# Patient Record
Sex: MALE | Race: WHITE | NOT HISPANIC OR LATINO | Employment: OTHER | URBAN - METROPOLITAN AREA
[De-identification: names, ages, dates, MRNs, and addresses within clinical notes are randomized per-mention and may not be internally consistent; named-entity substitution may affect disease eponyms.]

---

## 2017-04-13 ENCOUNTER — ALLSCRIPTS OFFICE VISIT (OUTPATIENT)
Dept: OTHER | Facility: OTHER | Age: 77
End: 2017-04-13

## 2017-04-13 ENCOUNTER — HOSPITAL ENCOUNTER (OUTPATIENT)
Dept: RADIOLOGY | Facility: CLINIC | Age: 77
Discharge: HOME | End: 2017-04-13
Payer: COMMERCIAL

## 2017-04-13 DIAGNOSIS — M16.12 PRIMARY OSTEOARTHRITIS OF LEFT HIP: ICD-10-CM

## 2017-04-13 PROCEDURE — 73501 X-RAY EXAM HIP UNI 1 VIEW: CPT

## 2017-04-14 LAB
A/G RATIO (HISTORICAL): 1.9 (ref 1.2–2.2)
ALBUMIN SERPL BCP-MCNC: 4.5 G/DL (ref 3.5–4.8)
ALP SERPL-CCNC: 21 IU/L (ref 39–117)
ALT SERPL W P-5'-P-CCNC: 22 IU/L (ref 0–44)
AST SERPL W P-5'-P-CCNC: 28 IU/L (ref 0–40)
BILIRUB SERPL-MCNC: 1.3 MG/DL (ref 0–1.2)
BUN SERPL-MCNC: 12 MG/DL (ref 8–27)
BUN/CREA RATIO (HISTORICAL): 12 (ref 10–24)
CALCIUM SERPL-MCNC: 9.5 MG/DL (ref 8.6–10.2)
CHLORIDE SERPL-SCNC: 100 MMOL/L (ref 96–106)
CHOLEST SERPL-MCNC: 168 MG/DL (ref 100–199)
CHOLEST/HDLC SERPL: 2.5 RATIO UNITS (ref 0–5)
CO2 SERPL-SCNC: 24 MMOL/L (ref 18–29)
CREAT SERPL-MCNC: 1.02 MG/DL (ref 0.76–1.27)
EGFR AFRICAN AMERICAN (HISTORICAL): 83 ML/MIN/1.73
EGFR-AMERICAN CALC (HISTORICAL): 72 ML/MIN/1.73
GLUCOSE SERPL-MCNC: 88 MG/DL (ref 65–99)
HDLC SERPL-MCNC: 67 MG/DL
LDLC SERPL CALC-MCNC: 90 MG/DL (ref 0–99)
POTASSIUM SERPL-SCNC: 5.8 MMOL/L (ref 3.5–5.2)
PROSTATE SPECIFIC ANTIGEN (HISTORICAL): 0.8 NG/ML (ref 0–4)
SODIUM SERPL-SCNC: 141 MMOL/L (ref 134–144)
TOT. GLOBULIN, SERUM (HISTORICAL): 2.4 G/DL (ref 1.5–4.5)
TOTAL PROTEIN (HISTORICAL): 6.9 G/DL (ref 6–8.5)
TRIGL SERPL-MCNC: 57 MG/DL (ref 0–149)
VLDLC SERPL CALC-MCNC: 11 MG/DL (ref 5–40)

## 2017-04-17 ENCOUNTER — GENERIC CONVERSION - ENCOUNTER (OUTPATIENT)
Dept: OTHER | Facility: OTHER | Age: 77
End: 2017-04-17

## 2017-05-11 ENCOUNTER — GENERIC CONVERSION - ENCOUNTER (OUTPATIENT)
Dept: OTHER | Facility: OTHER | Age: 77
End: 2017-05-11

## 2017-05-11 ENCOUNTER — TRANSCRIBE ORDERS (OUTPATIENT)
Dept: LAB | Facility: HOSPITAL | Age: 77
End: 2017-05-11

## 2017-05-11 ENCOUNTER — OFFICE VISIT (OUTPATIENT)
Dept: LAB | Facility: HOSPITAL | Age: 77
End: 2017-05-11
Attending: ORTHOPAEDIC SURGERY
Payer: COMMERCIAL

## 2017-05-11 ENCOUNTER — HOSPITAL ENCOUNTER (OUTPATIENT)
Dept: RADIOLOGY | Facility: HOSPITAL | Age: 77
Discharge: HOME | End: 2017-05-11
Attending: ORTHOPAEDIC SURGERY
Payer: COMMERCIAL

## 2017-05-11 ENCOUNTER — TRANSCRIBE ORDERS (OUTPATIENT)
Dept: RADIOLOGY | Facility: HOSPITAL | Age: 77
End: 2017-05-11

## 2017-05-11 ENCOUNTER — ALLSCRIPTS OFFICE VISIT (OUTPATIENT)
Dept: OTHER | Facility: OTHER | Age: 77
End: 2017-05-11

## 2017-05-11 DIAGNOSIS — M16.12 PRIMARY OSTEOARTHRITIS OF LEFT HIP: ICD-10-CM

## 2017-05-11 DIAGNOSIS — M16.12 PRIMARY OSTEOARTHRITIS OF LEFT HIP: Primary | ICD-10-CM

## 2017-05-11 LAB
ABO GROUP BLD: NORMAL
ATRIAL RATE: 50 BPM
BLD GP AB SCN SERPL QL: NEGATIVE
P AXIS: 44 DEGREES
PR INTERVAL: 264 MS
QRS AXIS: 18 DEGREES
QRSD INTERVAL: 82 MS
QT INTERVAL: 426 MS
QTC INTERVAL: 388 MS
RH BLD: POSITIVE
SPECIMEN EXPIRATION DATE: NORMAL
T WAVE AXIS: 24 DEGREES
VENTRICULAR RATE: 50 BPM

## 2017-05-11 PROCEDURE — 86900 BLOOD TYPING SEROLOGIC ABO: CPT

## 2017-05-11 PROCEDURE — 86901 BLOOD TYPING SEROLOGIC RH(D): CPT

## 2017-05-11 PROCEDURE — 93005 ELECTROCARDIOGRAM TRACING: CPT

## 2017-05-11 PROCEDURE — 71020 HB CHEST X-RAY 2VW FRONTAL&LATL: CPT

## 2017-05-11 PROCEDURE — 86850 RBC ANTIBODY SCREEN: CPT

## 2017-05-12 ENCOUNTER — GENERIC CONVERSION - ENCOUNTER (OUTPATIENT)
Dept: OTHER | Facility: OTHER | Age: 77
End: 2017-05-12

## 2017-05-12 LAB — AMBIG ABBREV CMP14 DEFAULT (HISTORICAL): NORMAL

## 2017-05-13 LAB
A/G RATIO (HISTORICAL): 1.8 (ref 1.2–2.2)
ALBUMIN SERPL BCP-MCNC: 4.2 G/DL (ref 3.5–4.8)
ALP SERPL-CCNC: 22 IU/L (ref 39–117)
ALT SERPL W P-5'-P-CCNC: 20 IU/L (ref 0–44)
APTT PPP: 28 SEC (ref 24–33)
AST SERPL W P-5'-P-CCNC: 28 IU/L (ref 0–40)
BACTERIA UR QL AUTO: NORMAL
BASOPHILS # BLD AUTO: 0.1 X10E3/UL (ref 0–0.2)
BASOPHILS # BLD AUTO: 1 %
BILIRUB SERPL-MCNC: 1 MG/DL (ref 0–1.2)
BILIRUB UR QL STRIP: NEGATIVE
BUN SERPL-MCNC: 13 MG/DL (ref 8–27)
BUN/CREA RATIO (HISTORICAL): 13 (ref 10–24)
CALCIUM SERPL-MCNC: 9.3 MG/DL (ref 8.6–10.2)
CHLORIDE SERPL-SCNC: 94 MMOL/L (ref 96–106)
CO2 SERPL-SCNC: 25 MMOL/L (ref 18–29)
COLOR UR: YELLOW
COMMENT (HISTORICAL): CLEAR
CREAT SERPL-MCNC: 1.02 MG/DL (ref 0.76–1.27)
DEPRECATED RDW RBC AUTO: 13.4 % (ref 12.3–15.4)
EGFR AFRICAN AMERICAN (HISTORICAL): 83 ML/MIN/1.73
EGFR-AMERICAN CALC (HISTORICAL): 72 ML/MIN/1.73
EOSINOPHIL # BLD AUTO: 0.1 X10E3/UL (ref 0–0.4)
EOSINOPHIL # BLD AUTO: 2 %
FECAL OCCULT BLOOD DIAGNOSTIC (HISTORICAL): NEGATIVE
GLUCOSE (HISTORICAL): NEGATIVE
GLUCOSE SERPL-MCNC: 90 MG/DL (ref 65–99)
HBA1C MFR BLD HPLC: 5.3 % (ref 4.8–5.6)
HCT VFR BLD AUTO: 41 % (ref 37.5–51)
HGB BLD-MCNC: 13.6 G/DL (ref 12.6–17.7)
IMM.GRANULOCYTES (CD4/8) (HISTORICAL): 0 %
IMM.GRANULOCYTES (CD4/8) (HISTORICAL): 0 X10E3/UL (ref 0–0.1)
INR PPP: 1.1 (ref 0.8–1.2)
KETONES UR STRIP-MCNC: NEGATIVE MG/DL
LEUKOCYTE ESTERASE UR QL STRIP: NEGATIVE
LYMPHOCYTES # BLD AUTO: 1.5 X10E3/UL (ref 0.7–3.1)
LYMPHOCYTES # BLD AUTO: 26 %
MCH RBC QN AUTO: 31.6 PG (ref 26.6–33)
MCHC RBC AUTO-ENTMCNC: 33.2 G/DL (ref 31.5–35.7)
MCV RBC AUTO: 95 FL (ref 79–97)
MICROSCOPIC EXAMINATION (HISTORICAL): NORMAL
MICROSCOPIC EXAMINATION (HISTORICAL): NORMAL
MONOCYTES # BLD AUTO: 0.4 X10E3/UL (ref 0.1–0.9)
MONOCYTES (HISTORICAL): 7 %
NEUTROPHILS # BLD AUTO: 3.9 X10E3/UL (ref 1.4–7)
NEUTROPHILS # BLD AUTO: 64 %
NITRITE UR QL STRIP: NEGATIVE
NON-SQ EPI CELLS URNS QL MICRO: NORMAL /HPF
PH UR STRIP.AUTO: 7 [PH] (ref 5–7.5)
PLATELET # BLD AUTO: 319 X10E3/UL (ref 150–379)
POTASSIUM SERPL-SCNC: 4.5 MMOL/L (ref 3.5–5.2)
PROT UR STRIP-MCNC: NEGATIVE MG/DL
PROTHROMBIN TIME: 11.9 SEC (ref 9.1–12)
RBC (HISTORICAL): 4.31 X10E6/UL (ref 4.14–5.8)
RBC (HISTORICAL): NORMAL /HPF
SODIUM SERPL-SCNC: 134 MMOL/L (ref 134–144)
SP GR UR STRIP.AUTO: 1.01 (ref 1–1.03)
TOT. GLOBULIN, SERUM (HISTORICAL): 2.3 G/DL (ref 1.5–4.5)
TOTAL PROTEIN (HISTORICAL): 6.5 G/DL (ref 6–8.5)
UROBILINOGEN UR QL STRIP.AUTO: 0.2 EU/DL (ref 0.2–1)
WBC # BLD AUTO: 6 X10E3/UL (ref 3.4–10.8)
WBC # BLD AUTO: NORMAL /HPF

## 2017-05-23 ENCOUNTER — ALLSCRIPTS OFFICE VISIT (OUTPATIENT)
Dept: OTHER | Facility: OTHER | Age: 77
End: 2017-05-23

## 2017-06-05 ENCOUNTER — ANESTHESIA EVENT (OUTPATIENT)
Dept: PERIOP | Facility: HOSPITAL | Age: 77
DRG: 470 | End: 2017-06-05
Payer: COMMERCIAL

## 2017-06-06 DIAGNOSIS — Z47.1 AFTERCARE FOLLOWING JOINT REPLACEMENT SURGERY: ICD-10-CM

## 2017-06-06 DIAGNOSIS — M16.12 PRIMARY OSTEOARTHRITIS OF LEFT HIP: ICD-10-CM

## 2017-06-09 ENCOUNTER — GENERIC CONVERSION - ENCOUNTER (OUTPATIENT)
Dept: OTHER | Facility: OTHER | Age: 77
End: 2017-06-09

## 2017-06-12 ENCOUNTER — ANESTHESIA (OUTPATIENT)
Dept: PERIOP | Facility: HOSPITAL | Age: 77
DRG: 470 | End: 2017-06-12
Payer: COMMERCIAL

## 2017-06-12 ENCOUNTER — APPOINTMENT (OUTPATIENT)
Dept: RADIOLOGY | Facility: HOSPITAL | Age: 77
DRG: 470 | End: 2017-06-12
Payer: COMMERCIAL

## 2017-06-12 ENCOUNTER — HOSPITAL ENCOUNTER (INPATIENT)
Facility: HOSPITAL | Age: 77
LOS: 1 days | Discharge: HOME HEALTH | DRG: 470 | End: 2017-06-13
Attending: ORTHOPAEDIC SURGERY | Admitting: ORTHOPAEDIC SURGERY
Payer: COMMERCIAL

## 2017-06-12 LAB
ABO GROUP BLD: NORMAL
BLD GP AB SCN SERPL QL: NEGATIVE
RH BLD: POSITIVE
SPECIMEN EXPIRATION DATE: NORMAL

## 2017-06-12 PROCEDURE — 73501 X-RAY EXAM HIP UNI 1 VIEW: CPT

## 2017-06-12 PROCEDURE — C1776 JOINT DEVICE (IMPLANTABLE): HCPCS | Performed by: ORTHOPAEDIC SURGERY

## 2017-06-12 PROCEDURE — 86923 COMPATIBILITY TEST ELECTRIC: CPT

## 2017-06-12 PROCEDURE — A9270 NON-COVERED ITEM OR SERVICE: HCPCS | Performed by: ORTHOPAEDIC SURGERY

## 2017-06-12 PROCEDURE — G8988 SELF CARE GOAL STATUS: HCPCS

## 2017-06-12 PROCEDURE — C1713 ANCHOR/SCREW BN/BN,TIS/BN: HCPCS | Performed by: ORTHOPAEDIC SURGERY

## 2017-06-12 PROCEDURE — A9270 NON-COVERED ITEM OR SERVICE: HCPCS | Performed by: PHYSICIAN ASSISTANT

## 2017-06-12 PROCEDURE — G8978 MOBILITY CURRENT STATUS: HCPCS

## 2017-06-12 PROCEDURE — 0SRB0JA REPLACEMENT OF LEFT HIP JOINT WITH SYNTHETIC SUBSTITUTE, UNCEMENTED, OPEN APPROACH: ICD-10-PCS | Performed by: ORTHOPAEDIC SURGERY

## 2017-06-12 PROCEDURE — 30233N1 TRANSFUSION OF NONAUTOLOGOUS RED BLOOD CELLS INTO PERIPHERAL VEIN, PERCUTANEOUS APPROACH: ICD-10-PCS | Performed by: ORTHOPAEDIC SURGERY

## 2017-06-12 PROCEDURE — 86901 BLOOD TYPING SEROLOGIC RH(D): CPT | Performed by: ORTHOPAEDIC SURGERY

## 2017-06-12 PROCEDURE — A9270 NON-COVERED ITEM OR SERVICE: HCPCS | Performed by: ANESTHESIOLOGY

## 2017-06-12 PROCEDURE — G8979 MOBILITY GOAL STATUS: HCPCS

## 2017-06-12 PROCEDURE — 97165 OT EVAL LOW COMPLEX 30 MIN: CPT

## 2017-06-12 PROCEDURE — 97163 PT EVAL HIGH COMPLEX 45 MIN: CPT

## 2017-06-12 PROCEDURE — 86850 RBC ANTIBODY SCREEN: CPT | Performed by: ORTHOPAEDIC SURGERY

## 2017-06-12 PROCEDURE — 86900 BLOOD TYPING SEROLOGIC ABO: CPT | Performed by: ORTHOPAEDIC SURGERY

## 2017-06-12 PROCEDURE — G8987 SELF CARE CURRENT STATUS: HCPCS

## 2017-06-12 PROCEDURE — G8989 SELF CARE D/C STATUS: HCPCS

## 2017-06-12 DEVICE — M-SPEC METAL FEMORAL HEAD 12/14 TAPER DIAMETER 36MM +1.5: Type: IMPLANTABLE DEVICE | Site: HIP | Status: FUNCTIONAL

## 2017-06-12 DEVICE — PINNACLE POROCOAT ACETABULAR SHELL SECTOR II 54MM OD
Type: IMPLANTABLE DEVICE | Site: HIP | Status: FUNCTIONAL
Brand: PINNACLE POROCOAT

## 2017-06-12 DEVICE — CORAIL HIP SYSTEM CEMENTLESS FEMORAL STEM 12/14 AMT 135 DEGREES KA SIZE 12 HA COATED STANDARD COLLAR
Type: IMPLANTABLE DEVICE | Site: HIP | Status: FUNCTIONAL
Brand: CORAIL

## 2017-06-12 DEVICE — PINNACLE HIP SOLUTIONS ALTRX POLYETHYLENE ACETABULAR LINER NEUTRAL 36MM ID 54MM OD
Type: IMPLANTABLE DEVICE | Site: HIP | Status: FUNCTIONAL
Brand: PINNACLE ALTRX

## 2017-06-12 DEVICE — PINNACLE CANCELLOUS BONE SCREW 6.5MM X 25MM
Type: IMPLANTABLE DEVICE | Site: HIP | Status: FUNCTIONAL
Brand: PINNACLE

## 2017-06-12 RX ORDER — LIDOCAINE HYDROCHLORIDE 10 MG/ML
INJECTION, SOLUTION INFILTRATION; PERINEURAL AS NEEDED
Status: DISCONTINUED | OUTPATIENT
Start: 2017-06-12 | End: 2017-06-12 | Stop reason: SURG

## 2017-06-12 RX ORDER — HYDROMORPHONE HYDROCHLORIDE 2 MG/ML
INJECTION, SOLUTION INTRAMUSCULAR; INTRAVENOUS; SUBCUTANEOUS AS NEEDED
Status: DISCONTINUED | OUTPATIENT
Start: 2017-06-12 | End: 2017-06-12 | Stop reason: SURG

## 2017-06-12 RX ORDER — MIDAZOLAM HYDROCHLORIDE 1 MG/ML
INJECTION INTRAMUSCULAR; INTRAVENOUS AS NEEDED
Status: DISCONTINUED | OUTPATIENT
Start: 2017-06-12 | End: 2017-06-12 | Stop reason: SURG

## 2017-06-12 RX ORDER — ONDANSETRON 2 MG/ML
4 INJECTION INTRAMUSCULAR; INTRAVENOUS ONCE AS NEEDED
Status: DISCONTINUED | OUTPATIENT
Start: 2017-06-12 | End: 2017-06-12 | Stop reason: HOSPADM

## 2017-06-12 RX ORDER — METOCLOPRAMIDE HYDROCHLORIDE 5 MG/ML
10 INJECTION INTRAMUSCULAR; INTRAVENOUS ONCE AS NEEDED
Status: DISCONTINUED | OUTPATIENT
Start: 2017-06-12 | End: 2017-06-12 | Stop reason: HOSPADM

## 2017-06-12 RX ORDER — ONDANSETRON 2 MG/ML
4 INJECTION INTRAMUSCULAR; INTRAVENOUS EVERY 6 HOURS PRN
Status: DISCONTINUED | OUTPATIENT
Start: 2017-06-12 | End: 2017-06-13 | Stop reason: HOSPADM

## 2017-06-12 RX ORDER — FENTANYL CITRATE/PF 50 MCG/ML
50 SYRINGE (ML) INJECTION
Status: DISCONTINUED | OUTPATIENT
Start: 2017-06-12 | End: 2017-06-12 | Stop reason: HOSPADM

## 2017-06-12 RX ORDER — GABAPENTIN 100 MG/1
100 CAPSULE ORAL ONCE
Status: COMPLETED | OUTPATIENT
Start: 2017-06-12 | End: 2017-06-12

## 2017-06-12 RX ORDER — PANTOPRAZOLE SODIUM 40 MG/1
40 TABLET, DELAYED RELEASE ORAL DAILY
Status: DISCONTINUED | OUTPATIENT
Start: 2017-06-12 | End: 2017-06-13 | Stop reason: HOSPADM

## 2017-06-12 RX ORDER — EPHEDRINE SULFATE 50 MG/ML
INJECTION, SOLUTION INTRAVENOUS AS NEEDED
Status: DISCONTINUED | OUTPATIENT
Start: 2017-06-12 | End: 2017-06-12 | Stop reason: SURG

## 2017-06-12 RX ORDER — MULTIVITAMIN
1 TABLET ORAL DAILY
COMMUNITY
End: 2017-12-28 | Stop reason: ALTCHOICE

## 2017-06-12 RX ORDER — TRANEXAMIC ACID 100 MG/ML
INJECTION, SOLUTION INTRAVENOUS AS NEEDED
Status: DISCONTINUED | OUTPATIENT
Start: 2017-06-12 | End: 2017-06-12 | Stop reason: SURG

## 2017-06-12 RX ORDER — DOCUSATE SODIUM 100 MG/1
100 CAPSULE, LIQUID FILLED ORAL 2 TIMES DAILY
Status: DISCONTINUED | OUTPATIENT
Start: 2017-06-12 | End: 2017-06-13 | Stop reason: HOSPADM

## 2017-06-12 RX ORDER — MEPERIDINE HYDROCHLORIDE 25 MG/ML
12.5 INJECTION INTRAMUSCULAR; INTRAVENOUS; SUBCUTANEOUS ONCE AS NEEDED
Status: DISCONTINUED | OUTPATIENT
Start: 2017-06-12 | End: 2017-06-12 | Stop reason: HOSPADM

## 2017-06-12 RX ORDER — ONDANSETRON 2 MG/ML
INJECTION INTRAMUSCULAR; INTRAVENOUS AS NEEDED
Status: DISCONTINUED | OUTPATIENT
Start: 2017-06-12 | End: 2017-06-12 | Stop reason: SURG

## 2017-06-12 RX ORDER — ACETAMINOPHEN 325 MG/1
650 TABLET ORAL EVERY 6 HOURS PRN
Status: DISCONTINUED | OUTPATIENT
Start: 2017-06-12 | End: 2017-06-13 | Stop reason: HOSPADM

## 2017-06-12 RX ORDER — MAGNESIUM HYDROXIDE/ALUMINUM HYDROXICE/SIMETHICONE 120; 1200; 1200 MG/30ML; MG/30ML; MG/30ML
30 SUSPENSION ORAL EVERY 6 HOURS PRN
Status: DISCONTINUED | OUTPATIENT
Start: 2017-06-12 | End: 2017-06-13 | Stop reason: HOSPADM

## 2017-06-12 RX ORDER — TRAMADOL HYDROCHLORIDE 50 MG/1
50 TABLET ORAL EVERY 6 HOURS SCHEDULED
Status: DISCONTINUED | OUTPATIENT
Start: 2017-06-12 | End: 2017-06-13 | Stop reason: HOSPADM

## 2017-06-12 RX ORDER — MAGNESIUM HYDROXIDE 1200 MG/15ML
LIQUID ORAL AS NEEDED
Status: DISCONTINUED | OUTPATIENT
Start: 2017-06-12 | End: 2017-06-12 | Stop reason: HOSPADM

## 2017-06-12 RX ORDER — ACETAMINOPHEN 325 MG/1
650 TABLET ORAL ONCE
Status: COMPLETED | OUTPATIENT
Start: 2017-06-12 | End: 2017-06-12

## 2017-06-12 RX ORDER — SODIUM CHLORIDE, SODIUM LACTATE, POTASSIUM CHLORIDE, CALCIUM CHLORIDE 600; 310; 30; 20 MG/100ML; MG/100ML; MG/100ML; MG/100ML
125 INJECTION, SOLUTION INTRAVENOUS CONTINUOUS
Status: DISCONTINUED | OUTPATIENT
Start: 2017-06-12 | End: 2017-06-13 | Stop reason: HOSPADM

## 2017-06-12 RX ORDER — PROPOFOL 10 MG/ML
INJECTION, EMULSION INTRAVENOUS AS NEEDED
Status: DISCONTINUED | OUTPATIENT
Start: 2017-06-12 | End: 2017-06-12 | Stop reason: SURG

## 2017-06-12 RX ORDER — OXYCODONE HYDROCHLORIDE 5 MG/1
5 TABLET ORAL EVERY 4 HOURS PRN
Status: DISCONTINUED | OUTPATIENT
Start: 2017-06-12 | End: 2017-06-13 | Stop reason: HOSPADM

## 2017-06-12 RX ORDER — MORPHINE SULFATE 4 MG/ML
3 INJECTION, SOLUTION INTRAMUSCULAR; INTRAVENOUS
Status: DISCONTINUED | OUTPATIENT
Start: 2017-06-12 | End: 2017-06-13 | Stop reason: HOSPADM

## 2017-06-12 RX ORDER — ROCURONIUM BROMIDE 10 MG/ML
INJECTION, SOLUTION INTRAVENOUS AS NEEDED
Status: DISCONTINUED | OUTPATIENT
Start: 2017-06-12 | End: 2017-06-12 | Stop reason: SURG

## 2017-06-12 RX ORDER — SENNOSIDES 8.6 MG
1 TABLET ORAL DAILY
Status: DISCONTINUED | OUTPATIENT
Start: 2017-06-12 | End: 2017-06-13 | Stop reason: HOSPADM

## 2017-06-12 RX ORDER — SODIUM CHLORIDE 9 MG/ML
INJECTION, SOLUTION INTRAVENOUS CONTINUOUS PRN
Status: DISCONTINUED | OUTPATIENT
Start: 2017-06-12 | End: 2017-06-12 | Stop reason: SURG

## 2017-06-12 RX ORDER — OXYCODONE HYDROCHLORIDE 10 MG/1
10 TABLET ORAL EVERY 4 HOURS PRN
Status: DISCONTINUED | OUTPATIENT
Start: 2017-06-12 | End: 2017-06-13 | Stop reason: HOSPADM

## 2017-06-12 RX ORDER — OXYCODONE HYDROCHLORIDE 5 MG/1
TABLET ORAL
Qty: 60 TABLET | Refills: 0 | Status: SHIPPED | OUTPATIENT
Start: 2017-06-12 | End: 2017-12-28 | Stop reason: ALTCHOICE

## 2017-06-12 RX ORDER — FENTANYL CITRATE 50 UG/ML
INJECTION, SOLUTION INTRAMUSCULAR; INTRAVENOUS AS NEEDED
Status: DISCONTINUED | OUTPATIENT
Start: 2017-06-12 | End: 2017-06-12 | Stop reason: SURG

## 2017-06-12 RX ORDER — TRAMADOL HYDROCHLORIDE 50 MG/1
50 TABLET ORAL ONCE
Status: COMPLETED | OUTPATIENT
Start: 2017-06-12 | End: 2017-06-12

## 2017-06-12 RX ORDER — GLYCOPYRROLATE 0.2 MG/ML
INJECTION INTRAMUSCULAR; INTRAVENOUS AS NEEDED
Status: DISCONTINUED | OUTPATIENT
Start: 2017-06-12 | End: 2017-06-12 | Stop reason: SURG

## 2017-06-12 RX ORDER — CALCIUM CARBONATE 200(500)MG
1000 TABLET,CHEWABLE ORAL DAILY PRN
Status: DISCONTINUED | OUTPATIENT
Start: 2017-06-12 | End: 2017-06-13 | Stop reason: HOSPADM

## 2017-06-12 RX ADMIN — HYDROMORPHONE HYDROCHLORIDE 0.2 MG: 1 INJECTION, SOLUTION INTRAMUSCULAR; INTRAVENOUS; SUBCUTANEOUS at 11:41

## 2017-06-12 RX ADMIN — PROPOFOL 150 MG: 10 INJECTION, EMULSION INTRAVENOUS at 08:16

## 2017-06-12 RX ADMIN — PANTOPRAZOLE SODIUM 40 MG: 40 TABLET, DELAYED RELEASE ORAL at 13:28

## 2017-06-12 RX ADMIN — SODIUM CHLORIDE, SODIUM LACTATE, POTASSIUM CHLORIDE, AND CALCIUM CHLORIDE 125 ML/HR: .6; .31; .03; .02 INJECTION, SOLUTION INTRAVENOUS at 19:34

## 2017-06-12 RX ADMIN — FENTANYL CITRATE 50 MCG: 50 INJECTION, SOLUTION INTRAMUSCULAR; INTRAVENOUS at 10:24

## 2017-06-12 RX ADMIN — TRAMADOL HYDROCHLORIDE 50 MG: 50 TABLET, COATED ORAL at 07:19

## 2017-06-12 RX ADMIN — HYDROMORPHONE HYDROCHLORIDE 0.2 MG: 1 INJECTION, SOLUTION INTRAMUSCULAR; INTRAVENOUS; SUBCUTANEOUS at 12:02

## 2017-06-12 RX ADMIN — ROCURONIUM BROMIDE 10 MG: 10 INJECTION, SOLUTION INTRAVENOUS at 10:24

## 2017-06-12 RX ADMIN — HYDROMORPHONE HYDROCHLORIDE 0.2 MG: 1 INJECTION, SOLUTION INTRAMUSCULAR; INTRAVENOUS; SUBCUTANEOUS at 12:07

## 2017-06-12 RX ADMIN — LIDOCAINE HYDROCHLORIDE 50 MG: 10 INJECTION, SOLUTION INFILTRATION; PERINEURAL at 08:16

## 2017-06-12 RX ADMIN — SODIUM CHLORIDE, SODIUM LACTATE, POTASSIUM CHLORIDE, AND CALCIUM CHLORIDE: .6; .31; .03; .02 INJECTION, SOLUTION INTRAVENOUS at 06:30

## 2017-06-12 RX ADMIN — EPHEDRINE SULFATE 5 MG: 50 INJECTION, SOLUTION INTRAMUSCULAR; INTRAVENOUS; SUBCUTANEOUS at 10:39

## 2017-06-12 RX ADMIN — TRANEXAMIC ACID 1000 MG: 100 INJECTION, SOLUTION INTRAVENOUS at 08:36

## 2017-06-12 RX ADMIN — ONDANSETRON 4 MG: 2 INJECTION INTRAMUSCULAR; INTRAVENOUS at 08:10

## 2017-06-12 RX ADMIN — EPHEDRINE SULFATE 5 MG: 50 INJECTION, SOLUTION INTRAMUSCULAR; INTRAVENOUS; SUBCUTANEOUS at 09:19

## 2017-06-12 RX ADMIN — NEOSTIGMINE METHYLSULFATE 3 MG: 1 INJECTION, SOLUTION INTRAMUSCULAR; INTRAVENOUS; SUBCUTANEOUS at 11:00

## 2017-06-12 RX ADMIN — HYDROMORPHONE HYDROCHLORIDE 0.4 MG: 2 INJECTION, SOLUTION INTRAMUSCULAR; INTRAVENOUS; SUBCUTANEOUS at 11:23

## 2017-06-12 RX ADMIN — DOCUSATE SODIUM 100 MG: 100 CAPSULE, LIQUID FILLED ORAL at 18:04

## 2017-06-12 RX ADMIN — EPHEDRINE SULFATE 5 MG: 50 INJECTION, SOLUTION INTRAMUSCULAR; INTRAVENOUS; SUBCUTANEOUS at 08:40

## 2017-06-12 RX ADMIN — ROCURONIUM BROMIDE 10 MG: 10 INJECTION, SOLUTION INTRAVENOUS at 10:48

## 2017-06-12 RX ADMIN — GABAPENTIN 100 MG: 100 CAPSULE ORAL at 07:19

## 2017-06-12 RX ADMIN — EPHEDRINE SULFATE 5 MG: 50 INJECTION, SOLUTION INTRAMUSCULAR; INTRAVENOUS; SUBCUTANEOUS at 10:02

## 2017-06-12 RX ADMIN — SODIUM CHLORIDE: 0.9 INJECTION, SOLUTION INTRAVENOUS at 08:21

## 2017-06-12 RX ADMIN — EPHEDRINE SULFATE 5 MG: 50 INJECTION, SOLUTION INTRAMUSCULAR; INTRAVENOUS; SUBCUTANEOUS at 10:04

## 2017-06-12 RX ADMIN — FENTANYL CITRATE 100 MCG: 50 INJECTION, SOLUTION INTRAMUSCULAR; INTRAVENOUS at 08:16

## 2017-06-12 RX ADMIN — HYDROMORPHONE HYDROCHLORIDE 0.2 MG: 2 INJECTION, SOLUTION INTRAMUSCULAR; INTRAVENOUS; SUBCUTANEOUS at 11:20

## 2017-06-12 RX ADMIN — ROCURONIUM BROMIDE 50 MG: 10 INJECTION, SOLUTION INTRAVENOUS at 08:16

## 2017-06-12 RX ADMIN — SODIUM CHLORIDE, SODIUM LACTATE, POTASSIUM CHLORIDE, AND CALCIUM CHLORIDE 125 ML/HR: .6; .31; .03; .02 INJECTION, SOLUTION INTRAVENOUS at 11:48

## 2017-06-12 RX ADMIN — TRAMADOL HYDROCHLORIDE 50 MG: 50 TABLET, COATED ORAL at 18:04

## 2017-06-12 RX ADMIN — DOCUSATE SODIUM 100 MG: 100 CAPSULE, LIQUID FILLED ORAL at 13:28

## 2017-06-12 RX ADMIN — CEFAZOLIN SODIUM 1000 MG: 1 SOLUTION INTRAVENOUS at 19:34

## 2017-06-12 RX ADMIN — MIDAZOLAM HYDROCHLORIDE 2 MG: 1 INJECTION, SOLUTION INTRAMUSCULAR; INTRAVENOUS at 08:10

## 2017-06-12 RX ADMIN — HYDROMORPHONE HYDROCHLORIDE 0.4 MG: 2 INJECTION, SOLUTION INTRAMUSCULAR; INTRAVENOUS; SUBCUTANEOUS at 11:27

## 2017-06-12 RX ADMIN — EPHEDRINE SULFATE 15 MG: 50 INJECTION, SOLUTION INTRAMUSCULAR; INTRAVENOUS; SUBCUTANEOUS at 08:43

## 2017-06-12 RX ADMIN — ACETAMINOPHEN 650 MG: 325 TABLET, FILM COATED ORAL at 21:40

## 2017-06-12 RX ADMIN — HYDROMORPHONE HYDROCHLORIDE 0.2 MG: 1 INJECTION, SOLUTION INTRAMUSCULAR; INTRAVENOUS; SUBCUTANEOUS at 12:29

## 2017-06-12 RX ADMIN — FENTANYL CITRATE 100 MCG: 50 INJECTION, SOLUTION INTRAMUSCULAR; INTRAVENOUS at 08:57

## 2017-06-12 RX ADMIN — ACETAMINOPHEN 650 MG: 325 TABLET, FILM COATED ORAL at 07:19

## 2017-06-12 RX ADMIN — CEFAZOLIN SODIUM 2000 MG: 2 SOLUTION INTRAVENOUS at 08:34

## 2017-06-12 RX ADMIN — HYDROMORPHONE HYDROCHLORIDE 0.2 MG: 1 INJECTION, SOLUTION INTRAMUSCULAR; INTRAVENOUS; SUBCUTANEOUS at 11:54

## 2017-06-12 RX ADMIN — ROCURONIUM BROMIDE 20 MG: 10 INJECTION, SOLUTION INTRAVENOUS at 09:01

## 2017-06-12 RX ADMIN — TRAMADOL HYDROCHLORIDE 50 MG: 50 TABLET, COATED ORAL at 13:28

## 2017-06-12 RX ADMIN — SENNOSIDES 8.6 MG: 8.6 TABLET, FILM COATED ORAL at 13:28

## 2017-06-12 RX ADMIN — GLYCOPYRROLATE 0.6 MG: 0.2 INJECTION INTRAMUSCULAR; INTRAVENOUS at 11:00

## 2017-06-13 VITALS
SYSTOLIC BLOOD PRESSURE: 106 MMHG | WEIGHT: 150 LBS | BODY MASS INDEX: 22.73 KG/M2 | HEART RATE: 70 BPM | OXYGEN SATURATION: 99 % | TEMPERATURE: 98.9 F | HEIGHT: 68 IN | RESPIRATION RATE: 18 BRPM | DIASTOLIC BLOOD PRESSURE: 56 MMHG

## 2017-06-13 PROBLEM — Z96.649 S/P TOTAL HIP ARTHROPLASTY: Status: ACTIVE | Noted: 2017-06-13

## 2017-06-13 LAB
ANION GAP SERPL CALCULATED.3IONS-SCNC: 6 MMOL/L (ref 4–13)
BUN SERPL-MCNC: 9 MG/DL (ref 5–25)
CALCIUM SERPL-MCNC: 7.6 MG/DL (ref 8.3–10.1)
CHLORIDE SERPL-SCNC: 98 MMOL/L (ref 100–108)
CO2 SERPL-SCNC: 28 MMOL/L (ref 21–32)
CREAT SERPL-MCNC: 0.68 MG/DL (ref 0.6–1.3)
ERYTHROCYTE [DISTWIDTH] IN BLOOD BY AUTOMATED COUNT: 13.8 % (ref 11.6–15.1)
GFR SERPL CREATININE-BSD FRML MDRD: >60 ML/MIN/1.73SQ M
GLUCOSE SERPL-MCNC: 109 MG/DL (ref 65–140)
HCT VFR BLD AUTO: 32.9 % (ref 36.5–49.3)
HGB BLD-MCNC: 11.2 G/DL (ref 12–17)
MCH RBC QN AUTO: 33.1 PG (ref 26.8–34.3)
MCHC RBC AUTO-ENTMCNC: 34 G/DL (ref 31.4–37.4)
MCV RBC AUTO: 97 FL (ref 82–98)
PLATELET # BLD AUTO: 259 THOUSANDS/UL (ref 149–390)
PMV BLD AUTO: 9.3 FL (ref 8.9–12.7)
POTASSIUM SERPL-SCNC: 3.9 MMOL/L (ref 3.5–5.3)
RBC # BLD AUTO: 3.38 MILLION/UL (ref 3.88–5.62)
SODIUM SERPL-SCNC: 132 MMOL/L (ref 136–145)
WBC # BLD AUTO: 9.33 THOUSAND/UL (ref 4.31–10.16)

## 2017-06-13 PROCEDURE — A9270 NON-COVERED ITEM OR SERVICE: HCPCS | Performed by: PHYSICIAN ASSISTANT

## 2017-06-13 PROCEDURE — 97116 GAIT TRAINING THERAPY: CPT

## 2017-06-13 PROCEDURE — 80048 BASIC METABOLIC PNL TOTAL CA: CPT | Performed by: ORTHOPAEDIC SURGERY

## 2017-06-13 PROCEDURE — 85027 COMPLETE CBC AUTOMATED: CPT | Performed by: PHYSICIAN ASSISTANT

## 2017-06-13 PROCEDURE — 97530 THERAPEUTIC ACTIVITIES: CPT

## 2017-06-13 RX ORDER — TRAMADOL HYDROCHLORIDE 50 MG/1
50 TABLET ORAL EVERY 6 HOURS SCHEDULED
Qty: 40 TABLET | Refills: 0
Start: 2017-06-13 | End: 2017-06-23

## 2017-06-13 RX ORDER — PANTOPRAZOLE SODIUM 40 MG/1
40 TABLET, DELAYED RELEASE ORAL DAILY
Refills: 0
Start: 2017-06-13 | End: 2017-12-28 | Stop reason: ALTCHOICE

## 2017-06-13 RX ORDER — ACETAMINOPHEN 325 MG/1
650 TABLET ORAL EVERY 6 HOURS PRN
Qty: 30 TABLET | Refills: 0
Start: 2017-06-13 | End: 2018-01-06 | Stop reason: HOSPADM

## 2017-06-13 RX ADMIN — ACETAMINOPHEN 650 MG: 325 TABLET, FILM COATED ORAL at 10:17

## 2017-06-13 RX ADMIN — TRAMADOL HYDROCHLORIDE 50 MG: 50 TABLET, COATED ORAL at 05:01

## 2017-06-13 RX ADMIN — TRAMADOL HYDROCHLORIDE 50 MG: 50 TABLET, COATED ORAL at 12:11

## 2017-06-13 RX ADMIN — DOCUSATE SODIUM 100 MG: 100 CAPSULE, LIQUID FILLED ORAL at 10:17

## 2017-06-13 RX ADMIN — OXYCODONE HYDROCHLORIDE 10 MG: 10 TABLET ORAL at 10:17

## 2017-06-13 RX ADMIN — PANTOPRAZOLE SODIUM 40 MG: 40 TABLET, DELAYED RELEASE ORAL at 10:17

## 2017-06-13 RX ADMIN — CEFAZOLIN SODIUM 1000 MG: 1 SOLUTION INTRAVENOUS at 04:52

## 2017-06-13 RX ADMIN — TRAMADOL HYDROCHLORIDE 50 MG: 50 TABLET, COATED ORAL at 00:22

## 2017-06-13 RX ADMIN — SENNOSIDES 8.6 MG: 8.6 TABLET, FILM COATED ORAL at 10:17

## 2017-06-13 RX ADMIN — ENOXAPARIN SODIUM 40 MG: 40 INJECTION SUBCUTANEOUS at 10:17

## 2017-06-13 RX ADMIN — ONDANSETRON 4 MG: 2 INJECTION INTRAMUSCULAR; INTRAVENOUS at 15:43

## 2017-06-13 RX ADMIN — OXYCODONE HYDROCHLORIDE 10 MG: 10 TABLET ORAL at 02:40

## 2017-06-13 RX ADMIN — OXYCODONE HYDROCHLORIDE 10 MG: 10 TABLET ORAL at 17:03

## 2017-06-13 RX ADMIN — SODIUM CHLORIDE, SODIUM LACTATE, POTASSIUM CHLORIDE, AND CALCIUM CHLORIDE 125 ML/HR: .6; .31; .03; .02 INJECTION, SOLUTION INTRAVENOUS at 04:51

## 2017-06-15 ENCOUNTER — GENERIC CONVERSION - ENCOUNTER (OUTPATIENT)
Dept: OTHER | Facility: OTHER | Age: 77
End: 2017-06-15

## 2017-06-15 LAB
ABO GROUP BLD BPU: NORMAL
ABO GROUP BLD BPU: NORMAL
BPU ID: NORMAL
BPU ID: NORMAL
UNIT DISPENSE STATUS: NORMAL
UNIT DISPENSE STATUS: NORMAL
UNIT PRODUCT CODE: NORMAL
UNIT PRODUCT CODE: NORMAL
UNIT RH: NORMAL
UNIT RH: NORMAL

## 2017-06-20 ENCOUNTER — ALLSCRIPTS OFFICE VISIT (OUTPATIENT)
Dept: OTHER | Facility: OTHER | Age: 77
End: 2017-06-20

## 2017-06-20 ENCOUNTER — HOSPITAL ENCOUNTER (OUTPATIENT)
Dept: RADIOLOGY | Facility: HOSPITAL | Age: 77
Discharge: HOME | End: 2017-06-20
Attending: ORTHOPAEDIC SURGERY
Payer: COMMERCIAL

## 2017-06-20 DIAGNOSIS — Z47.1 AFTERCARE FOLLOWING JOINT REPLACEMENT SURGERY: ICD-10-CM

## 2017-06-20 PROCEDURE — 73502 X-RAY EXAM HIP UNI 2-3 VIEWS: CPT

## 2017-06-27 ENCOUNTER — ALLSCRIPTS OFFICE VISIT (OUTPATIENT)
Dept: OTHER | Facility: OTHER | Age: 77
End: 2017-06-27

## 2017-07-28 ENCOUNTER — HOSPITAL ENCOUNTER (OUTPATIENT)
Dept: RADIOLOGY | Facility: HOSPITAL | Age: 77
Discharge: HOME | End: 2017-07-28
Attending: ORTHOPAEDIC SURGERY
Payer: COMMERCIAL

## 2017-07-28 ENCOUNTER — ALLSCRIPTS OFFICE VISIT (OUTPATIENT)
Dept: OTHER | Facility: OTHER | Age: 77
End: 2017-07-28

## 2017-07-28 DIAGNOSIS — Z47.1 AFTERCARE FOLLOWING JOINT REPLACEMENT SURGERY: ICD-10-CM

## 2017-07-28 DIAGNOSIS — M17.0 PRIMARY OSTEOARTHRITIS OF BOTH KNEES: ICD-10-CM

## 2017-07-28 PROCEDURE — 73502 X-RAY EXAM HIP UNI 2-3 VIEWS: CPT

## 2017-08-15 ENCOUNTER — ALLSCRIPTS OFFICE VISIT (OUTPATIENT)
Dept: OTHER | Facility: OTHER | Age: 77
End: 2017-08-15

## 2017-08-15 ENCOUNTER — HOSPITAL ENCOUNTER (OUTPATIENT)
Dept: RADIOLOGY | Facility: HOSPITAL | Age: 77
Discharge: HOME | End: 2017-08-15
Attending: ORTHOPAEDIC SURGERY
Payer: COMMERCIAL

## 2017-08-15 DIAGNOSIS — M17.0 PRIMARY OSTEOARTHRITIS OF BOTH KNEES: ICD-10-CM

## 2017-08-15 PROCEDURE — 73562 X-RAY EXAM OF KNEE 3: CPT

## 2017-09-08 ENCOUNTER — ALLSCRIPTS OFFICE VISIT (OUTPATIENT)
Dept: OTHER | Facility: OTHER | Age: 77
End: 2017-09-08

## 2017-09-08 ENCOUNTER — HOSPITAL ENCOUNTER (OUTPATIENT)
Dept: RADIOLOGY | Facility: HOSPITAL | Age: 77
Discharge: HOME | End: 2017-09-08
Attending: ORTHOPAEDIC SURGERY
Payer: COMMERCIAL

## 2017-09-08 DIAGNOSIS — Z47.1 AFTERCARE FOLLOWING JOINT REPLACEMENT SURGERY: ICD-10-CM

## 2017-09-08 PROCEDURE — 73502 X-RAY EXAM HIP UNI 2-3 VIEWS: CPT

## 2017-11-14 ENCOUNTER — GENERIC CONVERSION - ENCOUNTER (OUTPATIENT)
Dept: OTHER | Facility: OTHER | Age: 77
End: 2017-11-14

## 2017-12-05 ENCOUNTER — TRANSCRIBE ORDERS (OUTPATIENT)
Dept: ADMINISTRATIVE | Facility: HOSPITAL | Age: 77
End: 2017-12-05

## 2017-12-05 ENCOUNTER — HOSPITAL ENCOUNTER (OUTPATIENT)
Dept: NON INVASIVE DIAGNOSTICS | Facility: CLINIC | Age: 77
Discharge: HOME | End: 2017-12-05
Payer: COMMERCIAL

## 2017-12-05 ENCOUNTER — APPOINTMENT (OUTPATIENT)
Dept: RADIOLOGY | Facility: CLINIC | Age: 77
End: 2017-12-05
Payer: COMMERCIAL

## 2017-12-05 ENCOUNTER — GENERIC CONVERSION - ENCOUNTER (OUTPATIENT)
Dept: OTHER | Facility: OTHER | Age: 77
End: 2017-12-05

## 2017-12-05 ENCOUNTER — GENERIC CONVERSION - ENCOUNTER (OUTPATIENT)
Dept: OBGYN CLINIC | Facility: HOSPITAL | Age: 77
End: 2017-12-05

## 2017-12-05 ENCOUNTER — ALLSCRIPTS OFFICE VISIT (OUTPATIENT)
Dept: OTHER | Facility: OTHER | Age: 77
End: 2017-12-05

## 2017-12-05 DIAGNOSIS — Z01.810 PRE-OPERATIVE CARDIOVASCULAR EXAMINATION: Primary | ICD-10-CM

## 2017-12-05 DIAGNOSIS — Z01.810 PRE-OPERATIVE CARDIOVASCULAR EXAMINATION: ICD-10-CM

## 2017-12-05 DIAGNOSIS — M16.11 PRIMARY OSTEOARTHRITIS OF RIGHT HIP: ICD-10-CM

## 2017-12-05 PROCEDURE — 71020 HB CHEST X-RAY 2VW FRONTAL&LATL: CPT

## 2017-12-05 PROCEDURE — 93005 ELECTROCARDIOGRAM TRACING: CPT

## 2017-12-06 LAB
ABO GROUP BLD: NORMAL
APTT PPP: 27 SEC (ref 24–33)
ATRIAL RATE: 59 BPM
BACTERIA UR QL AUTO: NORMAL
BASOPHILS # BLD AUTO: 0 X10E3/UL (ref 0–0.2)
BASOPHILS # BLD AUTO: 1 %
BILIRUB UR QL STRIP: NEGATIVE
BUN SERPL-MCNC: 12 MG/DL (ref 8–27)
BUN/CREA RATIO (HISTORICAL): 13 (ref 10–24)
CALCIUM SERPL-MCNC: 9.7 MG/DL (ref 8.6–10.2)
CHLORIDE SERPL-SCNC: 95 MMOL/L (ref 96–106)
CO2 SERPL-SCNC: 24 MMOL/L (ref 18–29)
COLOR UR: YELLOW
COMMENT (HISTORICAL): CLEAR
CREAT SERPL-MCNC: 0.96 MG/DL (ref 0.76–1.27)
DEPRECATED RDW RBC AUTO: 13.1 % (ref 12.3–15.4)
EGFR AFRICAN AMERICAN (HISTORICAL): 89 ML/MIN/1.73
EGFR-AMERICAN CALC (HISTORICAL): 77 ML/MIN/1.73
EOSINOPHIL # BLD AUTO: 0.1 X10E3/UL (ref 0–0.4)
EOSINOPHIL # BLD AUTO: 2 %
FECAL OCCULT BLOOD DIAGNOSTIC (HISTORICAL): NEGATIVE
GLUCOSE (HISTORICAL): NEGATIVE
GLUCOSE SERPL-MCNC: 88 MG/DL (ref 65–99)
HBA1C MFR BLD HPLC: 5 % (ref 4.8–5.6)
HCT VFR BLD AUTO: 40 % (ref 37.5–51)
HGB BLD-MCNC: 14.1 G/DL (ref 13–17.7)
IMM.GRANULOCYTES (CD4/8) (HISTORICAL): 0 %
IMM.GRANULOCYTES (CD4/8) (HISTORICAL): 0 X10E3/UL (ref 0–0.1)
INR PPP: 1.1 (ref 0.8–1.2)
KETONES UR STRIP-MCNC: NEGATIVE MG/DL
LEUKOCYTE ESTERASE UR QL STRIP: NEGATIVE
LYMPHOCYTES # BLD AUTO: 1.2 X10E3/UL (ref 0.7–3.1)
LYMPHOCYTES # BLD AUTO: 22 %
MCH RBC QN AUTO: 33.3 PG (ref 26.6–33)
MCHC RBC AUTO-ENTMCNC: 35.3 G/DL (ref 31.5–35.7)
MCV RBC AUTO: 95 FL (ref 79–97)
MICROSCOPIC EXAMINATION (HISTORICAL): NORMAL
MICROSCOPIC EXAMINATION (HISTORICAL): NORMAL
MONOCYTES # BLD AUTO: 0.4 X10E3/UL (ref 0.1–0.9)
MONOCYTES (HISTORICAL): 6 %
NEUTROPHILS # BLD AUTO: 3.9 X10E3/UL (ref 1.4–7)
NEUTROPHILS # BLD AUTO: 69 %
NITRITE UR QL STRIP: NEGATIVE
NON-SQ EPI CELLS URNS QL MICRO: NORMAL /HPF
P AXIS: 47 DEGREES
PH UR STRIP.AUTO: 7.5 [PH] (ref 5–7.5)
PLATELET # BLD AUTO: 330 X10E3/UL (ref 150–379)
POTASSIUM SERPL-SCNC: 4.5 MMOL/L (ref 3.5–5.2)
PR INTERVAL: 260 MS
PROT UR STRIP-MCNC: NEGATIVE MG/DL
PROTHROMBIN TIME: 11.6 SEC (ref 9.1–12)
QRS AXIS: 11 DEGREES
QRSD INTERVAL: 80 MS
QT INTERVAL: 432 MS
QTC INTERVAL: 427 MS
RBC (HISTORICAL): 4.23 X10E6/UL (ref 4.14–5.8)
RBC (HISTORICAL): NORMAL /HPF
RH BLD: POSITIVE
SODIUM SERPL-SCNC: 138 MMOL/L (ref 134–144)
SP GR UR STRIP.AUTO: 1.01 (ref 1–1.03)
T WAVE AXIS: 31 DEGREES
URINALYSIS (UA) (HISTORICAL): NORMAL
UROBILINOGEN UR QL STRIP.AUTO: 0.2 EU/DL (ref 0.2–1)
VENTRICULAR RATE: 59 BPM
WBC # BLD AUTO: 5.6 X10E3/UL (ref 3.4–10.8)
WBC # BLD AUTO: NORMAL /HPF

## 2017-12-07 ENCOUNTER — GENERIC CONVERSION - ENCOUNTER (OUTPATIENT)
Dept: OTHER | Facility: OTHER | Age: 77
End: 2017-12-07

## 2017-12-27 ENCOUNTER — ALLSCRIPTS OFFICE VISIT (OUTPATIENT)
Dept: OTHER | Facility: OTHER | Age: 77
End: 2017-12-27

## 2017-12-28 ENCOUNTER — ANESTHESIA EVENT (OUTPATIENT)
Dept: PERIOP | Facility: HOSPITAL | Age: 77
DRG: 470 | End: 2017-12-28
Payer: COMMERCIAL

## 2017-12-28 NOTE — PROGRESS NOTES
or depression, no change in personality, no emotional problems  Endocrine: No complaints of proptosis, no hot flashes, no muscle weakness, no erectile dysfunction, no deepening of the voice, no feelings of weakness  Hematologic/Lymphatic: No complaints of swollen glands, no swollen glands in the neck, does not bleed easily, no easy bruising  Active Problems   1  Back pain (724 5) (M54 9)   2  Bladder disorder (596 9) (N32 9)   3  BPH (benign prostatic hyperplasia) (600 00) (N40 0)   4  Diverticulitis of colon (562 11) (K57 32)   5  Fullness in clavicular area (784 2) (R22 2)   6  Heart murmur (785 2) (R01 1)   7  Kidney mass (593 9) (N28 89)   8  Knee pain (719 46) (M25 569)   9  Left-sided low back pain with left-sided sciatica (724 3) (M54 42)   10  Leg pain (729 5) (M79 606)   11  Neoplasm of skin (239 2) (D49 2)   12  Pain of both hip joints (719 45) (M25 551,M25 552)   13  Primary osteoarthritis of both hips (715 15) (M16 0)   14  Primary osteoarthritis of both knees (715 16) (M17 0)   15  Primary osteoarthritis of left hip (715 15) (M16 12)   16  Primary osteoarthritis of right hip (715 15) (M16 11)    Past Medical History    · History of acute bronchitis (V12 69) (Z87 09)   · History of arthritis (V13 4) (Z87 39)     The active problems and past medical history were reviewed and updated today  Surgical History    · History of Arthroscopy Knee Left   · History of Arthroscopy Knee Right   · History of Hernia Repair     The surgical history was reviewed and updated today  Family History   Mother    · Family history of    · Family history of Melanoma  Father    · Family history of    · Family history of Melanoma  Sister    · Family history of      The family history was reviewed and updated today         Social History    · Former smoker (I72 39) (G02 157)   · Denied: History of Marijuana   ·    · Never a smoker   · Denied: History of No drug use   · Retired   · Social alcohol use (Z78 9)  The social history was reviewed and updated today  The social history was reviewed and is unchanged  Current Meds    1  Daily Value Multivitamin TABS; Take 1 tablet daily Recorded     The medication list was reviewed and updated today  Allergies   1  No Known Drug Allergies    Vitals    Recorded: [id number] 01:30PM   Heart Rate 64   Systolic 102, LUE, Sitting   Diastolic 80, LUE, Sitting   Height 5 ft 8 in   Weight 153 lb    BMI Calculated 23 26   BSA Calculated 1 82     Physical Exam        Constitutional      General appearance: No acute distress, well appearing and well nourished  Eyes      Conjunctiva and lids: No erythema, swelling or discharge  Pupils and irises: Equal, round, reactive to light  Ears, Nose, Mouth, and Throat      External inspection of ears and nose: Normal        Otoscopic examination: Tympanic membranes translucent with normal light reflex  Canals patent without erythema  Hearing: Normal        Nasal mucosa, septum, and turbinates: Normal without edema or erythema  Lips, teeth, and gums: Normal, good dentition  Oropharynx: Normal with no erythema, edema, exudate or lesions  Neck      Neck: Supple, symmetric, trachea midline, no masses  Thyroid: Normal, no thyromegaly  Pulmonary      Respiratory effort: No increased work of breathing or signs of respiratory distress  Auscultation of lungs: Clear to auscultation  Cardiovascular      Auscultation of heart: Normal rate and rhythm, normal S1 and S2, no murmurs  Peripheral vascular exam: Normal        Examination of extremities for edema and/or varicosities: Normal        Lymphatic      Palpation of lymph nodes in neck: No lymphadenopathy  Musculoskeletal      Gait and station: Normal        Inspection/palpation of digits and nails: Normal without clubbing or cyanosis         Inspection/palpation of joints, bones, and muscles: Normal        Range of motion: Normal        Stability: Normal        Muscle strength/tone: Normal        Skin      Skin and subcutaneous tissue: Normal without rashes or lesions  Palpation of skin and subcutaneous tissue: Normal turgor  Neurologic      Cranial nerves: Cranial nerves 2-12 intact  Cortical function: Normal mental status  Coordination: Normal finger to nose and heel to shin  Psychiatric      Judgment and insight: Normal        Orientation to person, place and time: Normal        Recent and remote memory: Intact  Mood and affect: Normal        End of Encounter Meds   1  Daily Value Multivitamin TABS; Take 1 tablet daily Recorded    Future Appointments      Date/Time Provider Specialty Site   01/03/2018 08:30 AM PARTHA Tena  [address] [place] [address] OR   01/11/2018 10:00 AM PARTHA Tena   Orthopedic Surgery [facility] [place]   01/19/2018 10:00 AM Efrain Rae     Signatures    Electronically signed by : Anita Kemp DO; Dec 27 2017  4:26PM EST                       (Author)

## 2018-01-03 ENCOUNTER — APPOINTMENT (OUTPATIENT)
Dept: RADIOLOGY | Facility: HOSPITAL | Age: 78
DRG: 470 | End: 2018-01-03
Payer: COMMERCIAL

## 2018-01-03 ENCOUNTER — ANESTHESIA (OUTPATIENT)
Dept: PERIOP | Facility: HOSPITAL | Age: 78
DRG: 470 | End: 2018-01-03
Payer: COMMERCIAL

## 2018-01-03 ENCOUNTER — HOSPITAL ENCOUNTER (INPATIENT)
Facility: HOSPITAL | Age: 78
LOS: 3 days | Discharge: HOME HEALTH | DRG: 470 | End: 2018-01-06
Attending: ORTHOPAEDIC SURGERY | Admitting: ORTHOPAEDIC SURGERY
Payer: COMMERCIAL

## 2018-01-03 DIAGNOSIS — Z96.641 STATUS POST TOTAL REPLACEMENT OF RIGHT HIP: Primary | ICD-10-CM

## 2018-01-03 PROCEDURE — C1776 JOINT DEVICE (IMPLANTABLE): HCPCS | Performed by: ORTHOPAEDIC SURGERY

## 2018-01-03 PROCEDURE — 73502 X-RAY EXAM HIP UNI 2-3 VIEWS: CPT

## 2018-01-03 PROCEDURE — G8979 MOBILITY GOAL STATUS: HCPCS

## 2018-01-03 PROCEDURE — 86900 BLOOD TYPING SEROLOGIC ABO: CPT | Performed by: ORTHOPAEDIC SURGERY

## 2018-01-03 PROCEDURE — C1713 ANCHOR/SCREW BN/BN,TIS/BN: HCPCS | Performed by: ORTHOPAEDIC SURGERY

## 2018-01-03 PROCEDURE — 97163 PT EVAL HIGH COMPLEX 45 MIN: CPT

## 2018-01-03 PROCEDURE — 0SR90JA REPLACEMENT OF RIGHT HIP JOINT WITH SYNTHETIC SUBSTITUTE, UNCEMENTED, OPEN APPROACH: ICD-10-PCS | Performed by: ORTHOPAEDIC SURGERY

## 2018-01-03 PROCEDURE — 86901 BLOOD TYPING SEROLOGIC RH(D): CPT | Performed by: ORTHOPAEDIC SURGERY

## 2018-01-03 PROCEDURE — G8978 MOBILITY CURRENT STATUS: HCPCS

## 2018-01-03 PROCEDURE — 86850 RBC ANTIBODY SCREEN: CPT | Performed by: ORTHOPAEDIC SURGERY

## 2018-01-03 DEVICE — M-SPEC METAL FEMORAL HEAD 12/14 TAPER DIAMETER 36MM -2: Type: IMPLANTABLE DEVICE | Site: HIP | Status: FUNCTIONAL

## 2018-01-03 DEVICE — PINNACLE HIP SOLUTIONS ALTRX POLYETHYLENE ACETABULAR LINER NEUTRAL 36MM ID 52MM OD
Type: IMPLANTABLE DEVICE | Site: HIP | Status: FUNCTIONAL
Brand: PINNACLE ALTRX

## 2018-01-03 DEVICE — PINNACLE POROCOAT ACETABULAR SHELL SECTOR II 52MM OD
Type: IMPLANTABLE DEVICE | Site: HIP | Status: FUNCTIONAL
Brand: PINNACLE POROCOAT

## 2018-01-03 DEVICE — PINNACLE CANCELLOUS BONE SCREW 6.5MM X 25MM
Type: IMPLANTABLE DEVICE | Site: HIP | Status: FUNCTIONAL
Brand: PINNACLE

## 2018-01-03 DEVICE — CORAIL HIP SYSTEM CEMENTLESS FEMORAL STEM 12/14 AMT 135 DEGREES KA SIZE 13 HA COATED STANDARD COLLAR
Type: IMPLANTABLE DEVICE | Site: HIP | Status: FUNCTIONAL
Brand: CORAIL

## 2018-01-03 RX ORDER — ROCURONIUM BROMIDE 10 MG/ML
INJECTION, SOLUTION INTRAVENOUS AS NEEDED
Status: DISCONTINUED | OUTPATIENT
Start: 2018-01-03 | End: 2018-01-03 | Stop reason: SURG

## 2018-01-03 RX ORDER — GLYCOPYRROLATE 0.2 MG/ML
INJECTION INTRAMUSCULAR; INTRAVENOUS AS NEEDED
Status: DISCONTINUED | OUTPATIENT
Start: 2018-01-03 | End: 2018-01-03 | Stop reason: SURG

## 2018-01-03 RX ORDER — FENTANYL CITRATE 50 UG/ML
INJECTION, SOLUTION INTRAMUSCULAR; INTRAVENOUS AS NEEDED
Status: DISCONTINUED | OUTPATIENT
Start: 2018-01-03 | End: 2018-01-03 | Stop reason: SURG

## 2018-01-03 RX ORDER — GABAPENTIN 100 MG/1
100 CAPSULE ORAL 3 TIMES DAILY
Qty: 90 CAPSULE | Refills: 0 | Status: SHIPPED | OUTPATIENT
Start: 2018-01-03 | End: 2018-06-29

## 2018-01-03 RX ORDER — TRAMADOL HYDROCHLORIDE 50 MG/1
50 TABLET ORAL EVERY 6 HOURS SCHEDULED
Status: DISCONTINUED | OUTPATIENT
Start: 2018-01-03 | End: 2018-01-06 | Stop reason: HOSPADM

## 2018-01-03 RX ORDER — PROPOFOL 10 MG/ML
INJECTION, EMULSION INTRAVENOUS AS NEEDED
Status: DISCONTINUED | OUTPATIENT
Start: 2018-01-03 | End: 2018-01-03 | Stop reason: SURG

## 2018-01-03 RX ORDER — OXYCODONE HYDROCHLORIDE 5 MG/1
TABLET ORAL
Qty: 30 TABLET | Refills: 0 | Status: SHIPPED | OUTPATIENT
Start: 2018-01-03 | End: 2018-06-29

## 2018-01-03 RX ORDER — METOCLOPRAMIDE HYDROCHLORIDE 5 MG/ML
5 INJECTION INTRAMUSCULAR; INTRAVENOUS ONCE AS NEEDED
Status: DISCONTINUED | OUTPATIENT
Start: 2018-01-03 | End: 2018-01-03 | Stop reason: HOSPADM

## 2018-01-03 RX ORDER — ACETAMINOPHEN 325 MG/1
975 TABLET ORAL ONCE
Status: COMPLETED | OUTPATIENT
Start: 2018-01-03 | End: 2018-01-03

## 2018-01-03 RX ORDER — SODIUM CHLORIDE, SODIUM LACTATE, POTASSIUM CHLORIDE, CALCIUM CHLORIDE 600; 310; 30; 20 MG/100ML; MG/100ML; MG/100ML; MG/100ML
20 INJECTION, SOLUTION INTRAVENOUS CONTINUOUS
Status: DISCONTINUED | OUTPATIENT
Start: 2018-01-03 | End: 2018-01-03

## 2018-01-03 RX ORDER — LIDOCAINE HYDROCHLORIDE 10 MG/ML
INJECTION, SOLUTION INFILTRATION; PERINEURAL AS NEEDED
Status: DISCONTINUED | OUTPATIENT
Start: 2018-01-03 | End: 2018-01-03 | Stop reason: SURG

## 2018-01-03 RX ORDER — GABAPENTIN 300 MG/1
300 CAPSULE ORAL ONCE
Status: COMPLETED | OUTPATIENT
Start: 2018-01-03 | End: 2018-01-03

## 2018-01-03 RX ORDER — CALCIUM CARBONATE 200(500)MG
1000 TABLET,CHEWABLE ORAL DAILY PRN
Status: DISCONTINUED | OUTPATIENT
Start: 2018-01-03 | End: 2018-01-06 | Stop reason: HOSPADM

## 2018-01-03 RX ORDER — OXYCODONE HYDROCHLORIDE 5 MG/1
5 TABLET ORAL EVERY 4 HOURS PRN
Status: DISCONTINUED | OUTPATIENT
Start: 2018-01-03 | End: 2018-01-06 | Stop reason: HOSPADM

## 2018-01-03 RX ORDER — ALBUMIN, HUMAN INJ 5% 5 %
SOLUTION INTRAVENOUS CONTINUOUS PRN
Status: DISCONTINUED | OUTPATIENT
Start: 2018-01-03 | End: 2018-01-03 | Stop reason: SURG

## 2018-01-03 RX ORDER — ACETAMINOPHEN 325 MG/1
650 TABLET ORAL EVERY 6 HOURS PRN
Status: DISCONTINUED | OUTPATIENT
Start: 2018-01-03 | End: 2018-01-06 | Stop reason: HOSPADM

## 2018-01-03 RX ORDER — DOCUSATE SODIUM 100 MG/1
100 CAPSULE, LIQUID FILLED ORAL 2 TIMES DAILY
Status: DISCONTINUED | OUTPATIENT
Start: 2018-01-03 | End: 2018-01-06 | Stop reason: HOSPADM

## 2018-01-03 RX ORDER — SODIUM CHLORIDE, SODIUM LACTATE, POTASSIUM CHLORIDE, CALCIUM CHLORIDE 600; 310; 30; 20 MG/100ML; MG/100ML; MG/100ML; MG/100ML
125 INJECTION, SOLUTION INTRAVENOUS CONTINUOUS
Status: DISCONTINUED | OUTPATIENT
Start: 2018-01-03 | End: 2018-01-03

## 2018-01-03 RX ORDER — OXYCODONE HYDROCHLORIDE 10 MG/1
10 TABLET ORAL EVERY 4 HOURS PRN
Status: DISCONTINUED | OUTPATIENT
Start: 2018-01-03 | End: 2018-01-06 | Stop reason: HOSPADM

## 2018-01-03 RX ORDER — TRANEXAMIC ACID 100 MG/ML
INJECTION, SOLUTION INTRAVENOUS AS NEEDED
Status: DISCONTINUED | OUTPATIENT
Start: 2018-01-03 | End: 2018-01-03

## 2018-01-03 RX ORDER — PANTOPRAZOLE SODIUM 40 MG/1
40 TABLET, DELAYED RELEASE ORAL
Status: DISCONTINUED | OUTPATIENT
Start: 2018-01-04 | End: 2018-01-06 | Stop reason: HOSPADM

## 2018-01-03 RX ORDER — ONDANSETRON 2 MG/ML
4 INJECTION INTRAMUSCULAR; INTRAVENOUS EVERY 6 HOURS PRN
Status: DISCONTINUED | OUTPATIENT
Start: 2018-01-03 | End: 2018-01-06 | Stop reason: HOSPADM

## 2018-01-03 RX ORDER — ACETAMINOPHEN 325 MG/1
650 TABLET ORAL EVERY 6 HOURS
Qty: 90 TABLET | Refills: 0 | Status: SHIPPED | OUTPATIENT
Start: 2018-01-03 | End: 2018-06-29

## 2018-01-03 RX ORDER — ONDANSETRON 2 MG/ML
4 INJECTION INTRAMUSCULAR; INTRAVENOUS ONCE AS NEEDED
Status: DISCONTINUED | OUTPATIENT
Start: 2018-01-03 | End: 2018-01-03 | Stop reason: HOSPADM

## 2018-01-03 RX ORDER — SODIUM CHLORIDE, SODIUM LACTATE, POTASSIUM CHLORIDE, CALCIUM CHLORIDE 600; 310; 30; 20 MG/100ML; MG/100ML; MG/100ML; MG/100ML
1.5 INJECTION, SOLUTION INTRAVENOUS CONTINUOUS
Status: DISCONTINUED | OUTPATIENT
Start: 2018-01-03 | End: 2018-01-05

## 2018-01-03 RX ORDER — EPHEDRINE SULFATE 50 MG/ML
INJECTION, SOLUTION INTRAVENOUS AS NEEDED
Status: DISCONTINUED | OUTPATIENT
Start: 2018-01-03 | End: 2018-01-03 | Stop reason: SURG

## 2018-01-03 RX ORDER — ONDANSETRON 2 MG/ML
INJECTION INTRAMUSCULAR; INTRAVENOUS AS NEEDED
Status: DISCONTINUED | OUTPATIENT
Start: 2018-01-03 | End: 2018-01-03 | Stop reason: SURG

## 2018-01-03 RX ORDER — MORPHINE SULFATE 2 MG/ML
2 INJECTION, SOLUTION INTRAMUSCULAR; INTRAVENOUS EVERY 4 HOURS PRN
Status: DISCONTINUED | OUTPATIENT
Start: 2018-01-03 | End: 2018-01-06 | Stop reason: HOSPADM

## 2018-01-03 RX ORDER — KETAMINE HYDROCHLORIDE 50 MG/ML
INJECTION, SOLUTION, CONCENTRATE INTRAMUSCULAR; INTRAVENOUS AS NEEDED
Status: DISCONTINUED | OUTPATIENT
Start: 2018-01-03 | End: 2018-01-03 | Stop reason: SURG

## 2018-01-03 RX ORDER — TRAMADOL HYDROCHLORIDE 50 MG/1
50 TABLET ORAL EVERY 6 HOURS
Qty: 40 TABLET | Refills: 0 | Status: SHIPPED | OUTPATIENT
Start: 2018-01-03 | End: 2018-01-13

## 2018-01-03 RX ORDER — SODIUM CHLORIDE 9 MG/ML
INJECTION, SOLUTION INTRAVENOUS CONTINUOUS PRN
Status: DISCONTINUED | OUTPATIENT
Start: 2018-01-03 | End: 2018-01-03 | Stop reason: SURG

## 2018-01-03 RX ORDER — FENTANYL CITRATE/PF 50 MCG/ML
25 SYRINGE (ML) INJECTION
Status: DISCONTINUED | OUTPATIENT
Start: 2018-01-03 | End: 2018-01-03 | Stop reason: HOSPADM

## 2018-01-03 RX ADMIN — KETAMINE HYDROCHLORIDE 10 MG: 50 INJECTION, SOLUTION INTRAMUSCULAR; INTRAVENOUS at 10:06

## 2018-01-03 RX ADMIN — SODIUM CHLORIDE, SODIUM LACTATE, POTASSIUM CHLORIDE, AND CALCIUM CHLORIDE 125 ML/HR: .6; .31; .03; .02 INJECTION, SOLUTION INTRAVENOUS at 07:48

## 2018-01-03 RX ADMIN — EPHEDRINE SULFATE 10 MG: 50 INJECTION, SOLUTION INTRAMUSCULAR; INTRAVENOUS; SUBCUTANEOUS at 09:03

## 2018-01-03 RX ADMIN — FENTANYL CITRATE 25 MCG: 50 INJECTION INTRAMUSCULAR; INTRAVENOUS at 12:24

## 2018-01-03 RX ADMIN — SODIUM CHLORIDE: 0.9 INJECTION, SOLUTION INTRAVENOUS at 08:42

## 2018-01-03 RX ADMIN — CEFAZOLIN SODIUM 2000 MG: 2 SOLUTION INTRAVENOUS at 08:45

## 2018-01-03 RX ADMIN — LIDOCAINE HYDROCHLORIDE 50 MG: 10 INJECTION, SOLUTION INFILTRATION; PERINEURAL at 08:37

## 2018-01-03 RX ADMIN — ROCURONIUM BROMIDE 10 MG: 10 INJECTION INTRAVENOUS at 10:45

## 2018-01-03 RX ADMIN — FENTANYL CITRATE 50 MCG: 50 INJECTION, SOLUTION INTRAMUSCULAR; INTRAVENOUS at 10:06

## 2018-01-03 RX ADMIN — FENTANYL CITRATE 25 MCG: 50 INJECTION INTRAMUSCULAR; INTRAVENOUS at 12:12

## 2018-01-03 RX ADMIN — ROCURONIUM BROMIDE 10 MG: 10 INJECTION INTRAVENOUS at 10:05

## 2018-01-03 RX ADMIN — FENTANYL CITRATE 50 MCG: 50 INJECTION, SOLUTION INTRAMUSCULAR; INTRAVENOUS at 09:05

## 2018-01-03 RX ADMIN — DEXAMETHASONE SODIUM PHOSPHATE 10 MG: 10 INJECTION INTRAMUSCULAR; INTRAVENOUS at 09:13

## 2018-01-03 RX ADMIN — ALBUMIN HUMAN: 0.05 INJECTION, SOLUTION INTRAVENOUS at 10:35

## 2018-01-03 RX ADMIN — ROCURONIUM BROMIDE 40 MG: 10 INJECTION INTRAVENOUS at 08:37

## 2018-01-03 RX ADMIN — KETAMINE HYDROCHLORIDE 20 MG: 50 INJECTION, SOLUTION INTRAMUSCULAR; INTRAVENOUS at 09:15

## 2018-01-03 RX ADMIN — MUPIROCIN 1 APPLICATION: 20 OINTMENT TOPICAL at 07:44

## 2018-01-03 RX ADMIN — KETAMINE HYDROCHLORIDE 10 MG: 50 INJECTION, SOLUTION INTRAMUSCULAR; INTRAVENOUS at 11:53

## 2018-01-03 RX ADMIN — PROPOFOL 150 MG: 10 INJECTION, EMULSION INTRAVENOUS at 08:37

## 2018-01-03 RX ADMIN — FENTANYL CITRATE 25 MCG: 50 INJECTION INTRAMUSCULAR; INTRAVENOUS at 12:17

## 2018-01-03 RX ADMIN — Medication 1000 MG: at 08:55

## 2018-01-03 RX ADMIN — HYDROMORPHONE HYDROCHLORIDE 0.4 MG: 1 INJECTION, SOLUTION INTRAMUSCULAR; INTRAVENOUS; SUBCUTANEOUS at 12:53

## 2018-01-03 RX ADMIN — ACETAMINOPHEN 650 MG: 325 TABLET, FILM COATED ORAL at 17:20

## 2018-01-03 RX ADMIN — SODIUM CHLORIDE, SODIUM LACTATE, POTASSIUM CHLORIDE, AND CALCIUM CHLORIDE 1.5 ML/KG/HR: .6; .31; .03; .02 INJECTION, SOLUTION INTRAVENOUS at 13:00

## 2018-01-03 RX ADMIN — NEOSTIGMINE METHYLSULFATE 3 MG: 1 INJECTION, SOLUTION INTRAMUSCULAR; INTRAVENOUS; SUBCUTANEOUS at 11:34

## 2018-01-03 RX ADMIN — CEFAZOLIN SODIUM 1000 MG: 1 SOLUTION INTRAVENOUS at 17:20

## 2018-01-03 RX ADMIN — FENTANYL CITRATE 50 MCG: 50 INJECTION, SOLUTION INTRAMUSCULAR; INTRAVENOUS at 08:37

## 2018-01-03 RX ADMIN — EPHEDRINE SULFATE 5 MG: 50 INJECTION, SOLUTION INTRAMUSCULAR; INTRAVENOUS; SUBCUTANEOUS at 09:54

## 2018-01-03 RX ADMIN — SODIUM CHLORIDE, SODIUM LACTATE, POTASSIUM CHLORIDE, AND CALCIUM CHLORIDE: .6; .31; .03; .02 INJECTION, SOLUTION INTRAVENOUS at 08:05

## 2018-01-03 RX ADMIN — EPHEDRINE SULFATE 5 MG: 50 INJECTION, SOLUTION INTRAMUSCULAR; INTRAVENOUS; SUBCUTANEOUS at 10:16

## 2018-01-03 RX ADMIN — GABAPENTIN 300 MG: 300 CAPSULE ORAL at 07:44

## 2018-01-03 RX ADMIN — FENTANYL CITRATE 50 MCG: 50 INJECTION, SOLUTION INTRAMUSCULAR; INTRAVENOUS at 11:53

## 2018-01-03 RX ADMIN — ROCURONIUM BROMIDE 10 MG: 10 INJECTION INTRAVENOUS at 09:05

## 2018-01-03 RX ADMIN — GLYCOPYRROLATE 0.4 MG: 0.2 INJECTION, SOLUTION INTRAMUSCULAR; INTRAVENOUS at 11:34

## 2018-01-03 RX ADMIN — DOCUSATE SODIUM 100 MG: 100 CAPSULE, LIQUID FILLED ORAL at 17:20

## 2018-01-03 RX ADMIN — HYDROMORPHONE HYDROCHLORIDE 0.4 MG: 1 INJECTION, SOLUTION INTRAMUSCULAR; INTRAVENOUS; SUBCUTANEOUS at 12:43

## 2018-01-03 RX ADMIN — ALBUMIN HUMAN: 0.05 INJECTION, SOLUTION INTRAVENOUS at 10:24

## 2018-01-03 RX ADMIN — KETAMINE HYDROCHLORIDE 10 MG: 50 INJECTION, SOLUTION INTRAMUSCULAR; INTRAVENOUS at 10:54

## 2018-01-03 RX ADMIN — ONDANSETRON 4 MG: 2 INJECTION INTRAMUSCULAR; INTRAVENOUS at 11:15

## 2018-01-03 RX ADMIN — FENTANYL CITRATE 25 MCG: 50 INJECTION INTRAMUSCULAR; INTRAVENOUS at 12:29

## 2018-01-03 RX ADMIN — HYDROMORPHONE HYDROCHLORIDE 0.4 MG: 1 INJECTION, SOLUTION INTRAMUSCULAR; INTRAVENOUS; SUBCUTANEOUS at 12:33

## 2018-01-03 RX ADMIN — ACETAMINOPHEN 975 MG: 325 TABLET, FILM COATED ORAL at 07:44

## 2018-01-03 RX ADMIN — TRAMADOL HYDROCHLORIDE 50 MG: 50 TABLET, FILM COATED ORAL at 17:20

## 2018-01-03 NOTE — CONSULTS
Consultation - Adia Andrade 78 y o  male MRN: [id number]    Unit/Bed#: CW3 351-01 Encounter: [id number]        History of Present Illness     HPI: Adia Andrade is a 78y o  year old male with a history of BPH, renal cyst presented today under orthopedic service to undergo right JUDIE  He recently underwent left JUDIE with a good outcome  He did well intraoperatively with an estimated 325 mL blood loss  He has since been transitioned to the floor with stable hemodynamics and we were asked to follow along for medical management  ROS:  Constitutional: Negative  HENT: Negative  Respiratory: Negative  Cardiovascular: Negative  Gastrointestinal: Negative  Musculoskeletal: + right hip pain   Neurological: Negative  Psychiatric/Behavioral: Negative          Historical Information   Past Medical History:   Diagnosis Date    BPH (benign prostatic hyperplasia)     Diverticulitis of colon     Kidney mass     Murmur 08/1947    diagnosed as a child 8 YO    Osteoarthritis      Past Surgical History:   Procedure Laterality Date    HERNIA REPAIR      HIP ARTHROSCOPY W/ LABRAL DEBRIDEMENT      Left    KNEE ARTHROSCOPY      bilateral    [place] TOTAL HIP ARTHROPLASTY Left 6/12/2017    Procedure: ANTERIOR TOTAL HIP ARTHROPLASTY;  Surgeon: Boy Stoll MD;  [facility];  Service: Orthopedics     Social History   History   Alcohol Use    1 2 oz/week    2 Cans of beer per week     History   Drug Use No     History   Smoking Status    Former Smoker   Smokeless Tobacco    Never Used     Family History   Problem Relation Age of Onset    Cancer Mother     Cancer Father     Cancer Brother     Heart disease Brother        Meds/Allergies   current meds:  Current Facility-Administered Medications   Medication Dose Route Frequency    acetaminophen (TYLENOL) tablet 650 mg  650 mg Oral Q6H PRN    calcium carbonate (TUMS) chewable tablet 1,000 mg  1,000 mg Oral Daily PRN    ceFAZolin (ANCEF) IVPB (premix) 1,000 mg  1,000 mg Intravenous Q8H    docusate sodium (COLACE) capsule 100 mg  100 mg Oral BID    [START ON 1/4/2018] enoxaparin (LOVENOX) subcutaneous injection 40 mg  40 mg Subcutaneous Daily    lactated ringers infusion  1 5 mL/kg/hr Intravenous Continuous    morphine injection 2 mg  2 mg Intravenous Q4H PRN    ondansetron (ZOFRAN) injection 4 mg  4 mg Intravenous Q6H PRN    oxyCODONE (ROXICODONE) immediate release tablet 10 mg  10 mg Oral Q4H PRN    oxyCODONE (ROXICODONE) IR tablet 5 mg  5 mg Oral Q4H PRN    [START ON 1/4/2018] pantoprazole (PROTONIX) EC tablet 40 mg  40 mg Oral Early Morning    povidone-iodine (BETADINE) 10 % 20 application in sodium chloride 0 9 % 500 mL irrigation bottle   Irrigation Once    traMADol (ULTRAM) tablet 50 mg  50 mg Oral Q6H [address]       PTA meds:   Prescriptions Prior to Admission   Medication    acetaminophen (TYLENOL) 325 mg tablet    ascorbic acid (VITAMIN C) 500 mg tablet    ferrous gluconate (FERGON) 324 mg tablet    folic acid (FOLVITE) 1 mg tablet     No Known Allergies    Objective   Vitals: Blood pressure 128/77, pulse 62, temperature 99 3 °F (37 4 °C), resp  rate 20, height 5' 7" (1 702 m), weight 68 kg (150 lb), SpO2 100 %  Physical Exam   Constitutional: Pt is in NAD, nontoxic  HENT: nares patent, oropharynx negative for thrush  Head: Normocephalic  Eyes: EOM are normal  Pupils are equal, round, and reactive to light  Neck: Neck supple  Cardiovascular: Normal rate and regular rhythm  No murmur heard  Pulmonary/Chest: Breath sounds normal  No respiratory distress  Pt has no wheezes  Pt has no rales  Abdominal: Soft  Bowel sounds are normal  Pt exhibits no distension  There is no tenderness  There is no rebound and no guarding  Extremities: no LE edema  Neurological: Pt is alert and oriented to person, place, and time  Psychiatric: Pt has a normal mood and affect         Lab Results:                   Glucose (mg/dL)   Date Value 06/13/2017 109       Labs reviewed    Imaging: reviewed  EKG, Pathology, and Other Studies: I have personally reviewed pertinent reports  VTE Prophylaxis: Enoxaparin (Lovenox)    Code Status: Level 1 - Full Code     Assessment/Plan     1  Right JUDIE: pain control per primary team  Recommend IS hourly and bowel regimen to help prevent narcotic induced constipation  Follow up labs in AM to assess degree of postoperative anemia  2  BPH: on no medications as an outpatient; monitor PVRs after ramirez removed in AM  3  DVT prophylaxis: continue Lovenox 40mg daily    Counseling / Coordination of Care  Total floor / unit time spent today 45 minutes  Greater than 50% of total time was spent with the patient and / or family counseling and / or coordination of care        Tammi Kruse PA-C

## 2018-01-03 NOTE — PHYSICAL THERAPY NOTE
Physical Therapy Evaluation    Patient's Name:Raman Coffey    Today's Date:01/03/18    Patient Active Problem List   Diagnosis    S/P total hip arthroplasty       Past Medical History:   Diagnosis Date    BPH (benign prostatic hyperplasia)     Diverticulitis of colon     Kidney mass     Murmur 08[birth year]1947    diagnosed as a child 8 YO    Osteoarthritis        Past Surgical History:   Procedure Laterality Date    HERNIA REPAIR      HIP ARTHROSCOPY W/ LABRAL DEBRIDEMENT      Left    KNEE ARTHROSCOPY      bilateral    [place] TOTAL HIP ARTHROPLASTY Left 6/12/2017    Procedure: ANTERIOR TOTAL HIP ARTHROPLASTY;  Surgeon: Tommy Bass MD;  [facility];  Service: Orthopedics        01/03/18 6399   Note Type   Note type Eval only   Pain Assessment   Pain Assessment 0-10   Pain Score 4   Pain Type Surgical pain   Pain Location Hip   Pain Orientation Right   Hospital Pain Intervention(s) Ambulation/increased activity;Repositioned   Response to Interventions feels better up in chair   Home Living   Type of [address] [place] [address] Two level; Able to live on main level with bedroom/bathroom  (1 MORENA)   Prior Function   Level of [place] Independent with ADLs and functional mobility   Lives With Spouse   Receives Help From [facility] in the last 6 months 0   Vocational Retired   Restrictions/Precautions   Wells Jose Bearing Precautions Per Order Yes   RLE [place] Bearing Per Order WBAT   Other Precautions Chair Alarm;WBS;Multiple lines; Fall Risk;Pain   General   Family/Caregiver Present No   Cognition   Overall Cognitive Status WFL   Arousal/Participation Alert   Orientation Level Oriented X4   Following Commands Follows all commands and directions without difficulty   RUE Assessment   RUE Assessment (func reach and grasp)   LUE Assessment   LUE Assessment (func reach and grasp)   Strength RLE   RLE Overall Strength 2/5  (knee ext 2+/5, hip fl 2/5)   Strength LLE   LLE Overall Strength 4/5   Coordination Movements are Fluid and Coordinated 0   Coordination and Movement Description RLE instability   Bed Mobility   Supine to Sit 4  Minimal assistance   Additional items Assist x 1;HOB elevated; Increased time required;Verbal cues;LE management   Transfers   Sit to Stand 4  Minimal assistance   Additional items Assist x 1; Increased time required;Verbal cues   Stand to Sit 4  Minimal assistance   Additional items Assist x 1; Increased time required;Verbal cues   Stand pivot 4  Minimal assistance   Additional items Assist x 1; Increased time required;Verbal cues   Ambulation/Elevation   Gait pattern Improper Weight shift;Decreased foot clearance;Decreased R stance; Short stride; Step to;Excessively slow   Gait Assistance 4  Minimal assist   Additional items Assist x 1;Verbal cues   Assistive Device Rolling walker   Distance 3 ft   Balance   Static Sitting Fair +   Dynamic Sitting Fair   Static Standing Poor +   Dynamic Standing Poor   Ambulatory Poor +   Endurance Deficit   Endurance Deficit Yes   Endurance Deficit Description pain and fatigue   Activity Tolerance   Activity Tolerance Patient tolerated treatment well   Nurse Adenike Ware RN   Assessment   Prognosis Good   Problem List Decreased strength;Decreased range of motion;Decreased endurance; Impaired balance;Decreased mobility; Decreased skin integrity;Orthopedic restrictions;Pain   Assessment Maria Jaffe is a 78year old male who presents [facility] to receive elective R JUDIE (anterior approach)  Current factors affecting complexity include cont pulse ox, NV checks, regression from baseline mobility, falls risk  Upon PT eval, pt impairments include decreased strength, decreased mobility, decreased balance, decreased endurance, increased pain, and orthopedic restrictions  Pt was min A when performing supine-sit and sit-stand transfers  Pt was able to amb 3 ft from bed-chair w/ RW and min A; WBAT RLE, short stride, improper weight shift, decreased R stance  Pt states that he was having slight lightheadedness once upright in chair and PT educated him that it is not uncommon after surgery  Pt prior level of func is independent w/ ADLS and func mobility  Pt lives w/ wife in 2 story home w/ first floor set up available and 1 MORENA  Skilled PT needed in order to increase pt strength, endurance, balance, mobility and maximize func independence  PT rec is home with family support and home PT upon DC from hospital     Barriers to Discharge Inaccessible home environment   Goals   Patient Goals go home   STG Expiration Date 01/13/18   Short Term Goal #1 1  Independent with Bed Mobility Rolling Right and Left     2  Modified Independent with Bed Mobility Supine-Sit     3  Modified Independent with Transfer Bed-Chair     4  Increase Dynamic Sitting Balance at least 1 Grade for improved stability with functional reach activities     5  Increase Dynamic Standing Balance at least 1 Grade for improved ease with Activities of Daily Living     6  Increase Lower Extremity Strength at least 1 Grade for improved ease mobility tasks     7  Modified Independent with  Ambulation 150 feet using RW to facilitate home and community mobility 8  Modified Independent with Ascending/Descending 1 step to facilitate home and community accessibility  Plan   Treatment/Interventions Functional transfer training;LE strengthening/ROM; Elevations; Therapeutic exercise; Endurance training;Patient/family training;Equipment eval/education; Bed mobility;Gait training; Compensatory technique education;Continued evaluation;Spoke to nursing   PT Frequency Twice a day   Recommendation   Recommendation Home with family support;Home PT   Equipment Recommended Walker   PT - OK to Discharge No   Barthel Index   Feeding 10   Bathing 0   Grooming Score 5   Dressing Score 5   Bladder Score 0   Bowels Score 10   Toilet Use Score 5   Transfers (Bed/Chair) Score 10   Mobility (Level Surface) Score 0   Stairs Score 0   Barthel Index Score 45

## 2018-01-03 NOTE — OP NOTE
OPERATIVE REPORT  PATIENT NAME: Martínez Mccord    :  1940  MRN: [id number]  Pt Location: [facility] OR ROOM 18    SURGERY DATE: 1/3/2018    Surgeon(s) and Role:     * Kobi Saldana MD - Primary     * Marianne Ornelas PA-C - Juma Pacheco MD - Assisting    Primary osteoarthritis of right hip [M16 11]    Post-Op Diagnosis Codes:     * Primary osteoarthritis of right hip [M16 11]    Procedure(s):  ANTERIOR TOTAL HIP ARTHROPLASTY    Specimen(s):  * No specimens in log *    Estimated Blood Loss:   325 mL    Drains:       Anesthesia Type:   Choice    Operative Indications:  Primary osteoarthritis of right hip [M16 11]  Severe DJD right Hip  This patient failed conservative treatment including injections, NSAID by mouth and physical therapy     Operative Findings:  Severe wear femoral head and acetabulum right hip    Complications:   None    Note:   This surgery could not be completed without the assistance of [facility]   An assistant was required on both sides of the table   for retraction during the soft tissue dissection, for retraction   during the exposure of bone and cutting and reaming of the acetabulum   and femoral neck and preparing the femoral canal  Also, two people   were needed for retraction during the insertion of the provisional   implants during the reduction and during the insertion of the   permanent implants  Procedure and Technique:  The patient was administered a general anesthetic with endotracheal   intubation and prepped and draped in the usual sterile fashion for an   operation on her right hip on the hana table  The modified Lozada-Hickman approach was used  Sharp dissection was   carried out down through the subcutaneous tissue to the investing   fascia of the tensa fascia wilfred muscle  This was incised for the   length of the muscle  The muscle was retracted laterally   The   perforating femoral vessels were carefully obliterated using the Aquamantys  Dissection was carried out down to the capsule  A   capsulotomy was carried out across the edge of the acetabulum, down   the femoral neck and into the region of the greater trochanter  Both   sides were tagged with 0 Ethibond  The labrum was ossified and then it   was removed using an osteotome and rongeurs  Dissection was carried   out medially around the medial calcar and down to the lesser   trochanter  Dissection was also carried out laterally to expose the   greater trochanter  The femoral neck was cut about a fingerbreadth   above the lesser trochanter using an oscillating saw finished off   laterally with an osteotome  The femoral head was extracted from the acetabulum using a corkscrew  The acetabulum was reamed starting with the size   Forty-seven mm reamers in 1-mm increments to size 52  The 52 mm [place] cup sector 2   shell was inserted  Two 25 mm screws was placed for improved stability  The Altrex liner was inserted  Attention was then drawn to the proximal femur  The soft tissue was removed from the region between the femoral neck and greater trochanter  A box osteotome was used to cut into the lateral aspect of the proximal   femur  A combination of rongeur and hand gouge was used, along with a curette   to remove cancellous bone out to the lateral wall  The chili pepper   broach was inserted  Then the size 8, 9, 10, 11, 12, and 13 broaches were   inserted  The calcar planer was used to bring the femoral neck down to   the appropriate level for the provisional   The size 13 seemed to be   the appropriate size  Trial reduction was carried out with a   standard neck and the leg lengths appeared to be longer on the right side under the guide   of image intensification    The -2 neck length was trialed and the leg lengths where close to equal     The provisionals were removed from the femur, and the KA 13 collared   Corail stem was inserted, and the 36 mm M-SPEC head was   applied with a -2 neck length  Trial reduction was carried out and the leg lengths appeared   equal  The hip was stable in extension and external rotation and also   there was no tendency toward lateral subluxation  The wound was thoroughly irrigated with sterile saline solution  It was   also irrigated with iodine solution  The capsule was closed using 0   Ethibond  The investing fascia of the tensa fascia wilfred muscle was   closed with a running 0 Vicryl  The subcutaneous tissue was closed   using 2-0 Vicryl in an inverted fashion  The skin was closed using   Staples   The skin was reprepped with chlorhexidine prep  A silver   sterile dressing was applied  The patient tolerated the procedure well and left the operating room in   good condition          I was present for the entire procedure    Patient Disposition:  PACU     SIGNATURE: Anay Bhakta MD  DATE: January 3, 2018  TIME: 12:11 PM    OPERATIVE REPORT  PATIENT NAME: Carmela Gomez    :  1940  MRN: [id number]  Pt Location: [facility] ROOM 18    SURGERY DATE: 1/3/2018    Surgeon(s) and Role:     * Anay Bhakta MD - Primary     * Jocelyn Avelar PA-C - Donna Lynch MD - Assisting    Preop Diagnosis:  Primary osteoarthritis of right hip [M16 11]    Post-Op Diagnosis Codes:     * Primary osteoarthritis of right hip [M16 11]    Procedure(s) (LRB):  ANTERIOR TOTAL HIP ARTHROPLASTY (Right)    Specimen(s):  * No specimens in log *    Estimated Blood Loss:   325 mL    Drains:       Anesthesia Type:   Choice    Operative Indications:  Primary osteoarthritis of right hip [M16 11]    Operative Findings:  Severe DJD right hip    Complications:   None    Procedure and Technique:  Right total hip arthroplasty by way of anterior approach on the [place] table   I was present for the entire procedure    Patient Disposition:  PACU     SIGNATURE: Anay Bhakta MD  DATE: January 3, 2018  TIME: 12:11 PM

## 2018-01-03 NOTE — SOCIAL WORK
Pt new admit to the floor  CM met with patient and explained cm role  Pt alert and oriented  Pt reports he lives in a 2 story home w/wife w/1st floor setup  Pt reports being independent PTA, reports good support at home, he drives and has transport home w/wife for d/c  Pt reports DME: rw, raised toilet seat, cane, reports previous VNA, and rehab  Pts pharmacy is [facility] in [place]  No POA  Pt denies hx/admission for drug/etoh and psych/mental health  Pt reports he filled his Lovenox  CM reviewed d/c planning process including the following: identifying help at home, patient preference for d/c planning needs, Discharge Lounge, [facility] Meds to Bed program, availability of treatment team to discuss questions or concerns patient and/or family may have regarding understanding medications and recognizing signs and symptoms once discharged  CM also encouraged patient to follow up with all recommended appointments after discharge  Patient advised of importance for patient and family to participate in managing patients medical well being

## 2018-01-03 NOTE — ANESTHESIA PREPROCEDURE EVALUATION
Review of Systems/Medical History  Patient summary reviewed  Chart reviewed      Cardiovascular  EKG reviewed, Exercise tolerance: good,    Comment: Murmur EKG SHOW PVC AND 1ST AV BLOCK,  Pulmonary  Negative pulmonary ROS ,        GI/Hepatic    No GERD ,        Negative  ROS        Endo/Other  Arthritis     GYN       Hematology  Negative hematology ROS      Musculoskeletal  Negative musculoskeletal ROS        Neurology  Negative neurology ROS      Psychology   Negative psychology ROS            Physical Exam    Airway    Mallampati score: II  TM Distance: >3 FB  Neck ROM: full     Dental   No notable dental hx     Cardiovascular  Cardiovascular exam normal    Pulmonary  Pulmonary exam normal     Other Findings        Anesthesia Plan  ASA Score- 2     Anesthesia Type- general with ASA Monitors  Additional Monitors:   Airway Plan: ETT  Plan Factors-    Induction- intravenous  Postoperative Plan-   Planned trial extubation    Informed Consent- Anesthetic plan and risks discussed with patient  I personally reviewed this patient with the CRNA  Discussed and agreed on the Anesthesia Plan with the CRNA  Venkatesh Stanton

## 2018-01-03 NOTE — ANESTHESIA POSTPROCEDURE EVALUATION
Post-Op Assessment Note      CV Status:  Stable    Mental Status:  Alert and awake    Hydration Status:  Euvolemic    PONV Controlled:  Controlled    Airway Patency:  Patent    Post Op Vitals Reviewed: Yes          Staff: CRNA           BP   136/74   Temp   98 8   Pulse  74   Resp   16   SpO2   99

## 2018-01-03 NOTE — DISCHARGE INSTRUCTIONS
Discharge Instructions - Orthopedics  Doria Spurling 78 y o  male MRN: [id number]  Unit/Bed#: PACU 10    Weight Bearing Status:                                           Weight Bearing as tolerated to the right lower extremity  DVT prophylaxis:  Complete course of Lovenox as directed    Pain:  Continue analgesics as directed    Showering Instructions:   Do not shower until followup     Dressing Instructions:   Keep dressing clean, dry and intact until follow up appointment  Driving Instructions:  No driving until cleared by Orthopaedic Surgery  PT/OT:  Continue PT/OT on outpatient basis as directed    Appt Instructions: If you do not have your appointment, please call the clinic at [phone]  Otherwise followup as scheduled below:    Contact the office sooner if you experience any increased numbness/tingling in the extremities        Miscellaneous:  None

## 2018-01-04 ENCOUNTER — DOCUMENTATION (OUTPATIENT)
Dept: OBGYN CLINIC | Facility: HOSPITAL | Age: 78
End: 2018-01-04

## 2018-01-04 LAB
ANION GAP SERPL CALCULATED.3IONS-SCNC: 6 MMOL/L (ref 4–13)
BUN SERPL-MCNC: 14 MG/DL (ref 5–25)
CALCIUM SERPL-MCNC: 8.3 MG/DL (ref 8.3–10.1)
CHLORIDE SERPL-SCNC: 101 MMOL/L (ref 100–108)
CO2 SERPL-SCNC: 29 MMOL/L (ref 21–32)
CREAT SERPL-MCNC: 0.9 MG/DL (ref 0.6–1.3)
ERYTHROCYTE [DISTWIDTH] IN BLOOD BY AUTOMATED COUNT: 13.3 % (ref 11.6–15.1)
GFR SERPL CREATININE-BSD FRML MDRD: 83 ML/MIN/1.73SQ M
GLUCOSE SERPL-MCNC: 109 MG/DL (ref 65–140)
HCT VFR BLD AUTO: 31.3 % (ref 36.5–49.3)
HGB BLD-MCNC: 10.7 G/DL (ref 12–17)
MCH RBC QN AUTO: 33.5 PG (ref 26.8–34.3)
MCHC RBC AUTO-ENTMCNC: 34.2 G/DL (ref 31.4–37.4)
MCV RBC AUTO: 98 FL (ref 82–98)
PLATELET # BLD AUTO: 241 THOUSANDS/UL (ref 149–390)
PMV BLD AUTO: 9.2 FL (ref 8.9–12.7)
POTASSIUM SERPL-SCNC: 4 MMOL/L (ref 3.5–5.3)
RBC # BLD AUTO: 3.19 MILLION/UL (ref 3.88–5.62)
SODIUM SERPL-SCNC: 136 MMOL/L (ref 136–145)
WBC # BLD AUTO: 8.04 THOUSAND/UL (ref 4.31–10.16)

## 2018-01-04 PROCEDURE — 97530 THERAPEUTIC ACTIVITIES: CPT | Performed by: PHYSICAL THERAPIST

## 2018-01-04 PROCEDURE — 80048 BASIC METABOLIC PNL TOTAL CA: CPT | Performed by: PHYSICIAN ASSISTANT

## 2018-01-04 PROCEDURE — 97166 OT EVAL MOD COMPLEX 45 MIN: CPT

## 2018-01-04 PROCEDURE — G8987 SELF CARE CURRENT STATUS: HCPCS

## 2018-01-04 PROCEDURE — 85027 COMPLETE CBC AUTOMATED: CPT | Performed by: PHYSICIAN ASSISTANT

## 2018-01-04 PROCEDURE — 97116 GAIT TRAINING THERAPY: CPT | Performed by: PHYSICAL THERAPIST

## 2018-01-04 PROCEDURE — G8988 SELF CARE GOAL STATUS: HCPCS

## 2018-01-04 RX ORDER — ACETAMINOPHEN 325 MG/1
TABLET ORAL
Qty: 30 TABLET | Refills: 0 | Status: SHIPPED | OUTPATIENT
Start: 2018-01-04 | End: 2018-06-29

## 2018-01-04 RX ORDER — DOCUSATE SODIUM 100 MG/1
100 CAPSULE, LIQUID FILLED ORAL 2 TIMES DAILY
Qty: 10 CAPSULE | Refills: 0 | Status: SHIPPED | OUTPATIENT
Start: 2018-01-04 | End: 2018-06-29

## 2018-01-04 RX ADMIN — SODIUM CHLORIDE, SODIUM LACTATE, POTASSIUM CHLORIDE, AND CALCIUM CHLORIDE 1.5 ML/KG/HR: .6; .31; .03; .02 INJECTION, SOLUTION INTRAVENOUS at 11:59

## 2018-01-04 RX ADMIN — TRAMADOL HYDROCHLORIDE 50 MG: 50 TABLET, FILM COATED ORAL at 06:00

## 2018-01-04 RX ADMIN — PANTOPRAZOLE SODIUM 40 MG: 40 TABLET, DELAYED RELEASE ORAL at 06:00

## 2018-01-04 RX ADMIN — SODIUM CHLORIDE, SODIUM LACTATE, POTASSIUM CHLORIDE, AND CALCIUM CHLORIDE 1.5 ML/KG/HR: .6; .31; .03; .02 INJECTION, SOLUTION INTRAVENOUS at 01:25

## 2018-01-04 RX ADMIN — DOCUSATE SODIUM 100 MG: 100 CAPSULE, LIQUID FILLED ORAL at 17:57

## 2018-01-04 RX ADMIN — TRAMADOL HYDROCHLORIDE 50 MG: 50 TABLET, FILM COATED ORAL at 11:58

## 2018-01-04 RX ADMIN — TRAMADOL HYDROCHLORIDE 50 MG: 50 TABLET, FILM COATED ORAL at 23:06

## 2018-01-04 RX ADMIN — ENOXAPARIN SODIUM 40 MG: 40 INJECTION SUBCUTANEOUS at 09:00

## 2018-01-04 RX ADMIN — DOCUSATE SODIUM 100 MG: 100 CAPSULE, LIQUID FILLED ORAL at 09:00

## 2018-01-04 RX ADMIN — ACETAMINOPHEN 650 MG: 325 TABLET, FILM COATED ORAL at 10:37

## 2018-01-04 RX ADMIN — TRAMADOL HYDROCHLORIDE 50 MG: 50 TABLET, FILM COATED ORAL at 17:56

## 2018-01-04 RX ADMIN — TRAMADOL HYDROCHLORIDE 50 MG: 50 TABLET, FILM COATED ORAL at 01:25

## 2018-01-04 RX ADMIN — CEFAZOLIN SODIUM 1000 MG: 1 SOLUTION INTRAVENOUS at 01:25

## 2018-01-04 NOTE — PROGRESS NOTES
Orthopedics   Stella De Los Santos 78 y o  male MRN: [id number]  Unit/Bed#: CW3 351-01      Subjective:  78 y  o male post operative day 1 right total hip arthroplasty  Pt doing well  Pain controlled      Labs:    0  Lab Value Date/Time   HCT 31 3 (L) 01/04/2018 0540   HCT 32 9 (L) 06/13/2017 0436   HGB 10 7 (L) 01/04/2018 0540   HGB 11 2 (L) 06/13/2017 0436   WBC 8 04 01/04/2018 0540   WBC 9 33 06/13/2017 0436       Meds:    Current Facility-Administered Medications:     acetaminophen (TYLENOL) tablet 650 mg, 650 mg, Oral, Q6H PRN, Teresa Villagomez PA-C, 650 mg at 01/03/18 1720    calcium carbonate (TUMS) chewable tablet 1,000 mg, 1,000 mg, Oral, Daily PRN, Teresa Villagomez PA-C    docusate sodium (COLACE) capsule 100 mg, 100 mg, Oral, BID, Teresa Villagomez PA-C, 100 mg at 01/03/18 1720    enoxaparin (LOVENOX) subcutaneous injection 40 mg, 40 mg, Subcutaneous, Daily, Teresa Villagomez PA-C    lactated ringers infusion, 1 5 mL/kg/hr, Intravenous, Continuous, Teresa Villagomez PA-C, Last Rate: 102 mL/hr at 01/04/18 0125, 1 5 mL/kg/hr at 01/04/18 0125    morphine injection 2 mg, 2 mg, Intravenous, Q4H PRN, Teresa Villagomez PA-C    ondansetron [facility]) injection 4 mg, 4 mg, Intravenous, Q6H PRN, Teresa Villagomez PA-C    oxyCODONE (ROXICODONE) immediate release tablet 10 mg, 10 mg, Oral, Q4H PRN, Teresa Villagomez PA-C    oxyCODONE (ROXICODONE) IR tablet 5 mg, 5 mg, Oral, Q4H PRN, Teresa Villagomez PA-C    pantoprazole (PROTONIX) EC tablet 40 mg, 40 mg, Oral, Early Morning, Teresa Villagomez PA-C, 40 mg at 01/04/18 0600    povidone-iodine (BETADINE) 10 % 20 application in sodium chloride 0 9 % 500 mL irrigation bottle, , Irrigation, Once, Nereyda Graham MD    traMADol [facility]) tablet 50 mg, 50 mg, Oral, Q6H [address], Teresa Villagomez PA-C, 50 mg at 01/04/18 0600    Blood Culture:   No results found for: BLOODCX    Wound Culture:   No results found for: WOUNDCULT    Ins and Outs:  I/O last 24 hours: In: 3916 [P O :540; I V :1900; IV Piggyback:500]  Out: 4350 [Urine:4025; Blood:325]          Physical Exam:  Vitals:    01/04/18 0238   BP: 103/60   Pulse: 67   Resp: 16   Temp: 98 °F (36 7 °C)   SpO2: 100%     left lower extremity:  · Dressings C/D/I  · Sensation intact s/s/sp/dp/t  · Motor intact ankle df/pf, ehl/fhl  · 2+ dorsalis pedis     _*_*_*_*_*_*_*_*_*_*_*_*_*_*_*_*_*_*_*_*_*_*_*_*_*_*_*_*_*_*_*_*_*_*_*_*_*_*_*_*_*    Assessment: 78 y  o male post operative day 1 right total hip arthroplasty   Doing well    Plan:  · Weight Bearing as tolerated RLE  · Up and out of bed  · Total hip precautions- anterior   · DVT prophylaxis  · Analgesics  · PT/OT  · Will continue to assess for acute blood loss anemia    Desiree Cardenas MD

## 2018-01-04 NOTE — SOCIAL WORK
Sent referral for Nursing and Home PT to [facility]  MARTHA called [facility] spoke to Jared, they will accept patient, and should be out to see him tomorrow  Jared asked that we fax d/c instructions  MARTHA faxed d/c instructions to [phone]

## 2018-01-04 NOTE — PHYSICAL THERAPY NOTE
Physical Therapy Progress Note     01/04/18 [facility]   Pain Assessment   Pain Assessment 0-10   Pain Score 4   Pain Type Acute pain   Pain Location Hip   Pain Orientation Right   Hospital Pain Intervention(s) Ambulation/increased activity;Repositioned; Emotional support  (notified nursing, pain med request)   Restrictions/Precautions   Weight Bearing Precautions Per Order Yes   RLE Weight Bearing Per Order WBAT   Other Precautions WBS; Multiple lines   General   Chart Reviewed Yes   Family/Caregiver Present No   Cognition   Overall Cognitive Status WFL   Orientation Level Oriented X4   Subjective   Subjective ready to go home, will be staying first floor for about 1 week but was able to voice knowlege of stair sequencing   Bed Mobility   Supine to Sit 5  Supervision   Sit to Supine 4  Minimal assistance   Additional items LE management  (rle)   Transfers   Sit to Stand 6  Modified independent   Stand to Sit 6  Modified independent   Ambulation/Elevation   Gait pattern Antalgic   Gait Assistance 6  Modified independent   Assistive Device Rolling walker   Distance 200'   Balance   Static Sitting Normal   Dynamic Sitting Good   Static Standing Fair -   Dynamic Standing Fair -   Ambulatory Fair -   Endurance Deficit   Endurance Deficit Yes   Endurance Deficit Description pain   Activity Tolerance   Activity Tolerance Patient tolerated treatment well;Patient limited by pain   Nurse Made Aware yes- nanci   Exercises   THR Supine;10 reps;AAROM; Right  (instructed and performed HEP)   Assessment   Prognosis Good   Problem List Decreased strength;Decreased endurance; Impaired balance;Decreased mobility; Decreased safety awareness;Decreased skin integrity;Pain   Assessment pt demonstrated good safety practices with all mobility, pt tolerated amb 200' but with antalgic gait  pt did request pain meds following session  pt instructed and performed HEP, demonstrated knowledge  pt  will be able to return home, has already arranged PT  pt will stay first floor  spoke with ortho resident   Goals   Patient Goals go home   Treatment Day 1   Plan   Treatment/Interventions Elevations   PT Frequency 7x/wk; Twice a day   Recommendation   Recommendation Home with family support  (PT already arranged)   Equipment Recommended Rodriguez Feldman   PT - OK to Discharge Yes     Wong Cea, PT

## 2018-01-04 NOTE — PROGRESS NOTES
Internal Medicine Progress Note  Patient: Shweta Adkins  Age/sex: 78 y o  male  Medical Record #: [id number]      ASSESSMENT/PLAN:  Shweta Adkins is seen and examined and mangement for following issues:       1  Right JUDIE: pain control per primary team  Recommend IS hourly and bowel regimen to help prevent narcotic induced constipation  2  BPH: on no medications as an outpatient; void trial now that ramirez out  3  ABLA: hgb stable at 10 7; no need for transfusion at this time  4  DVT prophylaxis: continue Lovenox 40mg daily    Subjective: Patient seen and examined  Pain controlled  No events overnight  Hoping to possibly be discharged today   Ramirez out    ROS:   GI: denies abdominal pain, change bowel habits or reflux symptoms  Neuro: No new neurologic changes  Respiratory: No Cough, SOB  Cardiovascular: No CP, palpitations     Scheduled Meds:    docusate sodium 100 mg Oral BID   enoxaparin 40 mg Subcutaneous Daily   pantoprazole 40 mg Oral Early Morning   betadine 20 mL in sodium chloride 0 9% 500 ml  Irrigation Once   traMADol 50 mg Oral Q6H [address]       Labs:       Results from last 7 days  Lab Units 01/04/18  0540   WBC Thousand/uL 8 04   HEMOGLOBIN g/dL 10 7*   HEMATOCRIT % 31 3*   PLATELETS Thousands/uL 241       Results from last 7 days  Lab Units 01/04/18  0540   SODIUM mmol/L 136   POTASSIUM mmol/L 4 0   CHLORIDE mmol/L 101   CO2 mmol/L 29   BUN mg/dL 14   CREATININE mg/dL 0 90   GLUCOSE RANDOM mg/dL 109   CALCIUM mg/dL 8 3                Glucose (mg/dL)   Date Value   01/04/2018 109   06/13/2017 109       Labs reviewed    Physical Examination:  Vitals:   Vitals:    01/03/18 1900 01/03/18 2308 01/04/18 0238 01/04/18 0836   BP: 117/61 106/60 103/60 99/53   Pulse: 63 69 67 72   Resp: 17 16 16 18   Temp: 97 6 °F (36 4 °C) 98 5 °F (36 9 °C) 98 °F (36 7 °C) 97 9 °F (36 6 °C)   TempSrc: Oral Oral Oral Oral   SpO2: 100% 100% 100% 98%   Weight:       Height:           GEN: NAD  HEENT: NC/AT, EOMI  RESP: CTAB, no R/R/W  CV: +S1 S2, regular rate, no rubs  ABD: soft, NT, ND, normal BS   : no ramirez  EXT: no lower extremity edema  Skin: no rashes  Neuro: AAO x 3      [x ] Total time spent: 30 Mins and greater than 50% of this time was spent counseling/coordinating care        Cliff Darden PA-C  Internal Medicine

## 2018-01-04 NOTE — OCCUPATIONAL THERAPY NOTE
[address] Evaluation     Patient Name: Stella De Los Santos  WMRCI'V Date: 1/4/2018  Problem List  Patient Active Problem List   Diagnosis    S/P total hip arthroplasty     Past Medical History  Past Medical History:   Diagnosis Date    BPH (benign prostatic hyperplasia)     Diverticulitis of colon     Kidney mass     Murmur 08/1947    diagnosed as a child 8 YO    Osteoarthritis      Past Surgical History  Past Surgical History:   Procedure Laterality Date    HERNIA REPAIR      HIP ARTHROSCOPY W/ LABRAL DEBRIDEMENT      Left    KNEE ARTHROSCOPY      bilateral    [facility] TOTAL HIP ARTHROPLASTY Left 6/12/2017    Procedure: ANTERIOR TOTAL HIP ARTHROPLASTY;  Surgeon: Nereyda Graham MD;  [facility];  Service: Orthopedics    [facility] TOTAL HIP ARTHROPLASTY Right 1/3/2018    Procedure: ANTERIOR TOTAL HIP ARTHROPLASTY;  Surgeon: Nereyda Graham MD;  [facility];  Service: Orthopedics           01/04/18 0940   Note Type   Note type Eval/Treat   Restrictions/Precautions   Weight Bearing Precautions Per Order Yes   RLE Weight Bearing Per Order WBAT   Other Precautions WBS; Multiple lines;Telemetry; Fall Risk;Pain   Pain Assessment   Pain Assessment 0-10   Pain Score 4   Pain Type Acute pain   Pain Location Hip   Pain Orientation Right   Hospital Pain Intervention(s) Ambulation/increased activity;Repositioned   Response to Interventions tolerated   Home Living   Type of [address] [facility] Two level; Able to live on main level with bedroom/bathroom   Bathroom Shower/Tub Walk-in shower   Bathroom Toilet Standard   [address]   Additional Comments Pt lives with his wife in a 2 story home w/ a first floor set up and no MORENA     Prior Function   Level of [place] Independent with ADLs and functional mobility   Lives With Spouse   Receives Help From Family   ADL Assistance Independent   IADLs Independent   Falls in the last 6 months 0   Vocational Retired   Comments Pt was I w/ ADLS and IADLS, drove, & required no use of DME PTA  Lifestyle   Autonomy Pt was I w/ ADLS/IADLS PTA    Reciprocal Relationships Pt lives with his wife who he reports will be able to provide A PRN upon D/C    Service to Others Pt is retired   Intrinsic Gratification Pt active PTA    Psychosocial   Psychosocial (WDL) WDL   ADL   Eating Assistance 7  Independent   Grooming Assistance 7  [address] [facility] 5  Supervision/Setup   [address] 3  Moderate Assistance   [address] 5  Supervision/Setup    [facility] [facility] [address] 3  Moderate [address]  4  Minimal Assistance   Bed Mobility   Supine to Sit 5  Supervision   Additional items HOB elevated; Increased time required   Sit to Supine Unable to assess  (pt oob at end of therapy session )   Transfers   Sit to Stand 4  Minimal assistance  (CGA)   Additional items Increased time required   Stand to Sit 5  Supervision  (close s)   Additional items Increased time required   Additional Comments Pt performed functional trasfners w/ CGA For sit to stand for steadying/balance and close S for stand to sit for safety   Functional Mobility   Functional Mobility 5  Supervision   Additional Comments Pt performed functional mobility for short in room distances w/ close S using rw   Additional items Rolling walker   Balance   Static Sitting Good   Static Standing Fair   Ambulatory Fair -   Activity Tolerance   Activity Tolerance Patient tolerated treatment well   Nurse Made Aware [facility] confirm pt appropriate for OT eval    RUE Assessment   RUE Assessment WFL   LUE Assessment   LUE Assessment WFL   Hand Function   Gross Motor Coordination Functional   Fine Motor Coordination Functional   Cognition   Overall Cognitive Status WFL   Arousal/Participation Alert; Responsive; Cooperative   Attention Within functional limits   Memory Within functional limits   Following Commands Follows all commands and directions without difficulty   Assessment   Limitation Decreased ADL status; Decreased endurance;Decreased self-care trans;Decreased high-level ADLs   Prognosis Good   Assessment Pt is a 77 y/o male seen for OT eval s/p adm to [facility] w/ OA of R hip receiving ANTERIOR TOTAL HIP ARTHROPLASTY on 1/3/2018  Comorbidities include a h/o BPH, renal cysts, murmur, diverticulitis and prior L JUDIE on 2017  Pt with active OT orders, WBAT orders and activity as tolerated orders  Pt lives with his wife in a 2 story home w/ a first floor set up and no MORENA  Pt was I w/ ADLS and IADLS, drove, & required no use of DME PTA  Pt is currently demonstrating the following occupational deficits: S UB ADLS, mod A LB ADLS, CGA functional transfers and close S functional mobility using rw for short in room distances  Pt with deficits and limitations in all baseline areas of occupation 2* significant pain, decreased endurance/activity tolerance, decreased functional forward reach, decreased ADL status, impaired balance, decreased mobility stauts, and orthopedic restrictions  The following Occupational Performance Areas to address include: bathing/shower, dressing, functional mobility, community mobility, clothing management, meal prep and household maintenance  Pt scored overall 55/100 on the Barthel Index  Based on the aforementioned OT evaluation, functional performance deficits, and assessments, pt has been identified as a moderate complexity evaluation  Anticipate pt will be able to return home w/ increased family support once medically stable  Pt to continue to benefit from acute immediate OT services to address the following goals 3-5x/wk to  w/in 7-10 days:   Goals   Patient Goals to go home   Plan   Treatment Interventions ADL retraining;Functional transfer training; Endurance training;Patient/family training;Equipment evaluation/education; Compensatory technique education;Continued evaluation; Energy conservation; Activityengagement   Goal Expiration Date 01/14/18   OT Frequency 3-5x/wk   Recommendation   OT Discharge Recommendation Home with family support   Equipment Recommended (shower chair )   OT - OK to Discharge Yes   Barthel Index   Feeding 10   Bathing 0   Grooming Score 5   Dressing Score 5   Bladder Score 10   Bowels Score 10   Toilet Use Score 5   Transfers (Bed/Chair) Score 10   Mobility (Level Surface) Score 0  (less than 50)   Stairs Score 0   Barthel Index Score 55   Modified [place] Scale   Modified [place] Scale 3       GOALS:    1) Pt will improve activity tolerance to G for min 30 min txment sessions  2) Pt will complete ADLs/self care w/ mod I using adaptive equipment and DME as needed w/ G hyiene/thoroughness w/ min cues fro cog support  3) Pt will complete toileting w/ mod I w/ G hygiene/thoroughness using DME as needed  4) Pt will improve functional transfers on/off all surfaces using DME as needed w/ G balance/safety including toileting w/ mod I  5) Pt will improve functional mobility during ADL/IADL/leisure tasks using DME as needed w/ g balance/safety w/ mod I  6) Pt will demonstrate G carryover of pt/caregiver education and training as appropriate w/ mod I w/o cues w/ G tolerance  7) Pt will demonstrate 100% carryover of learned E C  techniques s/p skilled education w/o cues t/o functional ADL/ IADL/leisure interest tasks w/ mod I  8) Pt will demonstrate 100% carryover of precautions s/p review w/o cues w/ mod I w/ G tolerance/participation t/o functional ADL/IADL/leisure tasks  9) Pt will demonstrate G high level balance and tolerance t/o fx'l I/ADL/leisure tasks w/ mod I w/ DME PRN w/ G balance/safety w/o cues      Timothy Trujillo, MS, OTR/L

## 2018-01-04 NOTE — PROGRESS NOTES
Lengthy discussion had at bedside regarding patient's urinary retention  Patient is highly averse to straight cath  He currently does not have the urge to void though his bladder was scanned for 650cc  Patient will be given more time to attempt spontaneous micturition with the caveat that he must notify the nurse if he feels the urge to void and cannot

## 2018-01-04 NOTE — PROGRESS NOTES
Spoke with Dr Sonam Lovelace regarding home care vs  outpt PT, per Dr Kaia Burk, pt OK to to outpt PT  Dr Kaia Burk aware that first outpt PT visit on 1/8, OK per Dr Sonam Lovelace  Spoke with pt, pt agreeable to go to outpt PT on 1/8 at 1600  Crystal with CM notified  Per Dr Yasmeen Porras, he will place new outpt PT order

## 2018-01-04 NOTE — DISCHARGE SUMMARY
ORTHOPEDICS DISCHARGE SUMMARY   Gaston Flowers 78 y o  male MRN: [id number]  Unit/Bed#: CW3 351-01      Attending Physician: Ho Mix    Admitting diagnosis: Primary osteoarthritis of right hip [M16 11]    Discharge diagnosis: Primary osteoarthritis of right hip [M16 11]    Date of admission: 1/3/2018    Date of discharge: 01/04/18    Procedure: Right anterior total hip arthroplasty    HPI  This is a 78y o  year old male that presented to the office with signs and symptoms of right hip osteoarthritis  They tried and failed conservative treatment measures and wished to proceed with surgical intervention  The risks, benefits, and complications of the procedure were discussed with the patient and informed consent was obtained  Hospital Course: The patient was admitted to the hospital on 1/3/2018 and underwent an uncomplicated right total hip arthroplasty  They were transferred to the floor after a brief stay in the post-anesthesia care unit  Their pain was well managed with IV and oral pain medications  They began therapy on post operative day #1  Lovenox was also started for DVT prophylaxis post operative day #1  Post operative course was uneventful On discharge date pt was cleared by PT and the medicine team and determined to be safe for discharge  Daily discussion was had with the patient, nursing staff, orthopaedic team, and family members if present  All questions were answered to the patients satisifaction  0  Lab Value Date/Time   HGB 10 7 (L) 01/04/2018 0540   HGB 11 2 (L) 06/13/2017 0436       Discharge Instructions: The patient was discharged weight bearing as tolerated to the right lower extremity  Lovenox will be continued for 28 days  Continue PT/OT  Take pain medications as instructed  Discharge Medications: For the complete list of discharge medications, please refer to the patient's medication reconciliation

## 2018-01-04 NOTE — CASE MANAGEMENT
Initial Clinical Review    Age/Sex: 78 y o  male admitted admitted on 1/3 for elective surgery - OR     Surgery Date: 1/3    Procedure: S/P ANTERIOR TOTAL HIP ARTHROPLASTY (Right Hip)    Anesthesia: General    Admission Orders: Date/Time/Statement: Inpatient 1/3/18 @ 80 Med Surg     Orders Placed This Encounter   Procedures    Inpatient Admission     Standing Status:   Standing     Number of Occurrences:   1     Order Specific Question:   Admitting Physician     Answer:   Sacha Clements [[id number]]     Order Specific Question:   Level of Care     Answer:   Med Surg [[id number]]     Order Specific Question:   Estimated length of stay     Answer:   More than 2 Midnights     Order Specific Question:   Certification     Answer:   I certify that inpatient services are medically necessary for this patient for a duration of greater than two midnights  See H&P and MD Progress Notes for additional information about the patient's course of treatment  Vital Signs: BP 99/53   Pulse 72   Temp 97 9 °F (36 6 °C) (Oral)   Resp 18   Ht 5' 7" (1 702 m)   Wt 68 kg (150 lb)   SpO2 98%   BMI 23 49 kg/m²     Diet:        Diet Orders            Start     Ordered    01/03/18 1320  Diet Regular; Regular House  Diet effective now     Question Answer Comment   Diet Type Regular    Regular Regular House    RD to adjust diet per protocol?  Yes        01/03/18 1319          Mobility: Activity as tolerated  PT/OT eval and treat    DVT Prophylaxis: Sequential compression device    Scheduled Meds:  Cefazolin  Intravenous x3   docusate sodium 100 mg Oral BID   enoxaparin 40 mg Subcutaneous Daily   pantoprazole 40 mg Oral Early Morning   betadine 20 mL in sodium chloride 0 9% 500 ml  Irrigation Once   traMADol 50 mg Oral Q6H [address]     Continuous Infusions:  lactated ringers 1 5 mL/kg/hr Last Rate: 1 5 mL/kg/hr (01/04/18 0125)     PRN Meds:     Acetaminophen po x1    calcium carbonate    morphine injection    ondansetron    oxyCODONE

## 2018-01-04 NOTE — PLAN OF CARE
Problem: OCCUPATIONAL THERAPY ADULT  Goal: Performs self-care activities at highest level of function for planned discharge setting  See evaluation for individualized goals  Treatment Interventions: ADL retraining, Functional transfer training, Endurance training, Patient/family training, Equipment evaluation/education, Compensatory technique education, Continued evaluation, Energy conservation, Activityengagement  Equipment Recommended:  (shower chair )       See flowsheet documentation for full assessment, interventions and recommendations  Limitation: Decreased ADL status, Decreased endurance, Decreased self-care trans, Decreased high-level ADLs  Prognosis: Good  Assessment: Pt is a 77 y/o male seen for OT eval s/p adm to [facility] w/ OA of R hip receiving ANTERIOR TOTAL HIP ARTHROPLASTY on 1/3/2018  Comorbidities include a h/o BPH, renal cysts, murmur, diverticulitis and prior L JUDIE on June 2017  Pt with active OT orders, WBAT orders and activity as tolerated orders  Pt lives with his wife in a 2 story home w/ a first floor set up and no MORENA  Pt was I w/ ADLS and IADLS, drove, & required no use of DME PTA  Pt is currently demonstrating the following occupational deficits: S UB ADLS, mod A LB ADLS, CGA functional transfers and close S functional mobility using rw for short in room distances  Pt with deficits and limitations in all baseline areas of occupation 2* significant pain, decreased endurance/activity tolerance, decreased functional forward reach, decreased ADL status, impaired balance, decreased mobility stauts, and orthopedic restrictions  The following Occupational Performance Areas to address include: bathing/shower, dressing, functional mobility, community mobility, clothing management, meal prep and household maintenance  Pt scored overall 55/100 on the Barthel Index   Based on the aforementioned OT evaluation, functional performance deficits, and assessments, pt has been identified as a moderate complexity evaluation  Anticipate pt will be able to return home w/ increased family support once medically stable  Pt to continue to benefit from acute immediate OT services to address the following goals 3-5x/wk to  w/in 7-10 days:     OT Discharge Recommendation: Home with family support  OT - OK to Discharge:  Yes    Ngoc Mars MS, OTR/L

## 2018-01-04 NOTE — PLAN OF CARE
Problem: PHYSICAL THERAPY ADULT  Goal: Performs mobility at highest level of function for planned discharge setting  See evaluation for individualized goals  Treatment/Interventions: Functional transfer training, LE strengthening/ROM, Elevations, Therapeutic exercise, Endurance training, Patient/family training, Equipment eval/education, Bed mobility, Gait training, Compensatory technique education, Continued evaluation, Spoke to nursing  Equipment Recommended: Rosendo Jay       See flowsheet documentation for full assessment, interventions and recommendations  Outcome: Adequate for Discharge  Prognosis: Good  Problem List: Decreased strength, Decreased endurance, Impaired balance, Decreased mobility, Decreased safety awareness, Decreased skin integrity, Pain  Assessment: pt demonstrated good safety practices with all mobility, pt tolerated amb 200' but with antalgic gait  pt did request pain meds following session  pt instructed and performed HEP, demonstrated knowledge  pt  will be able to return home, has already arranged PT  pt will stay first floor  spoke with ortho resident  Barriers to Discharge: Inaccessible home environment     Recommendation: Home with family support (PT already arranged)     PT - OK to Discharge: Yes    See flowsheet documentation for full assessment

## 2018-01-04 NOTE — SOCIAL WORK
[facility] stating an appointment for outpt PT has been setup for patient starting on Monday 1/8 @ 4pm w/2nd appointment on 1/11 @ 9am  CM notified RN, and [facility]

## 2018-01-05 LAB
ANION GAP SERPL CALCULATED.3IONS-SCNC: 5 MMOL/L (ref 4–13)
ANION GAP SERPL CALCULATED.3IONS-SCNC: 6 MMOL/L (ref 4–13)
BASOPHILS # BLD AUTO: 0.01 THOUSANDS/ΜL (ref 0–0.1)
BASOPHILS NFR BLD AUTO: 0 % (ref 0–1)
BUN SERPL-MCNC: 11 MG/DL (ref 5–25)
BUN SERPL-MCNC: 11 MG/DL (ref 5–25)
CALCIUM SERPL-MCNC: 8 MG/DL (ref 8.3–10.1)
CALCIUM SERPL-MCNC: 8.4 MG/DL (ref 8.3–10.1)
CHLORIDE SERPL-SCNC: 92 MMOL/L (ref 100–108)
CHLORIDE SERPL-SCNC: 93 MMOL/L (ref 100–108)
CO2 SERPL-SCNC: 27 MMOL/L (ref 21–32)
CO2 SERPL-SCNC: 28 MMOL/L (ref 21–32)
CREAT SERPL-MCNC: 0.75 MG/DL (ref 0.6–1.3)
CREAT SERPL-MCNC: 0.83 MG/DL (ref 0.6–1.3)
EOSINOPHIL # BLD AUTO: 0.01 THOUSAND/ΜL (ref 0–0.61)
EOSINOPHIL NFR BLD AUTO: 0 % (ref 0–6)
ERYTHROCYTE [DISTWIDTH] IN BLOOD BY AUTOMATED COUNT: 13.4 % (ref 11.6–15.1)
GFR SERPL CREATININE-BSD FRML MDRD: 86 ML/MIN/1.73SQ M
GFR SERPL CREATININE-BSD FRML MDRD: 90 ML/MIN/1.73SQ M
GLUCOSE SERPL-MCNC: 117 MG/DL (ref 65–140)
GLUCOSE SERPL-MCNC: 97 MG/DL (ref 65–140)
HCT VFR BLD AUTO: 29.2 % (ref 36.5–49.3)
HGB BLD-MCNC: 10.1 G/DL (ref 12–17)
LYMPHOCYTES # BLD AUTO: 0.86 THOUSANDS/ΜL (ref 0.6–4.47)
LYMPHOCYTES NFR BLD AUTO: 8 % (ref 14–44)
MCH RBC QN AUTO: 33.8 PG (ref 26.8–34.3)
MCHC RBC AUTO-ENTMCNC: 34.6 G/DL (ref 31.4–37.4)
MCV RBC AUTO: 98 FL (ref 82–98)
MONOCYTES # BLD AUTO: 0.96 THOUSAND/ΜL (ref 0.17–1.22)
MONOCYTES NFR BLD AUTO: 9 % (ref 4–12)
NEUTROPHILS # BLD AUTO: 9.09 THOUSANDS/ΜL (ref 1.85–7.62)
NEUTS SEG NFR BLD AUTO: 83 % (ref 43–75)
NRBC BLD AUTO-RTO: 0 /100 WBCS
PLATELET # BLD AUTO: 211 THOUSANDS/UL (ref 149–390)
PMV BLD AUTO: 9.7 FL (ref 8.9–12.7)
POTASSIUM SERPL-SCNC: 4 MMOL/L (ref 3.5–5.3)
POTASSIUM SERPL-SCNC: 4.3 MMOL/L (ref 3.5–5.3)
RBC # BLD AUTO: 2.99 MILLION/UL (ref 3.88–5.62)
SODIUM SERPL-SCNC: 125 MMOL/L (ref 136–145)
SODIUM SERPL-SCNC: 126 MMOL/L (ref 136–145)
WBC # BLD AUTO: 10.96 THOUSAND/UL (ref 4.31–10.16)

## 2018-01-05 PROCEDURE — 97535 SELF CARE MNGMENT TRAINING: CPT

## 2018-01-05 PROCEDURE — 85025 COMPLETE CBC W/AUTO DIFF WBC: CPT | Performed by: PHYSICIAN ASSISTANT

## 2018-01-05 PROCEDURE — 80048 BASIC METABOLIC PNL TOTAL CA: CPT | Performed by: PHYSICIAN ASSISTANT

## 2018-01-05 PROCEDURE — 97116 GAIT TRAINING THERAPY: CPT

## 2018-01-05 PROCEDURE — 97110 THERAPEUTIC EXERCISES: CPT

## 2018-01-05 PROCEDURE — G8989 SELF CARE D/C STATUS: HCPCS

## 2018-01-05 RX ORDER — SODIUM CHLORIDE 1000 MG
1 TABLET, SOLUBLE MISCELLANEOUS
Status: DISCONTINUED | OUTPATIENT
Start: 2018-01-05 | End: 2018-01-06 | Stop reason: HOSPADM

## 2018-01-05 RX ORDER — SODIUM CHLORIDE 9 MG/ML
100 INJECTION, SOLUTION INTRAVENOUS CONTINUOUS
Status: DISCONTINUED | OUTPATIENT
Start: 2018-01-05 | End: 2018-01-06 | Stop reason: HOSPADM

## 2018-01-05 RX ORDER — METOCLOPRAMIDE 10 MG/1
10 TABLET ORAL AS NEEDED
Status: COMPLETED | OUTPATIENT
Start: 2018-01-05 | End: 2018-01-05

## 2018-01-05 RX ADMIN — SODIUM CHLORIDE TAB 1 GM 1 G: 1 TAB at 17:57

## 2018-01-05 RX ADMIN — METOCLOPRAMIDE HYDROCHLORIDE 10 MG: 10 TABLET ORAL at 10:10

## 2018-01-05 RX ADMIN — SODIUM CHLORIDE TAB 1 GM 1 G: 1 TAB at 13:12

## 2018-01-05 RX ADMIN — ENOXAPARIN SODIUM 40 MG: 40 INJECTION SUBCUTANEOUS at 08:56

## 2018-01-05 RX ADMIN — ACETAMINOPHEN 650 MG: 325 TABLET, FILM COATED ORAL at 23:22

## 2018-01-05 RX ADMIN — DOCUSATE SODIUM 100 MG: 100 CAPSULE, LIQUID FILLED ORAL at 17:57

## 2018-01-05 RX ADMIN — DOCUSATE SODIUM 100 MG: 100 CAPSULE, LIQUID FILLED ORAL at 08:57

## 2018-01-05 RX ADMIN — ONDANSETRON 4 MG: 2 INJECTION INTRAMUSCULAR; INTRAVENOUS at 05:21

## 2018-01-05 NOTE — PLAN OF CARE
Problem: PHYSICAL THERAPY ADULT  Goal: Performs mobility at highest level of function for planned discharge setting  See evaluation for individualized goals  Treatment/Interventions: Functional transfer training, LE strengthening/ROM, Elevations, Therapeutic exercise, Endurance training, Patient/family training, Equipment eval/education, Bed mobility, Gait training, Compensatory technique education, Continued evaluation, Spoke to nursing  Equipment Recommended: Thao Bella       See flowsheet documentation for full assessment, interventions and recommendations  Outcome: Adequate for Discharge  Prognosis: Excellent  Problem List: Decreased strength, Decreased endurance, Impaired balance, Decreased mobility, Decreased safety awareness, Decreased skin integrity, Pain  Assessment: The pt  continues to ambulate with independence with the rolling walker and he was able to perform standing therapeutic exercise  He had no loss of balance, but he was limited due to nausea  He noted that this was worse with movement, but he was able to perform stairs today with only verbal instruction for technique  He has demonstrated the necessary mobility in order to safely return home  Barriers to Discharge: None     Recommendation: Home with family support     PT - OK to Discharge: Yes    See flowsheet documentation for full assessment

## 2018-01-05 NOTE — PHYSICAL THERAPY NOTE
Physical Therapy Progress Note     01/05/18 1545   Pain Assessment   Pain Assessment 0-10   Pain Score 2   Pain Type Surgical pain   Pain Location Hip   Pain Orientation Right   Hospital Pain Intervention(s) Repositioned; Ambulation/increased activity; Emotional support   Response to Interventions Satisfied  Restrictions/Precautions   Other Precautions Pain   Subjective   Subjective The pt  states that he is eager to go home, and he would like to go for a walk  Transfers   Sit to Stand 6  Modified independent   Stand to Sit 6  Modified independent   Ambulation/Elevation   Gait pattern Inconsistent fallon;Decreased foot clearance   Gait Assistance 6  Modified independent   Assistive Device Rolling walker   Distance 300 feet  Balance   Static Sitting Normal   Dynamic Sitting Normal   Static Standing Good   Ambulatory Good   Activity Tolerance   Activity Tolerance Patient tolerated treatment well;Patient limited by pain   Nurse [address] , RN  Exercises   THR Standing;Right;AROM;15 reps   Assessment   Prognosis Excellent   Problem List Decreased strength;Decreased endurance; Impaired balance;Decreased mobility; Decreased safety awareness;Decreased skin integrity;Pain   Assessment The pt  has improved gait quality, fallon, and ambulatory distance this afternoon  He also has improved strength in his RLE  He remains appropriate to discharge home when medically appropriate  Barriers to Discharge None   Goals   Patient Goals To go home  STG Expiration Date 01/13/18   Treatment Day 3   Plan   Treatment/Interventions Functional transfer training;LE strengthening/ROM; Therapeutic exercise; Endurance training;Patient/family training;Bed mobility;Gait training   Progress Improving as expected   PT Frequency Twice a day   Recommendation   Recommendation Home with family support; Outpatient PT   Equipment Recommended Rubi Burton   PT - OK to Discharge Yes     Tere Aguilar PTA

## 2018-01-05 NOTE — PROGRESS NOTES
Patient has been seen by Yudy Blanco from Ortho earlier this evening  Patient was due to void but does not have urge to void and is refusing to be straight cathed  I have bladder scanned the patient at 922 mL this evening and still does not feel urge to void  Ortho called and made aware  Will continue to monitor patient

## 2018-01-05 NOTE — DISCHARGE SUMMARY
ORTHOPEDICS DISCHARGE SUMMARY   Nemo Jaimes 78 y o  male MRN: [id number]  Unit/Bed#: CW3 351-01      Attending Physician: Staci Cabrales    Admitting diagnosis: Primary osteoarthritis of right hip [M16 11]    Discharge diagnosis: Primary osteoarthritis of right hip [M16 11]    Date of admission: 1/3/2018    Date of discharge: 01/05/18    Procedure: right total hip arthroplasty    HPI  This is a 78y o  year old male that presented to the office with signs and symptoms of right hip osteoarthritis  They tried and failed conservative treatment measures and wished to proceed with surgical intervention  The risks, benefits, and complications of the procedure were discussed with the patient and informed consent was obtained  Hospital Course: The patient was admitted to the hospital on 1/3/2018 and underwent an uncomplicated right total hip arthroplasty  They were transferred to the floor after a brief stay in the post-anesthesia care unit  Their pain was well managed with IV and oral pain medications  They began therapy on post operative day #1  Lovenox was also started for DVT prophylaxis post operative day #1  Non eventful post operative course  On discharge date pt was cleared by PT and the medicine team and determined to be safe for discharge  Daily discussion was had with the patient, nursing staff, orthopaedic team, and family members if present  All questions were answered to the patients satisifaction  0  Lab Value Date/Time   HGB 10 1 (L) 01/05/2018 0439   HGB 10 7 (L) 01/04/2018 0540   HGB 11 2 (L) 06/13/2017 0436         Discharge Instructions: The patient was discharged weight bearing as tolerated to the right lower extremity  Lovenox will be continued for 28 days  Continue PT/OT  Take pain medications as instructed  Maintain right  hip precautions  Discharge Medications: For the complete list of discharge medications, please refer to the patient's medication reconciliation

## 2018-01-05 NOTE — PLAN OF CARE
Problem: PHYSICAL THERAPY ADULT  Goal: Performs mobility at highest level of function for planned discharge setting  See evaluation for individualized goals  Treatment/Interventions: Functional transfer training, LE strengthening/ROM, Elevations, Therapeutic exercise, Endurance training, Patient/family training, Equipment eval/education, Bed mobility, Gait training, Compensatory technique education, Continued evaluation, Spoke to nursing  Equipment Recommended: Katja Ricardo       See flowsheet documentation for full assessment, interventions and recommendations  Outcome: Adequate for Discharge  Prognosis: Excellent  Problem List: Decreased strength, Decreased endurance, Impaired balance, Decreased mobility, Decreased safety awareness, Decreased skin integrity, Pain  Assessment: The pt  has improved gait quality, fallon, and ambulatory distance this afternoon  He also has improved strength in his RLE  He remains appropriate to discharge home when medically appropriate  Barriers to Discharge: None     Recommendation: Home with family support, Outpatient PT     PT - OK to Discharge: Yes    See flowsheet documentation for full assessment

## 2018-01-05 NOTE — PHYSICAL THERAPY NOTE
Physical Therapy Progress Note     01/05/18 0920   Pain Assessment   Pain Assessment 0-10   Pain Score 3   Pain Type Acute pain;Surgical pain   Pain Location Hip   Pain Orientation Right   Hospital Pain Intervention(s) Repositioned; Ambulation/increased activity   Response to Interventions Tolerated  Restrictions/Precautions   RLE Weight Bearing Per Order WBAT   Other Precautions WBS  (Anterior JUDIE )   Subjective   Subjective The pt  notes that he feels very nauseous, and that he doesn't feel "right" in the head  Transfers   Sit to Stand 6  Modified independent   Stand to Sit 6  Modified independent   Ambulation/Elevation   Gait pattern Excessively slow; Inconsistent fallon   Gait Assistance 6  Modified independent   Assistive Device Rolling walker   Distance 140 feet  Stair Management Assistance 6  Modified independent   Additional items Verbal cues   Stair Management Technique Two rails; Step to pattern; Foreward;Nonreciprocal   Number of Stairs 5   Balance   Static Sitting Normal   Dynamic Sitting Normal   Static Standing Good   Ambulatory Fair +   Activity Tolerance   Activity Tolerance Patient tolerated treatment well;Treatment limited secondary to medical complications (Comment)   Nurse Adenike Stevens RN  Exercises   THR Standing;Right;AROM;5 reps   Assessment   Prognosis Excellent   Problem List Decreased strength;Decreased endurance; Impaired balance;Decreased mobility; Decreased safety awareness;Decreased skin integrity;Pain   Assessment The pt  continues to ambulate with independence with the rolling walker and he was able to perform standing therapeutic exercise  He had no loss of balance, but he was limited due to nausea  He noted that this was worse with movement, but he was able to perform stairs today with only verbal instruction for technique  He has demonstrated the necessary mobility in order to safely return home  Barriers to Discharge None   Goals   Patient Goals To feel better     STG Expiration Date 01/13/18   Treatment Day 2   Plan   Treatment/Interventions Functional transfer training;LE strengthening/ROM; Elevations; Therapeutic exercise; Endurance training;Patient/family training;Bed mobility;Gait training   Progress Improving as expected   PT Frequency Twice a day   Recommendation   Recommendation Home with family support   Equipment Recommended Merlin Sovereign   PT - OK to Discharge Yes     Fariha Feliz, PTA

## 2018-01-05 NOTE — PROGRESS NOTES
Orthopedics   Adia Andrade 78 y o  male MRN: [id number]  Unit/Bed#: CW3 351-01      Subjective:  78 y  o male post operative day 1 right anterior total hip arthroplasty  Pt doing well  Pain controlled  Was able to void overnight      Labs:    0  Lab Value Date/Time   HCT 29 2 (L) 01/05/2018 0439   HCT 31 3 (L) 01/04/2018 0540   HCT 32 9 (L) 06/13/2017 0436   HGB 10 1 (L) 01/05/2018 0439   HGB 10 7 (L) 01/04/2018 0540   HGB 11 2 (L) 06/13/2017 0436   WBC 10 96 (H) 01/05/2018 0439   WBC 8 04 01/04/2018 0540   WBC 9 33 06/13/2017 0436       Meds:    Current Facility-Administered Medications:     acetaminophen (TYLENOL) tablet 650 mg, 650 mg, Oral, Q6H PRN, Malissa Lesches, PA-C, 650 mg at 01/04/18 1037    calcium carbonate (TUMS) chewable tablet 1,000 mg, 1,000 mg, Oral, Daily PRN, Malissa Lesches, PA-C    docusate sodium (COLACE) capsule 100 mg, 100 mg, Oral, BID, Malissa Lesches, PA-C, 100 mg at 01/04/18 1757    enoxaparin (LOVENOX) subcutaneous injection 40 mg, 40 mg, Subcutaneous, Daily, Malissa Lesches, PA-C, 40 mg at 01/04/18 0900    lactated ringers infusion, 1 5 mL/kg/hr, Intravenous, Continuous, Malissa Lesches, PA-C, Last Rate: 102 mL/hr at 01/04/18 1159, 1 5 mL/kg/hr at 01/04/18 1159    morphine injection 2 mg, 2 mg, Intravenous, Q4H PRN, Malissa Lesches, PA-C    [facility]etron [facility] injection 4 mg, 4 mg, Intravenous, Q6H PRN, Malissa Lesches, PA-C, 4 mg at 01/05/18 0521    oxyCODONE (ROXICODONE) immediate release tablet 10 mg, 10 mg, Oral, Q4H PRN, Malissa Lesches, PA-C    oxyCODONE (ROXICODONE) IR tablet 5 mg, 5 mg, Oral, Q4H PRN, Malissa Lesches, PA-C    pantoprazole (PROTONIX) EC tablet 40 mg, 40 mg, Oral, Early Morning, Malissa Lesches, PA-C, Stopped at 01/05/18 0517    povidone-iodine (BETADINE) 10 % 20 application in sodium chloride 0 9 % 500 mL irrigation bottle, , Irrigation, Once, Boy Stoll MD    traMADol Ana Maria Drone) tablet 50 mg, 50 mg, Oral, Q6H [address], Sandi Frias PA-C, 50 mg at 01/04/18 2304    Blood Culture:   No results found for: BLOODCX    Wound Culture:   No results found for: WOUNDCULT    Ins and Outs:  I/O last 24 hours: In: 680 [P O :680]  Out: 3225 [Urine:3225]          Physical Exam:  Vitals:    01/05/18 0528   BP: 93/58   Pulse: 72   Resp:    Temp:    SpO2:      right lower extremity:  · Dressings C/D/I  · Sensation intact L1/S1  · Motor intact L3-S1  · 2+ dorsalis pedis     _*_*_*_*_*_*_*_*_*_*_*_*_*_*_*_*_*_*_*_*_*_*_*_*_*_*_*_*_*_*_*_*_*_*_*_*_*_*_*_*_*    Assessment: 78 y  o male post operative day 1 right anterior total hip arthroplasty   Doing well    Plan:  · Weight Bearing as tolerated  · Up and out of bed  · Anterior hip precautions  · DVT prophylaxis  · Analgesics  · PT/OT  · Will continue to assess for acute blood loss anemia    Jania Chavez

## 2018-01-05 NOTE — PROGRESS NOTES
Internal Medicine Progress Note  Patient: Boris Peterson  Age/sex: 78 y o  male  Medical Record #: [id number]      ASSESSMENT/PLAN:  Boris Peterson is seen and examined and mangement for following issues:       1  Right JUDIE: pain control per primary team  Recommend IS hourly and bowel regimen to help prevent narcotic induced constipation  2  BPH: on no medications as an outpatient; void trial now that ramirez out  3  ABLA: hgb stable at 10 1; no need for transfusion at this time  4  DVT prophylaxis: continue Lovenox 40mg daily  5  Hyponatremia:  Dropped from yesterday to 126  Pt is on no medications to cause the drop  Will repeat to make sure result is accurate  Hold DC for now  Subjective: Patient seen and examined  Pain controlled  No events overnight  Hoping to possibly be discharged today  Discussed with pt and nursing that pt's Na is low  Will repeat to insure accuracy  Pt does report poor appetite  He denies any other complaints      ROS:   GI: denies abdominal pain, change bowel habits or reflux symptoms  Neuro: No new neurologic changes  Respiratory: No Cough, SOB  Cardiovascular: No CP, palpitations     Scheduled Meds:    docusate sodium 100 mg Oral BID   enoxaparin 40 mg Subcutaneous Daily   pantoprazole 40 mg Oral Early Morning   betadine 20 mL in sodium chloride 0 9% 500 ml  Irrigation Once   traMADol 50 mg Oral Q6H [address]       Labs:       Results from last 7 days  Lab Units 01/05/18  0439 01/04/18  0540   WBC Thousand/uL 10 96* 8 04   HEMOGLOBIN g/dL 10 1* 10 7*   HEMATOCRIT % 29 2* 31 3*   PLATELETS Thousands/uL 211 241       Results from last 7 days  Lab Units 01/05/18  0434 01/04/18  0540   SODIUM mmol/L 126* 136   POTASSIUM mmol/L 4 0 4 0   CHLORIDE mmol/L 93* 101   CO2 mmol/L 27 29   BUN mg/dL 11 14   CREATININE mg/dL 0 75 0 90   GLUCOSE RANDOM mg/dL 97 109   CALCIUM mg/dL 8 0* 8 3                  Glucose (mg/dL)   Date Value   01/05/2018 97   01/04/2018 109   06/13/2017 109       Labs reviewed    Physical Examination:  Vitals:   Vitals:    01/04/18 2330 01/05/18 0300 01/05/18 0528 01/05/18 0803   BP: 99/52 116/56 93/58 123/61   Pulse: 81 74 72 70   Resp: 18 18 18   Temp: 99 8 °F (37 7 °C) 99 4 °F (37 4 °C)  98 2 °F (36 8 °C)   TempSrc: Oral Oral  Oral   SpO2: 96% 96%  98%   Weight:       Height:           GEN: NAD  HEENT: NC/AT, EOMI  RESP: CTAB, no R/R/W  CV: +S1 S2, regular rate, no rubs  ABD: soft, NT, ND, normal BS   : no ramirez  EXT: no lower extremity edema  Skin: no rashes  Neuro: AAO x 3      [ X ] Total time spent: 30 Mins and greater than 50% of this time was spent counseling/coordinating care        Antony Gunn PA-C  Internal Medicine

## 2018-01-05 NOTE — OCCUPATIONAL THERAPY NOTE
OccupationalTherapy Progress Note     Patient Name: Doria Spurling  CJIUU'A Date: 1/5/2018  Problem List  Patient Active Problem List   Diagnosis    S/P total hip arthroplasty             01/05/18 1145   Restrictions/Precautions   Weight Bearing Precautions Per Order Yes   RUE Weight Bearing Per Order WBAT   LUE Weight Bearing Per Order WBAT   RLE Weight Bearing Per Order WBAT   LLE Weight Bearing Per Order WBAT   Other Precautions WBS   Pain Assessment   Pain Assessment 0-10   Pain Score 3   Pain Type Acute pain   Pain Location Hip   Pain Orientation Right   Hospital Pain Intervention(s) Repositioned; Ambulation/increased activity; Emotional support   ADL   Eating Assistance 7  Independent   Grooming Assistance 7  Independent   UB Bathing Assistance 5  Supervision/Setup   LB Bathing Assistance 5  Supervision/Setup   UB Dressing Assistance 5  Supervision/Setup   LB Dressing Assistance 5  Supervision/Setup   Toileting Assistance  5  Supervision/Setup   Bed Mobility   Additional Comments oob in chair   Transfers   Sit to Stand 6  Modified independent   Stand to Sit 6  Modified independent   Stand pivot 6  Modified independent   Functional Mobility   Functional Mobility 6  Modified independent   Additional items Rolling walker   Cognition   Overall Cognitive Status WFL   Activity Tolerance   Activity Tolerance Patient tolerated treatment well   Assessment   Assessment Pt seen for OT session focusing on adls, transfers, functional mob and safe use of rw - pt able to complete LB dressing seated in beside chair at sba level  with min cues to start with operated le first - sba to stand and pull over hips, completes all transfers and functional mobility at Mod I level with RW - demonstrates good carryover with safe use of RW during functional tasks - has all necessary DME for home as well as supportive spouse who is able to provide assist prn - anticipate d/c home when medically cleared - no further acute OT needs indicated at this time- d/c from caseload   Plan   Treatment Interventions ADL retraining;Functional transfer training;Patient/family training;Equipment evaluation/education; Compensatory technique education; Activityengagement   Goal Expiration Date 01/14/18   Treatment Day 1   OT Frequency 3-5x/wk   Recommendation   OT Discharge Recommendation Home with family support   OT - OK to Discharge Yes   Barthel Index   Feeding 10   Bathing 5   Grooming Score 5   Dressing Score 10   Bladder Score 10   Bowels Score 10   Toilet Use Score 10   Transfers (Bed/Chair) Score 15   Mobility (Level Surface) Score 15   Stairs Score 0   Barthel Index Score 90     [place], [place]

## 2018-01-06 VITALS
RESPIRATION RATE: 18 BRPM | HEART RATE: 75 BPM | SYSTOLIC BLOOD PRESSURE: 127 MMHG | HEIGHT: 67 IN | WEIGHT: 150 LBS | DIASTOLIC BLOOD PRESSURE: 62 MMHG | BODY MASS INDEX: 23.54 KG/M2 | TEMPERATURE: 98.1 F | OXYGEN SATURATION: 100 %

## 2018-01-06 LAB
ANION GAP SERPL CALCULATED.3IONS-SCNC: 5 MMOL/L (ref 4–13)
BUN SERPL-MCNC: 12 MG/DL (ref 5–25)
CALCIUM SERPL-MCNC: 8.2 MG/DL (ref 8.3–10.1)
CHLORIDE SERPL-SCNC: 96 MMOL/L (ref 100–108)
CO2 SERPL-SCNC: 29 MMOL/L (ref 21–32)
CREAT SERPL-MCNC: 0.8 MG/DL (ref 0.6–1.3)
GFR SERPL CREATININE-BSD FRML MDRD: 87 ML/MIN/1.73SQ M
GLUCOSE SERPL-MCNC: 100 MG/DL (ref 65–140)
POTASSIUM SERPL-SCNC: 4.2 MMOL/L (ref 3.5–5.3)
SODIUM SERPL-SCNC: 130 MMOL/L (ref 136–145)

## 2018-01-06 PROCEDURE — 97116 GAIT TRAINING THERAPY: CPT

## 2018-01-06 PROCEDURE — 80048 BASIC METABOLIC PNL TOTAL CA: CPT | Performed by: PHYSICIAN ASSISTANT

## 2018-01-06 PROCEDURE — 97110 THERAPEUTIC EXERCISES: CPT

## 2018-01-06 RX ADMIN — PANTOPRAZOLE SODIUM 40 MG: 40 TABLET, DELAYED RELEASE ORAL at 05:28

## 2018-01-06 RX ADMIN — ENOXAPARIN SODIUM 40 MG: 40 INJECTION SUBCUTANEOUS at 08:21

## 2018-01-06 RX ADMIN — ACETAMINOPHEN 650 MG: 325 TABLET, FILM COATED ORAL at 08:20

## 2018-01-06 RX ADMIN — SODIUM CHLORIDE TAB 1 GM 1 G: 1 TAB at 08:21

## 2018-01-06 RX ADMIN — DOCUSATE SODIUM 100 MG: 100 CAPSULE, LIQUID FILLED ORAL at 08:21

## 2018-01-06 NOTE — PROGRESS NOTES
Internal Medicine Progress Note  Patient: Angelita Church  Age/sex: 78 y o  male  Medical Record #: [id number]      ASSESSMENT/PLAN:  Angelita Church is seen and examined and mangement for following issues:       1  Right JUDIE: pain control per primary team  Recommend IS hourly and bowel regimen to help prevent narcotic induced constipation  2  BPH: on no medications as an outpatient; void trial now that ramirez out  3  ABLA: hgb stable at 10 1; no need for transfusion at this time  4  DVT prophylaxis: continue Lovenox 40mg daily  5  Hyponatremia:  Had decreased to 125 yesterday  Per nursing pt drank a large amount of water the day prior trying to urinate  Na improved to 130 today  Pt is medically cleared for discharge  Subjective: Patient seen and examined  Pain controlled  No events overnight  He denies any other complaints      ROS:   GI: denies abdominal pain, change bowel habits or reflux symptoms  Neuro: No new neurologic changes  Respiratory: No Cough, SOB  Cardiovascular: No CP, palpitations     Scheduled Meds:    docusate sodium 100 mg Oral BID   enoxaparin 40 mg Subcutaneous Daily   pantoprazole 40 mg Oral Early Morning   betadine 20 mL in sodium chloride 0 9% 500 ml  Irrigation Once   sodium chloride 1 g Oral TID With Meals   traMADol 50 mg Oral Q6H [address]       Labs:       Results from last 7 days  Lab Units 01/05/18  0439 01/04/18  0540   WBC Thousand/uL 10 96* 8 04   HEMOGLOBIN g/dL 10 1* 10 7*   HEMATOCRIT % 29 2* 31 3*   PLATELETS Thousands/uL 211 241       Results from last 7 days  Lab Units 01/06/18  0425 01/05/18  1102   SODIUM mmol/L 130* 125*   POTASSIUM mmol/L 4 2 4 3   CHLORIDE mmol/L 96* 92*   CO2 mmol/L 29 28   BUN mg/dL 12 11   CREATININE mg/dL 0 80 0 83   GLUCOSE RANDOM mg/dL 100 117   CALCIUM mg/dL 8 2* 8 4                  Glucose (mg/dL)   Date Value   01/06/2018 100   01/05/2018 117   01/05/2018 97   01/04/2018 109       Labs reviewed    Physical Examination:  Vitals:   Vitals: 01/05/18 0803 01/05/18 1500 01/05/18 2317 01/06/18 0759   BP: 123/61 143/88 110/54 127/62   Pulse: 70 86 71 75   Resp: 18 18 18 18   Temp: 98 2 °F (36 8 °C) 98 8 °F (37 1 °C) 98 2 °F (36 8 °C) 98 1 °F (36 7 °C)   TempSrc: Oral Oral Oral Oral   SpO2: 98% 98% 100% 100%   Weight:       Height:           GEN: NAD  HEENT: NC/AT, EOMI  RESP: CTAB, no R/R/W  CV: +S1 S2, regular rate, no rubs  ABD: soft, NT, ND, normal BS   : no ramirez  EXT: no lower extremity edema  Skin: no rashes  Neuro: AAO x 3      [ X ] Total time spent: 30 Mins and greater than 50% of this time was spent counseling/coordinating care        Ginna Knott PA-C  Internal Medicine

## 2018-01-06 NOTE — PHYSICAL THERAPY NOTE
Physical Therapy Progress Note     01/06/18 0930   Pain Assessment   Pain Assessment 0-10   Pain Score 3   Pain Type Surgical pain   Pain Location Hip   Pain Orientation Right   Hospital Pain Intervention(s) Repositioned;Cold applied; Ambulation/increased activity   Response to Interventions Good  Restrictions/Precautions   Other Precautions Pain   Subjective   Subjective The pt  reports that he is eager to go home today  Transfers   Sit to Stand 6  Modified independent   Stand to Sit 6  Modified independent   Ambulation/Elevation   Gait pattern WNL   Gait Assistance 6  Modified independent   Assistive Device Rolling walker   Distance 400 feet  Balance   Static Sitting Normal   Dynamic Sitting Normal   Static Standing Normal   Ambulatory Good   Activity Tolerance   Activity Tolerance Patient tolerated treatment well   Nurse [address], RN  Exercises   THR Standing;Sitting;AROM; Right;15 reps   Assessment   Prognosis Excellent   Problem List Decreased strength;Decreased endurance; Impaired balance;Decreased mobility; Decreased safety awareness;Decreased skin integrity;Pain   Assessment The pt  continues to improve with his activity tolerance as well as his strength  He is ambulating around the unit independently with the walker  He is able to return home at discharge  Barriers to Discharge None   Goals   Patient Goals To go home  STG Expiration Date 01/13/18   Treatment Day 4   Plan   Treatment/Interventions Functional transfer training;LE strengthening/ROM; Elevations; Therapeutic exercise; Endurance training;Patient/family training;Bed mobility;Gait training   Progress Improving as expected   PT Frequency Twice a day   Recommendation   Recommendation Outpatient PT; Home with family support   Equipment Recommended Walker  (Rolling walker  )   PT - OK to Discharge Yes     Qian Baez, OLGA

## 2018-01-06 NOTE — PROGRESS NOTES
Orthopedics   Maryjo Nicolas 78 y o  male MRN: [id number]  Unit/Bed#: CW3 351-01      Subjective:  78 y  o male post operative day 3 right anterior total hip arthroplasty  Pain controlled  Denies numbness or tingling  Participating well with PT         Labs:    0  Lab Value Date/Time   HCT 29 2 (L) 01/05/2018 0439   HCT 31 3 (L) 01/04/2018 0540   HCT 32 9 (L) 06/13/2017 0436   HGB 10 1 (L) 01/05/2018 0439   HGB 10 7 (L) 01/04/2018 0540   HGB 11 2 (L) 06/13/2017 0436   WBC 10 96 (H) 01/05/2018 0439   WBC 8 04 01/04/2018 0540   WBC 9 33 06/13/2017 0436       Meds:    Current Facility-Administered Medications:     acetaminophen (TYLENOL) tablet 650 mg, 650 mg, Oral, Q6H PRN, Sandraena Maribell, PA-C, 650 mg at 01/05/18 2322    calcium carbonate (TUMS) chewable tablet 1,000 mg, 1,000 mg, Oral, Daily PRN, Roman Reyna PA-C    docusate sodium (COLACE) capsule 100 mg, 100 mg, Oral, BID, Roman Reyna, PA-C, 100 mg at 01/05/18 1757    enoxaparin (LOVENOX) subcutaneous injection 40 mg, 40 mg, Subcutaneous, Daily, Roena Maribell, PA-C, 40 mg at 01/05/18 0856    morphine injection 2 mg, 2 mg, Intravenous, Q4H PRN, Roman Reyna PA-C    ondansetron [facility]) injection 4 mg, 4 mg, Intravenous, Q6H PRN, Roena Maribell, PA-C, 4 mg at 01/05/18 0521    oxyCODONE (ROXICODONE) immediate release tablet 10 mg, 10 mg, Oral, Q4H PRN, ANNA Arthur-C    oxyCODONE (ROXICODONE) IR tablet 5 mg, 5 mg, Oral, Q4H PRN, Roena Maribell PA-C    pantoprazole (PROTONIX) EC tablet 40 mg, 40 mg, Oral, Early Morning, Roena Maribell, PA-C, 40 mg at 01/06/18 2463    povidone-iodine (BETADINE) 10 % 20 application in sodium chloride 0 9 % 500 mL irrigation bottle, , Irrigation, Once, Ortiz Perez MD    sodium chloride 0 9 % infusion, 100 mL/hr, Intravenous, Continuous, Mary Rae DO    sodium chloride tablet 1 g, 1 g, Oral, TID With Meals, Flory Whitaker PA-C, 1 g at 01/05/18 7905   traMADol (ULTRAM) tablet 50 mg, 50 mg, Oral, Q6H [facility], Roman Reyna PA-C, 50 mg at 01/04/18 2306    Blood Culture:   No results found for: BLOODCX    Wound Culture:   No results found for: WOUNDCULT    Ins and Outs:  I/O last 24 hours: In: 860 [P O :860]  Out: 2850 [Urine:2850]          Physical Exam:  Vitals:    01/05/18 2317   BP: 110/54   Pulse: 71   Resp: 18   Temp: 98 2 °F (36 8 °C)   SpO2: 100%     right lower extremity:  · Dressings C/D/I  · Sensation intact s/s/sp/dp/t  · Motor intact ehl/fhl, ankle df/pf  · Toes WWP    _*_*_*_*_*_*_*_*_*_*_*_*_*_*_*_*_*_*_*_*_*_*_*_*_*_*_*_*_*_*_*_*_*_*_*_*_*_*_*_*_*    Assessment: 78 y  o male post operative day 3 right anterior total hip arthroplasty   Doing well    Plan:  · Weight Bearing as tolerated RLE  · Up and out of bed  · Anterior hip precautions  · DVT prophylaxis  · Analgesics  · PT/OT  · Sodium improving   · Will plan to DC today   · Will continue to assess for acute blood loss anemia    Liyah Ledesma MD

## 2018-01-06 NOTE — DISCHARGE SUMMARY
ORTHOPEDICS DISCHARGE SUMMARY   Carmela Gomez 78 y o  male MRN: [id number]  Unit/Bed#: CW3 351-01      Attending Physician: Kailyn Escobar    Admitting diagnosis: Primary osteoarthritis of right hip [M16 11]    Discharge diagnosis: Primary osteoarthritis of right hip [M16 11]    Date of admission: 1/3/2018    Date of discharge: 01/06/18    Procedure: right total hip arthroplasty    HPI  This is a 78y o  year old male that presented to the office with signs and symptoms of right hip osteoarthritis  They tried and failed conservative treatment measures and wished to proceed with surgical intervention  The risks, benefits, and complications of the procedure were discussed with the patient and informed consent was obtained  Hospital Course: The patient was admitted to the hospital on 1/3/2018 and underwent an uncomplicated right total hip arthroplasty  They were transferred to the floor after a brief stay in the post-anesthesia care unit  Their pain was well managed with IV and oral pain medications  They began therapy on post operative day #1  Lovenox was also started for DVT prophylaxis post operative day #1  Non eventful post operative course  On discharge date pt was cleared by PT and the medicine team and determined to be safe for discharge  Daily discussion was had with the patient, nursing staff, orthopaedic team, and family members if present  All questions were answered to the patients satisifaction  0  Lab Value Date/Time   HGB 10 1 (L) 01/05/2018 0439   HGB 10 7 (L) 01/04/2018 0540   HGB 11 2 (L) 06/13/2017 0436         Discharge Instructions: The patient was discharged weight bearing as tolerated to the right lower extremity  Lovenox will be continued for 28 days  Continue PT/OT  Take pain medications as instructed  Maintain right  hip precautions  Discharge Medications: For the complete list of discharge medications, please refer to the patient's medication reconciliation

## 2018-01-06 NOTE — PLAN OF CARE
Problem: PHYSICAL THERAPY ADULT  Goal: Performs mobility at highest level of function for planned discharge setting  See evaluation for individualized goals  Treatment/Interventions: Functional transfer training, LE strengthening/ROM, Elevations, Therapeutic exercise, Endurance training, Patient/family training, Equipment eval/education, Bed mobility, Gait training, Compensatory technique education, Continued evaluation, Spoke to nursing  Equipment Recommended: Asha Saldivar       See flowsheet documentation for full assessment, interventions and recommendations  Outcome: Adequate for Discharge  Prognosis: Excellent  Problem List: Decreased strength, Decreased endurance, Impaired balance, Decreased mobility, Decreased safety awareness, Decreased skin integrity, Pain  Assessment: The pt  continues to improve with his activity tolerance as well as his strength  He is ambulating around the unit independently with the walker  He is able to return home at discharge  Barriers to Discharge: None     Recommendation: Outpatient PT, Home with family support     PT - OK to Discharge: Yes    See flowsheet documentation for full assessment

## 2018-01-08 ENCOUNTER — GENERIC CONVERSION - ENCOUNTER (OUTPATIENT)
Dept: OTHER | Facility: OTHER | Age: 78
End: 2018-01-08

## 2018-01-10 ENCOUNTER — GENERIC CONVERSION - ENCOUNTER (OUTPATIENT)
Dept: OBGYN CLINIC | Facility: HOSPITAL | Age: 78
End: 2018-01-10

## 2018-01-10 NOTE — MISCELLANEOUS
Message  patient notified of u/s results    needs to see urology for bladder thickening and distension  sent to dr Brittanie Tracey disorder    · 1 - Levon George MD, Gabby Basilio (Urology) Physician Referral  Consult  Status: Active  Requested  for: 89RDR2025  Care Summary provided  : Yes    Signatures   Electronically signed by : Nelia Hall DO; May 20 2016  1:06PM EST                       (Author)

## 2018-01-11 ENCOUNTER — HOSPITAL ENCOUNTER (OUTPATIENT)
Dept: RADIOLOGY | Facility: HOSPITAL | Age: 78
Discharge: HOME | End: 2018-01-11
Attending: ORTHOPAEDIC SURGERY
Payer: COMMERCIAL

## 2018-01-11 ENCOUNTER — ALLSCRIPTS OFFICE VISIT (OUTPATIENT)
Dept: OTHER | Facility: OTHER | Age: 78
End: 2018-01-11

## 2018-01-11 DIAGNOSIS — Z48.89 ENCOUNTER FOR OTHER SPECIFIED SURGICAL AFTERCARE (CODE): ICD-10-CM

## 2018-01-11 PROCEDURE — 73502 X-RAY EXAM HIP UNI 2-3 VIEWS: CPT

## 2018-01-11 NOTE — MISCELLANEOUS
Message  Per Protocol, it is medically necessary for Mr Coffey to have a chest x-ray prior to surgical procedures due to his history of tobacco usage        Signatures   Electronically signed by : Nery Campa, [facility]; Jun 9 2017 12:12PM EST                       (Author)

## 2018-01-12 VITALS
HEIGHT: 68 IN | BODY MASS INDEX: 23.04 KG/M2 | SYSTOLIC BLOOD PRESSURE: 157 MMHG | WEIGHT: 152 LBS | DIASTOLIC BLOOD PRESSURE: 86 MMHG | HEART RATE: 71 BPM

## 2018-01-12 VITALS
BODY MASS INDEX: 22.43 KG/M2 | HEART RATE: 63 BPM | HEIGHT: 68 IN | DIASTOLIC BLOOD PRESSURE: 76 MMHG | SYSTOLIC BLOOD PRESSURE: 142 MMHG | WEIGHT: 148 LBS

## 2018-01-13 VITALS
WEIGHT: 156 LBS | HEIGHT: 68 IN | BODY MASS INDEX: 23.64 KG/M2 | DIASTOLIC BLOOD PRESSURE: 80 MMHG | TEMPERATURE: 97.4 F | SYSTOLIC BLOOD PRESSURE: 110 MMHG | HEART RATE: 60 BPM

## 2018-01-13 VITALS
SYSTOLIC BLOOD PRESSURE: 145 MMHG | HEART RATE: 69 BPM | DIASTOLIC BLOOD PRESSURE: 75 MMHG | BODY MASS INDEX: 22.43 KG/M2 | WEIGHT: 148 LBS | HEIGHT: 68 IN

## 2018-01-13 VITALS
BODY MASS INDEX: 23.19 KG/M2 | HEIGHT: 68 IN | WEIGHT: 153 LBS | HEART RATE: 66 BPM | DIASTOLIC BLOOD PRESSURE: 91 MMHG | SYSTOLIC BLOOD PRESSURE: 165 MMHG

## 2018-01-13 VITALS
HEIGHT: 68 IN | HEART RATE: 70 BPM | BODY MASS INDEX: 22.58 KG/M2 | TEMPERATURE: 97 F | RESPIRATION RATE: 17 BRPM | DIASTOLIC BLOOD PRESSURE: 60 MMHG | SYSTOLIC BLOOD PRESSURE: 110 MMHG | WEIGHT: 149 LBS

## 2018-01-13 NOTE — MISCELLANEOUS
Message   Recorded as Task   Date: 05/09/2016 03:39 PM, Created By: Giovanna Hernandez   Task Name: Call Back   Assigned To: [facility] [place] [facility],Team   Regarding Patient: Luis M Johnson, Status: In Progress   Comment:    Harley Villarreal - 09 May 2016 3:39 PM     TASK CREATED  Please call patient has signal on kidney and may need further workup  Please have him follow with primary care physician  MRi reveals left later disc herniation, if still with Left Lef pain would reccommend Left L4, TFESI-codes M51 16, M51 46   Clara Agarwal - 10 May 2016 9:47 AM     TASK EDITED    I spoke with Na Rothman at Dr De Santiago's office  Advised that a signal was noted on left kidney  She will make Dr De Santiago aware and will call pt  I spoke with pt, advised above  Pt willing to proceed with left L4 TFESI  Scheduled for 5/12/16 at 1415  Pt will have , will be NPO 1 hr prior, denies antibx/infection/anticoag  Pt verbalizes understanding  All questions answered  **********        Active Problems    1  Colon cancer screening (V76 51) (Z12 11)   2  Diverticulitis of colon (562 11) (K57 32)   3  Encounter for screening colonoscopy (V76 51) (Z12 11)   4  Fullness in clavicular area (784 2) (R22 2)   5  Knee pain (719 46) (M25 569)   6  Left-sided low back pain with left-sided sciatica (724 3) (M54 42)   7  Neoplasm of skin (239 2) (D49 2)   8  Pain of both hip joints (719 45) (M25 551,M25 552)   9  Primary osteoarthritis of both hips (715 15) (M16 0)   10  Primary osteoarthritis of both knees (715 16) (M17 0)    Current Meds   1  Aleve CAPS Recorded   2  Daily Value Multivitamin TABS; Take 1 tablet daily Recorded   3  Zostavax 87098 UNT/0 65ML Subcutaneous Solution Reconstituted (Zostavax 15656   UNT/0 65ML Subcutaneous Solution Reconstituted); INJECT 0 5 ML Once; Therapy: 95TXS1488 to (Evaluate:86Okt3060); Last Rx:06Apr2016 Ordered    Allergies    1  No Known Drug Allergies    Signatures   Electronically signed by :  Gardenia England, ; May 10 2016  9:47AM EST                       (Author)

## 2018-01-14 VITALS
WEIGHT: 148 LBS | HEART RATE: 54 BPM | SYSTOLIC BLOOD PRESSURE: 133 MMHG | DIASTOLIC BLOOD PRESSURE: 78 MMHG | BODY MASS INDEX: 22.43 KG/M2 | HEIGHT: 68 IN

## 2018-01-14 VITALS
SYSTOLIC BLOOD PRESSURE: 117 MMHG | HEART RATE: 60 BPM | WEIGHT: 149 LBS | BODY MASS INDEX: 22.58 KG/M2 | HEIGHT: 68 IN | DIASTOLIC BLOOD PRESSURE: 77 MMHG

## 2018-01-14 NOTE — PROGRESS NOTES
Assessment    1  History of Need for pneumococcal vaccination (V03 82) (Z23)   2  Encounter for preventive health examination (V70 0) (Z00 00)    Plan  Diverticulitis of colon    · Ciprofloxacin HCl - 500 MG Oral Tablet   · MetroNIDAZOLE 500 MG Oral Tablet (Flagyl)  Health Maintenance    · Follow-up visit in 1 year Evaluation and Treatment  Follow-up  Status: Hold For -  Scheduling  Requested for: 15VGV3328  Knee pain    · *1 - [facility] (ORTHOPEDIC SURGERY ) Physician  Referral  Consult  Status: Active  Requested for: 82FEM6570  Care Summary provided  : Yes   · * XR KNEE 3 VIEW LEFT; Status:Resulted - Requires Verification;   Done: 61DNK3528  09:11AM   · * XR KNEE 3 VIEW RIGHT; Status:Resulted - Requires Verification;   Done: 88ZMP1575  09:11AM  Neoplasm of skin    · 2 - Suhas CAREY, Estela Jain (Dermatology) Physician Referral  Consult  Status: Active   Requested for: 58JLO9911  Care Summary provided  : Yes  Pain of both hip joints    · * XR HIPS BILATERAL WITH AP PELVIS; Status:Resulted - Requires Verification;    Done: 84KUX8124 09:12AM  PMH: Need for pneumococcal vaccination    · Pneumo (Pneumovax)  PMH: Need for shingles vaccine    · Zostavax 46024 UNT/0 65ML Subcutaneous Solution Reconstituted (Zostavax  74297 UNT/0 65ML Subcutaneous Solution Reconstituted); INJECT 0 5 ML Once    Discussion/Summary    Doing well  will check xrays of hips and knees  shingle shot ordered  lasb reviewed  refer to ortho  f/u in 1 year or as needed  Impression: Initial Annual Wellness Visit, with preventive exam as well as age and risk appropriate counseling completed  Cardiovascular screening and counseling: the risks and benefits of screening were discussed and screening is current  Diabetes screening and counseling: the risks and benefits of screening were discussed and screening is current     Colorectal cancer screening and counseling: the risks and benefits of screening were discussed and due --has been 10 years  Prostate cancer screening and counseling: the risks and benefits of screening were discussed and screening is current  Osteoporosis screening and counseling: screening not indicated  Abdominal aortic aneurysm screening and counseling: screening not indicated  Glaucoma screening and counseling: needs to see eye doctor  HIV screening and counseling: screening not indicated  Chief Complaint  Complete physical 75 year, due for Colonoscopy 03/2016  Pneumo vaccine due  need fall, depression, and UI screens done  History of Present Illness  HPI: here for medicare physical    Welcome to Medicare and Wellness Visits: The patient is being seen for the initial annual wellness visit  Medicare Screening and Risk Factors   Hospitalizations: he has been hospitalized 0 times  Medicare Screening Tests Risk Questions   Abdominal aortic aneurysm risk assessment: over 72years of age  Osteoporosis risk assessment: none indicated  HIV risk assessment: none indicated  Drug and Alcohol Use: The patient is a former cigarette smoker and quit in 1980's  The patient reports occasional alcohol use  Alcohol concern:   The patient has no concerns about alcohol abuse  He has never used illicit drugs  Diet and Physical Activity: Current diet includes well balanced meals  He exercises infrequently  Mood Disorder and Cognitive Impairment Screening:   Depression screening  no significant symptoms  He denies feeling down, depressed, or hopeless over the past two weeks  He denies feeling little interest or pleasure in doing things over the past two weeks  Cognitive impairment screening: denies difficulty learning/retaining new information, denies difficulty handling complex tasks, denies difficulty with reasoning, denies difficulty with spatial ability and orientation, denies difficulty with language and denies difficulty with behavior     Functional Ability/Level of Safety: Hearing is normal bilaterally, normal in the right ear, normal in the left ear and a hearing aid is not used  The patient is currently able to do activities of daily living without limitations, able to do instrumental activities of daily living without limitations, able to participate in social activities without limitations and able to drive without limitations  Activities of daily living details: does not need help using the phone, no transportation help needed, does not need help shopping, no meal preparation help needed, does not need help doing housework, does not need help doing laundry, does not need help managing medications and does not need help managing money  Fall risk factors:  alcohol use, but The patient fell 0 times in the past 12 months , no polypharmacy, no mobility impairment, no antidepressant use, no deconditioning, no postural hypotension, no sedative use, no visual impairment, no urinary incontinence, no antihypertensive use, no cognitive impairment, up and go test was normal and no previous fall  Home safety risk factors:  no unfamiliar surroundings, no loose rugs, no poor household lighting, no uneven floors, no household clutter, grab bars in the bathroom and handrails on the stairs  Advance Directives: Advance directives: no living will, no durable power of  for health care directives and no advance directives  end of life decisions were reviewed with the patient and I agree with the patient's decisions  Co-Managers and Medical Equipment/Suppliers: See Patient Care Team      Review of Systems    Constitutional: negative  Eyes: negative  ENT: negative  Cardiovascular: negative  Respiratory: negative  Gastrointestinal: negative  Genitourinary: negative  Musculoskeletal: negative and as noted in HPI  Integumentary and Breasts: negative  Neurological: negative  Psychiatric: negative  Endocrine: negative  Hematologic and Lymphatic: negative  Active Problems    1  Diverticulitis of colon (562 11) (K57 32)    Past Medical History    · History of acute bronchitis (V12 69) (Z87 09)    The active problems and past medical history were reviewed and updated today  Surgical History    The surgical history was reviewed and updated today  Family History    The family history was reviewed and updated today  Social History    · Former smoker (T47 20) (W26 090)  The social history was reviewed and updated today  The social history was reviewed and is unchanged  Current Meds   1  Ciprofloxacin HCl - 500 MG Oral Tablet; Take 1 tablet twice daily; Therapy: 07Itn5978 to (Evaluate:75Ogd3258)  Requested for: 48Mga3207; Last   Rx:21Jnq1209 Ordered   2  Daily Value Multivitamin TABS; Take 1 tablet daily Recorded   3  MetroNIDAZOLE 500 MG Oral Tablet; TAKE 1 TABLET 3 times daily; Therapy: 17Ofn5111 to (Evaluate:64Xau3759)  Requested for: 66Gsd5172; Last   Rx:03Xht7806 Ordered    The medication list was reviewed and updated today  Allergies    1  No Known Drug Allergies    Immunizations   1 2 3    Influenza  10Pno7751 15FIH3446 75Ked1617     Vitals  Signs [Data Includes: Current Encounter]    Heart Rate: 60  Systolic: 469, LUE, Sitting  Diastolic: 80, LUE, Sitting  Height: 5 ft 8 in  Weight: 152 lb   BMI Calculated: 23 11  BSA Calculated: 1 82    Physical Exam    Constitutional   General appearance: No acute distress, well appearing and well nourished  Ears, Nose, Mouth, and Throat   External inspection of ears and nose: Normal     Otoscopic examination: Tympanic membranes translucent with normal light reflex  Canals patent without erythema  Hearing: Normal     Nasal mucosa, septum, and turbinates: Normal without edema or erythema  Lips, teeth, and gums: Normal, good dentition  Oropharynx: Normal with no erythema, edema, exudate or lesions  Neck   Neck: Supple, symmetric, trachea midline, no masses      Pulmonary   Respiratory effort: No increased work of breathing or signs of respiratory distress  Auscultation of lungs: Clear to auscultation  Cardiovascular   Auscultation of heart: Normal rate and rhythm, normal S1 and S2, no murmurs  Carotid pulses: 2+ bilaterally  Abdomen   Abdomen: Non-tender, no masses  Liver and spleen: No hepatomegaly or splenomegaly  Lymphatic   Palpation of lymph nodes in neck: No lymphadenopathy  Musculoskeletal   Gait and station: Normal     Inspection/palpation of digits and nails: Normal without clubbing or cyanosis  Inspection/palpation of joints, bones, and muscles: Abnormal   +Arthritic changes to knees, hands  Range of motion: Normal     Stability: Normal     Muscle strength/tone: Normal        Results/Data  * XR HIPS BILATERAL WITH AP PELVIS 47Kkh2495 09:12AM Ariella Rod    Order Number: [id number]     Test Name Result Flag Reference   XR HIPS BILATERAL WITH AP PELVIS (Report)     BILATERAL HIPS AND PELVIS     INDICATION: Chronic pain     COMPARISON: None     VIEWS: AP pelvis and coned down views of each hip; 5 images      FINDINGS:     No acute pelvic fracture or pathologic bone lesions  Visualized bony pelvis appears intact  LEFT HIP:   Moderate to severe narrowing of the hip joint  Subchondral sclerosis and subchondral cystic changes in the superior, weight bearing portion of the acetabulum  Flattening of the femoral head  Subchondral sclerosis and subchondral cystic changes in the    femoral head  Bony alignment is maintained  Soft tissues are unremarkable  RIGHT HIP:   Moderate narrowing of the hip joint  Subchondral sclerosis and subchondral cystic changes of the superior, weight bearing portion of the acetabulum and femoral head  Bony alignment is maintained  Soft tissues are unremarkable  Degenerative changes lumbar spine  IMPRESSION:   1  Moderate to severe degenerative changes left hip  2  Moderate degenerative changes right hip         Workstation performed: [id number]     Signed by:   Kishore Morataya DO   4/6/16     * XR KNEE 3 VIEW RIGHT 46XOI6788 09:11AM [facility] Order Number: [id number]     Test Name Result Flag Reference   XR KNEE 3 VW RIGHT (Report)     RIGHT KNEE     INDICATION: Chronic knee pain  COMPARISON: Plain radiographs August 14, 2009 and MRI November 26, 2010     VIEWS: 3; 3 images     FINDINGS:     There is no acute fracture or dislocation  There is no joint effusion  Progressive, moderate narrowing of the medial joint compartment with osteophytosis  Subchondral sclerosis of the medial tibial plateau  Mild flattening of the medial femoral condyle  Moderate narrowing of the patellofemoral space with osteophytosis  No lytic or blastic lesions are seen  Soft tissues are unremarkable  IMPRESSION:   Progressive, moderate degenerative changes  No acute osseous abnormality  Workstation performed: [id number]     Signed by:   Kishore Morataya DO   4/6/16     * XR KNEE 3 VIEW LEFT 29UQG9171 09:11AM [facility] Order Number: [id number]     Test Name Result Flag Reference   XR KNEE 3 VW LEFT (Report)     LEFT KNEE     INDICATION: Chronic knee pain     COMPARISON: Plain radiographs August 14, 2009     VIEWS: 3; 3 images     FINDINGS:     There is no acute fracture or dislocation  There is no joint effusion  Moderate medial joint space narrowing  Mild flattening of the medial femoral condyle with subchondral sclerosis  Osteophytosis of the medial joint compartment  Subchondral cystic changes in the medial femoral condyle  Sharpening of the tibial spines  Moderate narrowing of the patellofemoral space with osteophytosis  No lytic or blastic lesions are seen  Soft tissues are unremarkable  IMPRESSION:   Progressive moderate degenerative changes  No acute osseous abnormality         Workstation performed: [id number]     Signed by:   Kishore Morataya DO   4/6/16       Procedure    Procedure: Visual Acuity Test    Indication: routine screening  Inforrmation supplied by a Snellen chart  Results: 20/50 in the right eye without corrective device, 20/50 in the left eye without corrective device   The patient was cooperative  Health Management  History of Colon cancer screening   COLONOSCOPY; every 1 year; Last 92KIW1823; Next Due: 71PHJ0189; Overdue    Signatures   Electronically signed by : Brenda Collazo DO;  Apr 6 2016  1:14PM EST                       (Author)

## 2018-01-16 NOTE — PROGRESS NOTES
Assessment   1  Aftercare following surgery (S27 79) (Z48 89)1      1 Amended By: Kylee Mcgregor; Jan 16 2018 10:26 AM EST      Plan   Aftercare following surgery    · * XR HIP/PELV 2-3 VWS RIGHT W PELVIS IF PERFORMED; Status:Resulted - Requires    Verification,Retrospective By Protocol Authorization;   Done: 85HCK5953 10:31AM    Discussion/Summary      Patient seen and examined, discussed with Dr Tavares Kumar  Discussed with patient that he can continue physical therapy and full activities as tolerated  Patient to hold off on any dental work until at least 6 weeks postoperative  Patient to return to the office in 1 week for skin evaluation and removal staples  Patient aware of signs and symptoms of infection or DVT  Patient to call office or return sooner with any concerns or complaints  1     The  patient1  ,  patient's family1  was counseled regarding1  diagnostic results1 ,-- instructions for management1   The treatment plan was reviewed with the patient/guardian  The patient/guardian understands and agrees with the treatment plan1        1 Amended By: Kylee Mcgregor; Jan 16 2018 10:27 AM EST      Chief Complaint   1  Hip Pain    Post-Op   HPI: Patient is a 29-year-old male presenting to the office for 1 week follow-up status post right Total Hip Arthroplasty  Patient is doing well postoperatively  He states that the pain is up to a 4/10 at the worst  Patient states that the pain intermittently shoots down to the level of the shin  Patient is able to go up and down stairs without difficulty  He ambulates with a cane at this time  He has started physical therapy activities yesterday, however, he has been working on home PT exercises since postop day 2  Patient denies any other acute/associated complaints  Review of Systems        Constitutional:1  No fever or chills, feels well, no tiredness, no recent weight loss or weight gain1   Eyes:1  No complaints of red eyes, no eyesight problems1   ENT:1  no complaints of loss of hearing, no nosebleeds, no sore throat1   Cardiovascular: 1  No complaints of chest pain, no palpitations, no leg claudication or lower extremity edema1   Respiratory:1  No complaints of shortness of breath, no wheezing, no cough1   Gastrointestinal:1  No complaints of abdominal pain, no constipation, no nausea or vomiting, no diarrhea or bloody stools1   Genitourinary:1  No complaints of dysuria or incontinence, no hesitancy, no nocturia1   Musculoskeletal:1  as noted in Karely Rogers   Integumentary:1  No complaints of skin rash or lesion, no itching or dry skin, no skin wounds1   Neurological:1  No complaints of headache, no confusion, no numbness or tingling, no dizziness1   Psychiatric:1  No suicidal thoughts, no anxiety, no depression1   Endocrine:1  No muscle weakness, no frequent urination, no excessive thirst, no feelings of weakness1   ROS reviewed  1        1 Amended By: Alfonza Nyhan; Jan 16 2018 10:24 AM EST      Active Problems   1  Aftercare following surgery (V58 89) (Z48 89)  2  Back pain (724 5) (M54 9)  3  Bladder disorder (596 9) (N32 9)  4  BPH (benign prostatic hyperplasia) (600 00) (N40 0)  5  Diverticulitis of colon (562 11) (K57 32)  6  Fullness in clavicular area (784 2) (R22 2)  7  Heart murmur (785 2) (R01 1)  8  Kidney mass (593 9) (N28 89)  9  Knee pain (719 46) (M25 569)  10  Left-sided low back pain with left-sided sciatica (724 3) (M54 42)  11  Leg pain (729 5) (M79 606)  12  Neoplasm of skin (239 2) (D49 2)  13  Pain of both hip joints (719 45) (M25 551,M25 552)  14  Primary osteoarthritis of both hips (715 15) (M16 0)  15  Primary osteoarthritis of both knees (715 16) (M17 0)  16  Primary osteoarthritis of left hip (715 15) (M16 12)  17   Primary osteoarthritis of right hip (715 15) (M16 11)    Social History    · Former smoker (K21 90) (S83 583)   · Denied: History of Marijuana   ·    · Never a smoker   · Denied: History of No drug use   · Retired   · Social alcohol use (Z78 9)  The social history was reviewed and updated today  1        1 Amended By: Alfonza Nyhan; Jan 16 2018 10:24 AM EST      Current Meds   1  Colace 100 MG Oral Capsule; Therapy: (50-92-49-29) to Recorded  2  Daily Value Multivitamin TABS; Take 1 tablet daily Recorded  3  Ferrous Gluconate 324 MG TABS; take 2 tablet daily; Therapy: (50-92-49-29) to Recorded  4  Folic Acid 1 MG Oral Tablet; TAKE 1 TABLET DAILY; Therapy: (50-92-49-29) to Recorded  5  Gabapentin 100 MG Oral Capsule; one tab 3 times daily for 30 days following hip surgery     on 1/3/18; Therapy: (50-92-49-29) to Recorded  6  Lovenox 40 MG/0 4ML Subcutaneous Solution; Inject 0 4 ml SQ for 28 days following hip     surgery on 1/3/18; Therapy: (50-92-49-29) to Recorded  7  OxyCODONE HCl - 5 MG Oral Tablet; TAKE 1 TO 2 TABLETS EVERY 4 TO 6 HOURS AS     NEEDED FOR PAIN;     Therapy: (Recorded:08Jan2018) to Recorded  8  TraMADol HCl - 50 MG Oral Tablet; TAKE 1 TABLET Every 6 hours for 10 days following     hip surgery on 1/3/18; Therapy: (50-92-49-29) to Recorded  9  Tylenol 325 MG Oral Tablet; TAKE 1 TO 2 TABLETS EVERY 6 HOURS AS NEEDED; Therapy: (50-92-49-29) to Recorded  10  Vitamin C 500 MG Oral Tablet; Take 1 tablet twice daily; Therapy: (50-92-49-29) to Recorded     The medication list was reviewed and updated today  1        1 Amended By: Alfonza Nyhan; Jan 16 2018 10:24 AM EST      Allergies   1  No Known Drug Allergies    Vitals    Recorded: 64KSL1169 10:46AM   Heart Rate 65   Systolic 394   Diastolic 79   Height 5 ft 8 in     Physical Exam      Right Hip: Appearance:  Normal except1  1  swelling1   Surgical incision1  was clean, dry, and intact1   Tenderness:  None except the1  1   Slight tenderness over surgical site1  Flexion: 1  restricted AROM1    Internal rotation: 1 normal AROM1   External rotation: 1  normal AROM1   Abduction: 1  normal AROM1   Adduction: 1  normal AROM1   Motor: 1  4/5 flexion1   Constitutional -1  General appearance: Normal1   Musculoskeletal -1  Gait and station: Abnormal1  -- Muscle strength/tone: Normal1   Ambulating with cane     Cardiovascular -1  Pulses: Normal1  -- Examination of extremities for edema and/or varicosities: Normal1   Skin -1  Skin and subcutaneous tissue: Normal1   Neurologic -1  Sensation: Normal1   Psychiatric -1  Orientation to person, place, and time: Normal1  -- Mood and affect: Normal1   Eyes1       Conjunctiva and lids: Normal 1       Pupils and irises: Normal 1        1 Amended By: Simin Doyle; Jan 16 2018 10:25 AM EST      Results/Data   * XR HIP/PELV 2-3 VWS RIGHT W PELVIS IF PERFORMED 72MGV2500 10:31AM Areta Picket Order Number: [id number]      Performing Comments: WR      Test Name Result Flag Reference   * XR HIP/PELV 2-3 VWS RIGHT (Report)     RIGHT HIP           INDICATION: Z48 89: Encounter for other specified surgical aftercare  History taken directly from the electronic ordering system  COMPARISON: None           VIEWS: AP pelvis and 2 coned down views of the hip           IMAGES: 3           FINDINGS:           Changes of bilateral hip arthroplasties  No evidence for fracture  Alignment is maintained  Surgical staples overlying the right hip in keeping with more recent surgery  Lower lumbar spondylosis           No lytic or blastic lesions are seen  Soft tissues are unremarkable  IMPRESSION:           Changes of bilateral hip arthroplasties  No acute fracture  Workstation performed: [id number]           Signed by:      Becca Almendarez MD      1/11/18                              I personally reviewed the films/images/results in the office today  My interpretation follows  [address] Joint prosthesis in good position1         1 Amended By: Amelia Brooks; Jan 16 2018 10:26 AM EST      Future Appointments      Date/Time Provider Specialty Site   01/19/2018 10:00 AM Amelia Brooks, [facility] [facility]     Signatures    Electronically signed by : Nicole Goldman [facility]; Jan 16 2018 10:27AM EST                       (Author)     Electronically signed by :  PARTHA You ; Jan 16 2018 10:37AM EST                       (Author)

## 2018-01-16 NOTE — MISCELLANEOUS
Message   Recorded as Task   Date: 05/09/2016 03:38 PM, Created By: Jaret Geiger   Task Name: Call Back   Assigned To: [facility] [place] [facility],Team   Regarding Patient: Alisa John, Status: Active   Comment:    Harley Villarreal - 09 May 2016 3:38 PM     TASK CREATED  please fax and call patients PC with MRI results attention to renal hyperintensity   Clara Agarwal - 10 May 2016 9:48 AM     TASK EDITED    addressed in another task  ***********        Active Problems    1  Colon cancer screening (V76 51) (Z12 11)   2  Diverticulitis of colon (562 11) (K57 32)   3  Encounter for screening colonoscopy (V76 51) (Z12 11)   4  Fullness in clavicular area (784 2) (R22 2)   5  Knee pain (719 46) (M25 569)   6  Left-sided low back pain with left-sided sciatica (724 3) (M54 42)   7  Neoplasm of skin (239 2) (D49 2)   8  Pain of both hip joints (719 45) (M25 551,M25 552)   9  Primary osteoarthritis of both hips (715 15) (M16 0)   10  Primary osteoarthritis of both knees (715 16) (M17 0)    Current Meds   1  Aleve CAPS Recorded   2  Daily Value Multivitamin TABS; Take 1 tablet daily Recorded   3  Zostavax 93231 UNT/0 65ML Subcutaneous Solution Reconstituted (Zostavax 20299   UNT/0 65ML Subcutaneous Solution Reconstituted); INJECT 0 5 ML Once; Therapy: 23PNT8959 to (Evaluate:07Apr2016); Last Rx:06Apr2016 Ordered    Allergies    1  No Known Drug Allergies    Signatures   Electronically signed by :  Sonido Andrade, ; May 10 2016  9:48AM EST                       (Author)

## 2018-01-17 NOTE — MISCELLANEOUS
Message  patient notified of normal   labs  K+ elevated due to hemolysis and elle rechekc next labs      Signatures   Electronically signed by : Hussain Werner DO;  Apr 17 2017 10:15AM EST                       (Author)

## 2018-01-17 NOTE — MISCELLANEOUS
Message  patient notified of Lis Chapa  shows moderate to severe arthritis  throughout knees and hips  has appt with ortho      Signatures   Electronically signed by : Kraig Sweet DO;  Apr 7 2016  3:57PM EST                       (Author)

## 2018-01-19 ENCOUNTER — ALLSCRIPTS OFFICE VISIT (OUTPATIENT)
Dept: OTHER | Facility: OTHER | Age: 78
End: 2018-01-19

## 2018-01-22 VITALS
HEIGHT: 68 IN | BODY MASS INDEX: 23.19 KG/M2 | SYSTOLIC BLOOD PRESSURE: 120 MMHG | HEART RATE: 64 BPM | WEIGHT: 153 LBS | DIASTOLIC BLOOD PRESSURE: 80 MMHG

## 2018-01-22 VITALS
WEIGHT: 148 LBS | BODY MASS INDEX: 22.43 KG/M2 | DIASTOLIC BLOOD PRESSURE: 76 MMHG | HEART RATE: 61 BPM | SYSTOLIC BLOOD PRESSURE: 138 MMHG | HEIGHT: 68 IN

## 2018-01-23 VITALS — HEIGHT: 68 IN | SYSTOLIC BLOOD PRESSURE: 159 MMHG | HEART RATE: 65 BPM | DIASTOLIC BLOOD PRESSURE: 79 MMHG

## 2018-01-23 NOTE — CONSULTS
Chief Complaint  Pre-Op physical, right hip, Dr Angela Bush 01/03/2018  History of Present Illness  HPI: here for pre -op clearance  For hip surgery  No other complaints today      Review of Systems    Constitutional: No fever or chills, feels well, no tiredness, no recent weight gain or weight loss  Eyes: No complaints of eye pain, no red eyes, no discharge from eyes, no itchy eyes  ENT: no complaints of earache, no hearing loss, no nosebleeds, no nasal discharge, no sore throat, no hoarseness  Cardiovascular: No complaints of slow heart rate, no fast heart rate, no chest pain, no palpitations, no leg claudication, no lower extremity  Respiratory: No complaints of shortness of breath, no wheezing, no cough, no SOB on exertion, no orthopnea or PND  Gastrointestinal: No complaints of abdominal pain, no constipation, no nausea or vomiting, no diarrhea or bloody stools  Genitourinary: No complaints of dysuria, no incontinence, no hesitancy, no nocturia, no genital lesion, no testicular pain  Musculoskeletal: No complaints of arthralgia, no myalgias, no joint swelling or stiffness, no limb pain or swelling  Integumentary: No complaints of skin rash or skin lesions, no itching, no skin wound, no dry skin  Neurological: No compliants of headache, no confusion, no convulsions, no numbness or tingling, no dizziness or fainting, no limb weakness, no difficulty walking  Psychiatric: Is not suicidal, no sleep disturbances, no anxiety or depression, no change in personality, no emotional problems  Endocrine: No complaints of proptosis, no hot flashes, no muscle weakness, no erectile dysfunction, no deepening of the voice, no feelings of weakness  Hematologic/Lymphatic: No complaints of swollen glands, no swollen glands in the neck, does not bleed easily, no easy bruising  Active Problems    1  Back pain (724 5) (M54 9)   2  Bladder disorder (596 9) (N32 9)   3   BPH (benign prostatic hyperplasia) (600 00) (N40 0)   4  Diverticulitis of colon (562 11) (K57 32)   5  Fullness in clavicular area (784 2) (R22 2)   6  Heart murmur (785 2) (R01 1)   7  Kidney mass (593 9) (N28 89)   8  Knee pain (719 46) (M25 569)   9  Left-sided low back pain with left-sided sciatica (724 3) (M54 42)   10  Leg pain (729 5) (M79 606)   11  Neoplasm of skin (239 2) (D49 2)   12  Pain of both hip joints (719 45) (M25 551,M25 552)   13  Primary osteoarthritis of both hips (715 15) (M16 0)   14  Primary osteoarthritis of both knees (715 16) (M17 0)   15  Primary osteoarthritis of left hip (715 15) (M16 12)   16  Primary osteoarthritis of right hip (715 15) (M16 11)    Past Medical History    · History of acute bronchitis (V12 69) (Z87 09)   · History of arthritis (V13 4) (Z87 39)    The active problems and past medical history were reviewed and updated today  Surgical History    · History of Arthroscopy Knee Left   · History of Arthroscopy Knee Right   · History of Hernia Repair    The surgical history was reviewed and updated today  Family History    · Family history of    · Family history of Melanoma    · Family history of    · Family history of Melanoma    · Family history of     The family history was reviewed and updated today  Social History    · Former smoker (V15 82) (M77 441)   · Denied: History of Marijuana   ·    · Never a smoker   · Denied: History of No drug use   · Retired   · Social alcohol use (Z78 9)  The social history was reviewed and updated today  The social history was reviewed and is unchanged  Current Meds   1  Daily Value Multivitamin TABS; Take 1 tablet daily Recorded    The medication list was reviewed and updated today  Allergies    1   No Known Drug Allergies    Vitals  Signs    Heart Rate: 64  Systolic: 375, LUE, Sitting  Diastolic: 80, LUE, Sitting  Height: 5 ft 8 in  Weight: 153 lb   BMI Calculated: 23 26  BSA Calculated: 1 82    Physical Exam    Constitutional   General appearance: No acute distress, well appearing and well nourished  Eyes   Conjunctiva and lids: No erythema, swelling or discharge  Pupils and irises: Equal, round, reactive to light  Ears, Nose, Mouth, and Throat   External inspection of ears and nose: Normal     Otoscopic examination: Tympanic membranes translucent with normal light reflex  Canals patent without erythema  Hearing: Normal     Nasal mucosa, septum, and turbinates: Normal without edema or erythema  Lips, teeth, and gums: Normal, good dentition  Oropharynx: Normal with no erythema, edema, exudate or lesions  Neck   Neck: Supple, symmetric, trachea midline, no masses  Thyroid: Normal, no thyromegaly  Pulmonary   Respiratory effort: No increased work of breathing or signs of respiratory distress  Auscultation of lungs: Clear to auscultation  Cardiovascular   Auscultation of heart: Normal rate and rhythm, normal S1 and S2, no murmurs  Peripheral vascular exam: Normal     Examination of extremities for edema and/or varicosities: Normal     Lymphatic   Palpation of lymph nodes in neck: No lymphadenopathy  Musculoskeletal   Gait and station: Normal     Inspection/palpation of digits and nails: Normal without clubbing or cyanosis  Inspection/palpation of joints, bones, and muscles: Normal     Range of motion: Normal     Stability: Normal     Muscle strength/tone: Normal     Skin   Skin and subcutaneous tissue: Normal without rashes or lesions  Palpation of skin and subcutaneous tissue: Normal turgor  Neurologic   Cranial nerves: Cranial nerves 2-12 intact  Cortical function: Normal mental status  Coordination: Normal finger to nose and heel to shin  Psychiatric   Judgment and insight: Normal     Orientation to person, place and time: Normal     Recent and remote memory: Intact  Mood and affect: Normal        Assessment    1   Primary osteoarthritis of right hip (881 24) (M16 11)    Discussion/Summary  Surgical Clearance: He is at a LOW risk from a cardiovascular standpoint at this time without any additional cardiac testing  Reevaluation needed, if he should present with symptoms prior to surgery/procedure  Patient is cleared for procedure  Form filled out faxed  Preop testing reviewed and is stable and normal  Follow up in couple months or as needed for physical       End of Encounter Meds    1   Daily Value Multivitamin TABS; Take 1 tablet daily Recorded    Signatures   Electronically signed by : Lewis Najera DO; Dec 27 2017  4:26PM EST                       (Author)

## 2018-01-23 NOTE — PROGRESS NOTES
Preliminary Nursing Report                Patient Information    Initial Encounter Entry Date:   2017 11:46 AM EST (Automated Transmission Automated Transmission)       Last Modified:   {Yovany Holley}              Legal Name: [address] [place] [address] Number:        YOB: 1940        Age (years): 78        Gender: M        Body Mass Index (BMI): 23 kg/m2        Height: 68 in  Weight: 153 lbs (69 kgs)           Address:   [address] [place] [id number] [address] [facility]              Phone: [phone]   (consent to leave messages)        Email:        Ethnicity: Decline to State        Protestant:        Marital Status:        Preferred Language: English        Race: Other Race                    Patient Insurance Information        Primary Insurance Information Carrier Name: {Primary  CarrierName}           Carrier Address:   {Primary  CarrierAddress}              Carrier Phone: {Primary  CarrierPhone}          Group Number: {Primary  GroupNumber}          Policy Number: {Primary  PolicyNumber}          Insured Name: {Primary  InsuredName}          Insured : {Primary  InsuredDOB}          Relationship to Insured: {Primary  RelationshiptoInsured}           Secondary Insurance Information Carrier Name: {Secondary  CarrierName}           Carrier Address:   {Secondary  CarrierAddress}              Carrier Phone: {Secondary  CarrierPhone}          Group Number: {Secondary  GroupNumber}          Policy Number: {Secondary  PolicyNumber}          Insured Name: {Secondary  InsuredName}          Insured : {Secondary  InsuredDOB}          Relationship to Insured: {Secondary  RelationshiptoInsured}                       Health Profile   Booking #:   Kit Meter #: [id number]               DOS: 2018    Surgery : TOTAL HIP REPLACEMENT    Add'l Procedures/Notes:     Surgery Risk: Intermediate          Precautions     Kidney mass                   Allergies    No Known Drug Allergies             Medications    Daily Value Multivitamin TABS               Conditions    Aftercare following left hip joint replacement surgery       Back pain       Bladder disorder       BPH (benign prostatic hyperplasia)       Colon cancer screening       Diverticulitis of colon       Encounter for prostate cancer screening       Encounter for screening colonoscopy       Fullness in clavicular area       Heart murmur       Kidney mass       Knee pain       Laboratory examination ordered as part of a complete physical examination       Left-sided low back pain with left-sided sciatica       Leg pain       Neoplasm of skin       Pain of both hip joints       Primary osteoarthritis of both hips       Primary osteoarthritis of both knees       Primary osteoarthritis of left hip       Primary osteoarthritis of right hip       Status post left hip replacement               Family History    None             Surgical History    None             Social History    Denies Marijuana       Denies No drug use       Former smoker              Never a smoker       Retired       Social alcohol use                               Patient Instructions       Medical Procedure Risk  NPO Instructions   The day before surgery it is recommended to have a light dinner at your usual time and you are allowed a light snack early in the evening  Do not eat anything heavy or eat a big meal after 7pm  Do not eat or drink anything after midnight prior to your surgery  If you are supposed to take any of your medications, do so with a sip of water  Failure to follow these instructions can lead to an increased risk of lung complications and may result in a delay or cancellation of your procedure  If you have any questions, contact your institution for further instructions   No candy, no gum, no mints, no chewing tobacco          Kidney mass  Dialysis   If undergoing dialysis, please perform 24 to 48 before surgery and avoid interfering with dialysis schedule  Testing Considerations       ? Basic Metabolic Panel (BMP) t  If test was completed and normal within last six months, repeat test is not necessary  Triggered by: Kidney mass         ? Complete Blood Count (CBC) t  If test was completed and normal within last six months, repeat test is not necessary  Triggered by: Bladder disorder, Kidney mass         ? Electrocardiogram (ECG) t  Patient does not need new test if normal ECG is present within the last six months and no change in clinical condition  Triggered by: Kidney mass, Age or Facility Rec         ? Hemoglobin A1c (HbA1c) client  If test was completed and normal within the last three months, repeat test is not necessary  Triggered by: Age or Facility Rec         ? Serum Potassium (K) on the morning of surgery t  Triggered by: Kidney mass         ? Type and Screen client  Type and Screen - Blood: If there is anticipated or possible large blood loss with this procedure, then a Type and Screen for Blood should be ordered  Triggered by: Age or Facility Rec         ? Urinalysis t  Urinalysis may be appropriate if recent urinary symptoms or implants are being placed in surgical procedure  Triggered by: Bladder disorder               Consultations       ? Primary Care Physician Evaluation   Primary care physician may need to evaluate patient prior to surgery  This is likely NOT necessary if the listed conditions are chronic and stable  Triggered by: Kidney mass               Miscellaneous Questions         Question: Are you able to walk up a flight of stairs, walk up a hill or do heavy housework WITHOUT having chest pain or shortness of breath? Answer: YES                   Allergies/Conditions/Medications Not Found        The following were not recognized by our system when generating the recommendations  Please consider if this would impact any preoperative protocols  ? Denies Marijuana       ?  Denies No drug use ? Laboratory examination ordered as part of a complete physical examination       ?        ? Never a smoker       ? Retired       ? Social alcohol use       ? Status post left hip replacement       ? Daily Value Multivitamin TABS                  Appointment Information         Date:    01/03/2018        Location:    [place]        Address:           Directions:                      Footnotes revision 14      ?? Denotes a free-text entry  Legal Disclaimer: Any and all recommendations and services provided herein are designed to assist in the preoperative care of the patient  Nothing contained herein is designed to replace, eliminate or alleviate the responsibility of the attending physician to supervise and determine the patient?s preoperative care and course of treatment  Failure to provide complete, accurate information may negatively impact the system?s ability to recommend the proper preoperative protocol  THE ATTENDING PHYSICIAN IS RESPONSIBLE TO REVIEW THE SUGGESTED PREOPERATIVE PROTOCOLS/COURSE OF TREATMENT AND PRESCRIBE THE FINAL COURSE OF PREOPERATIVE TREATMENT IN CONSULTATION WITH THE PATIENT  THE ePREOP SYSTEM AND ITS MATERIALS ARE PROVIDED ? AS IS? WITHOUT WARRANTY OF ANY KIND, EXPRESS OR IMPLIED, INCLUDING, BUT NOT LIMITED TO, WARRANTIES OF PERFORMANCE OR MERCHANTABILITY OR FITNESS FOR A PARTICULAR PURPOSE  PATIENT AND PHYSICIANS HEREBY AGREE THAT THEIR USE OF THE MATERIALS AND RESOURCES ACT AS A CONSENT TO RELEASE AND WAIVE ePREOP FROM ANY AND ALL CLAIMS OF WARRANTY, TORT OR CONTRACT LAW OF ANY KIND

## 2018-01-23 NOTE — RESULT NOTES
Verified Results  * XR HIP/PELV 2-3 VWS RIGHT W PELVIS IF PERFORMED [id number] 10:31AM Yahaira Abler Order Number: [id number]   Performing Comments: WR     Test Name Result Flag Reference   * XR HIP/PELV 2-3 VWS RIGHT (Report)     RIGHT HIP     INDICATION: Z48 89: Encounter for other specified surgical aftercare  History taken directly from the electronic ordering system  COMPARISON: None     VIEWS: AP pelvis and 2 coned down views of the hip     IMAGES: 3     FINDINGS:     Changes of bilateral hip arthroplasties  No evidence for fracture  Alignment is maintained  Surgical staples overlying the right hip in keeping with more recent surgery  Lower lumbar spondylosis     No lytic or blastic lesions are seen  Soft tissues are unremarkable  IMPRESSION:     Changes of bilateral hip arthroplasties  No acute fracture  Workstation performed: [id number]     Signed by:   Pete Mims MD   1/11/18     ECG 12-LEAD [id number] 02:42PM Lencho Jones     Test Name Result Flag Reference   ECG 12-LEAD (Report)     Sinus bradycardia with 1st degree A-V block with Premature supraventricular complexes with occasional Premature ventricular complexes   RSR' or QR pattern in V1 suggests right ventricular conduction delay   Borderline ECG   No previous ECGs available   Confirmed by Merceda Fleischer MD, [facility] ([id number]) on 12/6/2017 12:55:47 PM     * XR CHEST PA & LATERAL [id number] 02:40PM Yahaira Abler Order Number: [id number]     Test Name Result Flag Reference   XR CHEST PA & LATERAL (Report)     CHEST - DUAL ENERGY     INDICATION: 44-year-old male, preoperative evaluation, hip surgery   COMPARISON: 5/11/2017 chest x-ray     VIEWS: PA (including soft tissue/bone algorithms) and lateral projections; 4 images     FINDINGS:     The lungs are clear  No pleural effusions  The cardiomediastinal silhouette is unremarkable  Bony thorax is unremarkable     Mild midthoracic dextroscoliosis     Findings are stable       IMPRESSION:     No acute cardiopulmonary disease, stable        Workstation performed: [id number]     Signed by:   Leslie Bui MD   12/9/17     (1) APTT [id number] 02:03PM "[employer]"     Test Name Result Flag Reference   aPTT 27 sec  24-33   This test has not been validated for monitoring unfractionated heparin  therapy  aPTT-based therapeutic ranges for unfractionated heparin  therapy have not been established  For general guidelines on  Heparin monitoring, refer to the [employer]  (1) BASIC METABOLIC PROFILE [id number] 02:03PM "[employer]"     Test Name Result Flag Reference   Glucose, Serum 88 mg/dL  65-99   BUN 12 mg/dL  8-27   Creatinine, Serum 0 96 mg/dL  0 76-1 27   BUN/Creatinine Ratio 13  10-24   Sodium, Serum 138 mmol/L  134-144   Potassium, Serum 4 5 mmol/L  3 5-5 2   Chloride, Serum 95 mmol/L L    Carbon Dioxide, Total 24 mmol/L  18-29   Calcium, Serum 9 7 mg/dL  8 6-10 2   eGFR If NonAfricn Am 77 mL/min/1 73  >59   eGFR If Africn Am 89 mL/min/1 73  >59     (1) CBC/PLT/DIFF [id number] 02:03PM "[employer]"     Test Name Result Flag Reference   WBC 5 6 x10E3/uL  3 4-10 8   RBC 4 23 x10E6/uL  4 14-5 80   Hemoglobin 14 1 g/dL  13 0-17 7   **Please note reference interval change**   Hematocrit 40 0 %  37 5-51 0   MCV 95 fL  79-97   MCH 33 3 pg H 26 6-33 0   MCHC 35 3 g/dL  31 5-35 7   RDW 13 1 %  12 3-15 4   Platelets 756 X21Z8/WQ  150-379   Neutrophils 69 %  Not Estab  Lymphs 22 %  Not Estab  Monocytes 6 %  Not Estab  Eos 2 %  Not Estab  Basos 1 %  Not Estab  Neutrophils (Absolute) 3 9 x10E3/uL  1 4-7 0   Lymphs (Absolute) 1 2 x10E3/uL  0 7-3 1   Monocytes(Absolute) 0 4 x10E3/uL  0 1-0 9   Eos (Absolute) 0 1 x10E3/uL  0 0-0 4   Baso (Absolute) 0 0 x10E3/uL  0 0-0 2   Immature Granulocytes 0 %  Not Estab     Immature Grans (Abs) 0 0 x10E3/uL  0 0-0 1     (1) HEMOGLOBIN A1C [id number] 02:03PM Khadijah Noonan Joselo     Test Name Result Flag Reference   Hemoglobin A1c 5 0 %  4 8-5 6   Pre-diabetes: 5 7 - 6 4           Diabetes: >6 4           Glycemic control for adults with diabetes: <7 0     (1) PT WITH INR [id number] 02:03PM María Urias     Test Name Result Flag Reference   INR 1 1  0 8-1 2   Reference interval is for non-anticoagulated patients  Suggested INR therapeutic range for Vitamin K                 antagonist therapy:                    Standard Dose (moderate intensity                                   therapeutic range):       2 0 - 3 0                    Higher intensity therapeutic range       2 5 - 3 5   Prothrombin Time 11 6 sec  9 1-12 0     (LC) UA/M w/rflx Culture, Comp [id number] 02:03PM María Urias     Test Name Result Flag Reference   Specific Gravity 1 010  1 005-1 030   pH 7 5  5 0-7 5   Urine-Color Yellow  Yellow   Appearance Clear  Clear   WBC Esterase Negative  Negative   Protein Negative  Negative/Trace   Glucose Negative  Negative   Ketones Negative  Negative   Occult Blood Negative  Negative   Bilirubin Negative  Negative   Urobilinogen,Semi-Qn 0 2 EU/dL  0 2-1 0   Nitrite, Urine Negative  Negative   Microscopic Examination      Microscopic follows if indicated  Microscopic follows if indicated  Microscopic Examination See below:     Urinalysis Reflex      This specimen will not reflex to a Urine Culture  This specimen will not reflex to a Urine Culture  WBC 0-5 /hpf  0 -  5   RBC 0-2 /hpf  0 -  2   Epithelial Cells (non renal) None seen /hpf  0 - 10   Bacteria None seen  None seen/Few     (1) ABO/RH(D) [id number] 02:03PM María Urias     Test Name Result Flag Reference   ABO Grouping A     Rh Factor Positive     Please note: Prior records for this patient's ABO / Rh type are not  available for additional verification         Plan  Primary osteoarthritis of right hip    · Physical Therapy Referral Other Co-Management  *  Status: Complete Done:  25HYY4975  are Referring to a non-SL Preferred Provider : Established Patient  Care Summary provided  : Yes   · (1) TYPE & SCREEN; Status:Active; Requested for:49Nsd5949;   currently receiving a biologic? : No  the patient been transfused in the last 3 months? : No  of Surgery : 84DMF5794  is the surgery scheduled? : [facility]  or PreOp : PreOp   · (1) URINALYSIS w URINE C/S REFLEX (will reflex a microscopy if leukocytes, occult  blood, or nitrites are not within normal limits); Status:Active;  Requested for:81Vjm2737;    · ECG 12-LEAD; Status:Hold For - Scheduling; Requested for:18Xrg1582;

## 2018-01-23 NOTE — PROGRESS NOTES
Assessment   1  Aftercare following surgery (V58 89) (Z48 89)    Plan   Aftercare following surgery    · Follow-up Visit in 4 Weeks Evaluation and Treatment  Follow-up  Status: Hold For -    Scheduling  Requested for: 47GQZ6836    Discussion/Summary      Patient seen and examined  Discussed with patient he can continue full activities as tolerated  Discussed with patient that there are certain parameters which will last drive, 1 of them including no narcotic use  Discussed with patient that if he feels his mechanical ability of the hip is safe to drive that he should tested out in a the empty parking lot to see if he can safely operate a vehicle  Patient to return to the office in 4 weeks for repeat evaluation with x-ray  Patient already has antibiotics for upcoming dental procedure in March  Patient call office or return sooner with any other concerns or complaints  The patient, patient's family was counseled regarding instructions for management  The treatment plan was reviewed with the patient/guardian  The patient/guardian understands and agrees with the treatment plan      Chief Complaint   Status post right Total Hip Arthroplasty      Post-Op   HPI: Patient is a 68-year-old male presenting to the office for 2 week follow-up status post right Total Hip Arthroplasty  Patient is doing well postoperatively  Patient states that the pain is improved and he is no longer needs her chronic medications  Patient is working with physical therapy and making progress  Patient would like to return to driving activities  Patient denies any acute/associated complaints  Review of Systems        Constitutional: No fever or chills, feels well, no tiredness, no recent weight loss or weight gain  Eyes: No complaints of red eyes, no eyesight problems  ENT: no complaints of loss of hearing, no nosebleeds, no sore throat        Cardiovascular: No complaints of chest pain, no palpitations, no leg claudication or lower extremity edema  Respiratory: No complaints of shortness of breath, no wheezing, no cough  Gastrointestinal: No complaints of abdominal pain, no constipation, no nausea or vomiting, no diarrhea or bloody stools  Genitourinary: No complaints of dysuria or incontinence, no hesitancy, no nocturia  Musculoskeletal: as noted in HPI  Integumentary: No complaints of skin rash or lesion, no itching or dry skin, no skin wounds  Neurological: No complaints of headache, no confusion, no numbness or tingling, no dizziness  Psychiatric: No suicidal thoughts, no anxiety, no depression  Endocrine: No muscle weakness, no frequent urination, no excessive thirst, no feelings of weakness  ROS reviewed  Active Problems   1  Aftercare following surgery (V58 89) (Z48 89)   2  Back pain (724 5) (M54 9)   3  Bladder disorder (596 9) (N32 9)   4  BPH (benign prostatic hyperplasia) (600 00) (N40 0)   5  Diverticulitis of colon (562 11) (K57 32)   6  Fullness in clavicular area (784 2) (R22 2)   7  Heart murmur (785 2) (R01 1)   8  Kidney mass (593 9) (N28 89)   9  Knee pain (719 46) (M25 569)   10  Left-sided low back pain with left-sided sciatica (724 3) (M54 42)   11  Leg pain (729 5) (M79 606)   12  Neoplasm of skin (239 2) (D49 2)   13  Pain of both hip joints (719 45) (M25 551,M25 552)   14  Primary osteoarthritis of both hips (715 15) (M16 0)   15  Primary osteoarthritis of both knees (715 16) (M17 0)   16  Primary osteoarthritis of left hip (715 15) (M16 12)   17  Primary osteoarthritis of right hip (715 15) (M16 11)    Social History    · Former smoker (V15 82) (H26 886)   · Denied: History of Marijuana   ·    · Never a smoker   · Denied: History of No drug use   · Retired   · Social alcohol use (Z78 9)  The social history was reviewed and updated today  Current Meds    1  Colace 100 MG Oral Capsule; Therapy: (50-92-49-29) to Recorded   2   Daily Value Multivitamin TABS; Take 1 tablet daily Recorded   3  Ferrous Gluconate 324 MG TABS; take 2 tablet daily; Therapy: (956 490 308) to Recorded   4  Folic Acid 1 MG Oral Tablet; TAKE 1 TABLET DAILY; Therapy: (791 459 413) to Recorded   5  Gabapentin 100 MG Oral Capsule; one tab 3 times daily for 30 days following hip surgery     on 1/3/18; Therapy: (538 609 681) to Recorded   6  Lovenox 40 MG/0 4ML Subcutaneous Solution; Inject 0 4 ml SQ for 28 days following hip     surgery on 1/3/18; Therapy: (660 564 627) to Recorded   7  OxyCODONE HCl - 5 MG Oral Tablet; TAKE 1 TO 2 TABLETS EVERY 4 TO 6 HOURS AS     NEEDED FOR PAIN;     Therapy: (Recorded:08Jan2018) to Recorded   8  TraMADol HCl - 50 MG Oral Tablet; TAKE 1 TABLET Every 6 hours for 10 days following     hip surgery on 1/3/18; Therapy: (270 274 764) to Recorded   9  Tylenol 325 MG Oral Tablet; TAKE 1 TO 2 TABLETS EVERY 6 HOURS AS NEEDED; Therapy: (397 678 669) to Recorded   10  Vitamin C 500 MG Oral Tablet; Take 1 tablet twice daily; Therapy: (173 725 804) to Recorded     The medication list was reviewed and updated today  Allergies   1  No Known Drug Allergies    Vitals    Recorded: 63LLJ7327 10:03AM   Heart Rate 63   Systolic 722   Diastolic 80   Height 5 ft 8 in   Weight 150 lb    BMI Calculated 22 81   BSA Calculated 1 81     Physical Exam      Right Hip: Appearance: Normal No signs and symptoms of a DVT  Surgical incision was clean, dry, and intact  Tenderness: None  Flexion: normal AROM  Internal rotation: normal AROM  External rotation: normal AROM  Motor: 5/5 flexion        Constitutional - General appearance: Normal       Musculoskeletal - Gait and station: Normal -- Muscle strength/tone: Normal       Cardiovascular - Pulses: Normal -- Examination of extremities for edema and/or varicosities: Normal       Skin - Skin and subcutaneous tissue: Normal       Neurologic - Sensation: Normal       Psychiatric - Orientation to person, place, and time: Normal -- Mood and affect: Normal       Eyes      Conjunctiva and lids: Normal        Pupils and irises: Normal        Signatures    Electronically signed by : RITA Regan; Jan 19 2018  4:37PM EST                       (Author)     Electronically signed by :  PARTHA Khan ; Jan 22 2018  4:50PM EST                       (Author)

## 2018-01-23 NOTE — MISCELLANEOUS
History of Present Illness  TCM Communication [facility]: The patient is being contacted for follow-up after hospitalization  Hospital records were reviewed  He was hospitalized at [facility]  The date of admission: 1/3/2018, date of discharge: 1/6/2018  Diagnosis: Primary Osteoarthritis of Right Hip / S/P Right Total Hip Arthroplasty  He was discharged to home, Patient will pursue physical therapy as an outpatient with [facility]  Medications reviewed and updated today  He refused a follow up appointment due to orthopedic followup on 1/11/18 and 1/19/2018  Patient has bruising of his right hip and buttocks and has contacted ortho this morning  He is ambulating with a walker without difficulty and has been active  He is self-administering Lovenox injections daily as recommended for 28 days postop and is managing pain with OTC Tylenol, Tramadol at bedtime and has not filled the Oxycodone prescription  He tried taking the Gabapentin as prescribed but felt "weird" and stopped the med  He has no other complaints at this time  Counseling was provided to the patient  Topics counseled included instructions for management, patient and family education and importance of compliance with treatment  Transition of Care appointment with Dr Anyi yarbrough at this time  Communication performed and completed by Be Banks RN      Active Problems    1  Back pain (724 5) (M54 9)   2  Bladder disorder (596 9) (N32 9)   3  BPH (benign prostatic hyperplasia) (600 00) (N40 0)   4  Diverticulitis of colon (562 11) (K57 32)   5  Fullness in clavicular area (784 2) (R22 2)   6  Heart murmur (785 2) (R01 1)   7  Kidney mass (593 9) (N28 89)   8  Knee pain (719 46) (M25 569)   9  Left-sided low back pain with left-sided sciatica (724 3) (M54 42)   10  Leg pain (729 5) (M79 606)   11  Neoplasm of skin (239 2) (D49 2)   12  Pain of both hip joints (719 45) (M25 551,M25 552)   13   Primary osteoarthritis of both hips (715 15) (M16 0)   14  Primary osteoarthritis of both knees (715 16) (M17 0)   15  Primary osteoarthritis of left hip (715 15) (M16 12)   16  Primary osteoarthritis of right hip (715 15) (M16 11)    Past Medical History    1  History of acute bronchitis (V12 69) (Z87 09)   2  History of arthritis (V13 4) (Z87 39)    Surgical History    1  History of Arthroscopy Knee Left   2  History of Arthroscopy Knee Right   3  History of Hernia Repair    Family History  Mother    1  Family history of    2  Family history of Melanoma  Father    3  Family history of    4  Family history of Melanoma  Sister    5  Family history of     Social History    · Former smoker (J40 10) (W73 322)   · Denied: History of Marijuana   ·    · Never a smoker   · Denied: History of No drug use   · Retired   · Social alcohol use (Z78 9)    Current Meds   1  Colace 100 MG Oral Capsule; Therapy: () to Recorded   2  Daily Value Multivitamin TABS; Take 1 tablet daily Recorded   3  Ferrous Gluconate 324 MG TABS; take 2 tablet daily; Therapy: () to Recorded   4  Folic Acid 1 MG Oral Tablet; TAKE 1 TABLET DAILY; Therapy: () to Recorded   5  Gabapentin 100 MG Oral Capsule; one tab 3 times daily for 30 days following hip surgery   on 1/3/18; Therapy: () to Recorded   6  Lovenox 40 MG/0 4ML Subcutaneous Solution; Inject 0 4 ml SQ for 28 days following hip   surgery on 1/3/18; Therapy: () to Recorded   7  OxyCODONE HCl - 5 MG Oral Tablet; TAKE 1 TO 2 TABLETS EVERY 4 TO 6 HOURS AS   NEEDED FOR PAIN;   Therapy: (Recorded:91Lum4388) to Recorded   8  TraMADol HCl - 50 MG Oral Tablet; TAKE 1 TABLET Every 6 hours for 10 days following   hip surgery on 1/3/18; Therapy: () to Recorded   9  Tylenol 325 MG Oral Tablet; TAKE 1 TO 2 TABLETS EVERY 6 HOURS AS NEEDED; Therapy: () to Recorded   10  Vitamin C 500 MG Oral Tablet; Take 1 tablet twice daily; Therapy: (Recorded:85Qfe3562) to Recorded    Allergies    1  No Known Drug Allergies    Future Appointments    Date/Time Provider Specialty Site   01/11/2018 10:00 AM PARTHA Joseph   Orthopedic Surgery [place] [address] [address]   01/19/2018 10:00 AM Rae Steven, [facility] Orthopedic Surgery [address]     Signatures   Electronically signed by : Dianna Christian, ; Jan 8 2018  3:09PM EST                       (Author)    Electronically signed by : Mario López DO; Jan 8 2018  4:35PM EST                       (Author)

## 2018-01-24 VITALS
WEIGHT: 153.38 LBS | BODY MASS INDEX: 23.25 KG/M2 | DIASTOLIC BLOOD PRESSURE: 71 MMHG | SYSTOLIC BLOOD PRESSURE: 131 MMHG | HEIGHT: 68 IN | HEART RATE: 60 BPM

## 2018-01-24 VITALS
WEIGHT: 150 LBS | BODY MASS INDEX: 22.73 KG/M2 | HEIGHT: 68 IN | SYSTOLIC BLOOD PRESSURE: 133 MMHG | DIASTOLIC BLOOD PRESSURE: 80 MMHG | HEART RATE: 63 BPM

## 2018-01-24 NOTE — PROGRESS NOTES
Assessment    1  BPH (benign prostatic hyperplasia) (600 00) (N40 0)   2  Bladder disorder (596 9) (N32 9)    Plan  Bladder disorder, BPH (benign prostatic hyperplasia)    · Urine Dip Non-Automated- POC; Status:Complete - Retrospective By Protocol  Authorization;   Done: 19LOI1272 10:11AM   Performed: In Office; Due:46Ryz4907; Last Updated Sameul Copper; 7/11/2016 10:10:24 AM;Ordered; For:Bladder disorder, BPH (benign prostatic hyperplasia); Ordered By:Danny Foster; Discussion/Summary  Discussion Summary:   29-year-old male with incomplete bladder emptying  I reviewed the post void residuals with the patient  There has been significant improvement on tamsulosin  Unfortunately the patient does not want to continue this medication as he is not noticing any difference in his urination  I did my best to explain that he is having improvement in his emptying but the patient thinks that this is just because he tried harder to empty today  He understands the risks of kidney damage, infection, and possible need for lifelong catheterization  He would like to continue without medication  I will see him back in one year with a PVR  He is to contact us if he has any problems before then  Chief Complaint  Chief Complaint Free Text Note Form: 1 mo f/u with PVR      History of Present Illness  HPI: 29-year-old male presents for follow-up for significantly elevated post void residuals  The patient tried tamsulosin for this month  He states he did not notice any change in his urination or flow  He denies any problems with side effects  Review of Systems  Complete-Male Urology:   Constitutional: No fever or chills, feels well, no tiredness, no recent weight gain or weight loss  Respiratory: No complaints of shortness of breath, no wheezing, no cough, no SOB on exertion, no orthopnea or PND     Cardiovascular: No complaints of slow heart rate, no fast heart rate, no chest pain, no palpitations, no leg claudication, no lower extremity  Gastrointestinal: No complaints of abdominal pain, no constipation, no nausea or vomiting, no diarrhea or bloody stools  Genitourinary: Empty sensation, stream quality fair, urinary stream starts and stops, but no dysuria, no hematuria, no incontinence and no feelings of urinary urgency    The patient presents with complaints of occasional episodes of urinary hesitancy  The patient presents with complaints of nocturia (1 time)   Musculoskeletal: No complaints of arthralgia, no myalgias, no joint swelling or stiffness, no limb pain or swelling  Integumentary: No complaints of skin rash or skin lesions, no itching, no skin wound, no dry skin  Hematologic/Lymphatic: No complaints of swollen glands, no swollen glands in the neck, does not bleed easily, no easy bruising  Neurological: No compliants of headache, no confusion, no convulsions, no numbness or tingling, no dizziness or fainting, no limb weakness, no difficulty walking  ROS Reviewed:   ROS reviewed  Active Problems    1  Back pain (724 5) (M54 9)   2  Bladder disorder (596 9) (N32 9)   3  BPH (benign prostatic hyperplasia) (600 00) (N40 0)   4  Colon cancer screening (V76 51) (Z12 11)   5  Diverticulitis of colon (562 11) (K57 32)   6  Encounter for screening colonoscopy (V76 51) (Z12 11)   7  Fullness in clavicular area (784 2) (R22 2)   8  Heart murmur (785 2) (R01 1)   9  Kidney mass (593 9) (N28 89)   10  Knee pain (719 46) (M25 569)   11  Left-sided low back pain with left-sided sciatica (724 3) (M54 42)   12  Neoplasm of skin (239 2) (D49 2)   13  Pain of both hip joints (719 45) (M25 551,M25 552)   14  Primary osteoarthritis of both hips (715 15) (M16 0)   15  Primary osteoarthritis of both knees (715 16) (M17 0)    Past Medical History    1  History of acute bronchitis (V12 69) (Z87 09)   2  History of arthritis (V13 4) (Z87 39)  Active Problems And Past Medical History Reviewed:    The active problems and past medical history were reviewed and updated today  Surgical History    1  History of Arthroscopy Knee Left   2  History of Arthroscopy Knee Right   3  History of Hernia Repair  Surgical History Reviewed: The surgical history was reviewed and updated today  Family History  Mother    1  Family history of    2  Family history of Melanoma  Father    3  Family history of    4  Family history of Melanoma  Sister    5  Family history of   Family History Reviewed: The family history was reviewed and updated today  Social History    · Former smoker (F95 12) (S22 949)   · Denied: History of Marijuana   ·    · Never a smoker   · Denied: History of No drug use   · Retired   · Social alcohol use (Z78 9)  Social History Reviewed: The social history was reviewed and is unchanged  Current Meds   1  Aleve CAPS Recorded   2  Daily Value Multivitamin TABS; Take 1 tablet daily Recorded   3  TraMADol HCl - 50 MG Oral Tablet Recorded   4  Zostavax 43926 UNT/0 65ML Subcutaneous Solution Reconstituted; INJECT 0 5 ML   Once; Therapy: 36EKR7678 to (Evaluate:2016); Last Rx:2016 Ordered  Medication List Reviewed: The medication list was reviewed and updated today  Allergies    1  No Known Drug Allergies    Vitals  Vital Signs [Data Includes: Current Encounter]    Recorded: 98Etw9804 10:10AM   Heart Rate 76   Systolic 167   Diastolic 74   Height 5 ft 8 in   Weight 155 lb    BMI Calculated 23 57   BSA Calculated 1 83     Physical Exam    Constitutional   General appearance: No acute distress, well appearing and well nourished  Pulmonary   Respiratory effort: No increased work of breathing or signs of respiratory distress  Cardiovascular   Examination of extremities for edema and/or varicosities: Normal     Abdomen   Abdomen: Non-tender, no masses  Liver and spleen: No hepatomegaly or splenomegaly      Musculoskeletal   Gait and station: Normal  Skin   Skin and subcutaneous tissue: Normal without rashes or lesions  Additional Exam:  Neuro exam nonfocal       Results/Data  Encounter Results   Urine Dip Non-Automated- POC 70GPS0344 10:11AM Jeff Macias     Test Name Result Flag Reference   Color Yellow     Clarity Transparent     Leukocytes -     Nitrite -     Blood -     Protein -     Ph 7     Specific [place] 1 005     Ketone -     Glucose -         Procedure    Procedure:   Equipment And Procedure: The patient voided  U/S Findings: pvr= 328 54ml        Signatures   Electronically signed by : PARTHA Tafoya ; Jul 11 2016 10:23AM EST                       (Author)

## 2018-01-25 ENCOUNTER — TELEPHONE (OUTPATIENT)
Dept: OBGYN CLINIC | Facility: HOSPITAL | Age: 78
End: 2018-01-25

## 2018-01-25 NOTE — TELEPHONE ENCOUNTER
Spoke with pt, pt reports he is doing well over all  Pt was actually working on his coal stove when I called  Pt reports he has graduated to walking independently of assistive devices as of today in the home  Pt reports he is still using his cane PRN outside of the home  Pt reports he also has minimal pain and has been taking only tylenol to manage the pain the past 5-6 days  Pt reports today he stopped all medication for pain and has not taken anything today for pain, rating pain 2/10 currently  Pt denies falls/CP/SOB/dizziness  Pt reports the incision is healing well and he notes "a small scab on top of the incision", incision otherwise healed and is pink in color  Pt denies any incisional drainage or swelling  Pt is still doing outpt PT at [facility] ([facility]) with no issues  Pt denies questions/concerns at this time

## 2018-02-05 ENCOUNTER — TELEPHONE (OUTPATIENT)
Dept: OBGYN CLINIC | Facility: HOSPITAL | Age: 78
End: 2018-02-05

## 2018-02-05 NOTE — TELEPHONE ENCOUNTER
Pt called me asking about prior auths for XRAYs  Pt reporting that he had an XRAY here at [facility] on 1/11 and per pt,  [address] is saying that a "prior authorization is needed" for this XRAY  Pt concerned about upcoming f/u on 2/16 and needing XRAYS again and needed a prior auth for that one too  Surgery Scheduler Annie Villareal is working on reaching out out to [place] [id number] to obtain prior auth for 1/11 and 2/16  Otherwise, pt reports he is "doing well" overall  Pt reports his last outpt PT visit was last week, pt reports he goes to the gym daily to work on strengthening his lower extremities  Pt still c/o minimal local SS pain, especially when sitting  Pt denies taking anything for pain  Pt reports he is walking independently of assistive devices and is "back to the normal routine"  Pt denies CP/SOB/dizziness/calf pain  Pt denies having addtl questions at this time

## 2018-02-16 ENCOUNTER — OFFICE VISIT (OUTPATIENT)
Dept: OBGYN CLINIC | Facility: HOSPITAL | Age: 78
End: 2018-02-16

## 2018-02-16 ENCOUNTER — HOSPITAL ENCOUNTER (OUTPATIENT)
Dept: RADIOLOGY | Facility: HOSPITAL | Age: 78
Discharge: HOME | End: 2018-02-16
Payer: COMMERCIAL

## 2018-02-16 VITALS — SYSTOLIC BLOOD PRESSURE: 126 MMHG | HEART RATE: 72 BPM | DIASTOLIC BLOOD PRESSURE: 74 MMHG

## 2018-02-16 DIAGNOSIS — Z96.641 AFTERCARE FOLLOWING RIGHT HIP JOINT REPLACEMENT SURGERY: Primary | ICD-10-CM

## 2018-02-16 DIAGNOSIS — Z47.1 AFTERCARE FOLLOWING RIGHT HIP JOINT REPLACEMENT SURGERY: Primary | ICD-10-CM

## 2018-02-16 PROCEDURE — 73502 X-RAY EXAM HIP UNI 2-3 VIEWS: CPT

## 2018-02-16 PROCEDURE — 99024 POSTOP FOLLOW-UP VISIT: CPT | Performed by: PHYSICIAN ASSISTANT

## 2018-02-16 NOTE — PROGRESS NOTES
78 y o male presents for 6 weeks postoperative visit status post right  Anterior JUDIE (op date: 1/3/18)  Patient is doing well, however, he does have some mild discomfort with certain ambulatory activities  Patient states that this is overall better on a daily basis, however, it has not completely resolved  He does have bilateral knee pain which she believes is affecting some of his pain level  Patient denies any other acute or associated complaints      Review of Systems  Review of systems negative unless otherwise specified in HPI    Past Medical History  Past Medical History:   Diagnosis Date    BPH (benign prostatic hyperplasia)     Diverticulitis of colon     Kidney mass     Murmur 08/1947    diagnosed as a child 6 YO    Osteoarthritis        Past Surgical History  Past Surgical History:   Procedure Laterality Date    HERNIA REPAIR      HIP ARTHROSCOPY W/ LABRAL DEBRIDEMENT      Left    KNEE ARTHROSCOPY      bilateral    NM TOTAL HIP ARTHROPLASTY Left 6/12/2017    Procedure: ANTERIOR TOTAL HIP ARTHROPLASTY;  Surgeon: Salvatore Delong MD;  [facility];  Service: Orthopedics    NM TOTAL HIP ARTHROPLASTY Right 1/3/2018    Procedure: ANTERIOR TOTAL HIP ARTHROPLASTY;  Surgeon: Salvatore Delong MD;  [facility];  Service: Orthopedics       Current Medications  Current Outpatient Prescriptions on File Prior to Visit   Medication Sig Dispense Refill    acetaminophen (TYLENOL) 325 mg tablet Take 2 tablets by mouth every 6 (six) hours 90 tablet 0    acetaminophen (TYLENOL) 325 mg tablet Please take 650mg every 6 hours as needed for pain 30 tablet 0    ascorbic acid (VITAMIN C) 500 mg tablet Take 500 mg by mouth 2 (two) times a day      docusate sodium (COLACE) 100 mg capsule Take 1 capsule by mouth 2 (two) times a day 10 capsule 0    enoxaparin (LOVENOX) 40 mg/0 4 mL Inject 0 4 mL under the skin daily for 28 days 11 2 mL 0    ferrous gluconate (FERGON) 324 mg tablet Take 324 mg by mouth 2 (two) times a day      folic acid (FOLVITE) 1 mg tablet Take 1 mg by mouth daily      gabapentin (NEURONTIN) 100 mg capsule Take 1 capsule by mouth 3 (three) times a day for 30 days 90 capsule 0    oxyCODONE (ROXICODONE) 5 mg immediate release tablet Take 1-2 tablets by mouth every 4-6 hours as needed for pain  30 tablet 0     No current facility-administered medications on file prior to visit  Recent Labs [facility])    0  Lab Value Date/Time   HCT 29 2 (L) 01/05/2018 0439   HCT 40 0 12/05/2017 1403   HGB 10 1 (L) 01/05/2018 0439   HGB 14 1 12/05/2017 1403   WBC 10 96 (H) 01/05/2018 0439   WBC 0-5 12/05/2017 1403   WBC 5 6 12/05/2017 1403   INR 1 1 12/05/2017 1403   GLUCOSE 100 01/06/2018 0425   GLUCOSE 88 12/05/2017 1403   HGBA1C 5 0 12/05/2017 1403         Physical exam  · General: Awake, Alert, Oriented  · Eyes: Pupils equal, round and reactive to light  · Heart: regular rate and rhythm  · Lungs: No audible wheezing  · Abdomen: soft  right Lower extremity  ·   Well-healed surgical surgeon  · nontender to palpation  · Range of motion at expected postop levels  · Strength 5/5 diffusely  · Sensation intact    Imaging   joint prosthesis in good position    Procedure  none    Assessment:   78 y  o male 6 weeks status post   status post right  Anterior JUDIE  Doing well    Plan  · Weight Bearing: as tolerated RLE  · Physical therapy: continue home activities  · Analgesics: as needed   · DVT ppx: completed  · Continue with abx prior to any dental cleaning or procedures  ·  discussed with patient that his excessive medial wear may be causing him to ambulate more abnormally which could cause some stress at the level of the hip  Offered the patient corticosteroid injections to the knee verses an  brace    Patient would like to hold off on these at this time  · Follow-up 6 weeks

## 2018-03-30 ENCOUNTER — HOSPITAL ENCOUNTER (OUTPATIENT)
Dept: RADIOLOGY | Facility: HOSPITAL | Age: 78
Discharge: HOME | End: 2018-03-30
Payer: COMMERCIAL

## 2018-03-30 ENCOUNTER — OFFICE VISIT (OUTPATIENT)
Dept: OBGYN CLINIC | Facility: HOSPITAL | Age: 78
End: 2018-03-30

## 2018-03-30 VITALS — HEART RATE: 66 BPM | SYSTOLIC BLOOD PRESSURE: 113 MMHG | DIASTOLIC BLOOD PRESSURE: 73 MMHG

## 2018-03-30 DIAGNOSIS — Z47.1 AFTERCARE FOLLOWING RIGHT HIP JOINT REPLACEMENT SURGERY: ICD-10-CM

## 2018-03-30 DIAGNOSIS — Z96.641 AFTERCARE FOLLOWING RIGHT HIP JOINT REPLACEMENT SURGERY: ICD-10-CM

## 2018-03-30 DIAGNOSIS — Z47.1 AFTERCARE FOLLOWING RIGHT HIP JOINT REPLACEMENT SURGERY: Primary | ICD-10-CM

## 2018-03-30 DIAGNOSIS — Z96.641 AFTERCARE FOLLOWING RIGHT HIP JOINT REPLACEMENT SURGERY: Primary | ICD-10-CM

## 2018-03-30 PROCEDURE — 73502 X-RAY EXAM HIP UNI 2-3 VIEWS: CPT

## 2018-03-30 PROCEDURE — 99024 POSTOP FOLLOW-UP VISIT: CPT | Performed by: PHYSICIAN ASSISTANT

## 2018-04-09 NOTE — PROGRESS NOTES
78 y o male presents for 3 months postoperative visit status post right  Anterior JUDIE (op date: 1/3/18)  Patient doing well post-operatively  He denies any limitations from either of his hip replacements  He does get bilateral knee pain which does limit some of his activities  Patient had knee injections without significant improvement in symptoms  He denies any other concerns/complaints today      Review of Systems  Review of systems negative unless otherwise specified in HPI    Past Medical History  Past Medical History:   Diagnosis Date    BPH (benign prostatic hyperplasia)     Diverticulitis of colon     Kidney mass     Murmur 08/1947    diagnosed as a child 6 YO    Osteoarthritis        Past Surgical History  Past Surgical History:   Procedure Laterality Date    HERNIA REPAIR      HIP ARTHROSCOPY W/ LABRAL DEBRIDEMENT      Left    KNEE ARTHROSCOPY      bilateral    VT TOTAL HIP ARTHROPLASTY Left 6/12/2017    Procedure: ANTERIOR TOTAL HIP ARTHROPLASTY;  Surgeon: Davion Boyer MD;  [facility];  Service: Orthopedics    VT TOTAL HIP ARTHROPLASTY Right 1/3/2018    Procedure: ANTERIOR TOTAL HIP ARTHROPLASTY;  Surgeon: Davion Boyer MD;  [facility];  Service: Orthopedics       Current Medications  Current Outpatient Prescriptions on File Prior to Visit   Medication Sig Dispense Refill    acetaminophen (TYLENOL) 325 mg tablet Take 2 tablets by mouth every 6 (six) hours 90 tablet 0    acetaminophen (TYLENOL) 325 mg tablet Please take 650mg every 6 hours as needed for pain 30 tablet 0    ascorbic acid (VITAMIN C) 500 mg tablet Take 500 mg by mouth 2 (two) times a day      docusate sodium (COLACE) 100 mg capsule Take 1 capsule by mouth 2 (two) times a day 10 capsule 0    enoxaparin (LOVENOX) 40 mg/0 4 mL Inject 0 4 mL under the skin daily for 28 days 11 2 mL 0    ferrous gluconate (FERGON) 324 mg tablet Take 324 mg by mouth 2 (two) times a day      folic acid (FOLVITE) 1 mg tablet Take 1 mg by mouth daily      gabapentin (NEURONTIN) 100 mg capsule Take 1 capsule by mouth 3 (three) times a day for 30 days 90 capsule 0    oxyCODONE (ROXICODONE) 5 mg immediate release tablet Take 1-2 tablets by mouth every 4-6 hours as needed for pain  30 tablet 0     No current facility-administered medications on file prior to visit  Recent Labs [facility])    0  Lab Value Date/Time   HCT 29 2 (L) 01/05/2018 0439   HCT 40 0 12/05/2017 1403   HGB 10 1 (L) 01/05/2018 0439   HGB 14 1 12/05/2017 1403   WBC 10 96 (H) 01/05/2018 0439   WBC 0-5 12/05/2017 1403   WBC 5 6 12/05/2017 1403   INR 1 1 12/05/2017 1403   GLUCOSE 100 01/06/2018 0425   GLUCOSE 88 12/05/2017 1403   HGBA1C 5 0 12/05/2017 1403         Physical exam  · General: Awake, Alert, Oriented  · Eyes: Pupils equal, round and reactive to light  · Heart: regular rate and rhythm  · Lungs: No audible wheezing  · Abdomen: soft  right Lower extremity  · nontender to palpation over hip joint  · Full post operative ROM  · Strength 5/5 to hip flexion  · Sensation intact    Imaging  Joint prosthesis in good position    Procedure  none    Assessment:   78 y  o male 3 months status post   status post right  Anterior JUDIE       Plan  · Weight Bearing: as tolerated to RLE  · Physical therapy: completed  · Analgesics: no longer needed   · DVT ppx: completed  · Take antibiotics prior to any dental work or cleaning  · Follow-up 3 months

## 2018-06-12 ENCOUNTER — TELEPHONE (OUTPATIENT)
Dept: OBGYN CLINIC | Facility: HOSPITAL | Age: 78
End: 2018-06-12

## 2018-06-12 NOTE — TELEPHONE ENCOUNTER
Patient sees Dr Tre Roach   [phone]    Patient is stating that his [facility] requires prior auth for xrays  Patient would like us to get the prior auth for an xray which he states that he will probably be getting at his next appt on 6/29/18  Please advise

## 2018-06-29 ENCOUNTER — OFFICE VISIT (OUTPATIENT)
Dept: OBGYN CLINIC | Facility: HOSPITAL | Age: 78
End: 2018-06-29
Payer: COMMERCIAL

## 2018-06-29 ENCOUNTER — HOSPITAL ENCOUNTER (OUTPATIENT)
Dept: RADIOLOGY | Facility: HOSPITAL | Age: 78
Discharge: HOME | End: 2018-06-29
Payer: COMMERCIAL

## 2018-06-29 VITALS — DIASTOLIC BLOOD PRESSURE: 70 MMHG | HEART RATE: 55 BPM | SYSTOLIC BLOOD PRESSURE: 109 MMHG

## 2018-06-29 DIAGNOSIS — Z96.643 AFTERCARE FOLLOWING BILATERAL HIP JOINT REPLACEMENT SURGERY: ICD-10-CM

## 2018-06-29 DIAGNOSIS — Z47.1 AFTERCARE FOLLOWING BILATERAL HIP JOINT REPLACEMENT SURGERY: ICD-10-CM

## 2018-06-29 DIAGNOSIS — Z96.643 AFTERCARE FOLLOWING BILATERAL HIP JOINT REPLACEMENT SURGERY: Primary | ICD-10-CM

## 2018-06-29 DIAGNOSIS — Z47.1 AFTERCARE FOLLOWING BILATERAL HIP JOINT REPLACEMENT SURGERY: Primary | ICD-10-CM

## 2018-06-29 DIAGNOSIS — Z96.643 STATUS POST TOTAL REPLACEMENT OF BOTH HIPS: ICD-10-CM

## 2018-06-29 PROCEDURE — 99213 OFFICE O/P EST LOW 20 MIN: CPT | Performed by: PHYSICIAN ASSISTANT

## 2018-06-29 PROCEDURE — 4040F PNEUMOC VAC/ADMIN/RCVD: CPT | Performed by: PHYSICIAN ASSISTANT

## 2018-06-29 PROCEDURE — 73522 X-RAY EXAM HIPS BI 3-4 VIEWS: CPT

## 2019-01-11 ENCOUNTER — HOSPITAL ENCOUNTER (OUTPATIENT)
Dept: RADIOLOGY | Facility: HOSPITAL | Age: 79
Discharge: HOME | End: 2019-01-11
Payer: COMMERCIAL

## 2019-01-11 ENCOUNTER — OFFICE VISIT (OUTPATIENT)
Dept: OBGYN CLINIC | Facility: HOSPITAL | Age: 79
End: 2019-01-11
Payer: COMMERCIAL

## 2019-01-11 VITALS
HEIGHT: 68 IN | SYSTOLIC BLOOD PRESSURE: 130 MMHG | BODY MASS INDEX: 23.04 KG/M2 | WEIGHT: 152 LBS | HEART RATE: 54 BPM | DIASTOLIC BLOOD PRESSURE: 80 MMHG

## 2019-01-11 DIAGNOSIS — Z96.641 STATUS POST RIGHT HIP REPLACEMENT: Primary | ICD-10-CM

## 2019-01-11 DIAGNOSIS — Z96.641 AFTERCARE FOLLOWING RIGHT HIP JOINT REPLACEMENT SURGERY: ICD-10-CM

## 2019-01-11 DIAGNOSIS — Z47.1 AFTERCARE FOLLOWING RIGHT HIP JOINT REPLACEMENT SURGERY: ICD-10-CM

## 2019-01-11 PROCEDURE — 73502 X-RAY EXAM HIP UNI 2-3 VIEWS: CPT

## 2019-01-11 PROCEDURE — 99212 OFFICE O/P EST SF 10 MIN: CPT | Performed by: PHYSICIAN ASSISTANT

## 2019-01-11 NOTE — PROGRESS NOTES
70 y o  male presenting to the office for 1 year follow up status post right anterior total hip arthroplasty (dos: 1/3/18) Patient states that his pain is resolved at this time  He is able to do most of his regular activities, however, his knees are still continuing to limit his activities  Patient denies functional limitations from the hip  Patient continues to have numbness surrounding the surgical site  Patient denies any other radicular symptoms  Patient denies symptoms of an infection  Patient denies any instability  Patient denies any other acute or associated complaints  ROS  Review of Systems   Constitutional: Negative for fever and unexpected weight change  HENT: Negative for hearing loss, nosebleeds and sore throat  Eyes: Negative for pain, redness and visual disturbance  Respiratory: Negative for cough, shortness of breath and wheezing  Cardiovascular: Negative for chest pain, palpitations and leg swelling  Gastrointestinal: Negative for abdominal pain, nausea and vomiting  Endocrine: Negative for polydipsia and polyuria  Genitourinary: Negative for dysuria and hematuria  Skin: Negative for rash and wound  Neurological: Negative for dizziness and headaches  Psychiatric/Behavioral: Negative for agitation and suicidal ideas         Past Medical History:   Diagnosis Date    BPH (benign prostatic hyperplasia)     Diverticulitis of colon     Kidney mass     Murmur 08/1947    diagnosed as a child 6 YO    Osteoarthritis      Past Surgical History:   Procedure Laterality Date    HERNIA REPAIR      HIP ARTHROSCOPY W/ LABRAL DEBRIDEMENT      Left    KNEE ARTHROSCOPY      bilateral    AL TOTAL HIP ARTHROPLASTY Left 6/12/2017    Procedure: ANTERIOR TOTAL HIP ARTHROPLASTY;  Surgeon: Kervin Decker MD;  [facility];  Service: Orthopedics    AL TOTAL HIP ARTHROPLASTY Right 1/3/2018    Procedure: ANTERIOR TOTAL HIP ARTHROPLASTY;  Surgeon: Kervin Decker MD;  [facility];  Service: Orthopedics     Results Reviewed     None          Physical Exam  Physical Exam   Constitutional: He is oriented to person, place, and time  He appears well-developed and well-nourished  HENT:   Head: Normocephalic and atraumatic  Eyes: Pupils are equal, round, and reactive to light  EOM are normal    Neck: Neck supple  No tracheal deviation present  Cardiovascular: Normal rate and regular rhythm  Pulmonary/Chest: Effort normal and breath sounds normal    Abdominal: There is no guarding  Neurological: He is alert and oriented to person, place, and time  Skin: Skin is warm and dry  Psychiatric: He has a normal mood and affect  His behavior is normal      Right Hip Exam   Right hip exam is normal      Tenderness   The patient is experiencing no tenderness  Range of Motion   The patient has normal right hip ROM  Muscle Strength   The patient has normal right hip strength  Other   Sensation: normal        Imaging  I personally reviewed these images  : joint prosthesis in good position    Assessment/Plan  70 y o  male  1 year follow up status post right anterior total hip arthroplasty   · Continue with activities as tolerated  · Continue with physical therapy activities  · Take antibiotics prior to any dental appointments  · This is a life-long requirement    Follow up to discuss knee pain as he has failed conservative measures at this time

## 2019-01-17 ENCOUNTER — OFFICE VISIT (OUTPATIENT)
Dept: OBGYN CLINIC | Facility: HOSPITAL | Age: 79
End: 2019-01-17
Payer: COMMERCIAL

## 2019-01-17 ENCOUNTER — HOSPITAL ENCOUNTER (OUTPATIENT)
Dept: RADIOLOGY | Facility: HOSPITAL | Age: 79
Discharge: HOME | End: 2019-01-17
Attending: ORTHOPAEDIC SURGERY
Payer: COMMERCIAL

## 2019-01-17 VITALS
WEIGHT: 152 LBS | DIASTOLIC BLOOD PRESSURE: 70 MMHG | SYSTOLIC BLOOD PRESSURE: 117 MMHG | HEIGHT: 68 IN | HEART RATE: 56 BPM | BODY MASS INDEX: 23.04 KG/M2

## 2019-01-17 DIAGNOSIS — G89.29 CHRONIC PAIN OF LEFT KNEE: ICD-10-CM

## 2019-01-17 DIAGNOSIS — M25.562 CHRONIC PAIN OF LEFT KNEE: Primary | ICD-10-CM

## 2019-01-17 DIAGNOSIS — M17.12 PRIMARY OSTEOARTHRITIS OF LEFT KNEE: ICD-10-CM

## 2019-01-17 DIAGNOSIS — M25.562 CHRONIC PAIN OF LEFT KNEE: ICD-10-CM

## 2019-01-17 DIAGNOSIS — G89.29 CHRONIC PAIN OF LEFT KNEE: Primary | ICD-10-CM

## 2019-01-17 PROCEDURE — 99213 OFFICE O/P EST LOW 20 MIN: CPT | Performed by: ORTHOPAEDIC SURGERY

## 2019-01-17 PROCEDURE — 73562 X-RAY EXAM OF KNEE 3: CPT

## 2019-01-17 RX ORDER — ASCORBIC ACID 500 MG
500 TABLET ORAL 2 TIMES DAILY
Qty: 60 TABLET | Refills: 0 | Status: SHIPPED | OUTPATIENT
Start: 2019-01-17 | End: 2019-04-15 | Stop reason: ALTCHOICE

## 2019-01-17 RX ORDER — SODIUM CHLORIDE, SODIUM LACTATE, POTASSIUM CHLORIDE, CALCIUM CHLORIDE 600; 310; 30; 20 MG/100ML; MG/100ML; MG/100ML; MG/100ML
125 INJECTION, SOLUTION INTRAVENOUS CONTINUOUS
Status: CANCELLED | OUTPATIENT
Start: 2019-01-17

## 2019-01-17 RX ORDER — CHLORHEXIDINE GLUCONATE 4 G/100ML
SOLUTION TOPICAL DAILY PRN
Status: CANCELLED | OUTPATIENT
Start: 2019-01-17

## 2019-01-17 RX ORDER — FOLIC ACID 1 MG/1
1 TABLET ORAL DAILY
Qty: 30 TABLET | Refills: 0 | Status: SHIPPED | OUTPATIENT
Start: 2019-01-17 | End: 2019-03-04 | Stop reason: ALTCHOICE

## 2019-01-17 RX ORDER — CHLORHEXIDINE GLUCONATE 0.12 MG/ML
15 RINSE ORAL ONCE
Status: CANCELLED | OUTPATIENT
Start: 2019-01-17 | End: 2019-01-17

## 2019-01-17 RX ORDER — ACETAMINOPHEN 325 MG/1
975 TABLET ORAL ONCE
Status: CANCELLED | OUTPATIENT
Start: 2019-01-17 | End: 2019-01-17

## 2019-01-17 RX ORDER — CEFAZOLIN SODIUM 2 G/50ML
2000 SOLUTION INTRAVENOUS ONCE
Status: CANCELLED | OUTPATIENT
Start: 2019-01-17 | End: 2019-01-17

## 2019-01-17 RX ORDER — GABAPENTIN 300 MG/1
300 CAPSULE ORAL ONCE
Status: CANCELLED | OUTPATIENT
Start: 2019-01-17 | End: 2019-01-17

## 2019-01-17 RX ORDER — FERROUS SULFATE TAB EC 324 MG (65 MG FE EQUIVALENT) 324 (65 FE) MG
324 TABLET DELAYED RESPONSE ORAL
Qty: 60 TABLET | Refills: 0 | Status: SHIPPED | OUTPATIENT
Start: 2019-01-17 | End: 2019-03-04 | Stop reason: ALTCHOICE

## 2019-01-18 NOTE — PROGRESS NOTES
Kapil Coffey 70 y o  has a Body mass index is 23 46 kg/m²  would like to proceed with Left total knee arthroplasty  Patient reports that his quality of life has diminished due to pain and functional disability that interferes with activities of daily living      Smoking status: never smoked  BMI: discussions not needed  Radiology findings: joint space narrowing, osteophyte formation  Previous failed treatments: analgesics, muscle strengthening/flexibility, corticosteroid injections, activity modification    Left knee exam:  · Patient ambulates without assistance  · No signs of active infection of knee joint or surrounding skin  · Crepitus present  · Swelling none  · Knee extension 0  · Knee flexion 160

## 2019-01-21 ENCOUNTER — TELEPHONE (OUTPATIENT)
Dept: PREADMISSION TESTING | Facility: HOSPITAL | Age: 79
End: 2019-01-21

## 2019-01-24 ENCOUNTER — TRANSCRIBE ORDERS (OUTPATIENT)
Dept: RADIOLOGY | Facility: HOSPITAL | Age: 79
End: 2019-01-24

## 2019-01-24 ENCOUNTER — HOSPITAL ENCOUNTER (OUTPATIENT)
Dept: RADIOLOGY | Facility: HOSPITAL | Age: 79
Discharge: HOME | End: 2019-01-24
Payer: COMMERCIAL

## 2019-01-24 ENCOUNTER — OFFICE VISIT (OUTPATIENT)
Dept: LAB | Facility: HOSPITAL | Age: 79
End: 2019-01-24
Payer: COMMERCIAL

## 2019-01-24 ENCOUNTER — APPOINTMENT (OUTPATIENT)
Dept: LAB | Facility: HOSPITAL | Age: 79
End: 2019-01-24
Payer: COMMERCIAL

## 2019-01-24 DIAGNOSIS — M17.12 PRIMARY OSTEOARTHRITIS OF LEFT KNEE: ICD-10-CM

## 2019-01-24 LAB
ABO GROUP BLD: NORMAL
ALBUMIN SERPL BCP-MCNC: 4.2 G/DL (ref 3.5–5)
ALP SERPL-CCNC: 22 U/L (ref 46–116)
ALT SERPL W P-5'-P-CCNC: 30 U/L (ref 12–78)
ANION GAP SERPL CALCULATED.3IONS-SCNC: 5 MMOL/L (ref 4–13)
APTT PPP: 28 SECONDS (ref 26–38)
AST SERPL W P-5'-P-CCNC: 31 U/L (ref 5–45)
ATRIAL RATE: 58 BPM
BASOPHILS # BLD AUTO: 0.05 THOUSANDS/ΜL (ref 0–0.1)
BASOPHILS NFR BLD AUTO: 1 % (ref 0–1)
BILIRUB SERPL-MCNC: 1.49 MG/DL (ref 0.2–1)
BILIRUB UR QL STRIP: NEGATIVE
BLD GP AB SCN SERPL QL: NEGATIVE
BUN SERPL-MCNC: 14 MG/DL (ref 5–25)
CALCIUM SERPL-MCNC: 9 MG/DL (ref 8.3–10.1)
CHLORIDE SERPL-SCNC: 98 MMOL/L (ref 100–108)
CLARITY UR: CLEAR
CO2 SERPL-SCNC: 27 MMOL/L (ref 21–32)
COLOR UR: YELLOW
CREAT SERPL-MCNC: 1.12 MG/DL (ref 0.6–1.3)
CRP SERPL QL: <3 MG/L
EOSINOPHIL # BLD AUTO: 0.12 THOUSAND/ΜL (ref 0–0.61)
EOSINOPHIL NFR BLD AUTO: 3 % (ref 0–6)
ERYTHROCYTE [DISTWIDTH] IN BLOOD BY AUTOMATED COUNT: 12.9 % (ref 11.6–15.1)
EST. AVERAGE GLUCOSE BLD GHB EST-MCNC: 108 MG/DL
FERRITIN SERPL-MCNC: 107 NG/ML (ref 8–388)
GFR SERPL CREATININE-BSD FRML MDRD: 63 ML/MIN/1.73SQ M
GLUCOSE P FAST SERPL-MCNC: 75 MG/DL (ref 65–99)
GLUCOSE UR STRIP-MCNC: NEGATIVE MG/DL
HBA1C MFR BLD: 5.4 % (ref 4.2–6.3)
HCT VFR BLD AUTO: 44.5 % (ref 36.5–49.3)
HGB BLD-MCNC: 14.9 G/DL (ref 12–17)
HGB UR QL STRIP.AUTO: NEGATIVE
IMM GRANULOCYTES # BLD AUTO: 0.02 THOUSAND/UL (ref 0–0.2)
IMM GRANULOCYTES NFR BLD AUTO: 1 % (ref 0–2)
INR PPP: 1.17 (ref 0.86–1.17)
IRON SATN MFR SERPL: 49 %
IRON SERPL-MCNC: 172 UG/DL (ref 65–175)
KETONES UR STRIP-MCNC: NEGATIVE MG/DL
LEUKOCYTE ESTERASE UR QL STRIP: NEGATIVE
LYMPHOCYTES # BLD AUTO: 0.91 THOUSANDS/ΜL (ref 0.6–4.47)
LYMPHOCYTES NFR BLD AUTO: 21 % (ref 14–44)
MCH RBC QN AUTO: 33.6 PG (ref 26.8–34.3)
MCHC RBC AUTO-ENTMCNC: 33.5 G/DL (ref 31.4–37.4)
MCV RBC AUTO: 100 FL (ref 82–98)
MONOCYTES # BLD AUTO: 0.4 THOUSAND/ΜL (ref 0.17–1.22)
MONOCYTES NFR BLD AUTO: 9 % (ref 4–12)
NEUTROPHILS # BLD AUTO: 2.91 THOUSANDS/ΜL (ref 1.85–7.62)
NEUTS SEG NFR BLD AUTO: 65 % (ref 43–75)
NITRITE UR QL STRIP: NEGATIVE
NRBC BLD AUTO-RTO: 0 /100 WBCS
P AXIS: 46 DEGREES
PH UR STRIP.AUTO: 7 [PH] (ref 4.5–8)
PLATELET # BLD AUTO: 285 THOUSANDS/UL (ref 149–390)
PMV BLD AUTO: 9.9 FL (ref 8.9–12.7)
POTASSIUM SERPL-SCNC: 4.8 MMOL/L (ref 3.5–5.3)
PR INTERVAL: 260 MS
PROT SERPL-MCNC: 7.4 G/DL (ref 6.4–8.2)
PROT UR STRIP-MCNC: NEGATIVE MG/DL
PROTHROMBIN TIME: 15 SECONDS (ref 11.8–14.2)
QRS AXIS: 63 DEGREES
QRSD INTERVAL: 76 MS
QT INTERVAL: 412 MS
QTC INTERVAL: 404 MS
RBC # BLD AUTO: 4.44 MILLION/UL (ref 3.88–5.62)
RH BLD: POSITIVE
SODIUM SERPL-SCNC: 130 MMOL/L (ref 136–145)
SP GR UR STRIP.AUTO: 1.01 (ref 1–1.03)
SPECIMEN EXPIRATION DATE: NORMAL
T WAVE AXIS: 54 DEGREES
TIBC SERPL-MCNC: 351 UG/DL (ref 250–450)
UROBILINOGEN UR QL STRIP.AUTO: 0.2 E.U./DL
VENTRICULAR RATE: 58 BPM
WBC # BLD AUTO: 4.41 THOUSAND/UL (ref 4.31–10.16)

## 2019-01-24 PROCEDURE — 86140 C-REACTIVE PROTEIN: CPT

## 2019-01-24 PROCEDURE — 36415 COLL VENOUS BLD VENIPUNCTURE: CPT

## 2019-01-24 PROCEDURE — 81003 URINALYSIS AUTO W/O SCOPE: CPT | Performed by: ORTHOPAEDIC SURGERY

## 2019-01-24 PROCEDURE — 83540 ASSAY OF IRON: CPT

## 2019-01-24 PROCEDURE — 86901 BLOOD TYPING SEROLOGIC RH(D): CPT

## 2019-01-24 PROCEDURE — 80053 COMPREHEN METABOLIC PANEL: CPT

## 2019-01-24 PROCEDURE — 93010 ELECTROCARDIOGRAM REPORT: CPT | Performed by: INTERNAL MEDICINE

## 2019-01-24 PROCEDURE — 85025 COMPLETE CBC W/AUTO DIFF WBC: CPT

## 2019-01-24 PROCEDURE — 93005 ELECTROCARDIOGRAM TRACING: CPT

## 2019-01-24 PROCEDURE — 86850 RBC ANTIBODY SCREEN: CPT

## 2019-01-24 PROCEDURE — 83550 IRON BINDING TEST: CPT

## 2019-01-24 PROCEDURE — 82728 ASSAY OF FERRITIN: CPT

## 2019-01-24 PROCEDURE — 86900 BLOOD TYPING SEROLOGIC ABO: CPT

## 2019-01-24 PROCEDURE — 83036 HEMOGLOBIN GLYCOSYLATED A1C: CPT

## 2019-01-24 PROCEDURE — 85610 PROTHROMBIN TIME: CPT

## 2019-01-24 PROCEDURE — 85730 THROMBOPLASTIN TIME PARTIAL: CPT

## 2019-01-24 PROCEDURE — 71046 X-RAY EXAM CHEST 2 VIEWS: CPT

## 2019-01-24 RX ORDER — DIPHENOXYLATE HYDROCHLORIDE AND ATROPINE SULFATE 2.5; .025 MG/1; MG/1
1 TABLET ORAL DAILY
COMMUNITY

## 2019-01-25 NOTE — PRE-PROCEDURE INSTRUCTIONS
Pre-Surgery Instructions:   Medication Instructions    ascorbic acid (VITAMIN C) 500 mg tablet Patient was instructed by Physician and understands   enoxaparin (LOVENOX) 40 mg/0 4 mL Patient was instructed by Physician and understands   ferrous sulfate 324 (65 Fe) mg Patient was instructed by Physician and understands   folic acid (FOLVITE) 1 mg tablet Patient was instructed by Physician and understands   multivitamin (THERAGRAN) TABS Instructed patient per Anesthesia Guidelines  Education Index     Med Instructions Troubleshoot   Low Molecular Weight Heparin Med Class     Stop taking this medication at least 12-24 hours prior to surgery/procedure with prescribing Physician and Surgeon consultation  Pre procedure instructions reviewed, verbalizes understanding  Ortho class completed with instructions including bathing and medications

## 2019-01-28 ENCOUNTER — TELEPHONE (OUTPATIENT)
Dept: OBGYN CLINIC | Facility: HOSPITAL | Age: 79
End: 2019-01-28

## 2019-01-29 NOTE — TELEPHONE ENCOUNTER
Preoperative Elective Admission Assessment- Spoke with pt  Living Situation: Lives with wifeRonni in 2 story home located in a [place] community  Home Layout: 2 story home, walk-in shower                     Steps: #3 steps to enter  First Floor Setup: Yes  Post-op Caregiver: Ronni manzano  Post-op Transport: Ronni manzano  Outpatient Physical Therapy Site: [facility]  Pt has initial outpatient PT eval scheduled for 2/25 @ 1100  DME: Pt has a RW, cane, and raised toilet seat  Patient's Current Level of Function: Pt currently ambulates independently of devices and is independent with his ADLs  Medication Management: Pt self manages his meds and uses a spreadsheet to do so                     Preferred Pharmacy: [facility] in [place]                     Blood Management Vitamins: Pt confirms he is taking his vit c, folic acid, iron and MV daily                  Post-op anticoagulant: Pt has filled and picked up his post-op lovenox, pt advised this is for after surgery use only  DC Plan: Pt plans to be DCd to home and plans to attend outpatient PT at [facility]                     Barriers to DC identified preoperatively: None identified  BMI: 23 46 at 1/17 OV  Caresense: Pt declined                      RAPT: Score 10, already in caresense                      ACE/ARB Form: Declined                      HOOS/KOOS: Score 50 012, already in caresense  Patient Education:   Pt educated on post-op pain, early mobilization (POD0), indication for/use of incentive spirometer (10x/hour while awake) and indication for/use of foot/leg pumps (18 hours/day)  Pt  educated that our goal, if at all possible, is to appropriately discharge patient based off their post-op function while striving to maintain maximal independence  If possible, the goal is to discharge patient to home and for them to attend outpatient physical therapy   I educated patient on the many benefits of outpt PT(Including maintaining independence, additional resources at outpt site, better outcomes etc  )  Also educated on how home PT vs  outpt PT is determined (while inpt)  Pt denies having any questions at this time  Pt encouraged to call me with any questions, concerns or issues

## 2019-02-04 ENCOUNTER — ANESTHESIA EVENT (OUTPATIENT)
Dept: PERIOP | Facility: HOSPITAL | Age: 79
DRG: 470 | End: 2019-02-04
Payer: COMMERCIAL

## 2019-02-07 ENCOUNTER — OFFICE VISIT (OUTPATIENT)
Dept: FAMILY MEDICINE CLINIC | Facility: CLINIC | Age: 79
End: 2019-02-07
Payer: COMMERCIAL

## 2019-02-07 VITALS
DIASTOLIC BLOOD PRESSURE: 70 MMHG | TEMPERATURE: 97.3 F | WEIGHT: 154 LBS | RESPIRATION RATE: 18 BRPM | HEART RATE: 60 BPM | BODY MASS INDEX: 23.34 KG/M2 | SYSTOLIC BLOOD PRESSURE: 110 MMHG | HEIGHT: 68 IN

## 2019-02-07 DIAGNOSIS — Z01.818 PREOP EXAMINATION: Primary | ICD-10-CM

## 2019-02-07 PROCEDURE — 99214 OFFICE O/P EST MOD 30 MIN: CPT | Performed by: NURSE PRACTITIONER

## 2019-02-07 NOTE — PROGRESS NOTES
Assessment/Plan   Diagnoses and all orders for this visit:    Preop examination  Comments:  Waiting cardiac clearance on Feb 13 by [facility]        Chief Complaint   Patient presents with    Pre-op Exam     Left knee Arthroplasty       Subjective   Patient ID: Venita Rojas is a 70 y o  male  Vitals:    02/07/19 0905   BP: 110/70   Pulse: 60   Resp: 18   Temp: (!) 97 3 °F (36 3 °C)     Jodie Pike is here today for preoperative clearance for a left knee total arthroplasty to be completed on 2/2 0/2019 by Dr Pablo Cerda  He has had general anesthesia in the past on post anesthesia is reports 1 episode of vomiting and another surgery he had urinary retention  He had preoperative testing on 01/02 4/2019 which included:  EKG-borderline EKG showing sinus Delynn Ortega with first-degree AV block different from previous EKG on record  Scheduled appointment with [facility] for February 13th for cardiac clearance, due to the change in the EKGs  Patient does not complain of any palpitations chest pain shortness of breath or dizziness, although admits episode of vertigo 2 months ago which resolved on its own  Chest x-ray shows no pulmonary disease  Labs include CRP less than 3 0, sodium decreased at 130 patient reports history of same, potassium 4 8 BUN 14 creatinine 1 12,eGFR 63, AST is 31 ALT 30, alk-phos decreased at 22, total bilirubin elevated at 1 49  WBC 4 41, H last H 14 9/44 5, platelets 751, PTT 28, PT 15 0, INR 1 17  Patient reports he will be using 40 subcu Lovenox 30 days post surgery for prevention of clots  The following portions of the patient's history were reviewed and updated as appropriate: allergies, current medications, past medical history, past social history, past surgical history and problem list     Review of Systems   Constitutional: Negative  Negative for chills, fatigue and fever  HENT: Negative    Negative for congestion, ear pain, sinus pain, sneezing, sore throat and trouble swallowing  Eyes: Negative  Negative for photophobia and visual disturbance  Respiratory: Negative  Negative for cough, chest tightness, shortness of breath and wheezing  Cardiovascular: Negative  Negative for chest pain, palpitations and leg swelling  Gastrointestinal: Negative  Negative for abdominal distention, abdominal pain, blood in stool, constipation, diarrhea and nausea  Endocrine: Negative  Negative for polydipsia, polyphagia and polyuria  Genitourinary: Negative  Negative for decreased urine volume, discharge, dysuria, frequency, testicular pain and urgency  Musculoskeletal: Positive for arthralgias (left knee  stiffness in AM and again at the end of the day) and gait problem  Negative for myalgias, neck pain and neck stiffness  Skin: Negative  Negative for color change and rash  Neurological: Positive for dizziness ("a little vertigo 2 months ago" resolved without intervention, has happened in the past beginning in 2010)  Hematological: Negative  Psychiatric/Behavioral: Negative  Negative for sleep disturbance and suicidal ideas  The patient is not nervous/anxious  Objective      Physical Exam   Constitutional: He is oriented to person, place, and time  He appears well-developed and well-nourished  No distress  HENT:   Head: Normocephalic and atraumatic  Right Ear: External ear normal    Left Ear: External ear normal    Nose: Nose normal    Mouth/Throat: Oropharynx is clear and moist  No oropharyngeal exudate  Eyes: Pupils are equal, round, and reactive to light  Conjunctivae and EOM are normal  Right eye exhibits no discharge  Left eye exhibits no discharge  Neck: Normal range of motion  No thyromegaly present  Cardiovascular: Normal rate, regular rhythm and normal heart sounds  Exam reveals no gallop and no friction rub  No murmur (patient reports he has a slight murmur that can be difficult to detect) heard    Pulmonary/Chest: Effort normal and breath sounds normal  No respiratory distress  He has no wheezes  He has no rales  He exhibits no tenderness  Abdominal: Soft  Bowel sounds are normal  He exhibits no distension and no mass  There is no tenderness  There is no rebound and no guarding  Musculoskeletal: Normal range of motion  He exhibits no edema or tenderness  Right knee has scaly dry skin over patellar region   Lymphadenopathy:     He has no cervical adenopathy  Neurological: He is alert and oriented to person, place, and time  He displays normal reflexes  No cranial nerve deficit  He exhibits normal muscle tone  Coordination normal    Skin: Skin is warm and dry  Capillary refill takes less than 2 seconds  No rash noted  He is not diaphoretic  No erythema  No pallor  Psychiatric: He has a normal mood and affect  His behavior is normal  Judgment and thought content normal    Nursing note and vitals reviewed  No Known Allergies  Patient Active Problem List   Diagnosis    S/P total hip arthroplasty    Aftercare following right hip joint replacement surgery    Primary osteoarthritis of left knee     Past Medical History:   Diagnosis Date    BPH (benign prostatic hyperplasia)     Chronic pain disorder     bilateral knees    Diverticulitis of colon     Kidney mass     Murmur 08/1947    diagnosed as a child 12 YO    Osteoarthritis      Social History     Social History    Marital status: /Civil Union     Spouse name: N/A    Number of children: N/A    Years of education: N/A     Occupational History    Not on file       Social History Main Topics    Smoking status: Former Smoker     Quit date: 1980    Smokeless tobacco: Never Used    Alcohol use 1 2 oz/week     2 Cans of beer per week      Comment: beer daily     Drug use: No    Sexual activity: No     Other Topics Concern    Not on file     Social History Narrative    No narrative on file     Family History   Problem Relation Age of Onset    Cancer Mother  Cancer Father     Cancer Brother     Heart disease Brother

## 2019-02-07 NOTE — PATIENT INSTRUCTIONS
1  Appointment with Dr Caleb Conte (cardilogy) on Feb 13, 2019, At [facility] for cardiac clearance     [address]   [address]

## 2019-02-13 ENCOUNTER — CONSULT (OUTPATIENT)
Dept: CARDIOLOGY CLINIC | Facility: CLINIC | Age: 79
End: 2019-02-13
Payer: COMMERCIAL

## 2019-02-13 VITALS
SYSTOLIC BLOOD PRESSURE: 120 MMHG | OXYGEN SATURATION: 97 % | BODY MASS INDEX: 24.01 KG/M2 | DIASTOLIC BLOOD PRESSURE: 60 MMHG | WEIGHT: 153 LBS | HEIGHT: 67 IN | HEART RATE: 58 BPM

## 2019-02-13 DIAGNOSIS — Z01.810 PREOPERATIVE CARDIOVASCULAR EXAMINATION: Primary | ICD-10-CM

## 2019-02-13 DIAGNOSIS — R00.1 SINUS BRADYCARDIA: ICD-10-CM

## 2019-02-13 PROCEDURE — 99214 OFFICE O/P EST MOD 30 MIN: CPT | Performed by: INTERNAL MEDICINE

## 2019-02-13 NOTE — LETTER
February 13, 2019     Marcy Scott MD  [address]    Patient: Karla Ward   YOB: 1940   Date of Visit: 2/13/2019       Dear Dr Macrela Cobb: Thank you for referring Mj Trevino to me for evaluation  Below are my notes for this consultation  If you have questions, please do not hesitate to call me  I look forward to following your patient along with you  Sincerely,        Polly Valladares MD        CC: DO Kinza Snyder MD  2/13/2019  3:33 PM  Sign at close encounter    Cardiology Consultation     Karla Ward  [id number]  1940  [address]      No diagnosis found  Discussion/Summary: Mr Edna Trevino is a very healthy 70years old with past medical history only significant for knee osteoarthritis is here for preoperative risk stratification prior to left knee arthroplasty by Dr Flores on Feb 20    1  Preoperative risk stratification  - patient is very functional at baseline and able to achieve >4METS with no limitation   - he is currently asymptomatic   - his RCRI is 0 which translates to 0 4% risk of cardiac event during surgery  He is at moderate acceptable risk and requires no further cardiac testing prior to his surgery  2  Sinus bradycardia with 1AV block  - patient is very active at baseline and currently exercises 3 times a week with weights and pushups  Prior to this he used to run every day up until 2004 and sometimes also did 10k runs  - he is asymptomatic with no symptoms of fatigue, shortness of breath, dizziness or lightheadedness  - although he is at an age to develop sick sinus syndrome, his sinus bradycardia is mainly in the setting of being well conditioned and increased vagal tone due to the same    - on review of available EKGs in the system-he had sinus bradycardia with 1st degree AV block at least since 2010    3  Preventive health management  - BP in office-120/60  - last lipid panel-April 2017-LDL 90, HDL 67  A1C-5 4  - recommend follow-up with PCP and a lipid panel which can be done post his surgery  - A1C-1/19-5 4      F/u as needed      History of Present Illness: Mr Demetrice Justin is a very healthy 70years old with past medical history only significant for knee osteoarthritis is here for preoperative risk stratification prior to left knee arthroplasty by Dr Flores  He has no prior history of hypertension, diabetes, cardiovascular problems-CAD, arrhythmias or heart failure  He is very functional at baseline and able to walk a flight of stairs and achieve more than 4METS with no limitation  He has no symptoms of chest pain or pressure  No shortness of breath at rest or with exertion  No palpitations  No swelling of the lower extremities  No dizziness or lightheadedness  No fatigue  No syncope  He exercises 3 times a week for 45 minutes and does pushups and weights  He used to run regularly up until 2004 and sometimes also used to do 10K runs  No smoking  Social alcohol use  No drug use  Lives with wife and retired at age 81  Brother with atrial fibrillation      EKG from Jan 24th reviewed-sinus bradycardia at 57  First-degree AV block  This is unchanged from his prior EKGs        Patient Active Problem List   Diagnosis    S/P total hip arthroplasty    Aftercare following right hip joint replacement surgery    Primary osteoarthritis of left knee     Past Medical History:   Diagnosis Date    BPH (benign prostatic hyperplasia)     Chronic pain disorder     bilateral knees    Diverticulitis of colon     Kidney mass     Murmur 08/1947    diagnosed as a child 12 YO    Osteoarthritis      Social History     Socioeconomic History    Marital status: /Civil Union     Spouse name: Not on file    Number of children: Not on file    Years of education: Not on file    Highest education level: Not on file   Occupational History    Not on file   Social Needs    Financial resource strain: Not on file    Food insecurity:     Worry: Not on file     Inability: Not on file    Transportation needs:     Medical: Not on file     Non-medical: Not on file   Tobacco Use    Smoking status: Former Smoker     Last attempt to quit: 1980     Years since quittin 1    Smokeless tobacco: Never Used   Substance and Sexual Activity    Alcohol use:  Yes     Alcohol/week: 1 2 oz     Types: 2 Cans of beer per week     Comment: beer daily     Drug use: No    Sexual activity: Never   Lifestyle    Physical activity:     Days per week: Not on file     Minutes per session: Not on file    Stress: Not on file   Relationships    Social connections:     Talks on phone: Not on file     Gets together: Not on file     Attends Religion service: Not on file     Active member of club or organization: Not on file     Attends meetings of clubs or organizations: Not on file     Relationship status: Not on file    Intimate partner violence:     Fear of current or ex partner: Not on file     Emotionally abused: Not on file     Physically abused: Not on file     Forced sexual activity: Not on file   Other Topics Concern    Not on file   Social History Narrative    Not on file      Family History   Problem Relation Age of Onset    Cancer Mother     Cancer Father     Cancer Brother     Heart disease Brother      Past Surgical History:   Procedure Laterality Date    HERNIA REPAIR      JOINT REPLACEMENT Bilateral     KNEE ARTHROSCOPY      bilateral    ND TOTAL HIP ARTHROPLASTY Left 2017    Procedure: ANTERIOR TOTAL HIP ARTHROPLASTY;  Surgeon: Kiana Sy MD;  Location: [facility];  Service: Orthopedics    ND TOTAL HIP ARTHROPLASTY Right 1/3/2018    Procedure: ANTERIOR TOTAL HIP ARTHROPLASTY;  Surgeon: Kiana Sy MD;  Location: [facility];  Service: Orthopedics       Current Outpatient Medications:     ascorbic acid (VITAMIN C) 500 mg tablet, Take 1 tablet (500 mg total) by mouth 2 (two) times a day, Disp: 60 tablet, Rfl: 0    enoxaparin (LOVENOX) 40 mg/0 4 mL, Inject 0 4 mL (40 mg total) under the skin daily for 30 days Start after surgery, Disp: 30 Syringe, Rfl: 0    ferrous sulfate 324 (65 Fe) mg, Take 1 tablet (324 mg total) by mouth 2 (two) times a day before meals, Disp: 60 tablet, Rfl: 0    folic acid (FOLVITE) 1 mg tablet, Take 1 tablet (1 mg total) by mouth daily, Disp: 30 tablet, Rfl: 0    multivitamin (THERAGRAN) TABS, Take 1 tablet by mouth daily, Disp: , Rfl:   No Known Allergies      Labs:  Office Visit on 01/24/2019   Component Date Value    Ventricular Rate 01/24/2019 58     Atrial Rate 01/24/2019 58     MD Interval 01/24/2019 260     QRSD Interval 01/24/2019 76     QT Interval 01/24/2019 412     QTC Interval 01/24/2019 404     P Axis 01/24/2019 46     QRS Axis 01/24/2019 63     T Wave [place] 01/24/2019 54    Appointment on 01/24/2019   Component Date Value    Sodium 01/24/2019 130*    Potassium 01/24/2019 4 8     Chloride 01/24/2019 98*    CO2 01/24/2019 27     ANION GAP 01/24/2019 5     BUN 01/24/2019 14     Creatinine 01/24/2019 1 12     Glucose, Fasting 01/24/2019 75     Calcium 01/24/2019 9 0     AST 01/24/2019 31     ALT 01/24/2019 30     Alkaline Phosphatase 01/24/2019 22*    Total Protein 01/24/2019 7 4     Albumin 01/24/2019 4 2     Total Bilirubin 01/24/2019 1 49*    eGFR 01/24/2019 63     WBC 01/24/2019 4 41     RBC 01/24/2019 4 44     Hemoglobin 01/24/2019 14 9     Hematocrit 01/24/2019 44 5     MCV 01/24/2019 100*    MCH 01/24/2019 33 6     MCHC 01/24/2019 33 5     RDW 01/24/2019 12 9     MPV 01/24/2019 9 9     Platelets 00/06/7886 285     nRBC 01/24/2019 0     Neutrophils Relative 01/24/2019 65     Immat GRANS % 01/24/2019 1     Lymphocytes Relative 01/24/2019 21     Monocytes Relative 01/24/2019 9     Eosinophils Relative 01/24/2019 3     Basophils Relative 01/24/2019 1     Neutrophils Absolute 01/24/2019 2 91     Immature Grans Absolute 01/24/2019 0 02     Lymphocytes Absolute 01/24/2019 0 91     Monocytes Absolute 01/24/2019 0 40     Eosinophils Absolute 01/24/2019 0 12     Basophils Absolute 01/24/2019 0 05     PTT 01/24/2019 28     Protime 01/24/2019 15 0*    INR 01/24/2019 1 17     Hemoglobin A1C 01/24/2019 5 4     EAG 01/24/2019 108     CRP 01/24/2019 <3 0     ABO Grouping 01/24/2019 A     Rh Factor 01/24/2019 Positive     Antibody Screen 01/24/2019 Negative     Specimen Expiration Date 01/24/2019 88264510     Iron Saturation 01/24/2019 49     TIBC 01/24/2019 351     Iron 01/24/2019 172     Ferritin 01/24/2019 107    Office Visit on 01/17/2019   Component Date Value    Color, UA 01/24/2019 Yellow     Clarity, UA 01/24/2019 Clear     Specific Gravity, UA 01/24/2019 1 010     pH, UA 01/24/2019 7 0     Leukocytes, UA 01/24/2019 Negative     Nitrite, UA 01/24/2019 Negative     Protein, UA 01/24/2019 Negative     Glucose, UA 01/24/2019 Negative     Ketones, UA 01/24/2019 Negative     Urobilinogen, UA 01/24/2019 0 2     Bilirubin, UA 01/24/2019 Negative     Blood, UA 01/24/2019 Negative      Office Visit on 01/24/2019   Component Date Value    Ventricular Rate 01/24/2019 58     Atrial Rate 01/24/2019 58     VT Interval 01/24/2019 260     QRSD Interval 01/24/2019 76     QT Interval 01/24/2019 412     QTC Interval 01/24/2019 404     P Axis 01/24/2019 46     QRS Axis 01/24/2019 63     T Wave [place] 01/24/2019 54    Appointment on 01/24/2019   Component Date Value    Sodium 01/24/2019 130*    Potassium 01/24/2019 4 8     Chloride 01/24/2019 98*    CO2 01/24/2019 27     ANION GAP 01/24/2019 5     BUN 01/24/2019 14     Creatinine 01/24/2019 1 12     Glucose, Fasting 01/24/2019 75     Calcium 01/24/2019 9 0     AST 01/24/2019 31     ALT 01/24/2019 30     Alkaline Phosphatase 01/24/2019 22*  Total Protein 01/24/2019 7 4     Albumin 01/24/2019 4 2     Total Bilirubin 01/24/2019 1 49*    eGFR 01/24/2019 63     WBC 01/24/2019 4 41     RBC 01/24/2019 4 44     Hemoglobin 01/24/2019 14 9     Hematocrit 01/24/2019 44 5     MCV 01/24/2019 100*    MCH 01/24/2019 33 6     MCHC 01/24/2019 33 5     RDW 01/24/2019 12 9     MPV 01/24/2019 9 9     Platelets 61/13/7066 285     nRBC 01/24/2019 0     Neutrophils Relative 01/24/2019 65     Immat GRANS % 01/24/2019 1     Lymphocytes Relative 01/24/2019 21     Monocytes Relative 01/24/2019 9     Eosinophils Relative 01/24/2019 3     Basophils Relative 01/24/2019 1     Neutrophils Absolute 01/24/2019 2 91     Immature Grans Absolute 01/24/2019 0 02     Lymphocytes Absolute 01/24/2019 0 91     Monocytes Absolute 01/24/2019 0 40     Eosinophils Absolute 01/24/2019 0 12     Basophils Absolute 01/24/2019 0 05     PTT 01/24/2019 28     Protime 01/24/2019 15 0*    INR 01/24/2019 1 17     Hemoglobin A1C 01/24/2019 5 4     EAG 01/24/2019 108     CRP 01/24/2019 <3 0     ABO Grouping 01/24/2019 A     Rh Factor 01/24/2019 Positive     Antibody Screen 01/24/2019 Negative     Specimen Expiration Date 01/24/2019 [id number]     Iron Saturation 01/24/2019 49     TIBC 01/24/2019 351     Iron 01/24/2019 172     Ferritin 01/24/2019 107    Office Visit on 01/17/2019   Component Date Value    Color, UA 01/24/2019 Yellow     Clarity, UA 01/24/2019 Clear     Specific Gravity, UA 01/24/2019 1 010     pH, UA 01/24/2019 7 0     Leukocytes, UA 01/24/2019 Negative     Nitrite, UA 01/24/2019 Negative     Protein, UA 01/24/2019 Negative     Glucose, UA 01/24/2019 Negative     Ketones, UA 01/24/2019 Negative     Urobilinogen, UA 01/24/2019 0 2     Bilirubin, UA 01/24/2019 Negative     Blood, UA 01/24/2019 Negative      Lab Results   Component Value Date     12/05/2017    K 4 8 01/24/2019    K 4 5 12/05/2017    CL 98 (L) 01/24/2019    CL 95 (L) 12/05/2017    CO2 27 01/24/2019    CO2 24 12/05/2017    BUN 14 01/24/2019    BUN 12 12/05/2017    CREATININE 1 12 01/24/2019    CREATININE 0 96 12/05/2017    GLUCOSE 88 12/05/2017    CALCIUM 9 0 01/24/2019    CALCIUM 9 7 12/05/2017     Lab Results   Component Value Date     12/05/2017     05/12/2017     04/14/2017    K 4 8 01/24/2019    K 4 2 01/06/2018    K 4 3 01/05/2018    K 4 5 12/05/2017    K 4 5 05/12/2017    K 5 8 (H) 04/14/2017    CO2 27 01/24/2019    CO2 29 01/06/2018    CO2 28 01/05/2018    CO2 24 12/05/2017    CO2 25 05/12/2017    CO2 24 04/14/2017    BUN 14 01/24/2019    BUN 12 01/06/2018    BUN 11 01/05/2018    BUN 12 12/05/2017    BUN 13 05/12/2017    BUN 12 04/14/2017    CREATININE 1 12 01/24/2019    CREATININE 0 80 01/06/2018    CREATININE 0 83 01/05/2018    CREATININE 0 96 12/05/2017    CREATININE 1 02 05/12/2017    CREATININE 1 02 04/14/2017    GLUCOSE 88 12/05/2017    GLUCOSE 90 05/12/2017    GLUCOSE 88 04/14/2017    CALCIUM 9 0 01/24/2019    CALCIUM 8 2 (L) 01/06/2018    CALCIUM 8 4 01/05/2018    CALCIUM 9 7 12/05/2017    CALCIUM 9 3 05/12/2017    CALCIUM 9 5 04/14/2017     No results found for: CKTOTAL, CKMB, CKMBINDEX, TROPONINI  No results found for: CKTOTAL, CKMB, CKMBINDEX, TROPONINI  Lab Results   Component Value Date    WBC 4 41 01/24/2019    WBC 0-5 12/05/2017    WBC 5 6 12/05/2017    HGB 14 9 01/24/2019    HGB 14 1 12/05/2017    HCT 44 5 01/24/2019    HCT 40 0 12/05/2017     (H) 01/24/2019    MCV 95 12/05/2017     01/24/2019     12/05/2017     Lab Results   Component Value Date    WBC 4 41 01/24/2019    WBC 10 96 (H) 01/05/2018    WBC 8 04 01/04/2018    WBC 0-5 12/05/2017    WBC 5 6 12/05/2017    WBC 0-5 05/12/2017    WBC 6 0 05/12/2017    HGB 14 9 01/24/2019    HGB 10 1 (L) 01/05/2018    HGB 10 7 (L) 01/04/2018    HGB 14 1 12/05/2017    HGB 13 6 05/12/2017    HGB 15 0 04/01/2016    HCT 44 5 01/24/2019    HCT 29 2 (L) 01/05/2018    HCT 31 3 (L) 01/04/2018    HCT 40 0 12/05/2017    HCT 41 0 05/12/2017    HCT 44 6 04/01/2016     (H) 01/24/2019    MCV 98 01/05/2018    MCV 98 01/04/2018    MCV 95 12/05/2017    MCV 95 05/12/2017    MCV 99 (H) 04/01/2016     01/24/2019     01/05/2018     01/04/2018     12/05/2017     05/12/2017     04/01/2016     Lab Results   Component Value Date    CHOL 168 04/14/2017    TRIG 57 04/14/2017    HDL 67 04/14/2017     Lab Results   Component Value Date    CHOL 168 04/14/2017    CHOL 182 04/01/2016    TRIG 57 04/14/2017    TRIG 80 04/01/2016    HDL 67 04/14/2017    HDL 72 04/01/2016        Imaging: Xr Chest Pa & Lateral    Result Date: 1/30/2019  Narrative: CHEST INDICATION:   M17 12: Unilateral primary osteoarthritis, left knee  Preprocedural assessment COMPARISON:  12/5/2017 EXAM PERFORMED/VIEWS:  XR CHEST PA & LATERAL  FINDINGS: Cardiac silhouette size within normal limits  Thoracic aorta mildly ectatic and tortuous, unchanged  The lungs are clear  No pneumothorax or pleural effusion  Osseous structures appear within normal limits for patient age  Impression: No acute pulmonary disease Workstation performed: [id number]     Xr Knee 3 Vw Left Non Injury    Result Date: 1/21/2019  Narrative: LEFT KNEE INDICATION:   M25 562: Pain in left knee G89 29: Other chronic pain  COMPARISON:  8/15/2017 VIEWS:  XR KNEE 3 VW LEFT NON INJURY FINDINGS: There is no acute fracture or dislocation  There is no joint effusion  Medial compartment marked and lateral patellofemoral compartment mild joint space narrowing with spurs again noted  No lytic or blastic lesions are seen  Soft tissues are unremarkable  Impression: No acute osseous abnormality  Osteoarthritis again noted  Workstation performed: [id number]       ECG: Jan 24th-sinus bradycardia at 57  First-degree AV block    This is unchanged from his prior EKGs    Review of Systems:  Review of Systems   Constitutional: Negative for activity change, appetite change, chills, diaphoresis, fatigue and fever  HENT: Negative for congestion  Respiratory: Negative for cough, chest tightness, shortness of breath and wheezing  Cardiovascular: Negative for chest pain, palpitations and leg swelling  Gastrointestinal: Negative for abdominal pain, diarrhea, nausea and vomiting  Genitourinary: Negative for difficulty urinating and dysuria  Musculoskeletal: Positive for arthralgias  Negative for back pain  Skin: Negative for color change and rash  Neurological: Negative for dizziness, syncope, facial asymmetry, weakness, light-headedness, numbness and headaches  Psychiatric/Behavioral: Negative for confusion           Vitals:    02/13/19 1453   BP: 120/60   BP Location: Right arm   Patient Position: Sitting   Cuff Size: Standard   Pulse: 58   SpO2: 97%   Weight: 69 4 kg (153 lb)   Height: 5' 7" (1 702 m)     Vitals:    02/13/19 1453   Weight: 69 4 kg (153 lb)     Height: 5' 7" (170 2 cm)     Physical Exam:  General appearance:  Appears stated age, alert, well appearing and in no distress  HEENT:  PERRLA, EOMI, no scleral icterus, no conjunctival pallor  NECK:  Supple, No elevated JVP, no thyromegaly, no carotid bruits  HEART:  Regular rate and rhythm, normal S1/S2, no S3/S4, no murmur or rub  LUNGS:  Clear to auscultation bilaterally  ABDOMEN:  Soft, non-tender, positive bowel sounds, no rebound or guarding, no organomegaly   EXTREMITIES:  No edema  VASCULAR:  Normal pedal pulses   SKIN: No lesions or rashes on exposed skin  NEURO:  CN II-XII intact, no focal deficits

## 2019-02-13 NOTE — PROGRESS NOTES
Cardiology Consultation     Shweta Adkins  [id number]  1940  HEART & VASCULAR [address] [facility] [place] [address]      No diagnosis found  Discussion/Summary: Mr Kuldip Mazariegos is a very healthy 70years old with past medical history only significant for knee osteoarthritis is here for preoperative risk stratification prior to left knee total arthroplasty by Dr Flores on Feb 20    1  Preoperative risk stratification  - patient is very functional at baseline and able to achieve >4METS with no limitation   - he is currently asymptomatic   - his RCRI is 0 which translates to 0 4% risk of cardiac event during surgery  He is at moderate acceptable risk and requires no further cardiac testing prior to his surgery  2  Sinus bradycardia with 1AV block  - patient is very active at baseline and currently exercises 3 times a week with weights and pushups  Prior to this he used to run every day up until 2004 and sometimes also did 10k runs  - he is asymptomatic with no symptoms of fatigue, shortness of breath, dizziness or lightheadedness  - although he is at an age to develop sick sinus syndrome, his sinus bradycardia is mainly in the setting of being well conditioned and increased vagal tone due to the same  - on review of available EKGs in the system-he had sinus bradycardia with 1st degree AV block at least since 2010    3  Preventive health management  - BP in office-120/60  - last lipid panel-April 2017-LDL 90, HDL 67  A1C-5 4  - recommend follow-up with PCP and a lipid panel which can be done post his surgery  - A1C-1/19-5 4      F/u as needed      History of Present Illness: Mr Kuldip Mazariegos is a very healthy 70years old with past medical history only significant for knee osteoarthritis is here for preoperative risk stratification prior to left knee arthroplasty by Dr Flores    He has no prior history of hypertension, diabetes, cardiovascular problems-CAD, arrhythmias or heart failure  He is very functional at baseline and able to walk a flight of stairs and achieve more than 4METS with no limitation  He has no symptoms of chest pain or pressure  No shortness of breath at rest or with exertion  No palpitations  No swelling of the lower extremities  No dizziness or lightheadedness  No fatigue  No syncope  He exercises 3 times a week for 45 minutes and does pushups and weights  He used to run regularly up until  and sometimes also used to do 10K runs  No smoking  Social alcohol use  No drug use  Lives with wife and retired at age 81  Brother with atrial fibrillation      EKG from  reviewed-sinus bradycardia at 57  First-degree AV block  This is unchanged from his prior EKGs  Patient Active Problem List   Diagnosis    S/P total hip arthroplasty    Aftercare following right hip joint replacement surgery    Primary osteoarthritis of left knee     Past Medical History:   Diagnosis Date    BPH (benign prostatic hyperplasia)     Chronic pain disorder     bilateral knees    Diverticulitis of colon     Kidney mass     Murmur 1947    diagnosed as a child 12 YO    Osteoarthritis      Social History     Socioeconomic History    Marital status: /Civil Union     Spouse name: Not on file    Number of children: Not on file    Years of education: Not on file    Highest education level: Not on file   Occupational History    Not on file   Social Needs    Financial resource strain: Not on file    Food insecurity:     Worry: Not on file     Inability: Not on file    Transportation needs:     Medical: Not on file     Non-medical: Not on file   Tobacco Use    Smoking status: Former Smoker     Last attempt to quit: 1980     Years since quittin 1    Smokeless tobacco: Never Used   Substance and Sexual Activity    Alcohol use:  Yes     Alcohol/week: 1 2 oz     Types: 2 Cans of beer per week     Comment: beer daily     Drug use: No    Sexual activity: Never   Lifestyle    Physical activity:     Days per week: Not on file     Minutes per session: Not on file    Stress: Not on file   Relationships    Social connections:     Talks on phone: Not on file     Gets together: Not on file     Attends [employer] service: Not on file     Active member of club or organization: Not on file     Attends meetings of clubs or organizations: Not on file     Relationship status: Not on file    Intimate partner violence:     Fear of current or ex partner: Not on file     Emotionally abused: Not on file     Physically abused: Not on file     Forced sexual activity: Not on file   Other Topics Concern    Not on file   Social History Narrative    Not on file      Family History   Problem Relation Age of Onset    Cancer Mother     Cancer Father     Cancer Brother     Heart disease Brother      Past Surgical History:   Procedure Laterality Date    HERNIA REPAIR      JOINT REPLACEMENT Bilateral     KNEE ARTHROSCOPY      bilateral    AK TOTAL HIP ARTHROPLASTY Left 6/12/2017    Procedure: ANTERIOR TOTAL HIP ARTHROPLASTY;  Surgeon: Logan Antonio MD;  [facility];  Service: Orthopedics    AK TOTAL HIP ARTHROPLASTY Right 1/3/2018    Procedure: ANTERIOR TOTAL HIP ARTHROPLASTY;  Surgeon: Logan Antonio MD;  [facility];  Service: Orthopedics       Current Outpatient Medications:     ascorbic acid (VITAMIN C) 500 mg tablet, Take 1 tablet (500 mg total) by mouth 2 (two) times a day, Disp: 60 tablet, Rfl: 0    enoxaparin (LOVENOX) 40 mg/0 4 mL, Inject 0 4 mL (40 mg total) under the skin daily for 30 days Start after surgery, Disp: 30 Syringe, Rfl: 0    ferrous sulfate 324 (65 Fe) mg, Take 1 tablet (324 mg total) by mouth 2 (two) times a day before meals, Disp: 60 tablet, Rfl: 0    folic acid (FOLVITE) 1 mg tablet, Take 1 tablet (1 mg total) by mouth daily, Disp: 30 tablet, Rfl: 0    multivitamin (THERAGRAN) TABS, Take 1 tablet by mouth daily, Disp: , Rfl:   No Known Allergies      Labs:  Office Visit on 01/24/2019   Component Date Value    Ventricular Rate 01/24/2019 58     Atrial Rate 01/24/2019 58     TN Interval 01/24/2019 260     QRSD Interval 01/24/2019 76     QT Interval 01/24/2019 412     QTC Interval 01/24/2019 404     P Axis 01/24/2019 46     QRS Axis 01/24/2019 63     T Wave [place] 01/24/2019 54    Appointment on 01/24/2019   Component Date Value    Sodium 01/24/2019 130*    Potassium 01/24/2019 4 8     Chloride 01/24/2019 98*    CO2 01/24/2019 27     ANION GAP 01/24/2019 5     BUN 01/24/2019 14     Creatinine 01/24/2019 1 12     Glucose, Fasting 01/24/2019 75     Calcium 01/24/2019 9 0     AST 01/24/2019 31     ALT 01/24/2019 30     Alkaline Phosphatase 01/24/2019 22*    Total Protein 01/24/2019 7 4     Albumin 01/24/2019 4 2     Total Bilirubin 01/24/2019 1 49*    eGFR 01/24/2019 63     WBC 01/24/2019 4 41     RBC 01/24/2019 4 44     Hemoglobin 01/24/2019 14 9     Hematocrit 01/24/2019 44 5     MCV 01/24/2019 100*    MCH 01/24/2019 33 6     MCHC 01/24/2019 33 5     RDW 01/24/2019 12 9     MPV 01/24/2019 9 9     Platelets 61/38/0933 285     nRBC 01/24/2019 0     Neutrophils Relative 01/24/2019 65     Immat GRANS % 01/24/2019 1     Lymphocytes Relative 01/24/2019 21     Monocytes Relative 01/24/2019 9     Eosinophils Relative 01/24/2019 3     Basophils Relative 01/24/2019 1     Neutrophils Absolute 01/24/2019 2 91     Immature Grans Absolute 01/24/2019 0 02     Lymphocytes Absolute 01/24/2019 0 91     Monocytes Absolute 01/24/2019 0 40     Eosinophils Absolute 01/24/2019 0 12     Basophils Absolute 01/24/2019 0 05     PTT 01/24/2019 28     Protime 01/24/2019 15 0*    INR 01/24/2019 1 17     Hemoglobin A1C 01/24/2019 5 4     EAG 01/24/2019 108     CRP 01/24/2019 <3 0     ABO Grouping 01/24/2019 A     Rh Factor 01/24/2019 Positive     Antibody Screen 01/24/2019 Negative     Specimen Expiration Date 01/24/2019 81916718     Iron Saturation 01/24/2019 49     TIBC 01/24/2019 351     Iron 01/24/2019 172     Ferritin 01/24/2019 107    Office Visit on 01/17/2019   Component Date Value    Color, UA 01/24/2019 Yellow     Clarity, UA 01/24/2019 Clear     Specific Gravity, UA 01/24/2019 1 010     pH, UA 01/24/2019 7 0     Leukocytes, UA 01/24/2019 Negative     Nitrite, UA 01/24/2019 Negative     Protein, UA 01/24/2019 Negative     Glucose, UA 01/24/2019 Negative     Ketones, UA 01/24/2019 Negative     Urobilinogen, UA 01/24/2019 0 2     Bilirubin, UA 01/24/2019 Negative     Blood, UA 01/24/2019 Negative      Office Visit on 01/24/2019   Component Date Value    Ventricular Rate 01/24/2019 58     Atrial Rate 01/24/2019 58     [place] Interval 01/24/2019 260     QRSD Interval 01/24/2019 76     QT Interval 01/24/2019 412     QTC Interval 01/24/2019 404     P Axis 01/24/2019 46     QRS Axis 01/24/2019 63     T Wave [place] 01/24/2019 54    Appointment on 01/24/2019   Component Date Value    Sodium 01/24/2019 130*    Potassium 01/24/2019 4 8     Chloride 01/24/2019 98*    CO2 01/24/2019 27     ANION GAP 01/24/2019 5     BUN 01/24/2019 14     Creatinine 01/24/2019 1 12     Glucose, Fasting 01/24/2019 75     Calcium 01/24/2019 9 0     AST 01/24/2019 31     ALT 01/24/2019 30     Alkaline Phosphatase 01/24/2019 22*    Total Protein 01/24/2019 7 4     Albumin 01/24/2019 4 2     Total Bilirubin 01/24/2019 1 49*    eGFR 01/24/2019 63     WBC 01/24/2019 4 41     RBC 01/24/2019 4 44     Hemoglobin 01/24/2019 14 9     Hematocrit 01/24/2019 44 5     MCV 01/24/2019 100*    MCH 01/24/2019 33 6     MCHC 01/24/2019 33 5     RDW 01/24/2019 12 9     MPV 01/24/2019 9 9     Platelets 86/30/5255 285     nRBC 01/24/2019 0     Neutrophils Relative 01/24/2019 65     Immat GRANS % 01/24/2019 1     Lymphocytes Relative 01/24/2019 21     Monocytes Relative 01/24/2019 9     Eosinophils Relative 01/24/2019 3     Basophils Relative 01/24/2019 1     Neutrophils Absolute 01/24/2019 2 91     Immature Grans Absolute 01/24/2019 0 02     Lymphocytes Absolute 01/24/2019 0 91     Monocytes Absolute 01/24/2019 0 40     Eosinophils Absolute 01/24/2019 0 12     Basophils Absolute 01/24/2019 0 05     PTT 01/24/2019 28     Protime 01/24/2019 15 0*    INR 01/24/2019 1 17     Hemoglobin A1C 01/24/2019 5 4     EAG 01/24/2019 108     CRP 01/24/2019 <3 0     ABO Grouping 01/24/2019 A     Rh Factor 01/24/2019 Positive     Antibody Screen 01/24/2019 Negative     Specimen Expiration Date 01/24/2019 [id number]     Iron Saturation 01/24/2019 49     TIBC 01/24/2019 351     Iron 01/24/2019 172     Ferritin 01/24/2019 107    Office Visit on 01/17/2019   Component Date Value    Color, UA 01/24/2019 Yellow     Clarity, UA 01/24/2019 Clear     Specific Gravity, UA 01/24/2019 1 010     pH, UA 01/24/2019 7 0     Leukocytes, UA 01/24/2019 Negative     Nitrite, UA 01/24/2019 Negative     Protein, UA 01/24/2019 Negative     Glucose, UA 01/24/2019 Negative     Ketones, UA 01/24/2019 Negative     Urobilinogen, UA 01/24/2019 0 2     Bilirubin, UA 01/24/2019 Negative     Blood, UA 01/24/2019 Negative      Lab Results   Component Value Date     12/05/2017    K 4 8 01/24/2019    K 4 5 12/05/2017    CL 98 (L) 01/24/2019    CL 95 (L) 12/05/2017    CO2 27 01/24/2019    CO2 24 12/05/2017    BUN 14 01/24/2019    BUN 12 12/05/2017    CREATININE 1 12 01/24/2019    CREATININE 0 96 12/05/2017    GLUCOSE 88 12/05/2017    CALCIUM 9 0 01/24/2019    CALCIUM 9 7 12/05/2017     Lab Results   Component Value Date     12/05/2017     05/12/2017     04/14/2017    K 4 8 01/24/2019    K 4 2 01/06/2018    K 4 3 01/05/2018    K 4 5 12/05/2017    K 4 5 05/12/2017    K 5 8 (H) 04/14/2017    CO2 27 01/24/2019    CO2 29 01/06/2018    CO2 28 01/05/2018    CO2 24 12/05/2017    CO2 25 05/12/2017    CO2 24 04/14/2017    BUN 14 01/24/2019    BUN 12 01/06/2018    BUN 11 01/05/2018    BUN 12 12/05/2017    BUN 13 05/12/2017    BUN 12 04/14/2017    CREATININE 1 12 01/24/2019    CREATININE 0 80 01/06/2018    CREATININE 0 83 01/05/2018    CREATININE 0 96 12/05/2017    CREATININE 1 02 05/12/2017    CREATININE 1 02 04/14/2017    GLUCOSE 88 12/05/2017    GLUCOSE 90 05/12/2017    GLUCOSE 88 04/14/2017    CALCIUM 9 0 01/24/2019    CALCIUM 8 2 (L) 01/06/2018    CALCIUM 8 4 01/05/2018    CALCIUM 9 7 12/05/2017    CALCIUM 9 3 05/12/2017    CALCIUM 9 5 04/14/2017     No results found for: CKTOTAL, CKMB, CKMBINDEX, TROPONINI  No results found for: CKTOTAL, CKMB, CKMBINDEX, TROPONINI  Lab Results   Component Value Date    WBC 4 41 01/24/2019    WBC 0-5 12/05/2017    WBC 5 6 12/05/2017    HGB 14 9 01/24/2019    HGB 14 1 12/05/2017    HCT 44 5 01/24/2019    HCT 40 0 12/05/2017     (H) 01/24/2019    MCV 95 12/05/2017     01/24/2019     12/05/2017     Lab Results   Component Value Date    WBC 4 41 01/24/2019    WBC 10 96 (H) 01/05/2018    WBC 8 04 01/04/2018    WBC 0-5 12/05/2017    WBC 5 6 12/05/2017    WBC 0-5 05/12/2017    WBC 6 0 05/12/2017    HGB 14 9 01/24/2019    HGB 10 1 (L) 01/05/2018    HGB 10 7 (L) 01/04/2018    HGB 14 1 12/05/2017    HGB 13 6 05/12/2017    HGB 15 0 04/01/2016    HCT 44 5 01/24/2019    HCT 29 2 (L) 01/05/2018    HCT 31 3 (L) 01/04/2018    HCT 40 0 12/05/2017    HCT 41 0 05/12/2017    HCT 44 6 04/01/2016     (H) 01/24/2019    MCV 98 01/05/2018    MCV 98 01/04/2018    MCV 95 12/05/2017    MCV 95 05/12/2017    MCV 99 (H) 04/01/2016     01/24/2019     01/05/2018     01/04/2018     12/05/2017     05/12/2017     04/01/2016     Lab Results   Component Value Date    CHOL 168 04/14/2017    TRIG 57 04/14/2017    HDL 67 04/14/2017     Lab Results   Component Value Date    CHOL 168 04/14/2017    CHOL 182 04/01/2016    TRIG 57 04/14/2017    TRIG 80 04/01/2016    HDL 67 04/14/2017    HDL 72 04/01/2016        Imaging: Xr Chest Pa & Lateral    Result Date: 1/30/2019  Narrative: CHEST INDICATION:   M17 12: Unilateral primary osteoarthritis, left knee  Preprocedural assessment COMPARISON:  12/5/2017 EXAM PERFORMED/VIEWS:  XR CHEST PA & LATERAL  FINDINGS: Cardiac silhouette size within normal limits  Thoracic aorta mildly ectatic and tortuous, unchanged  The lungs are clear  No pneumothorax or pleural effusion  Osseous structures appear within normal limits for patient age  Impression: No acute pulmonary disease Workstation performed: [id number]     Xr Knee 3 Vw Left Non Injury    Result Date: 1/21/2019  Narrative: LEFT KNEE INDICATION:   M25 562: Pain in left knee G89 29: Other chronic pain  COMPARISON:  8/15/2017 VIEWS:  XR KNEE 3 VW LEFT NON INJURY FINDINGS: There is no acute fracture or dislocation  There is no joint effusion  Medial compartment marked and lateral patellofemoral compartment mild joint space narrowing with spurs again noted  No lytic or blastic lesions are seen  Soft tissues are unremarkable  Impression: No acute osseous abnormality  Osteoarthritis again noted  Workstation performed: [id number]       ECG: Jan 24th-sinus bradycardia at 57  First-degree AV block  This is unchanged from his prior EKGs    Review of Systems:  Review of Systems   Constitutional: Negative for activity change, appetite change, chills, diaphoresis, fatigue and fever  HENT: Negative for congestion  Respiratory: Negative for cough, chest tightness, shortness of breath and wheezing  Cardiovascular: Negative for chest pain, palpitations and leg swelling  Gastrointestinal: Negative for abdominal pain, diarrhea, nausea and vomiting  Genitourinary: Negative for difficulty urinating and dysuria  Musculoskeletal: Positive for arthralgias  Negative for back pain  Skin: Negative for color change and rash     Neurological: Negative for dizziness, syncope, facial asymmetry, weakness, light-headedness, numbness and headaches  Psychiatric/Behavioral: Negative for confusion           Vitals:    02/13/19 1453   BP: 120/60   BP Location: Right arm   Patient Position: Sitting   Cuff Size: Standard   Pulse: 58   SpO2: 97%   Weight: 69 4 kg (153 lb)   Height: 5' 7" (1 702 m)     Vitals:    02/13/19 1453   Weight: 69 4 kg (153 lb)     Height: 5' 7" (170 2 cm)     Physical Exam:  General appearance:  Appears stated age, alert, well appearing and in no distress  HEENT:  PERRLA, EOMI, no scleral icterus, no conjunctival pallor  NECK:  Supple, No elevated JVP, no thyromegaly, no carotid bruits  HEART:  Regular rate and rhythm, normal S1/S2, no S3/S4, no murmur or rub  LUNGS:  Clear to auscultation bilaterally  ABDOMEN:  Soft, non-tender, positive bowel sounds, no rebound or guarding, no organomegaly   EXTREMITIES:  No edema  VASCULAR:  Normal pedal pulses   SKIN: No lesions or rashes on exposed skin  NEURO:  CN II-XII intact, no focal deficits

## 2019-02-14 NOTE — LETTER
February 14, 2019     Gladystine Ee    Patient: Gladystine Ee   YOB: 1940   Date of Visit: 2/14/2019       Saad Jasso is here today for preoperative clearance for a left knee total arthroplasty to be completed on 2/20/2019 by Dr Vik Borja  He has had general anesthesia in the past, on post anesthesia is reports 1 episode of vomiting and another surgery he had urinary retention      He had preoperative testing on 01/24/2019 which included:  EKG-borderline EKG showing sinus Chalice Ali with first-degree AV block different from previous EKG on record, but reviewed by [facility] and is at an acceptable risk for his surgery  Chest x-ray shows no pulmonary disease    Labs include CRP less than 3 0, sodium decreased at 130 patient reports history of same, potassium 4 8 BUN 14 creatinine 1 12,eGFR 63, AST is 31 ALT 30, alk-phos decreased at 22, total bilirubin elevated at 1 49  WBC 4 41, H last H 14 9/44 5, platelets 461, PTT 28, PT 15 0, INR 1 17  Patient reports he will be using 40 subcu Lovenox 30 days post surgery for prevention of clots  Review of Systems   Constitutional: Negative  Negative for chills, fatigue and fever  HENT: Negative  Negative for congestion, ear pain, sinus pain, sneezing, sore throat and trouble swallowing  Eyes: Negative  Negative for photophobia and visual disturbance  Respiratory: Negative  Negative for cough, chest tightness, shortness of breath and wheezing  Cardiovascular: Negative  Negative for chest pain, palpitations and leg swelling  Gastrointestinal: Negative  Negative for abdominal distention, abdominal pain, blood in stool, constipation, diarrhea and nausea  Endocrine: Negative  Negative for polydipsia, polyphagia and polyuria  Genitourinary: Negative  Negative for decreased urine volume, discharge, dysuria, frequency, testicular pain and urgency     Musculoskeletal: Positive for arthralgias (left knee  stiffness in AM and again at the end of the day) and gait problem  Negative for myalgias, neck pain and neck stiffness  Skin: Negative  Negative for color change and rash  Neurological: Positive for dizziness ("a little vertigo 2 months ago" resolved without intervention, has happened in the past beginning in 2010)  Hematological: Negative  Psychiatric/Behavioral: Negative  Negative for sleep disturbance and suicidal ideas  The patient is not nervous/anxious  Physical Exam   Constitutional: He is oriented to person, place, and time  He appears well-developed and well-nourished  No distress  HENT:   Head: Normocephalic and atraumatic  Right Ear: External ear normal    Left Ear: External ear normal    Nose: Nose normal    Mouth/Throat: Oropharynx is clear and moist  No oropharyngeal exudate  Eyes: Pupils are equal, round, and reactive to light  Conjunctivae and EOM are normal  Right eye exhibits no discharge  Left eye exhibits no discharge  Neck: Normal range of motion  No thyromegaly present  Cardiovascular: Normal rate, regular rhythm and normal heart sounds  Exam reveals no gallop and no friction rub  No murmur (patient reports he has a slight murmur that can be difficult to detect) heard  Pulmonary/Chest: Effort normal and breath sounds normal  No respiratory distress  He has no wheezes  He has no rales  He exhibits no tenderness  Abdominal: Soft  Bowel sounds are normal  He exhibits no distension and no mass  There is no tenderness  There is no rebound and no guarding  Musculoskeletal: Normal range of motion  He exhibits no edema or tenderness  Right knee has scaly dry skin over patellar region   Lymphadenopathy:     He has no cervical adenopathy  Neurological: He is alert and oriented to person, place, and time  He displays normal reflexes  No cranial nerve deficit  He exhibits normal muscle tone  Coordination normal    Skin: Skin is warm and dry  Capillary refill takes less than 2 seconds   No rash noted  He is not diaphoretic  No erythema  No pallor  Psychiatric: He has a normal mood and affect  His behavior is normal  Judgment and thought content normal    Nursing note and vitals reviewed      NO KNOWN ALLERGIES  Patient Active Problem List   Diagnosis    S/P total hip arthroplasty    Preoperative cardiovascular examination    Primary osteoarthritis of left knee    Sinus bradycardia        Past Surgical History:   Procedure Laterality Date    HERNIA REPAIR      JOINT REPLACEMENT Bilateral     KNEE ARTHROSCOPY      bilateral    AR TOTAL HIP ARTHROPLASTY Left 6/12/2017    Procedure: ANTERIOR TOTAL HIP ARTHROPLASTY;  Surgeon: Jaimie Wong MD;  [facility];  Service: Orthopedics    AR TOTAL HIP ARTHROPLASTY Right 1/3/2018    Procedure: ANTERIOR TOTAL HIP ARTHROPLASTY;  Surgeon: Jaimie Wong MD;  [facility];  Service: Orthopedics       Past Medical History:   Diagnosis Date    BPH (benign prostatic hyperplasia)     Chronic pain disorder     bilateral knees    Diverticulitis of colon     Kidney mass     Murmur 08/1947    diagnosed as a child 6 YO    Osteoarthritis     Preoperative cardiovascular examination 2/16/2018       Current Outpatient Medications:     ascorbic acid (VITAMIN C) 500 mg tablet, Take 1 tablet (500 mg total) by mouth 2 (two) times a day    enoxaparin (LOVENOX) 40 mg/0 4 mL, Inject 0 4 mL (40 mg total) under the skin daily for 30 days Start after surgery    ferrous sulfate 324 (65 Fe) mg, Take 1 tablet (324 mg total) by mouth 2 (two) times a day before meals    folic acid (FOLVITE) 1 mg tablet, Take 1 tablet (1 mg total) by mouth daily    multivitamin (THERAGRAN) TABS, Take 1 tablet by mouth daily  Sincerely,        JimmyMARIFER Jansen        CC: No Recipients

## 2019-02-20 ENCOUNTER — ANESTHESIA (OUTPATIENT)
Dept: PERIOP | Facility: HOSPITAL | Age: 79
DRG: 470 | End: 2019-02-20
Payer: COMMERCIAL

## 2019-02-20 ENCOUNTER — HOSPITAL ENCOUNTER (INPATIENT)
Facility: HOSPITAL | Age: 79
LOS: 2 days | Discharge: HOME HEALTH | DRG: 470 | End: 2019-02-22
Attending: ORTHOPAEDIC SURGERY | Admitting: ORTHOPAEDIC SURGERY
Payer: COMMERCIAL

## 2019-02-20 DIAGNOSIS — Z96.652 AFTERCARE FOLLOWING LEFT KNEE JOINT REPLACEMENT SURGERY: ICD-10-CM

## 2019-02-20 DIAGNOSIS — Z47.1 AFTERCARE FOLLOWING LEFT KNEE JOINT REPLACEMENT SURGERY: ICD-10-CM

## 2019-02-20 DIAGNOSIS — Z96.652 S/P TOTAL KNEE ARTHROPLASTY, LEFT: ICD-10-CM

## 2019-02-20 DIAGNOSIS — N13.8 BENIGN PROSTATIC HYPERPLASIA WITH URINARY OBSTRUCTION: ICD-10-CM

## 2019-02-20 DIAGNOSIS — N40.1 BENIGN PROSTATIC HYPERPLASIA WITH URINARY OBSTRUCTION: ICD-10-CM

## 2019-02-20 DIAGNOSIS — R00.1 SINUS BRADYCARDIA: ICD-10-CM

## 2019-02-20 DIAGNOSIS — N32.9 BLADDER DISORDER: ICD-10-CM

## 2019-02-20 DIAGNOSIS — R33.9 URINARY RETENTION: Primary | ICD-10-CM

## 2019-02-20 PROCEDURE — C1776 JOINT DEVICE (IMPLANTABLE): HCPCS | Performed by: ORTHOPAEDIC SURGERY

## 2019-02-20 PROCEDURE — 27447 TOTAL KNEE ARTHROPLASTY: CPT | Performed by: ORTHOPAEDIC SURGERY

## 2019-02-20 PROCEDURE — 0SRD0J9 REPLACEMENT OF LEFT KNEE JOINT WITH SYNTHETIC SUBSTITUTE, CEMENTED, OPEN APPROACH: ICD-10-PCS | Performed by: ORTHOPAEDIC SURGERY

## 2019-02-20 PROCEDURE — C1713 ANCHOR/SCREW BN/BN,TIS/BN: HCPCS | Performed by: ORTHOPAEDIC SURGERY

## 2019-02-20 PROCEDURE — C9290 INJ, BUPIVACAINE LIPOSOME: HCPCS | Performed by: ORTHOPAEDIC SURGERY

## 2019-02-20 PROCEDURE — 86850 RBC ANTIBODY SCREEN: CPT | Performed by: STUDENT IN AN ORGANIZED HEALTH CARE EDUCATION/TRAINING PROGRAM

## 2019-02-20 PROCEDURE — 86900 BLOOD TYPING SEROLOGIC ABO: CPT | Performed by: STUDENT IN AN ORGANIZED HEALTH CARE EDUCATION/TRAINING PROGRAM

## 2019-02-20 PROCEDURE — 86901 BLOOD TYPING SEROLOGIC RH(D): CPT | Performed by: STUDENT IN AN ORGANIZED HEALTH CARE EDUCATION/TRAINING PROGRAM

## 2019-02-20 DEVICE — SMARTSET HIGH PERFORMANCE MV MEDIUM VISCOSITY BONE CEMENT 40G
Type: IMPLANTABLE DEVICE | Site: KNEE | Status: FUNCTIONAL
Brand: SMARTSET

## 2019-02-20 DEVICE — ATTUNE KNEE SYSTEM TIBIAL INSERT ROTATING PLATFORM POSTERIOR STABILIZED 7 5MM AOX
Type: IMPLANTABLE DEVICE | Site: KNEE | Status: FUNCTIONAL
Brand: ATTUNE

## 2019-02-20 DEVICE — ATTUNE PATELLA MEDIALIZED DOME 38MM CEMENTED AOX
Type: IMPLANTABLE DEVICE | Site: KNEE | Status: FUNCTIONAL
Brand: ATTUNE

## 2019-02-20 DEVICE — ATTUNE KNEE SYSTEM TIBIAL BASE ROTATING PLATFORM SIZE 7 CEMENTED
Type: IMPLANTABLE DEVICE | Site: KNEE | Status: FUNCTIONAL
Brand: ATTUNE

## 2019-02-20 DEVICE — ATTUNE KNEE SYSTEM FEMORAL POSTERIOR STABILIZED SIZE 7 LEFT CEMENTED
Type: IMPLANTABLE DEVICE | Site: KNEE | Status: FUNCTIONAL
Brand: ATTUNE

## 2019-02-20 RX ORDER — ROPIVACAINE HYDROCHLORIDE 5 MG/ML
INJECTION, SOLUTION EPIDURAL; INFILTRATION; PERINEURAL
Status: DISCONTINUED | OUTPATIENT
Start: 2019-02-20 | End: 2019-02-20 | Stop reason: SURG

## 2019-02-20 RX ORDER — ACETAMINOPHEN 325 MG/1
975 TABLET ORAL EVERY 8 HOURS SCHEDULED
Status: DISCONTINUED | OUTPATIENT
Start: 2019-02-20 | End: 2019-02-22 | Stop reason: HOSPADM

## 2019-02-20 RX ORDER — CEFAZOLIN SODIUM 2 G/50ML
SOLUTION INTRAVENOUS AS NEEDED
Status: DISCONTINUED | OUTPATIENT
Start: 2019-02-20 | End: 2019-02-20 | Stop reason: SURG

## 2019-02-20 RX ORDER — OXYCODONE HYDROCHLORIDE 5 MG/1
5 TABLET ORAL EVERY 4 HOURS PRN
Status: DISCONTINUED | OUTPATIENT
Start: 2019-02-20 | End: 2019-02-22 | Stop reason: HOSPADM

## 2019-02-20 RX ORDER — ACETAMINOPHEN 325 MG/1
975 TABLET ORAL ONCE
Status: COMPLETED | OUTPATIENT
Start: 2019-02-20 | End: 2019-02-20

## 2019-02-20 RX ORDER — SODIUM CHLORIDE, SODIUM LACTATE, POTASSIUM CHLORIDE, CALCIUM CHLORIDE 600; 310; 30; 20 MG/100ML; MG/100ML; MG/100ML; MG/100ML
INJECTION, SOLUTION INTRAVENOUS CONTINUOUS PRN
Status: DISCONTINUED | OUTPATIENT
Start: 2019-02-20 | End: 2019-02-20 | Stop reason: SURG

## 2019-02-20 RX ORDER — SODIUM CHLORIDE, SODIUM LACTATE, POTASSIUM CHLORIDE, CALCIUM CHLORIDE 600; 310; 30; 20 MG/100ML; MG/100ML; MG/100ML; MG/100ML
1.5 INJECTION, SOLUTION INTRAVENOUS CONTINUOUS
Status: DISCONTINUED | OUTPATIENT
Start: 2019-02-20 | End: 2019-02-22 | Stop reason: HOSPADM

## 2019-02-20 RX ORDER — ONDANSETRON 2 MG/ML
4 INJECTION INTRAMUSCULAR; INTRAVENOUS ONCE AS NEEDED
Status: DISCONTINUED | OUTPATIENT
Start: 2019-02-20 | End: 2019-02-20 | Stop reason: HOSPADM

## 2019-02-20 RX ORDER — ROPIVACAINE HYDROCHLORIDE 2 MG/ML
INJECTION, SOLUTION EPIDURAL; INFILTRATION; PERINEURAL AS NEEDED
Status: DISCONTINUED | OUTPATIENT
Start: 2019-02-20 | End: 2019-02-20 | Stop reason: SURG

## 2019-02-20 RX ORDER — ONDANSETRON 2 MG/ML
4 INJECTION INTRAMUSCULAR; INTRAVENOUS EVERY 8 HOURS PRN
Status: DISCONTINUED | OUTPATIENT
Start: 2019-02-20 | End: 2019-02-22 | Stop reason: HOSPADM

## 2019-02-20 RX ORDER — PROPOFOL 10 MG/ML
INJECTION, EMULSION INTRAVENOUS CONTINUOUS PRN
Status: DISCONTINUED | OUTPATIENT
Start: 2019-02-20 | End: 2019-02-20 | Stop reason: SURG

## 2019-02-20 RX ORDER — FENTANYL CITRATE/PF 50 MCG/ML
25 SYRINGE (ML) INJECTION
Status: DISCONTINUED | OUTPATIENT
Start: 2019-02-20 | End: 2019-02-20 | Stop reason: HOSPADM

## 2019-02-20 RX ORDER — GABAPENTIN 100 MG/1
100 CAPSULE ORAL 3 TIMES DAILY
Qty: 90 CAPSULE | Refills: 0 | Status: SHIPPED | OUTPATIENT
Start: 2019-02-20 | End: 2019-03-04 | Stop reason: ALTCHOICE

## 2019-02-20 RX ORDER — CHLORHEXIDINE GLUCONATE 4 G/100ML
SOLUTION TOPICAL DAILY PRN
Status: DISCONTINUED | OUTPATIENT
Start: 2019-02-20 | End: 2019-02-20

## 2019-02-20 RX ORDER — GABAPENTIN 300 MG/1
300 CAPSULE ORAL ONCE
Status: COMPLETED | OUTPATIENT
Start: 2019-02-20 | End: 2019-02-20

## 2019-02-20 RX ORDER — MAGNESIUM HYDROXIDE 1200 MG/15ML
LIQUID ORAL AS NEEDED
Status: DISCONTINUED | OUTPATIENT
Start: 2019-02-20 | End: 2019-02-20 | Stop reason: HOSPADM

## 2019-02-20 RX ORDER — OXYCODONE HYDROCHLORIDE 5 MG/1
TABLET ORAL
Qty: 30 TABLET | Refills: 0 | Status: SHIPPED | OUTPATIENT
Start: 2019-02-20 | End: 2019-04-15 | Stop reason: ALTCHOICE

## 2019-02-20 RX ORDER — CHLORHEXIDINE GLUCONATE 0.12 MG/ML
15 RINSE ORAL ONCE
Status: COMPLETED | OUTPATIENT
Start: 2019-02-20 | End: 2019-02-20

## 2019-02-20 RX ORDER — MIDAZOLAM HYDROCHLORIDE 1 MG/ML
INJECTION INTRAMUSCULAR; INTRAVENOUS AS NEEDED
Status: DISCONTINUED | OUTPATIENT
Start: 2019-02-20 | End: 2019-02-20 | Stop reason: SURG

## 2019-02-20 RX ORDER — SODIUM CHLORIDE, SODIUM LACTATE, POTASSIUM CHLORIDE, CALCIUM CHLORIDE 600; 310; 30; 20 MG/100ML; MG/100ML; MG/100ML; MG/100ML
125 INJECTION, SOLUTION INTRAVENOUS CONTINUOUS
Status: DISCONTINUED | OUTPATIENT
Start: 2019-02-20 | End: 2019-02-20

## 2019-02-20 RX ORDER — SODIUM CHLORIDE, SODIUM LACTATE, POTASSIUM CHLORIDE, CALCIUM CHLORIDE 600; 310; 30; 20 MG/100ML; MG/100ML; MG/100ML; MG/100ML
INJECTION, SOLUTION INTRAVENOUS CONTINUOUS PRN
Status: DISCONTINUED | OUTPATIENT
Start: 2019-02-20 | End: 2019-02-20

## 2019-02-20 RX ORDER — CALCIUM CARBONATE 200(500)MG
1000 TABLET,CHEWABLE ORAL DAILY PRN
Status: DISCONTINUED | OUTPATIENT
Start: 2019-02-20 | End: 2019-02-22 | Stop reason: HOSPADM

## 2019-02-20 RX ORDER — GABAPENTIN 100 MG/1
100 CAPSULE ORAL EVERY 8 HOURS SCHEDULED
Status: DISCONTINUED | OUTPATIENT
Start: 2019-02-20 | End: 2019-02-21

## 2019-02-20 RX ORDER — ACETAMINOPHEN 325 MG/1
650 TABLET ORAL EVERY 6 HOURS PRN
Status: DISCONTINUED | OUTPATIENT
Start: 2019-02-20 | End: 2019-02-20

## 2019-02-20 RX ORDER — EPHEDRINE SULFATE 50 MG/ML
INJECTION, SOLUTION INTRAVENOUS AS NEEDED
Status: DISCONTINUED | OUTPATIENT
Start: 2019-02-20 | End: 2019-02-20 | Stop reason: SURG

## 2019-02-20 RX ORDER — DOCUSATE SODIUM 100 MG/1
100 CAPSULE, LIQUID FILLED ORAL 2 TIMES DAILY
Status: DISCONTINUED | OUTPATIENT
Start: 2019-02-20 | End: 2019-02-22 | Stop reason: HOSPADM

## 2019-02-20 RX ORDER — OXYCODONE HYDROCHLORIDE 10 MG/1
10 TABLET ORAL EVERY 4 HOURS PRN
Status: DISCONTINUED | OUTPATIENT
Start: 2019-02-20 | End: 2019-02-22 | Stop reason: HOSPADM

## 2019-02-20 RX ORDER — FENTANYL CITRATE 50 UG/ML
INJECTION, SOLUTION INTRAMUSCULAR; INTRAVENOUS AS NEEDED
Status: DISCONTINUED | OUTPATIENT
Start: 2019-02-20 | End: 2019-02-20 | Stop reason: SURG

## 2019-02-20 RX ORDER — PANTOPRAZOLE SODIUM 40 MG/1
40 TABLET, DELAYED RELEASE ORAL
Status: DISCONTINUED | OUTPATIENT
Start: 2019-02-21 | End: 2019-02-22 | Stop reason: HOSPADM

## 2019-02-20 RX ORDER — BUPIVACAINE HYDROCHLORIDE 7.5 MG/ML
INJECTION, SOLUTION INTRASPINAL AS NEEDED
Status: DISCONTINUED | OUTPATIENT
Start: 2019-02-20 | End: 2019-02-20 | Stop reason: SURG

## 2019-02-20 RX ORDER — BUPIVACAINE HYDROCHLORIDE 5 MG/ML
INJECTION, SOLUTION PERINEURAL AS NEEDED
Status: DISCONTINUED | OUTPATIENT
Start: 2019-02-20 | End: 2019-02-20 | Stop reason: HOSPADM

## 2019-02-20 RX ORDER — MORPHINE SULFATE 10 MG/ML
2 INJECTION, SOLUTION INTRAMUSCULAR; INTRAVENOUS
Status: DISCONTINUED | OUTPATIENT
Start: 2019-02-20 | End: 2019-02-22 | Stop reason: HOSPADM

## 2019-02-20 RX ADMIN — DOCUSATE SODIUM 100 MG: 100 CAPSULE, LIQUID FILLED ORAL at 18:11

## 2019-02-20 RX ADMIN — SODIUM CHLORIDE, SODIUM LACTATE, POTASSIUM CHLORIDE, AND CALCIUM CHLORIDE 1.5 ML/KG/HR: .6; .31; .03; .02 INJECTION, SOLUTION INTRAVENOUS at 15:51

## 2019-02-20 RX ADMIN — BUPIVACAINE HYDROCHLORIDE IN DEXTROSE 1.5 ML: 7.5 INJECTION, SOLUTION SUBARACHNOID at 12:35

## 2019-02-20 RX ADMIN — TRANEXAMIC ACID 1000 MG: 1 INJECTION, SOLUTION INTRAVENOUS at 12:42

## 2019-02-20 RX ADMIN — CEFAZOLIN SODIUM 2000 MG: 2 SOLUTION INTRAVENOUS at 12:50

## 2019-02-20 RX ADMIN — ACETAMINOPHEN 975 MG: 325 TABLET, FILM COATED ORAL at 11:11

## 2019-02-20 RX ADMIN — MIDAZOLAM 1 MG: 1 INJECTION INTRAMUSCULAR; INTRAVENOUS at 12:42

## 2019-02-20 RX ADMIN — FENTANYL CITRATE 200 MCG: 50 INJECTION, SOLUTION INTRAMUSCULAR; INTRAVENOUS at 12:53

## 2019-02-20 RX ADMIN — ROPIVACAINE HYDROCHLORIDE 20 ML: 5 INJECTION, SOLUTION EPIDURAL; INFILTRATION; PERINEURAL at 12:10

## 2019-02-20 RX ADMIN — GABAPENTIN 300 MG: 300 CAPSULE ORAL at 11:12

## 2019-02-20 RX ADMIN — SODIUM CHLORIDE, SODIUM LACTATE, POTASSIUM CHLORIDE, AND CALCIUM CHLORIDE: .6; .31; .03; .02 INJECTION, SOLUTION INTRAVENOUS at 11:59

## 2019-02-20 RX ADMIN — SODIUM CHLORIDE, SODIUM LACTATE, POTASSIUM CHLORIDE, AND CALCIUM CHLORIDE: .6; .31; .03; .02 INJECTION, SOLUTION INTRAVENOUS at 13:05

## 2019-02-20 RX ADMIN — ROPIVACAINE HYDROCHLORIDE 10 ML: 2 INJECTION, SOLUTION EPIDURAL; INFILTRATION at 14:02

## 2019-02-20 RX ADMIN — ACETAMINOPHEN 975 MG: 325 TABLET, FILM COATED ORAL at 21:18

## 2019-02-20 RX ADMIN — EPHEDRINE SULFATE 20 MG: 50 INJECTION, SOLUTION INTRAMUSCULAR; INTRAVENOUS; SUBCUTANEOUS at 14:25

## 2019-02-20 RX ADMIN — SODIUM CHLORIDE, SODIUM LACTATE, POTASSIUM CHLORIDE, AND CALCIUM CHLORIDE 125 ML/HR: .6; .31; .03; .02 INJECTION, SOLUTION INTRAVENOUS at 11:39

## 2019-02-20 RX ADMIN — PROPOFOL 120 MCG/KG/MIN: 10 INJECTION, EMULSION INTRAVENOUS at 12:50

## 2019-02-20 RX ADMIN — MIDAZOLAM 1 MG: 1 INJECTION INTRAMUSCULAR; INTRAVENOUS at 12:46

## 2019-02-20 RX ADMIN — CHLORHEXIDINE GLUCONATE 15 ML: 1.2 RINSE ORAL at 11:11

## 2019-02-20 RX ADMIN — ROPIVACAINE HYDROCHLORIDE 4 ML: 2 INJECTION, SOLUTION EPIDURAL; INFILTRATION at 14:42

## 2019-02-20 RX ADMIN — MIDAZOLAM 1 MG: 1 INJECTION INTRAMUSCULAR; INTRAVENOUS at 12:22

## 2019-02-20 RX ADMIN — GABAPENTIN 100 MG: 100 CAPSULE ORAL at 21:18

## 2019-02-20 RX ADMIN — CEFAZOLIN SODIUM 1000 MG: 10 INJECTION, POWDER, FOR SOLUTION INTRAVENOUS at 18:11

## 2019-02-20 RX ADMIN — MIDAZOLAM 1 MG: 1 INJECTION INTRAMUSCULAR; INTRAVENOUS at 13:22

## 2019-02-20 NOTE — ANESTHESIA POSTPROCEDURE EVALUATION
Post-Op Assessment Note    CV Status:  Stable  Pain Score: 0    Pain management: satisfactory to patient     Mental Status:  Alert and awake   Hydration Status:  Stable   PONV Controlled:  None   Airway Patency:  Patent   Post Op Vitals Reviewed: Yes      Staff: CRNA   Comments: Pt  to Pacu ; Report given  Course unevnetful  VSS  Epidural Cath  d/c-ed in PACU without incident (tip of Catheter evident, upon removal ); Airway patent      Post-op block assessment: no complications        BP (!) 119/45 (02/20/19 1512)    Temp      Pulse 68 (02/20/19 1512)   Resp 16 (02/20/19 1512)    SpO2 98 % (02/20/19 1512)

## 2019-02-20 NOTE — CONSULTS
Consultation - Shana Perez 70 y o  male MRN: [id number]    Unit/Bed#: CW3 337-01 Encounter: [id number]        History of Present Illness     HPI: Shana Perez is a 70y o  year old male who presents with severe osteoarthritis of the left knee  He failed outpatient conservative measures and elected to undergo replacement with Dr Dolly Thomas  He underwent the procedure today without any difficulty  ROS:  Constitutional: Negative  HENT: Negative  Respiratory: Negative  Cardiovascular: Negative  Gastrointestinal: Negative  Musculoskeletal: +right knee pain    Neurological: Negative  Psychiatric/Behavioral: Negative          Historical Information   Past Medical History:   Diagnosis Date    BPH (benign prostatic hyperplasia)     Chronic pain disorder     bilateral knees    Diverticulitis of colon     Kidney mass     Murmur 1947    diagnosed as a child 6 YO    Osteoarthritis     Preoperative cardiovascular examination 2018     Past Surgical History:   Procedure Laterality Date    HERNIA REPAIR      JOINT REPLACEMENT Bilateral     KNEE ARTHROSCOPY      bilateral    WA TOTAL HIP ARTHROPLASTY Left 2017    Procedure: ANTERIOR TOTAL HIP ARTHROPLASTY;  Surgeon: Audra Castellanos MD;  [facility];  Service: Orthopedics    [place] TOTAL HIP ARTHROPLASTY Right 1/3/2018    Procedure: ANTERIOR TOTAL HIP ARTHROPLASTY;  Surgeon: Audra Castellanos MD;  [facility];  Service: Orthopedics     Social History   Social History     Substance and Sexual Activity   Alcohol Use Yes    Alcohol/week: 1 2 oz    Types: 2 Cans of beer per week    Comment: beer daily      Social History     Substance and Sexual Activity   Drug Use No     Social History     Tobacco Use   Smoking Status Former Smoker    Last attempt to quit: Sigrid Aguirre Years since quittin 1   Smokeless Tobacco Never Used     Family History   Problem Relation Age of Onset    Cancer Mother     Cancer Father    Jeanne Paul Cancer Brother     Heart disease Brother        Meds/Allergies   current meds:  Current Facility-Administered Medications   Medication Dose Route Frequency    acetaminophen (TYLENOL) tablet 650 mg  650 mg Oral Q6H PRN    calcium carbonate (TUMS) chewable tablet 1,000 mg  1,000 mg Oral Daily PRN    ceFAZolin (ANCEF) 1 g in sodium chloride 0 9% 50 ml IVPB  1,000 mg Intravenous Q8H    docusate sodium (COLACE) capsule 100 mg  100 mg Oral BID    enoxaparin (LOVENOX) subcutaneous injection 40 mg  40 mg Subcutaneous Daily    gabapentin (NEURONTIN) capsule 100 mg  100 mg Oral Q8H [facility]    lactated ringers infusion  1 5 mL/kg/hr Intravenous Continuous    morphine (PF) 10 mg/mL injection 2 mg  2 mg Intravenous Q1H PRN    oxyCODONE (ROXICODONE) IR tablet 10 mg  10 mg Oral Q4H PRN    oxyCODONE (ROXICODONE) IR tablet 5 mg  5 mg Oral Q4H PRN    [START ON 2/21/2019] pantoprazole (PROTONIX) EC tablet 40 mg  40 mg Oral Early Morning       PTA meds:   Medications Prior to Admission   Medication    ascorbic acid (VITAMIN C) 500 mg tablet    enoxaparin (LOVENOX) 40 mg/0 4 mL    ferrous sulfate 324 (65 Fe) mg    folic acid (FOLVITE) 1 mg tablet    multivitamin (THERAGRAN) TABS     No Known Allergies    Objective   Vitals: Blood pressure 132/84, pulse 62, temperature (!) 97 °F (36 1 °C), resp  rate (!) 28, height 5' 7" (1 702 m), weight 69 4 kg (153 lb), SpO2 98 %  Physical Exam      HENT:  Normocephalic  EOM are normal  PERRLAt  Neck supple  Cardiovascular: Normal rate and regular rhythm  Pulmonary: Breath sounds normal  No respiratory distress  Pt has no wheezes nor rales  Abdominal: Soft  Bowel sounds are normal  Pt exhibits no distension  There is no tenderness  There is no rebound and no guarding  Neurological: Pt is alert and oriented to person, place, and time  Genitourinary:   Ovalle catheter draining clear yellow  Musculoskeletal:  Moves all extremities; dressing/drain in place to left knee  Psychiatric: Pt has a normal mood and affect  Lab Results:           Invalid input(s): AG                [unfilled]    Labs reviewed    Imaging: reviewed  EKG, Pathology, and Other Studies: I have personally reviewed pertinent reports  VTE Prophylaxis: Enoxaparin (Lovenox)    Code Status: Level 1 - Full Code   Advance Directive and Living Will:      Power of :    POLST:      Assessment/Plan     # L TKR:  Post operative management per orthopedics; monitor cbc/bmp and treat accordingly; encourage incentive spirometry; dvt prophylaxis=lovenox; avoid post operative narcotic induced constipation with stool softeners and early ambulation    # GERD:  Cont pantoprazole    # Osteoarthritis:  Previous R hip replacement 1/18    Counseling / Coordination of Care  Total floor / unit time spent today 75 minutes  Greater than 50% of total time was spent with the patient and / or family counseling and / or coordination of care  ** Please Note: Dragon 360 Dictation voice to text software may have been used in the creation of this document   **

## 2019-02-20 NOTE — PHYSICAL THERAPY NOTE
PT Cancellation    PT orders received and chart reviewed  Attempted to see pt at 1600, 1630, and 1815  Per RN, pt had not fully recovered from anesthesia and was unable to move operative limb  At 1815, he began to move the limb, but continued to lack full feeling and was unsafe for OOB transfer at this time  Will follow and see as able      Sree Joseph, PT, DPT

## 2019-02-20 NOTE — OP NOTE
OPERATIVE REPORT  PATIENT NAME: Neelam Campos    :  1940  MRN: [id number]  Pt Location: [facility] ROOM 18    SURGERY DATE: 2019    Surgeon(s) and Role:     * Peña Vanessa MD - Primary     * Melecio Liu PA-C - Assisting     * Wood Aj MD - Assisting    Primary osteoarthritis of left knee [M17 12]    Post-Op Diagnosis Codes:     * Primary osteoarthritis of left knee [M17 12]    Procedure(s):  TOTAL KNEE ARTHROPLASTY    Specimen(s):  * No specimens in log *    Estimated Blood Loss:   150 mL    Drains:  Urethral Catheter Latex 16 Fr  (Active)   Number of days: 0       Anesthesia Type:   Choice    Operative Indications:  Primary osteoarthritis of left knee [M17 12]  Severe DJD left Knee  This patient failed conservative treatment including injections, NSAID by mouth, physical therapy and bracing    Operative Findings:  Severe wear of all three joint surfaces left knee    Complications:   None    Procedure and Technique:  The patient was administered a nerve block for postoperative pain management and   then an epidural block for postoperative pain management, some   sedation, and then prepped and draped in the usual sterile fashion for   an operation on the left knee  A midline incision was made  Sharp   dissection was carried out down through the subcutaneous tissue  The knee capsule was exposed and a medial parapatellar incision was made  Medial and   lateral dissections were carried out along the proximal tibia  The   patella was everted  The distal femur and   proximal tibia had synovial hypertrophy and this was removed using the   Bovie  The step drill was then inserted in the intercondylar notch to the appropriate depth  Next the femoral guide was inserted and the distal femoral cutting guide   was applied to the distal femur  It was set at 10 mm for the distal cut and 5 degrees of   valgus  The cutting block was affixed to the distal femur and a distal   femoral cut was made   Sizing guide was applied and a size 7 was found   to be the appropriate size  The size 7 femoral cutting block was   applied  Posterior condylar, posterior chamfer cuts were made, and   anterior femoral and anterior chamfer cuts were made  The    was next used and the appropriate box cut was made  Trial reduction   was carried out with the 7 provisional and it fit appropriately  Attention was then drawn to the tibial side  The external guide was   applied to the tibia in the appropriate fashion  A Caliper was placed   over the deepest defect in the medial compartment of the tibia  The   tibial cutting guide was applied to the proximal tibia using the drop   cari for rotational guidance  The 3 degree posterior slope cutting block was affixed to the proximal tibia and the tibial cut was made  The flexion and extension   gaps were measured and they were at 5 mm  The tibial side was   then addressed  The size 7 tibial plate was applied with the drop cari   guidance and then this was affixed to the tibia  The collet was   applied and drilling ensued to the appropriate depth  The keel punch   was next inserted  A trial reduction was carried out with the size set   femoral component and the 5 mm poly, and this provided good stability and   full extension and good stability in flexion  Attention was drawn to   the patella  The patella cutting guide was applied after the thickness of the patella was measured  The appropriate sleeve was placed on the cutting guide and the  patella cut   was made  The 38 mm patella button was found to be appropriate  Drilling ensued through that drill guide and a trial reduction of the   patella revealed the tracking was good and the knee was in full   extension and could flex easily  There was no tendency toward lateral subluxation of the patella  The provisional components were   removed  The knee was thoroughly irrigated with sterile saline   solution via pulsating jet lavage   The Aquamantys was used to secure   hemostasis in the knee  Cement was mixed in the usual fashion and was   pressurized into the tibia  The tibial component was applied  Excess   cement was removed  Cement was pressurized into the femur  The femoral   component was applied  Excess cement was removed  The 5 mm poly   posterior stabilized rotating platform insert was applied and the knee   was placed into full extension  Excess cement was removed  Cement was   pressurized in the patella  The patella button was applied  Excess   cement was removed  The patella button chavez was applied  Once the   cement cured, the area was inspected for any loose cement and the knee   was thoroughly irrigated with sterile saline solution  The tourniquet was released  Hemostasis was secured using the Aquamantys  The knee was once again irrigated with   sterile saline solution  A dilute betadine solution was poured in the wound and then suctioned out  A Hemovac drain was left deep in the wound  The capsule was closed using 0 Vicryl in a figure-of-eight fashion  The subcutaneous tissues were   closed using 2-0 Vicryl in an inverted fashion  The skin was reprepped with chlorhexidine prep  A Prineo dressing was applied  The patient tolerated the procedure well   and left the operating room in good condition          I was present for the entire procedure    Patient Disposition:  PACU     SIGNATURE: Kervin Decker MD  DATE: February 20, 2019  TIME: 2:43 PM    O

## 2019-02-20 NOTE — DISCHARGE INSTRUCTIONS
Discharge Instructions - Orthopedics  Preethi Hernandez 70 y o  male MRN: [id number]  Unit/Bed#: Operating Room    Weight Bearing Status:                                           Weight Bearing as tolerated to the left lower extremity  DVT prophylaxis:  Complete course of Lovenox as directed    Pain:  Continue analgesics as directed    Showering Instructions:   OK to shower with this dressing    Dressing Instructions:   Keep dressing clean, dry and intact until follow up appointment  Driving Instructions:  No driving until cleared by Orthopaedic Surgery  PT/OT:  Continue PT/OT on outpatient basis as directed    Appt Instructions: If you do not have your appointment, please call the clinic at [phone]    Contact the office sooner if you experience any increased numbness/tingling in the extremities

## 2019-02-20 NOTE — ANESTHESIA PROCEDURE NOTES
CSE Block    Patient location during procedure: holding area  Start time: 2/20/2019 12:18 PM  Reason for block: at surgeon's request  Staffing  Anesthesiologist: Patsie Brunner, MD  Performed: anesthesiologist   Preanesthetic Checklist  Completed: patient identified, site marked, surgical consent, pre-op evaluation, timeout performed, IV checked, risks and benefits discussed and monitors and equipment checked  CSE  Patient position: sitting  Prep: Betadine  Patient monitoring: continuous pulse ox and frequent blood pressure checks  Approach: midline  Spinal Needle  Needle type: pencil-tip   Needle gauge: 27 G  Needle length: 10 cm  Epidural Needle  Injection technique: EPHRAIM saline  Needle type: Tuohy   Needle gauge: 18 G  Needle insertion depth: 7 cm  Location: lumbar (1-5)  Catheter  Catheter type: side hole  Catheter size: 20 G  Catheter at skin depth: 12 cm  Assessment  Injection Assessment:  positive aspiration for clear CSF, negative aspiration for heme, no pain on injection and no paresthesia on injection

## 2019-02-20 NOTE — ANESTHESIA PROCEDURE NOTES
Peripheral Block    Start time: 2/20/2019 12:10 PM  Staffing  Anesthesiologist: Chelsi Seo MD  Performed: anesthesiologist   Preanesthetic Checklist  Completed: patient identified, site marked, surgical consent, pre-op evaluation, timeout performed, IV checked, risks and benefits discussed and monitors and equipment checked  Peripheral Block  Patient position: supine  Prep: ChloraPrep  Patient monitoring: heart rate, frequent blood pressure checks and continuous pulse ox  Block type: adductor canal block  Laterality: left  Injection technique: single-shot  Procedures: ultrasound guided  Ultrasound permanent image savedropivacaine (NAROPIN) 0 5 % perineural infiltration, 20 mL  Needle  Needle type: Stimuplex   Needle gauge: 21 G  Needle length: 10 cm  Needle localization: ultrasound guidance  Assessment  Injection assessment: incremental injection, local visualized surrounding nerve on ultrasound, negative aspiration for heme and no paresthesia on injection  Slow fractionated injection: yes  Post-procedure:  site cleaned  patient tolerated the procedure well with no immediate complications

## 2019-02-20 NOTE — PLAN OF CARE
Problem: Potential for Falls  Goal: Patient will remain free of falls  Description  INTERVENTIONS:  - Assess patient frequently for physical needs  -  Identify cognitive and physical deficits and behaviors that affect risk of falls    -  [place] fall precautions as indicated by assessment   - Educate patient/family on patient safety including physical limitations  - Instruct patient to call for assistance with activity based on assessment  - Modify environment to reduce risk of injury  - Consider OT/PT consult to assist with strengthening/mobility  Outcome: Progressing     Problem: MUSCULOSKELETAL - ADULT  Goal: Maintain or return mobility to safest level of function  Description  INTERVENTIONS:  - Assess patient's ability to carry out ADLs; assess patient's baseline for ADL function and identify physical deficits which impact ability to perform ADLs (bathing, care of mouth/teeth, toileting, grooming, dressing, etc )  - Assess/evaluate cause of self-care deficits   - Assess range of motion  - Assess patient's mobility; develop plan if impaired  - Assess patient's need for assistive devices and provide as appropriate  - Encourage maximum independence but intervene and supervise when necessary  - Involve family in performance of ADLs  - Assess for home care needs following discharge   - Request OT consult to assist with ADL evaluation and planning for discharge  - Provide patient education as appropriate  Outcome: Progressing  Goal: Maintain proper alignment of affected body part  Description  INTERVENTIONS:  - Support, maintain and protect limb and body alignment  - Provide pt/fam with appropriate education  Outcome: Progressing     Problem: PAIN - ADULT  Goal: Verbalizes/displays adequate comfort level or baseline comfort level  Description  Interventions:  - Encourage patient to monitor pain and request assistance  - Assess pain using appropriate pain scale  - Administer analgesics based on type and severity of pain and evaluate response  - Implement non-pharmacological measures as appropriate and evaluate response  - Consider cultural and social influences on pain and pain management  - Notify physician/advanced practitioner if interventions unsuccessful or patient reports new pain  Outcome: Progressing     Problem: SAFETY ADULT  Goal: Maintain or return to baseline ADL function  Description  INTERVENTIONS:  -  Assess patient's ability to carry out ADLs; assess patient's baseline for ADL function and identify physical deficits which impact ability to perform ADLs (bathing, care of mouth/teeth, toileting, grooming, dressing, etc )  - Assess/evaluate cause of self-care deficits   - Assess range of motion  - Assess patient's mobility; develop plan if impaired  - Assess patient's need for assistive devices and provide as appropriate  - Encourage maximum independence but intervene and supervise when necessary  ¯ Involve family in performance of ADLs  ¯ Assess for home care needs following discharge   ¯ Request OT consult to assist with ADL evaluation and planning for discharge  ¯ Provide patient education as appropriate  Outcome: Progressing  Goal: Maintain or return mobility status to optimal level  Description  INTERVENTIONS:  - Assess patient's baseline mobility status (ambulation, transfers, stairs, etc )    - Identify cognitive and physical deficits and behaviors that affect mobility  - Identify mobility aids required to assist with transfers and/or ambulation (gait belt, sit-to-stand, lift, walker, cane, etc )  - [place] fall precautions as indicated by assessment  - Record patient progress and toleration of activity level on Mobility SBAR; progress patient to next Phase/Stage  - Instruct patient to call for assistance with activity based on assessment  - Request Rehabilitation consult to assist with strengthening/weightbearing, etc   Outcome: Progressing

## 2019-02-20 NOTE — ANESTHESIA PREPROCEDURE EVALUATION
Review of Systems/Medical History  Patient summary reviewed  Chart reviewed      Cardiovascular   Pulmonary       GI/Hepatic            Endo/Other     GYN       Hematology   Musculoskeletal    Arthritis     Neurology   Psychology           Physical Exam    Airway    Mallampati score: I  TM Distance: >3 FB  Neck ROM: full     Dental       Cardiovascular      Pulmonary      Other Findings        Anesthesia Plan  ASA Score- 2     Anesthesia Type- spinal and IV sedation with anesthesia with ASA Monitors  Additional Monitors:   Airway Plan:         Plan Factors-    Induction- intravenous  Postoperative Plan-     Informed Consent- Anesthetic plan and risks discussed with patient  I personally reviewed this patient with the CRNA  Discussed and agreed on the Anesthesia Plan with the CRNA  Suresh Mitchell

## 2019-02-21 PROBLEM — M16.9 OSTEOARTHRITIS OF HIP: Status: ACTIVE | Noted: 2017-04-13

## 2019-02-21 PROBLEM — Z96.652 S/P TOTAL KNEE ARTHROPLASTY, LEFT: Status: ACTIVE | Noted: 2019-02-21

## 2019-02-21 PROBLEM — M79.606 LEG PAIN: Status: ACTIVE | Noted: 2017-05-23

## 2019-02-21 LAB
ANION GAP SERPL CALCULATED.3IONS-SCNC: 7 MMOL/L (ref 4–13)
BUN SERPL-MCNC: 12 MG/DL (ref 5–25)
CALCIUM SERPL-MCNC: 8 MG/DL (ref 8.3–10.1)
CHLORIDE SERPL-SCNC: 101 MMOL/L (ref 100–108)
CO2 SERPL-SCNC: 26 MMOL/L (ref 21–32)
CREAT SERPL-MCNC: 1.02 MG/DL (ref 0.6–1.3)
ERYTHROCYTE [DISTWIDTH] IN BLOOD BY AUTOMATED COUNT: 13.2 % (ref 11.6–15.1)
GFR SERPL CREATININE-BSD FRML MDRD: 71 ML/MIN/1.73SQ M
GLUCOSE SERPL-MCNC: 107 MG/DL (ref 65–140)
HCT VFR BLD AUTO: 34.5 % (ref 36.5–49.3)
HGB BLD-MCNC: 11.5 G/DL (ref 12–17)
MCH RBC QN AUTO: 33.5 PG (ref 26.8–34.3)
MCHC RBC AUTO-ENTMCNC: 33.3 G/DL (ref 31.4–37.4)
MCV RBC AUTO: 101 FL (ref 82–98)
PLATELET # BLD AUTO: 198 THOUSANDS/UL (ref 149–390)
PMV BLD AUTO: 9.6 FL (ref 8.9–12.7)
POTASSIUM SERPL-SCNC: 4.2 MMOL/L (ref 3.5–5.3)
RBC # BLD AUTO: 3.43 MILLION/UL (ref 3.88–5.62)
SODIUM SERPL-SCNC: 134 MMOL/L (ref 136–145)
WBC # BLD AUTO: 8.15 THOUSAND/UL (ref 4.31–10.16)

## 2019-02-21 PROCEDURE — 97166 OT EVAL MOD COMPLEX 45 MIN: CPT

## 2019-02-21 PROCEDURE — G8978 MOBILITY CURRENT STATUS: HCPCS

## 2019-02-21 PROCEDURE — G8987 SELF CARE CURRENT STATUS: HCPCS

## 2019-02-21 PROCEDURE — 97163 PT EVAL HIGH COMPLEX 45 MIN: CPT

## 2019-02-21 PROCEDURE — 97116 GAIT TRAINING THERAPY: CPT

## 2019-02-21 PROCEDURE — 80048 BASIC METABOLIC PNL TOTAL CA: CPT | Performed by: PHYSICIAN ASSISTANT

## 2019-02-21 PROCEDURE — G8988 SELF CARE GOAL STATUS: HCPCS

## 2019-02-21 PROCEDURE — 97530 THERAPEUTIC ACTIVITIES: CPT

## 2019-02-21 PROCEDURE — G8979 MOBILITY GOAL STATUS: HCPCS

## 2019-02-21 PROCEDURE — 85027 COMPLETE CBC AUTOMATED: CPT | Performed by: PHYSICIAN ASSISTANT

## 2019-02-21 PROCEDURE — G8989 SELF CARE D/C STATUS: HCPCS

## 2019-02-21 RX ORDER — DOCUSATE SODIUM 100 MG/1
100 CAPSULE, LIQUID FILLED ORAL 2 TIMES DAILY
Qty: 10 CAPSULE | Refills: 0 | Status: SHIPPED | OUTPATIENT
Start: 2019-02-21 | End: 2019-03-04 | Stop reason: ALTCHOICE

## 2019-02-21 RX ADMIN — ACETAMINOPHEN 975 MG: 325 TABLET, FILM COATED ORAL at 21:23

## 2019-02-21 RX ADMIN — ENOXAPARIN SODIUM 40 MG: 40 INJECTION SUBCUTANEOUS at 08:09

## 2019-02-21 RX ADMIN — PANTOPRAZOLE SODIUM 40 MG: 40 TABLET, DELAYED RELEASE ORAL at 05:30

## 2019-02-21 RX ADMIN — SODIUM CHLORIDE, SODIUM LACTATE, POTASSIUM CHLORIDE, AND CALCIUM CHLORIDE 1.5 ML/KG/HR: .6; .31; .03; .02 INJECTION, SOLUTION INTRAVENOUS at 02:52

## 2019-02-21 RX ADMIN — OXYCODONE HYDROCHLORIDE 5 MG: 5 TABLET ORAL at 21:23

## 2019-02-21 RX ADMIN — ACETAMINOPHEN 975 MG: 325 TABLET, FILM COATED ORAL at 14:08

## 2019-02-21 RX ADMIN — OXYCODONE HYDROCHLORIDE 5 MG: 5 TABLET ORAL at 16:20

## 2019-02-21 RX ADMIN — CEFAZOLIN SODIUM 1000 MG: 10 INJECTION, POWDER, FOR SOLUTION INTRAVENOUS at 02:52

## 2019-02-21 RX ADMIN — OXYCODONE HYDROCHLORIDE 5 MG: 5 TABLET ORAL at 08:09

## 2019-02-21 RX ADMIN — DOCUSATE SODIUM 100 MG: 100 CAPSULE, LIQUID FILLED ORAL at 08:09

## 2019-02-21 RX ADMIN — ACETAMINOPHEN 975 MG: 325 TABLET, FILM COATED ORAL at 05:30

## 2019-02-21 NOTE — UTILIZATION REVIEW
Notification of Inpatient Admission/Inpatient Authorization Request  This is a Notification of Inpatient Admission/Request for Inpatient Authorization for our facility [place] [address]  Be advised that this patient was admitted to our facility under Inpatient Status  Please contact the Utilization Review Department where the patient is receiving care services for additional admission information  Place of Service Code: 24   Place of Service Name: Inpatient Hospital  Presentation Date & Time: 2/20/2019  9:58 AM  Inpatient Admission Date & Time: 2/20/19 1555  Discharge Date & Time: No discharge date for patient encounter  Discharge Disposition (if discharged): Home with [facility]  Attending Physician: Bri Wallace Md [[id number]]  Admission Orders (From admission, onward)    Ordered        02/20/19 1655  Inpatient Admission  Once     Order ID Start Status   [id number] 02/20/19 1656 Completed              Facility: [address])  Network Utilization Review Department  Phone: [address]; Fax [phone]     Send all requests for admission clinical reviews, approved or denied determinations and any other requests to fax [phone]   All voicemails are confidential

## 2019-02-21 NOTE — PLAN OF CARE
Problem: Potential for Falls  Goal: Patient will remain free of falls  Description  INTERVENTIONS:  - Assess patient frequently for physical needs  -  Identify cognitive and physical deficits and behaviors that affect risk of falls    -  [place] fall precautions as indicated by assessment   - Educate patient/family on patient safety including physical limitations  - Instruct patient to call for assistance with activity based on assessment  - Modify environment to reduce risk of injury  - Consider OT/PT consult to assist with strengthening/mobility  Outcome: Progressing     Problem: MUSCULOSKELETAL - ADULT  Goal: Maintain or return mobility to safest level of function  Description  INTERVENTIONS:  - Assess patient's ability to carry out ADLs; assess patient's baseline for ADL function and identify physical deficits which impact ability to perform ADLs (bathing, care of mouth/teeth, toileting, grooming, dressing, etc )  - Assess/evaluate cause of self-care deficits   - Assess range of motion  - Assess patient's mobility; develop plan if impaired  - Assess patient's need for assistive devices and provide as appropriate  - Encourage maximum independence but intervene and supervise when necessary  - Involve family in performance of ADLs  - Assess for home care needs following discharge   - Request OT consult to assist with ADL evaluation and planning for discharge  - Provide patient education as appropriate  Outcome: Progressing  Goal: Maintain proper alignment of affected body part  Description  INTERVENTIONS:  - Support, maintain and protect limb and body alignment  - Provide pt/fam with appropriate education  Outcome: Progressing     Problem: PAIN - ADULT  Goal: Verbalizes/displays adequate comfort level or baseline comfort level  Description  Interventions:  - Encourage patient to monitor pain and request assistance  - Assess pain using appropriate pain scale  - Administer analgesics based on type and severity of pain and evaluate response  - Implement non-pharmacological measures as appropriate and evaluate response  - Consider cultural and social influences on pain and pain management  - Notify physician/advanced practitioner if interventions unsuccessful or patient reports new pain  Outcome: Progressing     Problem: SAFETY ADULT  Goal: Maintain or return to baseline ADL function  Description  INTERVENTIONS:  -  Assess patient's ability to carry out ADLs; assess patient's baseline for ADL function and identify physical deficits which impact ability to perform ADLs (bathing, care of mouth/teeth, toileting, grooming, dressing, etc )  - Assess/evaluate cause of self-care deficits   - Assess range of motion  - Assess patient's mobility; develop plan if impaired  - Assess patient's need for assistive devices and provide as appropriate  - Encourage maximum independence but intervene and supervise when necessary  ¯ Involve family in performance of ADLs  ¯ Assess for home care needs following discharge   ¯ Request OT consult to assist with ADL evaluation and planning for discharge  ¯ Provide patient education as appropriate  Outcome: Progressing  Goal: Maintain or return mobility status to optimal level  Description  INTERVENTIONS:  - Assess patient's baseline mobility status (ambulation, transfers, stairs, etc )    - Identify cognitive and physical deficits and behaviors that affect mobility  - Identify mobility aids required to assist with transfers and/or ambulation (gait belt, sit-to-stand, lift, walker, cane, etc )  - [place] fall precautions as indicated by assessment  - Record patient progress and toleration of activity level on Mobility SBAR; progress patient to next Phase/Stage  - Instruct patient to call for assistance with activity based on assessment  - Request Rehabilitation consult to assist with strengthening/weightbearing, etc   Outcome: Progressing

## 2019-02-21 NOTE — CONSULTS
Consultation - Acute Pain Service   Gaston Flowers 70 y o  male MRN: [id number]  Unit/Bed#: CW3 337-01 Encounter: [id number]               Assessment/Plan     Assessment:   70 yr old M POD 1 s/p left TKA, pt had an adductor canal block and periarticular injection of Exparel for postop pain control  I have reviewed the patient's controlled substance dispensing history in the Prescription Drug Monitoring Program before prescribing any controlled substances: opioid naiive patient: tolerates tylenol and ibuprofen in the past    Pt admits to pain from 0-4/10 at most during the past 24 hrs  He only took 1 dose of oxycodone over 24 hrs  Adductor canal block wore off appropriately  Pt aware of Exparel injection into the knee, and pain may escalate in 1-2 days time  Pt is eager to participate in PT session and to get discharged home  Plan:   - continue tylenol scheduled, gabapentin scheduled  - continue oxycodone 5-10 mg PO Q 4 hrs PRN mod-severe pain  - continue morphine 2 mg IV PRN    APS sign off  Thank you for the Consult  Please call [facility] ( [phone]) with any further questions    History of Present Illness    Admit Date:  2/20/2019  Hospital Day:  1 day  Primary Service:  Orthopedic Surgery  Attending Provider:  My Emerson MD  Reason for Consult / Principal Problem: acute postop pain, well control with regional nn block and intraop periarticular injection Exparel  Hx and PE limited by: none  HPI: Gaston Flowers is a 70y o  year old male who presents with (see above assessment form)    Inpatient consult to Acute Pain Service  Consult performed by:  Irish Hinkle MD  Consult ordered by: Juan R Ferrari MD          Review of Systems    Historical Information   Past Medical History:   Diagnosis Date    BPH (benign prostatic hyperplasia)     Chronic pain disorder     bilateral knees    Diverticulitis of colon     Kidney mass     Murmur 26/0982    diagnosed as a child 8 YO    Osteoarthritis     Preoperative cardiovascular examination 2018     Past Surgical History:   Procedure Laterality Date    HERNIA REPAIR      JOINT REPLACEMENT Bilateral     KNEE ARTHROSCOPY      bilateral    NE TOTAL HIP ARTHROPLASTY Left 2017    Procedure: ANTERIOR TOTAL HIP ARTHROPLASTY;  Surgeon: Staci Toney MD;  [facility];  Service: Orthopedics    NE TOTAL HIP ARTHROPLASTY Right 1/3/2018    Procedure: ANTERIOR TOTAL HIP ARTHROPLASTY;  Surgeon: Staci Toney MD;  [facility];  Service: Orthopedics    NE TOTAL KNEE ARTHROPLASTY Left 2019    Procedure: TOTAL KNEE ARTHROPLASTY;  Surgeon: Staci Toney MD;  [facility];  Service: Orthopedics     Social History   Social History     Substance and Sexual Activity   Alcohol Use Yes    Alcohol/week: 1 2 oz    Types: 2 Cans of beer per week    Comment: beer daily      Social History     Substance and Sexual Activity   Drug Use No     Social History     Tobacco Use   Smoking Status Former Smoker    Last attempt to quit: Carla Foy Yandeldinacodie 41 Years since quittin 1   Smokeless Tobacco Never Used     Family History: non-contributory    Meds/Allergies   all current active meds have been reviewed, current meds:   Current Facility-Administered Medications   Medication Dose Route Frequency    acetaminophen (TYLENOL) tablet 975 mg  975 mg Oral Q8H [address]    calcium carbonate (TUMS) chewable tablet 1,000 mg  1,000 mg Oral Daily PRN    docusate sodium (COLACE) capsule 100 mg  100 mg Oral BID    enoxaparin (LOVENOX) subcutaneous injection 40 mg  40 mg Subcutaneous Daily    gabapentin (NEURONTIN) capsule 100 mg  100 mg Oral Q8H [address]    lactated ringers infusion  1 5 mL/kg/hr Intravenous Continuous    morphine (PF) 10 mg/mL injection 2 mg  2 mg Intravenous Q1H PRN    ondansetron (ZOFRAN) injection 4 mg  4 mg Intravenous Q8H PRN    oxyCODONE (ROXICODONE) IR tablet 10 mg  10 mg Oral Q4H PRN    oxyCODONE (ROXICODONE) IR tablet 5 mg  5 mg Oral Q4H PRN    pantoprazole (PROTONIX) EC tablet 40 mg  40 mg Oral Early Morning    and PTA meds:   Prior to Admission Medications   Prescriptions Last Dose Informant Patient Reported? Taking?   ascorbic acid (VITAMIN C) 500 mg tablet 2/19/2019 at Unknown time Self No Yes   Sig: Take 1 tablet (500 mg total) by mouth 2 (two) times a day   enoxaparin (LOVENOX) 40 mg/0 4 mL  Self No Yes   Sig: Inject 0 4 mL (40 mg total) under the skin daily for 30 days Start after surgery   ferrous sulfate 324 (65 Fe) mg 2/19/2019 at Unknown time Self No Yes   Sig: Take 1 tablet (324 mg total) by mouth 2 (two) times a day before meals   folic acid (FOLVITE) 1 mg tablet 2/19/2019 at Unknown time Self No Yes   Sig: Take 1 tablet (1 mg total) by mouth daily   multivitamin (THERAGRAN) TABS 2/13/2019 Self Yes No   Sig: Take 1 tablet by mouth daily      Facility-Administered Medications: None       No Known Allergies    Objective   Temp:  [96 6 °F (35 9 °C)-100 2 °F (37 9 °C)] 100 2 °F (37 9 °C)  HR:  [52-72] 72  Resp:  [16-28] 18  BP: (105-132)/() 113/61  FiO2 (%):  [30] 30    Intake/Output Summary (Last 24 hours) at 2/21/2019 0918  Last data filed at 2/21/2019 0556  Gross per 24 hour   Intake 4082 ml   Output 3030 ml   Net 1052 ml       Physical Exam    Lab Results:   I have personally reviewed pertinent labs  , CBC:   Lab Results   Component Value Date    WBC 8 15 02/21/2019    HGB 11 5 (L) 02/21/2019    HCT 34 5 (L) 02/21/2019     (H) 02/21/2019     02/21/2019    MCH 33 5 02/21/2019    MCHC 33 3 02/21/2019    RDW 13 2 02/21/2019    MPV 9 6 02/21/2019   , CMP:   Lab Results   Component Value Date    SODIUM 134 (L) 02/21/2019    K 4 2 02/21/2019     02/21/2019    CO2 26 02/21/2019    BUN 12 02/21/2019    CREATININE 1 02 02/21/2019    CALCIUM 8 0 (L) 02/21/2019    EGFR 71 02/21/2019     Imaging Studies: I have personally reviewed pertinent reports      EKG, Pathology, and Other Studies: I have personally reviewed pertinent reports  Counseling / Coordination of Care  Total floor / unit time spent today Level 1 = 20 minutes  Greater than 50% of total time was spent with the patient and / or family counseling and / or coordination of care  A description of the counseling / coordination of care: discussed pain regimen and reinforced scheduled meds and what meds to ask as needed for pain  Discussed exparel use in the OR for prolonged pain management

## 2019-02-21 NOTE — PROGRESS NOTES
Subjective: No acute events overnight  No acute distress       Objective:  Lab Results   Component Value Date/Time    WBC 8 15 02/21/2019 05:34 AM    WBC 0-5 12/05/2017 02:03 PM    WBC 5 6 12/05/2017 02:03 PM    HGB 11 5 (L) 02/21/2019 05:34 AM    HGB 14 1 12/05/2017 02:03 PM       Vitals:    02/21/19 0256   BP: 112/60   Pulse: 64   Resp: 18   Temp: 100 2 °F (37 9 °C)   SpO2: 96%     LLE  Dressing c/d/i at knee  + ankle df/pf, ehl/fhl motor functions  Sensation intact sp/dp distribution  Foot warm/well perfused    Assessment: 78M s/p L TKA    Plan:  WBAT LLE  Hemovac out today  Pain control  DVT ppx - lovenox, mechanical  PT eval today  Dispo pending PT clearance, likely dc today    Britton Ken  02/21/19

## 2019-02-21 NOTE — DISCHARGE SUMMARY
ORTHOPEDICS DISCHARGE SUMMARY  Jace Faustin 70 y o  male MRN: [id number]  Unit/Bed#: JO6 337-01    Attending Physician: Savita Hirsch    Admitting diagnosis: Primary osteoarthritis of left knee [M17 12]    Discharge diagnosis: Primary osteoarthritis of left knee [M17 12]    Date of admission: 2/20/2019    Date of discharge: 02/22/2019      Procedure: L TKA    HPI:  This is a 70y o  year old male that presented to the office with signs and symptoms of left knee osteoarthritis  They tried and failed conservative treatment measures and wished to proceed with surgical intervention  The risks, benefits, and complications of the procedure were discussed with the patient and informed consent was obtained  Hospital Course: The patient was admitted to the hospital on 2/20/2019 and underwent an uncomplicated left total knee arthroplasty  They were transferred to the floor after a brief stay in the post-anesthesia care unit  Their pain was well managed with IV and oral pain medications  They began therapy on post operative day #1  Lovenox was also started for DVT prophylaxis post operative day #1  Hemovac drain was removed on POD1  Patient developed difficulty with voiding post op for which he had a ramirez catheter placed  Patient is to follow up with urology in 1 week for removal of catheter  On discharge date pt was cleared by PT and the medicine team and determined to be safe for discharge  Daily discussion was had with the patient, nursing staff, orthopaedic team, and family members if present  All questions were answered to the patients satisifaction  0   Lab Value Date/Time    HGB 11 5 (L) 02/21/2019 0534    HGB 14 9 01/24/2019 1108    HGB 10 1 (L) 01/05/2018 0439    HGB 10 7 (L) 01/04/2018 0540    HGB 14 1 12/05/2017 1403    HGB 11 2 (L) 06/13/2017 0436    HGB 13 6 05/12/2017 1327    HGB 15 0 04/01/2016 0000       Greater than 2 gram drop which qualifies for diagnosis of acute blood loss anemia    Vital signs remained stable and pt was resuscitated with IVF as needed     Discharge Instructions: The patient was discharged weight bearing as tolerated to the left lower extremity  Lovenox will be continued for 28 days  Continue PT/OT  Take pain medications as instructed  Discharge Medications: For the complete list of discharge medications, please refer to the patient's medication reconciliation

## 2019-02-21 NOTE — PROGRESS NOTES
Internal Medicine Progress Note  Patient: Aashish Bassett  Age/sex: 70 y o  male  Medical Record #: [id number]      ASSESSMENT/PLAN:  Aashish Bassett is seen and examined and mangement for following issues:    # L TKR:  Post operative management per orthopedics;  encourage incentive spirometry; dvt prophylaxis=lovenox; avoid post operative narcotic induced constipation with stool softeners and early ambulation     # GERD:  Cont pantoprazole     # Osteoarthritis:  Previous R hip replacement 1/18    # Post operative anemia:  hgb 11 5 with expected drop; cont to monitor          Subjective: Patient seen and examined  New or overnight issues include no significant overnight events or reported nursing issues   Slept well overnight, anxious to go home today    ROS:   GI: denies abdominal pain, change bowel habits or reflux symptoms  Neuro: No new neurologic changes  Respiratory: No Cough, SOB  Musculoskeletal: +left knee pain  Cardiovascular: No CP, palpitations     Scheduled Meds:    Current Facility-Administered Medications:  acetaminophen 975 mg Oral [facility] Pavithra Mckeon MD    calcium carbonate 1,000 mg Oral Daily PRN Yudelka Dinh PA-C    docusate sodium 100 mg Oral BID Yudelka Dinh PA-C    enoxaparin 40 mg Subcutaneous Daily Yudelka Dinh PA-C    gabapentin 100 mg Oral [facility] Yudelka Dinh PA-C    lactated ringers 1 5 mL/kg/hr Intravenous Continuous Yudelka Dinh PA-C Last Rate: 1 5 mL/kg/hr (02/21/19 0252)   morphine injection 2 mg Intravenous Q1H PRN Yudelka Dinh PA-C    ondansetron 4 mg Intravenous Q8H PRN Pavithra Mckoen MD    oxyCODONE 10 mg Oral Q4H PRN Yudelka Dinh PA-C    oxyCODONE 5 mg Oral Q4H PRN Yudelka Dinh PA-C    pantoprazole 40 mg Oral Early Morning Yudelka Dinh PA-C        Labs:     Results from last 7 days   Lab Units 02/21/19  0534   WBC Thousand/uL 8 15   HEMOGLOBIN g/dL 11 5*   HEMATOCRIT % 34 5*   PLATELETS Thousands/uL 198     Results from last 7 days   Lab Units 02/21/19  0534   SODIUM mmol/L 134*   POTASSIUM mmol/L 4 2   CHLORIDE mmol/L 101   CO2 mmol/L 26   BUN mg/dL 12   CREATININE mg/dL 1 02   CALCIUM mg/dL 8 0*                      Labs reviewed    Physical Examination:  Vitals:   Vitals:    02/20/19 1908 02/20/19 1921 02/20/19 2259 02/21/19 0256   BP: (!) 128/109  118/76 112/60   Pulse: (!) 52 60 62 64   Resp: 18  18 18   Temp:   99 5 °F (37 5 °C) 100 2 °F (37 9 °C)   TempSrc:       SpO2: 100% 99% 96% 96%   Weight:       Height:           GEN: NAD  RESP: CTAB, no R/R/W  CV: +S1 S2, regular rate, no rubs  ABD: soft, NT, ND, normal BS   : voiding  EXT: DP pulses intact b/l; dressing to left knee intact  Neuro: AAOx3    [x ] Total time spent: 30 Mins and greater than 50% of this time was spent counseling/coordinating care  ** Please Note: Dragon 360 Dictation voice to text software may have been used in the creation of this document   **

## 2019-02-21 NOTE — SOCIAL WORK
CM met with patient and explained cm role  Pt alert and oriented  Pt reports he lives in a 2 story home w/wife w/1st floor setup  Pt reports being independent PTA, reports good support at home, he drives and has transport home w/wife for d/c  Pt reports DME: rw, raised toilet seat, cane, reports prev VNA w/[place], and rehab  Pts pharmacy is [facility] in [place]  No POA  Pt denies hx/admission for drug/etoh and psych/mental health       Pt reports he filled his Lovenox  PT/OT recommendation: Outpt PT  Pt scheduled Monday 2/25 @1100  CM reviewed d/c planning process including the following: identifying help at home, patient preference for d/c planning needs, Discharge Lounge, [facility] Meds to Bed program, availability of treatment team to discuss questions or concerns patient and/or family may have regarding understanding medications and recognizing signs and symptoms once discharged  CM also encouraged patient to follow up with all recommended appointments after discharge  Patient advised of importance for patient and family to participate in managing patients medical well being

## 2019-02-21 NOTE — PHYSICAL THERAPY NOTE
Physical Therapy Evaluation    Patient's Name:Raman Coffey    Today's Date:02/21/19    Patient Active Problem List   Diagnosis    S/P total hip arthroplasty    Preoperative cardiovascular examination    Primary osteoarthritis of left knee    Sinus bradycardia    S/P total knee arthroplasty, left    Back pain    Bladder disorder    BPH (benign prostatic hyperplasia)    Diverticulitis of colon    Fullness in clavicular area    Heart murmur    Kidney mass    Left-sided low back pain with left-sided sciatica    Knee pain    Leg pain    Neoplasm of skin    Osteoarthritis of hip    Primary osteoarthritis of both hips    Pain of both hip joints    Primary osteoarthritis of both knees       Past Medical History:   Diagnosis Date    BPH (benign prostatic hyperplasia)     Chronic pain disorder     bilateral knees    Diverticulitis of colon     Kidney mass     Murmur [birth year]1947    diagnosed as a child 6 YO    Osteoarthritis     Preoperative cardiovascular examination 2/16/2018       Past Surgical History:   Procedure Laterality Date    HERNIA REPAIR      JOINT REPLACEMENT Bilateral     KNEE ARTHROSCOPY      bilateral    LA TOTAL HIP ARTHROPLASTY Left 6/12/2017    Procedure: ANTERIOR TOTAL HIP ARTHROPLASTY;  Surgeon: Poppy Stokes MD;  [facility];  Service: Orthopedics    LA TOTAL HIP ARTHROPLASTY Right 1/3/2018    Procedure: ANTERIOR TOTAL HIP ARTHROPLASTY;  Surgeon: Poppy Stokes MD;  [facility];  Service: Orthopedics    LA TOTAL KNEE ARTHROPLASTY Left 2/20/2019    Procedure: TOTAL KNEE ARTHROPLASTY;  Surgeon: Poppy Stokes MD;  [facility];  Service: Orthopedics          02/21/19 0910   Pain Assessment   Pain Assessment 0-10   Pain Score 2   Pain Type Acute pain;Surgical pain   Pain Location Knee   Pain Orientation Left   Hospital Pain Intervention(s) Repositioned; Ambulation/increased activity   Response to Interventions tolerated   Home Living   Type of Home House   Home Layout Multi-level  (3 MORENA)   Prior Function   Level of [place] Independent with ADLs and functional mobility   Lives With Spouse   Receives Help From Family   Restrictions/Precautions   LLE Weight Bearing Per Order WBAT   Other Precautions WBS   General   Family/Caregiver Present No   Cognition   Arousal/Participation Alert   Orientation Level Oriented X4   Following Commands Follows all commands and directions without difficulty   RUE Assessment   RUE Assessment   (funct propulsion of RW)   LUE Assessment   LUE Assessment   (funct propulsion of RW)   RLE Assessment   RLE Assessment X   Strength RLE   RLE Overall Strength 4/5   LLE Assessment   LLE Assessment X   Strength LLE   LLE Overall Strength   (hip fl 3/5 knee ext 2/5)   Coordination   Movements are Fluid and Coordinated 0   Coordination and Movement Description antalgic LE instability   Bed Mobility   Supine to Sit 5  Supervision   Additional items HOB elevated;Verbal cues   Transfers   Sit to Stand 5  Supervision   Additional items Increased time required;Verbal cues   Stand to Sit 5  Supervision   Additional items Increased time required;Verbal cues   Stand pivot 5  Supervision   Additional items Increased time required;Verbal cues  (RW)   Ambulation/Elevation   Gait pattern Improper Weight shift; Antalgic; Excessively slow   Gait Assistance 5  Supervision   Additional items Verbal cues   Assistive Device Rolling walker   Distance 75 ft   Balance   Static Standing Fair  (RW)   Dynamic Standing Fair -  (RW)   Endurance Deficit   Endurance Deficit Yes   Endurance Deficit Description antalgic LE instability   Activity Tolerance   Activity Tolerance Patient tolerated treatment well;Patient limited by fatigue;Patient limited by pain   Medical Staff Made Aware MD OT CM   Nurse Made Aware yes   Assessment   Prognosis Excellent   Problem List Decreased strength;Decreased range of motion;Decreased endurance; Impaired balance;Decreased mobility; Decreased coordination;Decreased cognition; Impaired judgement;Decreased safety awareness;Pain;Orthopedic restrictions;Decreased skin integrity   Assessment Pt is a 70 y o  male [facility]2019 S/P total knee arthroplasty, left; is WBAT LLE Pt exhibits significant impairments with weakness, decreased ROM, impaired balance, decreased endurance, impaired coordination, gait deviations, pain, decreased activity tolerance, decreased functional mobility tolerance, decreased safety awareness, impaired judgement, fall risk, orthopedic restrictions and decreased skin integrity; these impact independence with mobility, ADLs, and IADLs; requires supervision w transf and amb 75 ft using RW LLE WBAT; gaot pattern antalgic walker reliant using step to pattern objective measures on the Barthel Index also reveal limitations;  therapy prognosis is impacted by relevant co morbidities as noted in evaluation; clinical presentation is currently unstable/unpredictable - pt is on cont pulse oximetry, neurovasc checks, presents w pain and phys impairments as noted- a regression from baseline; PTA, pt was Independent with mobility lives in multilevel w fam support available prn skilled PT is indicated to to optimize functional independence and discharge planning; pending functional progress, PT recommendation at discharge is OP PT and home with family support    Goals   Patient Goals go home   STG Expiration Date 03/03/19   Short Term Goal #1 1  Modified Independent with Bed Mobility Rolling Right and Left     2  Modified Independent with Bed Mobility Supine-Sit     3  Modified Independent with Transfer Bed-Chair     4  Increase Dynamic Sitting Balance at least 1 Grade for improved stability with functional reach activities     5  Increase Dynamic Standing Balance at least 1 Grade for improved ease with Activities of Daily Living     6   Increase Lower Extremity Strength at least 1 Grade for improved ease mobility tasks     7  Modified Independent with  Ambulation 200 feet using RW LLE WBATto facilitate home and community mobility 8  Modified Independent with Ascending/Descending 14 steps to facilitate home and community accessibility  9  Increase L knee ROM 0-90 deg   Plan   Treatment/Interventions Functional transfer training;LE strengthening/ROM; Elevations; Therapeutic exercise; Endurance training;Cognitive reorientation;Patient/family training;Equipment eval/education; Bed mobility;Gait training; Compensatory technique education;Continued evaluation;Spoke to nursing;OT;Spoke to MD   PT Frequency Twice a day   Recommendation   Recommendation Outpatient PT; Home with family support   Equipment Recommended   (pt has a walker)   Barthel Index   Feeding 10   Bathing 0   Grooming Score 5   Dressing Score 5   Bladder Score 10   Bowels Score 10   Toilet Use Score 5   Transfers (Bed/Chair) Score 10   Mobility (Level Surface) Score 10   Stairs Score 0   Barthel Index Score 65     PT Progress Note  Time In:0840  Time Out:0910  Total Time: 30  Additional session warranted to facilitate stair trng d/c home; pt requires super vision w 6 steps ascent descent using SPC step to pattern; also amb mod indep 75 ft using RW LLE WBAT- gait pattern stable walker reliant initiating step through gait; pt educated on seated and sup HEP for knee ROM ankle pumps and hip strengthening; pt appropriate for d/c home w fam support and f/u OPPT when med cleared; pt has a RW and cane at home    Abilio Martinez PT

## 2019-02-21 NOTE — OCCUPATIONAL THERAPY NOTE
[address]  Evaluation     Patient Name: Neelam Campos  PDGLZ'I Date: 2/21/2019  Problem List  Patient Active Problem List   Diagnosis    S/P total hip arthroplasty    Preoperative cardiovascular examination    Primary osteoarthritis of left knee    Sinus bradycardia    S/P total knee arthroplasty, left    Back pain    Bladder disorder    BPH (benign prostatic hyperplasia)    Diverticulitis of colon    Fullness in clavicular area    Heart murmur    Kidney mass    Left-sided low back pain with left-sided sciatica    Knee pain    Leg pain    Neoplasm of skin    Osteoarthritis of hip    Primary osteoarthritis of both hips    Pain of both hip joints    Primary osteoarthritis of both knees     Past Medical History  Past Medical History:   Diagnosis Date    BPH (benign prostatic hyperplasia)     Chronic pain disorder     bilateral knees    Diverticulitis of colon     Kidney mass     Murmur 08/1947    diagnosed as a child 6 YO    Osteoarthritis     Preoperative cardiovascular examination 2/16/2018     Past Surgical History  Past Surgical History:   Procedure Laterality Date    HERNIA REPAIR      JOINT REPLACEMENT Bilateral     KNEE ARTHROSCOPY      bilateral    [place] TOTAL HIP ARTHROPLASTY Left 6/12/2017    Procedure: ANTERIOR TOTAL HIP ARTHROPLASTY;  Surgeon: Peña Vanessa MD;  [facility];  Service: Orthopedics    [place] TOTAL HIP ARTHROPLASTY Right 1/3/2018    Procedure: ANTERIOR TOTAL HIP ARTHROPLASTY;  Surgeon: Peña Vanessa MD;  [facility];  Service: Orthopedics    [place] TOTAL KNEE ARTHROPLASTY Left 2/20/2019    Procedure: TOTAL KNEE ARTHROPLASTY;  Surgeon: Peña Vanessa MD;  [facility];  Service: Orthopedics         02/21/19 0840   Note Type   Note type Eval only   Restrictions/Precautions   Weight Bearing Precautions Per Order Yes   RUE Weight Bearing Per Order WBAT   LUE Weight Bearing Per Order WBAT   RLE Weight Bearing Per Order WBAT   LLE Weight Bearing Per Order WBAT   Other Precautions Pain   Pain Assessment   Pain Assessment 0-10   Pain Score 2   Pain Type Acute pain   Pain Location Knee   Pain Orientation Left   Hospital Pain Intervention(s) Repositioned; Ambulation/increased activity; Emotional support   Home Living   Type of [facility] One level;Stairs to enter with rails   Bathroom Shower/Tub Walk-in shower   [address] Walker;Cane   Prior Function   Level of [place] Independent with ADLs and functional mobility   Lives With Spouse   Receives Help From Family   ADL Assistance Independent   IADLs Independent   Falls in the last 6 months 0   Vocational Retired   [address] [place] [address] W/O AD - I IADLS - SHARES HOMEMAKING WITH FAMILY    Reciprocal Relationships SUPPORTIVE FAMILY    Service to Others RETIRED   Intrinsic Gratification ACTIVE PTA   Subjective   Subjective "I WOULD LIKE TO GO HOME TODAY IF I COULD"   ADL   Eating Assistance 7  Independent   Grooming Assistance 7  Independent   UB Bathing Assistance 5  Supervision/Setup   LB Bathing Assistance 4  Minimal Assistance   UB Dressing Assistance 5  Supervision/Setup   LB Dressing Assistance 5  Supervision/Setup   Toileting Assistance  5  Supervision/Setup   Bed Mobility   Supine to Sit 5  Supervision   Transfers   Sit to Stand 5  Supervision   Stand to Sit 5  Supervision   Stand pivot 5  Supervision   Functional Mobility   Functional Mobility 5  Supervision   Additional items Rolling walker   Balance   Static Sitting Good   Dynamic Sitting Fair +   Static Standing Fair   Dynamic Standing Fair   Ambulatory Fair   Activity Tolerance   Activity Tolerance Patient tolerated treatment well   RUE Assessment   RUE Assessment WFL   LUE Assessment   LUE Assessment WFL   Cognition   Overall Cognitive Status WFL   Assessment   Limitation Decreased endurance;Decreased self-care trans;Decreased high-level ADLs   Prognosis Good Assessment Pt is a 70 y o  male who was admitted to [facility] on 2/20/2019 with S/P total knee arthroplasty, left   Pt's problem list also includes PMH of osteoarthritis, GERD  At baseline pt was completing adls and mobility independently without assistive device  Pt lives with spouse in 1 story home with 3 MORENA  Currently pt requires min assist for overall ADLS and sba for functional mobility/transfers  Pt currently presents with impairments in the following categories -steps to enter environment and difficulty performing IADLS  activity tolerance and standing balance/tolerance  These impairments, as well as pt's pain, orthopedic restricitions  and WBS   limit pt's ability to safely engage in all baseline areas of occupation, includingbathing, dressing, functional mobility/transfers, community mobility, driving, house maintenance, social participation  and leisure activities however has supportive wife who is able to assist prn and all necessary dme (RW, BSC)  From OT standpoint, recommend home with family support upon D/C   No further acute OT needs indicated at this time- Recommend continued oob for meals, ambulation to/from BR, setup for self care tasks and mobility in hallway with nursing/restorative - d/c from caseload with above recommendations   Goals   Patient Goals go home    Plan   OT Frequency Eval only   Recommendation   OT Discharge Recommendation Home with family support   OT - OK to Discharge Yes   Barthel Index   Feeding 10   Bathing 0   Grooming Score 5   Dressing Score 5   Bladder Score 10   Bowels Score 10   Toilet Use Score 5   Transfers (Bed/Chair) Score 10   Mobility (Level Surface) Score 10   Stairs Score 5   Barthel Index Score alling [address] Per Psychiatry

## 2019-02-22 ENCOUNTER — TELEPHONE (OUTPATIENT)
Dept: OTHER | Facility: HOSPITAL | Age: 79
End: 2019-02-22

## 2019-02-22 VITALS
HEIGHT: 67 IN | SYSTOLIC BLOOD PRESSURE: 101 MMHG | WEIGHT: 153 LBS | BODY MASS INDEX: 24.01 KG/M2 | HEART RATE: 56 BPM | TEMPERATURE: 99.1 F | RESPIRATION RATE: 18 BRPM | DIASTOLIC BLOOD PRESSURE: 62 MMHG | OXYGEN SATURATION: 99 %

## 2019-02-22 LAB
ANION GAP SERPL CALCULATED.3IONS-SCNC: 8 MMOL/L (ref 4–13)
BUN SERPL-MCNC: 10 MG/DL (ref 5–25)
CALCIUM SERPL-MCNC: 7.5 MG/DL (ref 8.3–10.1)
CHLORIDE SERPL-SCNC: 91 MMOL/L (ref 100–108)
CO2 SERPL-SCNC: 25 MMOL/L (ref 21–32)
CREAT SERPL-MCNC: 0.82 MG/DL (ref 0.6–1.3)
ERYTHROCYTE [DISTWIDTH] IN BLOOD BY AUTOMATED COUNT: 12.8 % (ref 11.6–15.1)
GFR SERPL CREATININE-BSD FRML MDRD: 86 ML/MIN/1.73SQ M
GLUCOSE SERPL-MCNC: 108 MG/DL (ref 65–140)
HCT VFR BLD AUTO: 28.2 % (ref 36.5–49.3)
HGB BLD-MCNC: 9.6 G/DL (ref 12–17)
MCH RBC QN AUTO: 33.2 PG (ref 26.8–34.3)
MCHC RBC AUTO-ENTMCNC: 34 G/DL (ref 31.4–37.4)
MCV RBC AUTO: 98 FL (ref 82–98)
PLATELET # BLD AUTO: 170 THOUSANDS/UL (ref 149–390)
PMV BLD AUTO: 10.5 FL (ref 8.9–12.7)
POTASSIUM SERPL-SCNC: 4.1 MMOL/L (ref 3.5–5.3)
RBC # BLD AUTO: 2.89 MILLION/UL (ref 3.88–5.62)
SODIUM SERPL-SCNC: 124 MMOL/L (ref 136–145)
WBC # BLD AUTO: 10.14 THOUSAND/UL (ref 4.31–10.16)

## 2019-02-22 PROCEDURE — 0T9B70Z DRAINAGE OF BLADDER WITH DRAINAGE DEVICE, VIA NATURAL OR ARTIFICIAL OPENING: ICD-10-PCS | Performed by: UROLOGY

## 2019-02-22 PROCEDURE — 99222 1ST HOSP IP/OBS MODERATE 55: CPT | Performed by: UROLOGY

## 2019-02-22 PROCEDURE — 80048 BASIC METABOLIC PNL TOTAL CA: CPT | Performed by: PHYSICIAN ASSISTANT

## 2019-02-22 PROCEDURE — 85027 COMPLETE CBC AUTOMATED: CPT | Performed by: PHYSICIAN ASSISTANT

## 2019-02-22 RX ORDER — SODIUM CHLORIDE 9 MG/ML
100 INJECTION, SOLUTION INTRAVENOUS CONTINUOUS
Status: DISCONTINUED | OUTPATIENT
Start: 2019-02-22 | End: 2019-02-22 | Stop reason: HOSPADM

## 2019-02-22 RX ORDER — TAMSULOSIN HYDROCHLORIDE 0.4 MG/1
0.4 CAPSULE ORAL
Status: DISCONTINUED | OUTPATIENT
Start: 2019-02-22 | End: 2019-02-22 | Stop reason: HOSPADM

## 2019-02-22 RX ORDER — TAMSULOSIN HYDROCHLORIDE 0.4 MG/1
0.4 CAPSULE ORAL
Qty: 30 CAPSULE | Refills: 0 | Status: SHIPPED | OUTPATIENT
Start: 2019-02-23 | End: 2019-04-15 | Stop reason: ALTCHOICE

## 2019-02-22 RX ADMIN — PANTOPRAZOLE SODIUM 40 MG: 40 TABLET, DELAYED RELEASE ORAL at 06:00

## 2019-02-22 RX ADMIN — ACETAMINOPHEN 975 MG: 325 TABLET, FILM COATED ORAL at 14:39

## 2019-02-22 RX ADMIN — OXYCODONE HYDROCHLORIDE 10 MG: 10 TABLET ORAL at 02:03

## 2019-02-22 RX ADMIN — OXYCODONE HYDROCHLORIDE 10 MG: 10 TABLET ORAL at 10:39

## 2019-02-22 RX ADMIN — SODIUM CHLORIDE 100 ML/HR: 0.9 INJECTION, SOLUTION INTRAVENOUS at 11:58

## 2019-02-22 RX ADMIN — OXYCODONE HYDROCHLORIDE 5 MG: 5 TABLET ORAL at 06:01

## 2019-02-22 RX ADMIN — ENOXAPARIN SODIUM 40 MG: 40 INJECTION SUBCUTANEOUS at 08:47

## 2019-02-22 RX ADMIN — TAMSULOSIN HYDROCHLORIDE 0.4 MG: 0.4 CAPSULE ORAL at 08:47

## 2019-02-22 RX ADMIN — DOCUSATE SODIUM 100 MG: 100 CAPSULE, LIQUID FILLED ORAL at 08:47

## 2019-02-22 RX ADMIN — ACETAMINOPHEN 975 MG: 325 TABLET, FILM COATED ORAL at 06:00

## 2019-02-22 NOTE — RESTORATIVE TECHNICIAN NOTE
Restorative Specialist Mobility Note       Activity: Bedrest, Dangle, Stand at bedside, Turn, Ambulate in room, Ambulate in reeves     Assistive Device: Front wheel walker     Ambulation Response:  Tolerated fairly well  Repositioned: Sitting, Up in chair

## 2019-02-22 NOTE — SOCIAL WORK
Patient accepted and is agreeable to [facility] to provide SN services for ashley  Patient is following up outpatient for PT and OT

## 2019-02-22 NOTE — CONSULTS
UROLOGY CONSULTATION NOTE     Patient Identifiers: Jadene Kayser (MRN [id number])  Service Requesting Consultation: orthopedics  Service Providing Consultation:  Urology, Shayy Ryan MD    Date of Service: 2/22/2019  Inpatient consult to Urology  Consult performed by: Shayy Ryan MD  Consult ordered by: Alan Yap MD          Reason for Consultation: Urinary retention    History of Present Illness:     Jadene Kayser is a 70 y o  old with a history of orthopedic surgery and postoperative urinary retention  Failed multiple void trials  Ovalle catheter in place now  Patient to be discharged home today  He is previously known to Dr Kalina Johns for lower urinary tract symptoms        Past Medical, Past Surgical History:     Past Medical History:   Diagnosis Date    BPH (benign prostatic hyperplasia)     Chronic pain disorder     bilateral knees    Diverticulitis of colon     Kidney mass     Murmur [birth year]/1947    diagnosed as a child 6 YO    Osteoarthritis     Preoperative cardiovascular examination 2/16/2018   :    Past Surgical History:   Procedure Laterality Date    HERNIA REPAIR      JOINT REPLACEMENT Bilateral     KNEE ARTHROSCOPY      bilateral    [place] TOTAL HIP ARTHROPLASTY Left 6/12/2017    Procedure: ANTERIOR TOTAL HIP ARTHROPLASTY;  Surgeon: Isabelle Arndt MD;  [facility];  Service: Orthopedics    [place] TOTAL HIP ARTHROPLASTY Right 1/3/2018    Procedure: ANTERIOR TOTAL HIP ARTHROPLASTY;  Surgeon: Isabelle Arndt MD;  [facility];  Service: Orthopedics    CA TOTAL KNEE ARTHROPLASTY Left 2/20/2019    Procedure: TOTAL KNEE ARTHROPLASTY;  Surgeon: Isabelle Arndt MD;  [facility];  Service: Orthopedics   :    Medications, Allergies:     Current Facility-Administered Medications   Medication Dose Route Frequency    acetaminophen (TYLENOL) tablet 975 mg  975 mg Oral Q8H [address]    calcium carbonate (TUMS) chewable tablet 1,000 mg  1,000 mg Oral Daily PRN    docusate sodium (COLACE) capsule 100 mg  100 mg Oral BID    enoxaparin (LOVENOX) subcutaneous injection 40 mg  40 mg Subcutaneous Daily    lactated ringers infusion  1 5 mL/kg/hr Intravenous Continuous    morphine (PF) 10 mg/mL injection 2 mg  2 mg Intravenous Q1H PRN    ondansetron (ZOFRAN) injection 4 mg  4 mg Intravenous Q8H PRN    oxyCODONE (ROXICODONE) IR tablet 10 mg  10 mg Oral Q4H PRN    oxyCODONE (ROXICODONE) IR tablet 5 mg  5 mg Oral Q4H PRN    pantoprazole (PROTONIX) EC tablet 40 mg  40 mg Oral Early Morning    sodium chloride 0 9 % infusion  100 mL/hr Intravenous Continuous    tamsulosin (FLOMAX) capsule 0 4 mg  0 4 mg Oral Daily With Dinner       Allergies:  No Known Allergies:    Social and Family History:   Social History:   Social History     Tobacco Use    Smoking status: Former Smoker     Last attempt to quit:      Years since quittin 1    Smokeless tobacco: Never Used   Substance Use Topics    Alcohol use: Yes     Alcohol/week: 1 2 oz     Types: 2 Cans of beer per week     Comment: beer daily     Drug use: No        Social History     Tobacco Use   Smoking Status Former Smoker    Last attempt to quit: Terry Zarate Years since quittin 1   Smokeless Tobacco Never Used       Family History:  Family History   Problem Relation Age of Onset    Cancer Mother     Cancer Father     Cancer Brother     Heart disease Brother    :     Review of Systems:     General: Fever, chills, or night sweats: negative  Cardiac: Negative for chest pain  Pulmonary: Negative for shortness of breath  Gastrointestinal: Abdominal pain negative  Nausea, vomiting, or diarrhea negative,  Genitourinary: See HPI above  Patient does not have hematuria  All other systems queried were negative  Physical Exam:   General: Patient is pleasant and in NAD   Awake and alert  /62   Pulse 56   Temp 99 1 °F (37 3 °C)   Resp 18   Ht 5' 7" (1 702 m)   Wt 69 4 kg (153 lb)   SpO2 99%   BMI 23 96 kg/m² Temp (24hrs), Av 1 °F (37 3 °C), Min:99 1 °F (37 3 °C), Max:99 1 °F (37 3 °C)  current; Temperature: 99 1 °F (37 3 °C)  I/O last 24 hours: In: 1207 [P O :1207]  Out: 3525 [Urine:3525]  /62   Pulse 56   Temp 99 1 °F (37 3 °C)   Resp 18   Ht 5' 7" (1 702 m)   Wt 69 4 kg (153 lb)   SpO2 99%   BMI 23 96 kg/m²   General appearance: alert and oriented, in no acute distress  Lungs: clear to auscultation bilaterally  Heart: regular rate and rhythm  Abdomen: soft, non-tender; bowel sounds normal; no masses,  no organomegaly  Extremities: extremities normal, warm and well-perfused; no cyanosis, clubbing, or edema, dressing at left knee  Neurologic: Grossly normal    (Male): ramirze in place with clear yellow urine, connected to leg bag    Labs:     Lab Results   Component Value Date    HGB 9 6 (L) 2019    HCT 28 2 (L) 2019    WBC 10 14 2019     2019   ]    Lab Results   Component Value Date     2017    K 4 1 2019    CL 91 (L) 2019    CO2 25 2019    BUN 10 2019    CREATININE 0 82 2019    CALCIUM 7 5 (L) 2019    GLUCOSE 88 2017   ]    Imaging:   I personally reviewed the images and report of the following studies, and reviewed them with the patient:    none      ASSESSMENT:     70 y o  old male with  postop urinary retention  PLAN:     Plan is for discharge today with ramirez  He will continue tamsulosin  Follow up in [employer] for void trial in 1 week  Discussed with patient at length  Numerous questions answered  Thank you for allowing me to participate in this patients care  Please do not hesitate to call with any additional questions    Kassandra Amador MD

## 2019-02-22 NOTE — PROGRESS NOTES
Subjective: No acute events overnight  No acute distress       Objective:  Lab Results   Component Value Date/Time    WBC 10 14 02/22/2019 04:52 AM    WBC 0-5 12/05/2017 02:03 PM    WBC 5 6 12/05/2017 02:03 PM    HGB 9 6 (L) 02/22/2019 04:52 AM    HGB 14 1 12/05/2017 02:03 PM       Vitals:    02/21/19 2338   BP: 131/71   Pulse: 74   Resp: 16   Temp:    SpO2: 98%     Left lower extremity  Dressing c/d/i at knee  + ankle df/pf, ehl/fhl motor functions  Extremity warm/well perfused     Assessment: 78M s/p L TKA    Plan:  WBAt LLE  Pain control  DVT ppx - lovenox, mechanical  PT  Dispo pending PT clearance, pain control, possible dc today    Britton Ken  02/22/19

## 2019-02-22 NOTE — TELEPHONE ENCOUNTER
Nemo Jaimes is a 28-year-old male with postoperative urinary retention after orthopedic surgery  Patient was sent home by primary surgical service with Ovalle catheter requested reschedule outpatient follow-up  Patient will have void trial with visiting nurses services  However, requesting 4 week follow up with Dr Pascual Acevedo, attending urologist on call at time of consultation  Please contact patient with hospital follow-up appointment date and time  Thank you

## 2019-02-22 NOTE — PROGRESS NOTES
Internal Medicine Progress Note  Patient: Salma Odom  Age/sex: 70 y o  male  Medical Record #: [id number]      ASSESSMENT/PLAN:  Salma Odom is seen and examined and mangement for following issues:    # L TKR:  Pain controlled afebrile     # GERD:  Cont pantoprazole     # Osteoarthritis:  Previous R hip replacement 1/18    # Post operative anemia:  hgb 11 5 with expected drop; cont to monitor    # Urine retention; follow protocol    # Hyponatremia: IVF to NSS @ 100/hr x 1 liter; BMP in AM          Subjective: Patient seen and examined  New or overnight issues include no significant overnight events or reported nursing issues   Slept well overnight, anxious to go home today    ROS:   GI: denies abdominal pain, change bowel habits or reflux symptoms  Neuro: No new neurologic changes  Respiratory: No Cough, SOB  Musculoskeletal: +left knee pain  Cardiovascular: No CP, palpitations     Scheduled Meds:    Current Facility-Administered Medications:  acetaminophen 975 mg Oral [facility] Kelly Brand MD    calcium carbonate 1,000 mg Oral Daily PRN Mikael Canada PA-C    docusate sodium 100 mg Oral BID Mikael Canada PA-C    enoxaparin 40 mg Subcutaneous Daily Mikael Canada PA-C    lactated ringers 1 5 mL/kg/hr Intravenous Continuous Mikael Canada PA-C Last Rate: 1 5 mL/kg/hr (02/21/19 0252)   morphine injection 2 mg Intravenous Q1H PRN Mikael Canada PA-C    ondansetron 4 mg Intravenous Q8H PRN Kelly Brand MD    oxyCODONE 10 mg Oral Q4H PRN Mikael Canada PA-C    oxyCODONE 5 mg Oral Q4H PRN Mikael Canada PA-C    pantoprazole 40 mg Oral Early Morning Mikael Canada PA-C        Labs:     Results from last 7 days   Lab Units 02/21/19  0534   WBC Thousand/uL 8 15   HEMOGLOBIN g/dL 11 5*   HEMATOCRIT % 34 5*   PLATELETS Thousands/uL 198     Results from last 7 days   Lab Units 02/21/19  0534   SODIUM mmol/L 134*   POTASSIUM mmol/L 4 2   CHLORIDE mmol/L 101   CO2 mmol/L 26   BUN mg/dL 12   CREATININE mg/dL 1 02   CALCIUM mg/dL 8 0*                      Labs reviewed    Physical Examination:  Vitals:   Vitals:    02/21/19 0256 02/21/19 0736 02/21/19 1513 02/21/19 2338   BP: 112/60 113/61 126/73 131/71   Pulse: 64 72 59 74   Resp: 18  18 16   Temp: 100 2 °F (37 9 °C)  100 2 °F (37 9 °C)    TempSrc:       SpO2: 96% 98% 96% 98%   Weight:       Height:           GEN: NAD  RESP: CTAB, no R/R/W  CV: +S1 S2, regular rate, no rubs  ABD: soft, NT, ND, normal BS   : voiding  EXT: DP pulses intact b/l; dressing to left knee intact  Neuro: AAOx3    [x ] Total time spent: 30 Mins and greater than 50% of this time was spent counseling/coordinating care  ** Please Note: Dragon 360 Dictation voice to text software may have been used in the creation of this document   **

## 2019-02-23 ENCOUNTER — TELEPHONE (OUTPATIENT)
Dept: OBGYN CLINIC | Facility: HOSPITAL | Age: 79
End: 2019-02-23

## 2019-02-23 NOTE — TELEPHONE ENCOUNTER
Caller: Venus Moncada  C/B #: [phone]  Dr Dillon Stevens:   Post Op patient called on Saturday with medication questions and possible request for refills   I paged Dr Sheela Davis who is listed as the on call Dr

## 2019-02-25 ENCOUNTER — TELEPHONE (OUTPATIENT)
Dept: OBGYN CLINIC | Facility: HOSPITAL | Age: 79
End: 2019-02-25

## 2019-02-25 ENCOUNTER — TRANSITIONAL CARE MANAGEMENT (OUTPATIENT)
Dept: FAMILY MEDICINE CLINIC | Facility: CLINIC | Age: 79
End: 2019-02-25

## 2019-02-25 NOTE — UTILIZATION REVIEW
Notification of Discharge  This is a Notification of Discharge from our facility [address]  Please be advised that this patient has been discharge from our facility  Below you will find the admission and discharge date and time including the patients disposition  PRESENTATION DATE: 2/20/2019  9:58 AM  IP ADMISSION DATE: 2/20/19 1555  DISCHARGE DATE: 2/22/2019  5:10 PM  DISPOSITION: Home with [address] Utilization Review Department  Phone: [phone] Chelle Craig; Fax [phone]    Send all requests for admission clinical reviews, approved or denied determinations and any other requests to fax [phone]   All voicemails are confidential

## 2019-02-25 NOTE — TELEPHONE ENCOUNTER
Pt calling- Last seen 7/2016  Was discharged w Ovalle Catheter, prefers [facility] location    Please advise on appropriate apt   [phone]

## 2019-02-25 NOTE — TELEPHONE ENCOUNTER
Please advise if Prineo dressing will remain in place until follow-up or if it looses its integrity then dry sterile dressing daily and as needed? Jackelyn Lyn

## 2019-02-25 NOTE — TELEPHONE ENCOUNTER
Dr Tisha Liu had consulted patient in the hospital   There is a note that states to f/u in 1 week for ramirez removal   Does patinet still have ramirez? Please call patient with date, time, and location  I placed patient in Epic   Make sure that we take his insurance

## 2019-02-25 NOTE — TELEPHONE ENCOUNTER
Caller: [facility]  Call back number: [phone]  Patient's doctor: Dr Ling Byers called to let you know she did see the patient on 2/22  She states he still has an ace bandage on the knee  She states he was told to keep it on until his follow up appointment  Can he remove this? Can he shower?  Please advise

## 2019-02-25 NOTE — TELEPHONE ENCOUNTER
Yes he can remove the ace and shower  The prineo dressing will stay in place   We will remove it in the office at his 2 week visit

## 2019-02-25 NOTE — TELEPHONE ENCOUNTER
Pt calling- would like [facility] office for Ovalle removal   Please advise    [phone]

## 2019-02-25 NOTE — TELEPHONE ENCOUNTER
Patient was calling for his presurgical supplements  Wondered if he needed refills  Dr Sky Lezama told him he did not need refills after surgery  Question, patient has [facility] coming to the house to monitor his catheter he has due to urinary retention  Patient is doing out patient PT  Can he have VNA come if he is having outpatient PT? He would need to d/c VNA, right? He also then would need an RX for outpatient PT as he is going to an outside facility

## 2019-02-25 NOTE — TELEPHONE ENCOUNTER
Talked with logan and he is going to check to see if his visiting nurse can remove cath next Tuesday, reqardless, he will be having void trial next Tuesday, 3/5

## 2019-02-26 NOTE — TELEPHONE ENCOUNTER
Message reviewed,   Chart reviewed    Surgeon of record, Doctor Mayito    Please forward    Thank you,  CANDIE

## 2019-02-26 NOTE — TELEPHONE ENCOUNTER
I called patient and he stated that he wanted us to remove his ramirez    He was scheduled for ramirez removal   He did not want to get any bills from visiting nurses because he is going to outpatient rehab

## 2019-02-26 NOTE — TELEPHONE ENCOUNTER
Pt contacted today to conduct his postoperative follow up call assessment  Pt reports he has "been better but getting along " Pt reports pain is currently a 5 out of 10 that worsens with movement  Pt reports taking an oxycodone and 2 tylenol at bedtime and only tylenol again in the am for pain control  Pt educated on 3000mg max doseage of tylenol, pt agreeable and verbalized understanding  Pt reports knee is "swollen" and that he ice and elevates, was doing both at the time of the call  Pt reports dress has "markings of dry blood on the lower dressing, nothing more than what it was originally, its all dry " Pt reports dressing remains intact and no new drainage aside from the dry markings from early on  Pt reports ambulating with the walker and the cane depending on where he is  Pt reports transferring to cane this am around the home  Pt denies any falls  Pt has first PT apt this am at [facility]  Pt scheduled 3 apts with them this week for PT  Pt reports lovenox is "going okay, I'm an old pro " Denies questions, concerns or any bleeding  Pt reports having a ramirez catheter still placed and was waiting for a call back from urology for an apt  Pt advised to call us at 2pm if he had not heard from urology by then  Pt verbalized understanding  Pt reports not yet having a bowel movement, passing gas, and states "when the visiting nurse was here she said that was a good sign " Pt reports increasing fiber in diet and taking colace 2x daily  Pt encouraged to contact me with any difficulty or delay in bowel movement  Pt denies nausea, vomiting or abdominal pain  Pt reports vomiting 1x since DC after the care ride home, has denied feeling nauseas since then  Pt denies chest pain, SOB, dizziness, fever, and/or calf pain  Pt only reports posterior knee pain at this time and reports a 5 out of 10  Pt denies any questions, complaints, or concerning symptoms      pt encouraged to call me with questions, concerns or issues       **Addended, by 2pm today pt had urology apt scheduled for 2/28 at 9:15am

## 2019-02-26 NOTE — TELEPHONE ENCOUNTER
Pt called stating he was released from care of the visiting nurse   Pt would like for a script to be sent to [facility] in [place] for Out patient  [phone]    Pt states he has an appt tomorrow 2/27

## 2019-02-26 NOTE — TELEPHONE ENCOUNTER
Yariel from [email or web address] [facility] called she would like orders to be faxed   If you having any questions you can reach her at [phone] or you can fax order to [facility]#[phone]

## 2019-02-26 NOTE — TELEPHONE ENCOUNTER
I returned call and had spoken with Noreen Wakefield  She stated that she could remove his ramirez between 8 and 9 on 3/5/19  She just needs an order to do so    She is going to visit patient in 1 hour and will return my call after she informs patient of the plan for ramirez removal and then pvr in pm

## 2019-02-27 DIAGNOSIS — Z96.652 AFTERCARE FOLLOWING LEFT KNEE JOINT REPLACEMENT SURGERY: Primary | ICD-10-CM

## 2019-02-27 DIAGNOSIS — Z47.1 AFTERCARE FOLLOWING LEFT KNEE JOINT REPLACEMENT SURGERY: Primary | ICD-10-CM

## 2019-03-04 ENCOUNTER — OFFICE VISIT (OUTPATIENT)
Dept: FAMILY MEDICINE CLINIC | Facility: CLINIC | Age: 79
End: 2019-03-04
Payer: COMMERCIAL

## 2019-03-04 VITALS
HEART RATE: 63 BPM | BODY MASS INDEX: 23.7 KG/M2 | WEIGHT: 151 LBS | TEMPERATURE: 97.7 F | OXYGEN SATURATION: 99 % | DIASTOLIC BLOOD PRESSURE: 68 MMHG | HEIGHT: 67 IN | SYSTOLIC BLOOD PRESSURE: 108 MMHG

## 2019-03-04 DIAGNOSIS — M17.12 PRIMARY OSTEOARTHRITIS OF LEFT KNEE: Primary | ICD-10-CM

## 2019-03-04 DIAGNOSIS — Z96.652 S/P TOTAL KNEE ARTHROPLASTY, LEFT: ICD-10-CM

## 2019-03-04 DIAGNOSIS — R33.9 URINARY RETENTION: ICD-10-CM

## 2019-03-04 PROCEDURE — 99495 TRANSJ CARE MGMT MOD F2F 14D: CPT | Performed by: FAMILY MEDICINE

## 2019-03-04 PROCEDURE — 1160F RVW MEDS BY RX/DR IN RCRD: CPT | Performed by: FAMILY MEDICINE

## 2019-03-04 NOTE — PROGRESS NOTES
Assessment/Plan:     Diagnoses and all orders for this visit:    Primary osteoarthritis of left knee    Urinary retention    S/P total knee arthroplasty, left       patient is recovering from his knee surgery  Patient was very upset today that he still has a catheter and a bag for his urinary retention after surgery  But he has an appointment tomorrow to have that removed  Otherwise he is stable  He will follow up in 6 months for his Medicare wellness visit or sooner if needed      Subjective:     Chief Complaint   Patient presents with    Transition of Care Management     d/c from [facility] on 2/22/19         TCM Call (since 2/1/2019)     Date and time call was made  2/25/2019  1:14 PM    Hospital care reviewed  Records reviewed    Patient was hospitial[facility] at  [facility]    Date of Admission  02/20/19    Date of discharge  02/22/19    Diagnosis  Total Knee replacement    Disposition  Home    Were the patients medications reviewed and updated  Yes    Current Symptoms  Leg pain - left side    Left side leg pain severity  Moderate    Leg pain, left side, onset  Ongoing      TCM Call (since 2/1/2019)     Post hospital issues  None    Should patient be enrolled in anticoag monitoring? No    Scheduled for follow up? Yes    Patients specialists  Urologist; Other (comment)    Urologist name  Dr Riaz Staples    Other specialists names  Dr Kacey Melendez    Did you obtain your prescribed medications  Yes    Do you need help managing your prescriptions or medications  No    Is transportation to your appointment needed  No    I have advised the patient to call PCP with any new or worsening symptoms  Melonie ORDAZ      Living Arrangements  Spouse or Significiant other    Support System  Spouse    The type of support provided  Emotional; Physical    Do you have social support  No, not at all    Are you recieving any outpatient services  Yes    What type of services  Visiting Nurse    Are you recieving home care services  No    Are you using any community resources  No    Current waiver services  No    Have you fallen in the last 12 months  No    Interperter language line needed  No    Counseling  Patient    Counseling topics  Activities of daily living           Patient ID: Shana Perez is a 70 y o  male  Patient is here for transition of care after left knee replacement  He reports only complaint being he is upset over having to be catheterized for year difficulty urinating after his surgery  He still has a catheter and bag in place  He does have Urology appointment tomorrow hopefully be having them removed at this time otherwise he is doing physical therapy for his knee and recovering  He states the wound looks okay with no signs of infection      The following portions of the patient's history were reviewed and updated as appropriate: allergies, current medications, past family history, past medical history, past social history, past surgical history and problem list     Review of Systems   Constitutional: Negative  HENT: Negative  Eyes: Negative  Respiratory: Negative  Cardiovascular: Negative  Gastrointestinal: Negative  Endocrine: Negative  Genitourinary: Positive for difficulty urinating  Musculoskeletal: Negative  Skin: Negative  Allergic/Immunologic: Negative  Neurological: Negative  Hematological: Negative  Psychiatric/Behavioral: Negative  All other systems reviewed and are negative  Objective:    Vitals:    03/04/19 1120   BP: 108/68   BP Location: Left arm   Patient Position: Sitting   Cuff Size: Standard   Pulse: 63   Temp: 97 7 °F (36 5 °C)   TempSrc: Tympanic   SpO2: 99%   Weight: 68 5 kg (151 lb)   Height: 5' 7" (1 702 m)          Physical Exam   Constitutional: He is oriented to person, place, and time  He appears well-developed and well-nourished  No distress  HENT:   Head: Normocephalic     Right Ear: External ear normal    Left Ear: External ear normal    Nose: Nose normal    Mouth/Throat: Oropharynx is clear and moist    Eyes: Pupils are equal, round, and reactive to light  Conjunctivae and EOM are normal  Right eye exhibits no discharge  Left eye exhibits no discharge  Neck: Normal range of motion  Cardiovascular: Normal rate, regular rhythm and normal heart sounds  Pulmonary/Chest: Effort normal and breath sounds normal    Abdominal: Soft  Bowel sounds are normal  He exhibits no distension  There is no tenderness  Musculoskeletal: Normal range of motion  Neurological: He is alert and oriented to person, place, and time  No cranial nerve deficit  Skin: Skin is warm and dry  No rash noted  Psychiatric: He has a normal mood and affect  His behavior is normal  Judgment and thought content normal    Nursing note and vitals reviewed

## 2019-03-05 ENCOUNTER — PROCEDURE VISIT (OUTPATIENT)
Dept: UROLOGY | Facility: HOSPITAL | Age: 79
End: 2019-03-05
Payer: COMMERCIAL

## 2019-03-05 VITALS
SYSTOLIC BLOOD PRESSURE: 110 MMHG | BODY MASS INDEX: 23.42 KG/M2 | DIASTOLIC BLOOD PRESSURE: 78 MMHG | WEIGHT: 149.2 LBS | HEART RATE: 72 BPM | HEIGHT: 67 IN

## 2019-03-05 DIAGNOSIS — R33.9 URINARY RETENTION: Primary | ICD-10-CM

## 2019-03-05 LAB — POST-VOID RESIDUAL VOLUME, ML POC: 500.35 ML

## 2019-03-05 PROCEDURE — 51798 US URINE CAPACITY MEASURE: CPT

## 2019-03-05 RX ORDER — ACETAMINOPHEN 500 MG
500 TABLET ORAL EVERY 6 HOURS PRN
COMMUNITY
End: 2019-06-21

## 2019-03-05 NOTE — PROGRESS NOTES
3/5/2019    Juliana Weems  1940  [id number]    Diagnosis  Chief Complaint     Urinary Retention          Patient presents for void trial  managed by Dr Marlee Hernandez  Follow up in 6 weeks with PVR    Procedure Ovalle removal/voiding trial    Ovalle catheter removed after deflation of an intact balloon  Patient tolerated well  Encouraged patient to hydrate well and return this afternoon for post void residual   he knows he may return early if uncomfortable and unable to urinate  Patient agrees to this plan  Patient returned this afternoon  Patient states able to void  Patient voided 50 ml while in office  Bladder ultrasound performed and PVR measured 500 35ml  Patient refused to have catheter placed as it made it too dificult to walk as he just had knee surgery  Discussed CIC and he was willing to try  He demonstrated that he was able to perform it on his own when he used a coude tip as he has an en;arged prostate and drained out 1000 ml  Discussed with provider and patient should do CIC four times daily  Patient said he would do it twice daily  No results found for this or any previous visit (from the past 1 hour(s))        Vitals:    03/05/19 0835   BP: 110/78   Pulse: 72   Weight: 67 7 kg (149 lb 3 2 oz)   Height: 5' 7" (1 702 m)           Sandi Leroy RN

## 2019-03-06 ENCOUNTER — TELEPHONE (OUTPATIENT)
Dept: OBGYN CLINIC | Facility: HOSPITAL | Age: 79
End: 2019-03-06

## 2019-03-06 NOTE — TELEPHONE ENCOUNTER
Spoke to pt  Who stated since having surgery he has noticed some "black and blue and some redness to left ankle " today the pt  Stated the black and blue has disappear but he started with redness, warmth and tenderness to calf area  Pt  Had concerns, pt  Already have an appt  With Dr Amada Peterson for tomorrow   Please advise if a STAT doppler study is possible before visit in order to rule out DVT    Please advise

## 2019-03-06 NOTE — TELEPHONE ENCOUNTER
Caller: P/T on behalf of Patient  C/B # [phone]  Dr Lupe Mark op patient -- Physical therapy called to advise that the involved leg is red, swollen and a little tender, Patient will be seen tomorrow but wants to know if this should be adressed before then  He is asking if we can call the patient if need be    Thanks

## 2019-03-06 NOTE — TELEPHONE ENCOUNTER
Spoke to pt  He will arnold to see the Dr Beryl Reid for his follow up  Pt  Advised if any uncontrol pain, SOB pt  Should get to ED or call 911  Pt  Verbalized understanding

## 2019-03-07 ENCOUNTER — OFFICE VISIT (OUTPATIENT)
Dept: OBGYN CLINIC | Facility: HOSPITAL | Age: 79
End: 2019-03-07

## 2019-03-07 ENCOUNTER — HOSPITAL ENCOUNTER (OUTPATIENT)
Dept: NON INVASIVE DIAGNOSTICS | Facility: HOSPITAL | Age: 79
Discharge: HOME | End: 2019-03-07
Payer: COMMERCIAL

## 2019-03-07 ENCOUNTER — TELEPHONE (OUTPATIENT)
Dept: OBGYN CLINIC | Facility: HOSPITAL | Age: 79
End: 2019-03-07

## 2019-03-07 ENCOUNTER — HOSPITAL ENCOUNTER (OUTPATIENT)
Dept: RADIOLOGY | Facility: HOSPITAL | Age: 79
Discharge: HOME | End: 2019-03-07
Attending: ORTHOPAEDIC SURGERY
Payer: COMMERCIAL

## 2019-03-07 VITALS
DIASTOLIC BLOOD PRESSURE: 73 MMHG | BODY MASS INDEX: 23.04 KG/M2 | HEIGHT: 68 IN | WEIGHT: 152 LBS | HEART RATE: 80 BPM | SYSTOLIC BLOOD PRESSURE: 113 MMHG

## 2019-03-07 DIAGNOSIS — Z47.1 AFTERCARE FOLLOWING LEFT KNEE JOINT REPLACEMENT SURGERY: ICD-10-CM

## 2019-03-07 DIAGNOSIS — Z96.652 AFTERCARE FOLLOWING LEFT KNEE JOINT REPLACEMENT SURGERY: ICD-10-CM

## 2019-03-07 DIAGNOSIS — Z47.1 AFTERCARE FOLLOWING LEFT KNEE JOINT REPLACEMENT SURGERY: Primary | ICD-10-CM

## 2019-03-07 DIAGNOSIS — Z96.652 AFTERCARE FOLLOWING LEFT KNEE JOINT REPLACEMENT SURGERY: Primary | ICD-10-CM

## 2019-03-07 PROCEDURE — 99024 POSTOP FOLLOW-UP VISIT: CPT | Performed by: ORTHOPAEDIC SURGERY

## 2019-03-07 PROCEDURE — 93971 EXTREMITY STUDY: CPT

## 2019-03-07 PROCEDURE — 73560 X-RAY EXAM OF KNEE 1 OR 2: CPT

## 2019-03-07 RX ORDER — OXYCODONE HYDROCHLORIDE 5 MG/1
5 TABLET ORAL EVERY 4 HOURS PRN
Qty: 15 TABLET | Refills: 0 | Status: SHIPPED | OUTPATIENT
Start: 2019-03-07 | End: 2019-03-17

## 2019-03-07 NOTE — PROGRESS NOTES
Assessment:      Status post left total knee arthroplasty  Doing well postoperatively  he is noting left lower extremity swelling that was concerning to his physical therapist   He is only new take 2 oxycodone a day      Plan:      Full weight bearing  Stat Venous Doppler to R/O DVT and was reported as negative  His oxycodone was refilled today as well  Follow up: 4 weeks  Patient was seen, examined and plan formulated by Dr Lul Meehan    Subjective:      Nemo Jaimes is here for followup after left total knee replacement surgery  The patient denies  Fever but is experiencing left lower extremity swelling and pain  He is currently taking Lovenox as directed  His Ovalle catheter was just discontinued this past Monday as per Urology  He is ambulating and progressing in physical therapy  Objective:         General :    alert and oriented, in no acute distress   Gait:  Antalgic  Sutures:    Prineo removed   Incision:  healing well, no significant drainage, no dehiscence, no significant erythema   Tenderness:  Left posterior calf   Flexion ROM:  70   Extension ROM:  -10   Effusion:  mild   DVT Evaluation:  No evidence of DVT seen on physical exam   Posterior calf tenderness present       Imaging   Stable left TKA

## 2019-03-08 PROCEDURE — 93971 EXTREMITY STUDY: CPT | Performed by: SURGERY

## 2019-03-11 ENCOUNTER — TELEPHONE (OUTPATIENT)
Dept: FAMILY MEDICINE CLINIC | Facility: CLINIC | Age: 79
End: 2019-03-11

## 2019-03-11 ENCOUNTER — TELEPHONE (OUTPATIENT)
Dept: UROLOGY | Facility: MEDICAL CENTER | Age: 79
End: 2019-03-11

## 2019-03-11 NOTE — TELEPHONE ENCOUNTER
Called krystin and she gave me [facility] information and called Nalini @ [phone] x [phone] -she said needed prior auth form PCP even though they didn't order CIC  Called Krystin back and left message that PCP had to give prior authorization

## 2019-03-11 NOTE — TELEPHONE ENCOUNTER
Please call Martinez Burch at Dr Beck Cutting Page's office re the order you sent to [phone] [address]   She has questions for you  Her # is [phone]

## 2019-04-12 ENCOUNTER — OFFICE VISIT (OUTPATIENT)
Dept: OBGYN CLINIC | Facility: HOSPITAL | Age: 79
End: 2019-04-12

## 2019-04-12 VITALS
SYSTOLIC BLOOD PRESSURE: 127 MMHG | HEIGHT: 68 IN | DIASTOLIC BLOOD PRESSURE: 79 MMHG | WEIGHT: 150 LBS | BODY MASS INDEX: 22.73 KG/M2 | HEART RATE: 58 BPM

## 2019-04-12 DIAGNOSIS — Z96.652 AFTERCARE FOLLOWING LEFT KNEE JOINT REPLACEMENT SURGERY: Primary | ICD-10-CM

## 2019-04-12 DIAGNOSIS — Z47.1 AFTERCARE FOLLOWING LEFT KNEE JOINT REPLACEMENT SURGERY: Primary | ICD-10-CM

## 2019-04-12 PROCEDURE — 99024 POSTOP FOLLOW-UP VISIT: CPT | Performed by: PHYSICIAN ASSISTANT

## 2019-04-15 ENCOUNTER — TELEPHONE (OUTPATIENT)
Dept: UROLOGY | Facility: HOSPITAL | Age: 79
End: 2019-04-15

## 2019-04-15 ENCOUNTER — OFFICE VISIT (OUTPATIENT)
Dept: UROLOGY | Facility: HOSPITAL | Age: 79
End: 2019-04-15
Payer: COMMERCIAL

## 2019-04-15 VITALS
BODY MASS INDEX: 23.04 KG/M2 | WEIGHT: 152 LBS | SYSTOLIC BLOOD PRESSURE: 134 MMHG | HEIGHT: 68 IN | DIASTOLIC BLOOD PRESSURE: 82 MMHG | HEART RATE: 59 BPM

## 2019-04-15 DIAGNOSIS — R33.9 RETENTION OF URINE: Primary | ICD-10-CM

## 2019-04-15 DIAGNOSIS — R33.9 URINARY RETENTION: ICD-10-CM

## 2019-04-15 DIAGNOSIS — N52.9 ERECTILE DYSFUNCTION, UNSPECIFIED ERECTILE DYSFUNCTION TYPE: ICD-10-CM

## 2019-04-15 LAB — POST-VOID RESIDUAL VOLUME, ML POC: 585.05 ML

## 2019-04-15 PROCEDURE — 99213 OFFICE O/P EST LOW 20 MIN: CPT | Performed by: UROLOGY

## 2019-04-15 PROCEDURE — 51798 US URINE CAPACITY MEASURE: CPT | Performed by: UROLOGY

## 2019-04-15 RX ORDER — SILDENAFIL 100 MG/1
100 TABLET, FILM COATED ORAL DAILY PRN
Qty: 10 TABLET | Refills: 11 | Status: SHIPPED | OUTPATIENT
Start: 2019-04-15 | End: 2020-12-21 | Stop reason: SDUPTHER

## 2019-04-15 RX ORDER — TAMSULOSIN HYDROCHLORIDE 0.4 MG/1
0.4 CAPSULE ORAL
Qty: 90 CAPSULE | Refills: 3 | Status: SHIPPED | OUTPATIENT
Start: 2019-04-15 | End: 2020-04-15 | Stop reason: SDUPTHER

## 2019-05-17 ENCOUNTER — HOSPITAL ENCOUNTER (OUTPATIENT)
Dept: RADIOLOGY | Facility: HOSPITAL | Age: 79
Discharge: HOME | End: 2019-05-17
Payer: COMMERCIAL

## 2019-05-17 ENCOUNTER — OFFICE VISIT (OUTPATIENT)
Dept: OBGYN CLINIC | Facility: HOSPITAL | Age: 79
End: 2019-05-17

## 2019-05-17 VITALS
WEIGHT: 155 LBS | SYSTOLIC BLOOD PRESSURE: 117 MMHG | HEART RATE: 64 BPM | HEIGHT: 68 IN | DIASTOLIC BLOOD PRESSURE: 74 MMHG | BODY MASS INDEX: 23.49 KG/M2

## 2019-05-17 DIAGNOSIS — Z47.1 AFTERCARE FOLLOWING LEFT KNEE JOINT REPLACEMENT SURGERY: ICD-10-CM

## 2019-05-17 DIAGNOSIS — Z96.652 AFTERCARE FOLLOWING LEFT KNEE JOINT REPLACEMENT SURGERY: ICD-10-CM

## 2019-05-17 DIAGNOSIS — Z96.652 AFTERCARE FOLLOWING LEFT KNEE JOINT REPLACEMENT SURGERY: Primary | ICD-10-CM

## 2019-05-17 DIAGNOSIS — Z47.1 AFTERCARE FOLLOWING LEFT KNEE JOINT REPLACEMENT SURGERY: Primary | ICD-10-CM

## 2019-05-17 PROCEDURE — 99024 POSTOP FOLLOW-UP VISIT: CPT | Performed by: PHYSICIAN ASSISTANT

## 2019-05-17 PROCEDURE — 73560 X-RAY EXAM OF KNEE 1 OR 2: CPT

## 2019-05-24 ENCOUNTER — PROCEDURE VISIT (OUTPATIENT)
Dept: UROLOGY | Facility: HOSPITAL | Age: 79
End: 2019-05-24
Payer: COMMERCIAL

## 2019-05-24 ENCOUNTER — TELEPHONE (OUTPATIENT)
Dept: UROLOGY | Facility: AMBULATORY SURGERY CENTER | Age: 79
End: 2019-05-24

## 2019-05-24 VITALS
BODY MASS INDEX: 23.22 KG/M2 | SYSTOLIC BLOOD PRESSURE: 100 MMHG | HEART RATE: 60 BPM | HEIGHT: 68 IN | WEIGHT: 153.2 LBS | DIASTOLIC BLOOD PRESSURE: 68 MMHG

## 2019-05-24 DIAGNOSIS — R33.9 RETENTION OF URINE: Primary | ICD-10-CM

## 2019-05-24 LAB
SL AMB  POCT GLUCOSE, UA: NORMAL
SL AMB LEUKOCYTE ESTERASE,UA: NORMAL
SL AMB POCT BILIRUBIN,UA: NORMAL
SL AMB POCT BLOOD,UA: NORMAL
SL AMB POCT CLARITY,UA: CLEAR
SL AMB POCT COLOR,UA: YELLOW
SL AMB POCT KETONES,UA: NORMAL
SL AMB POCT NITRITE,UA: NORMAL
SL AMB POCT PH,UA: 5
SL AMB POCT SPECIFIC GRAVITY,UA: 1.01
SL AMB POCT URINE PROTEIN: NORMAL
SL AMB POCT UROBILINOGEN: NORMAL

## 2019-05-24 PROCEDURE — 99213 OFFICE O/P EST LOW 20 MIN: CPT | Performed by: UROLOGY

## 2019-05-24 PROCEDURE — 81002 URINALYSIS NONAUTO W/O SCOPE: CPT | Performed by: UROLOGY

## 2019-05-24 PROCEDURE — 52000 CYSTOURETHROSCOPY: CPT | Performed by: UROLOGY

## 2019-05-24 RX ORDER — CEFAZOLIN SODIUM 1 G/50ML
1000 SOLUTION INTRAVENOUS ONCE
Status: CANCELLED | OUTPATIENT
Start: 2019-05-24 | End: 2019-05-24

## 2019-05-28 ENCOUNTER — PREP FOR PROCEDURE (OUTPATIENT)
Dept: UROLOGY | Facility: AMBULATORY SURGERY CENTER | Age: 79
End: 2019-05-28

## 2019-05-28 ENCOUNTER — OFFICE VISIT (OUTPATIENT)
Dept: UROLOGY | Facility: AMBULATORY SURGERY CENTER | Age: 79
End: 2019-05-28
Payer: COMMERCIAL

## 2019-05-28 ENCOUNTER — TELEPHONE (OUTPATIENT)
Dept: UROLOGY | Facility: AMBULATORY SURGERY CENTER | Age: 79
End: 2019-05-28

## 2019-05-28 VITALS
HEIGHT: 67 IN | SYSTOLIC BLOOD PRESSURE: 132 MMHG | HEART RATE: 64 BPM | BODY MASS INDEX: 24.64 KG/M2 | DIASTOLIC BLOOD PRESSURE: 90 MMHG | WEIGHT: 157 LBS

## 2019-05-28 DIAGNOSIS — N40.1 BENIGN PROSTATIC HYPERPLASIA WITH INCOMPLETE BLADDER EMPTYING: Primary | ICD-10-CM

## 2019-05-28 DIAGNOSIS — R33.9 URINARY RETENTION: Primary | ICD-10-CM

## 2019-05-28 DIAGNOSIS — R39.14 BENIGN PROSTATIC HYPERPLASIA WITH INCOMPLETE BLADDER EMPTYING: Primary | ICD-10-CM

## 2019-05-28 LAB — POST-VOID RESIDUAL VOLUME, ML POC: 878 ML

## 2019-05-28 PROCEDURE — 51798 US URINE CAPACITY MEASURE: CPT

## 2019-06-12 ENCOUNTER — TELEPHONE (OUTPATIENT)
Dept: UROLOGY | Facility: AMBULATORY SURGERY CENTER | Age: 79
End: 2019-06-12

## 2019-06-21 NOTE — PRE-PROCEDURE INSTRUCTIONS
Pre-Surgery Instructions:   Medication Instructions    multivitamin (THERAGRAN) TABS Instructed patient per Anesthesia Guidelines   sildenafil (VIAGRA) 100 mg tablet Instructed patient per Anesthesia Guidelines   tamsulosin (FLOMAX) 0 4 mg Instructed patient per Anesthesia Guidelines  Before your operation, you play an important role in decreasing your risk for infection by washing with special antiseptic soap  This is an effective way to reduce bacteria on the skin which may help to prevent infections at the surgical site  Please read the following directions in advance  1  In the week before your operation purchase a 4 ounce bottle of antiseptic soap containing chlorhexidine gluconate 4%  Some brand names include: Aplicare, Endure, and Hibiclens  The cost is usually less than $5 00  · For your convenience, the [address] [place] [address] carries the soap  · It may also be available at your doctor's office or pre-admission testing center, and at most retail pharmacies  · If you are allergic or sensitive to soaps containing chlorhexidine gluconate (CHG), please let your doctor know so another antiseptic soap can be suggested  · CHG antiseptic soap is for external use only  2      The day before your operation follow these directions carefully to get ready  · Place clean lines (sheets) on your bed; you should sleep on clean sheets after your evening shower  · Get clean towels and washcloths ready - you need enough for 2 showers  · Set aside clean underwear, pajamas, and clothing to wear after the shower  Reminders:  · DO NOT use any other soap or body rinse on your skin during or after the antiseptic showers  · DO NOT use lotion , powder, deodorant, or perfume/aftershave of any kind on your skin after your antiseptic shower  · DO NOT shave any body parts in the 24 hours/the day before your operation  · DO NOT get the antiseptic soap in your eyes, ears, nose, mouth, or vaginal area      3  You will need to shower the night before AND the morning of your Surgery  Shower 1:  · The evening before your operation, take the fist shower  · First, shampoo your hair with regular shampoo and rinse it completely before you use the anitseptic soap  After washing and rinsing your hair, rinse your body  · Next, use a clean wash cloth to apply the antiseptic soap and wash your body from the neck down to your toes using 1/2 bottle of the antiseptic soap  You will use the other 1/2 bottle for the second shower  · Clean the area where your incision will be; later this area well for about 2 minutes  · If you ar having head or neck surgery, wash areas with the antiseptic soap  · Rinse yourself completely with running water  · Use a clean towel to dry off  · Wear clean underwear and clothing/pajamas  Shower 2:  · The Morning of your operation, take the second shower following the same steps as Shower 1 using the second 1/2 of the bottle of antiseptic soap  · Use clean cloths and towels to was and dry yourself off  · Wear clean underwear and clothing

## 2019-06-24 ENCOUNTER — APPOINTMENT (OUTPATIENT)
Dept: LAB | Facility: CLINIC | Age: 79
End: 2019-06-24
Payer: COMMERCIAL

## 2019-06-24 DIAGNOSIS — R33.9 URINARY RETENTION: ICD-10-CM

## 2019-06-24 PROCEDURE — 87186 SC STD MICRODIL/AGAR DIL: CPT

## 2019-06-24 PROCEDURE — 87077 CULTURE AEROBIC IDENTIFY: CPT

## 2019-06-24 PROCEDURE — 87147 CULTURE TYPE IMMUNOLOGIC: CPT

## 2019-06-24 PROCEDURE — 87086 URINE CULTURE/COLONY COUNT: CPT

## 2019-06-26 LAB — BACTERIA UR CULT: ABNORMAL

## 2019-06-27 ENCOUNTER — TELEPHONE (OUTPATIENT)
Dept: UROLOGY | Facility: HOSPITAL | Age: 79
End: 2019-06-27

## 2019-06-27 DIAGNOSIS — N39.0 URINARY TRACT INFECTION WITHOUT HEMATURIA, SITE UNSPECIFIED: Primary | ICD-10-CM

## 2019-06-28 RX ORDER — SULFAMETHOXAZOLE AND TRIMETHOPRIM 800; 160 MG/1; MG/1
1 TABLET ORAL EVERY 12 HOURS SCHEDULED
Qty: 10 TABLET | Refills: 0 | Status: SHIPPED | OUTPATIENT
Start: 2019-06-28 | End: 2019-07-03

## 2019-07-01 ENCOUNTER — TELEPHONE (OUTPATIENT)
Dept: UROLOGY | Facility: AMBULATORY SURGERY CENTER | Age: 79
End: 2019-07-01

## 2019-07-01 NOTE — TELEPHONE ENCOUNTER
Patient seen by Dr Pearl Situ in [place]/scheduled 7/5/19 for [facility] has questions for nurse about catheter  Please call back  [phone]  Thank you!

## 2019-07-05 ENCOUNTER — ANESTHESIA (OUTPATIENT)
Dept: PERIOP | Facility: HOSPITAL | Age: 79
End: 2019-07-05
Payer: COMMERCIAL

## 2019-07-05 ENCOUNTER — ANESTHESIA EVENT (OUTPATIENT)
Dept: PERIOP | Facility: HOSPITAL | Age: 79
End: 2019-07-05
Payer: COMMERCIAL

## 2019-07-05 ENCOUNTER — HOSPITAL ENCOUNTER (OUTPATIENT)
Facility: HOSPITAL | Age: 79
Setting detail: OUTPATIENT SURGERY
Discharge: HOME | End: 2019-07-05
Attending: UROLOGY | Admitting: UROLOGY
Payer: COMMERCIAL

## 2019-07-05 VITALS
OXYGEN SATURATION: 100 % | RESPIRATION RATE: 16 BRPM | SYSTOLIC BLOOD PRESSURE: 169 MMHG | BODY MASS INDEX: 22.28 KG/M2 | DIASTOLIC BLOOD PRESSURE: 97 MMHG | WEIGHT: 147 LBS | HEART RATE: 51 BPM | TEMPERATURE: 97.3 F | HEIGHT: 68 IN

## 2019-07-05 DIAGNOSIS — R33.9 RETENTION OF URINE: Primary | ICD-10-CM

## 2019-07-05 PROCEDURE — 52442 CYSTO INS TRNSPRSTC IMPLT EA: CPT | Performed by: UROLOGY

## 2019-07-05 PROCEDURE — 52441 CYSTO INSJ TRNSPRSTC 1 IMPLT: CPT | Performed by: UROLOGY

## 2019-07-05 PROCEDURE — L8699 PROSTHETIC IMPLANT NOS: HCPCS | Performed by: UROLOGY

## 2019-07-05 PROCEDURE — 93005 ELECTROCARDIOGRAM TRACING: CPT

## 2019-07-05 PROCEDURE — NC001 PR NO CHARGE: Performed by: UROLOGY

## 2019-07-05 DEVICE — IMPLANT URO PROSTATE UROLIFT: Type: IMPLANTABLE DEVICE | Site: URETHRA | Status: FUNCTIONAL

## 2019-07-05 RX ORDER — CEFAZOLIN SODIUM 1 G/50ML
1000 SOLUTION INTRAVENOUS ONCE
Status: COMPLETED | OUTPATIENT
Start: 2019-07-05 | End: 2019-07-05

## 2019-07-05 RX ORDER — PROPOFOL 10 MG/ML
INJECTION, EMULSION INTRAVENOUS AS NEEDED
Status: DISCONTINUED | OUTPATIENT
Start: 2019-07-05 | End: 2019-07-05 | Stop reason: SURG

## 2019-07-05 RX ORDER — FENTANYL CITRATE/PF 50 MCG/ML
25 SYRINGE (ML) INJECTION
Status: DISCONTINUED | OUTPATIENT
Start: 2019-07-05 | End: 2019-07-05 | Stop reason: HOSPADM

## 2019-07-05 RX ORDER — ONDANSETRON 2 MG/ML
INJECTION INTRAMUSCULAR; INTRAVENOUS AS NEEDED
Status: DISCONTINUED | OUTPATIENT
Start: 2019-07-05 | End: 2019-07-05 | Stop reason: SURG

## 2019-07-05 RX ORDER — MIDAZOLAM HYDROCHLORIDE 1 MG/ML
INJECTION INTRAMUSCULAR; INTRAVENOUS AS NEEDED
Status: DISCONTINUED | OUTPATIENT
Start: 2019-07-05 | End: 2019-07-05 | Stop reason: SURG

## 2019-07-05 RX ORDER — MAGNESIUM HYDROXIDE 1200 MG/15ML
LIQUID ORAL AS NEEDED
Status: DISCONTINUED | OUTPATIENT
Start: 2019-07-05 | End: 2019-07-05 | Stop reason: HOSPADM

## 2019-07-05 RX ORDER — GLYCOPYRROLATE 0.2 MG/ML
INJECTION INTRAMUSCULAR; INTRAVENOUS AS NEEDED
Status: DISCONTINUED | OUTPATIENT
Start: 2019-07-05 | End: 2019-07-05 | Stop reason: SURG

## 2019-07-05 RX ORDER — PHENAZOPYRIDINE HYDROCHLORIDE 100 MG/1
100 TABLET, FILM COATED ORAL 3 TIMES DAILY PRN
Qty: 20 TABLET | Refills: 0 | Status: SHIPPED | OUTPATIENT
Start: 2019-07-05 | End: 2019-08-14 | Stop reason: ALTCHOICE

## 2019-07-05 RX ORDER — CIPROFLOXACIN 500 MG/1
500 TABLET, FILM COATED ORAL 2 TIMES DAILY
Qty: 6 TABLET | Refills: 0 | Status: SHIPPED | OUTPATIENT
Start: 2019-07-05 | End: 2019-07-08

## 2019-07-05 RX ORDER — HYDROCODONE BITARTRATE AND ACETAMINOPHEN 5; 325 MG/1; MG/1
1 TABLET ORAL EVERY 4 HOURS PRN
Status: DISCONTINUED | OUTPATIENT
Start: 2019-07-05 | End: 2019-07-05 | Stop reason: HOSPADM

## 2019-07-05 RX ORDER — FENTANYL CITRATE 50 UG/ML
INJECTION, SOLUTION INTRAMUSCULAR; INTRAVENOUS AS NEEDED
Status: DISCONTINUED | OUTPATIENT
Start: 2019-07-05 | End: 2019-07-05 | Stop reason: SURG

## 2019-07-05 RX ORDER — FUROSEMIDE 10 MG/ML
INJECTION INTRAMUSCULAR; INTRAVENOUS AS NEEDED
Status: DISCONTINUED | OUTPATIENT
Start: 2019-07-05 | End: 2019-07-05 | Stop reason: SURG

## 2019-07-05 RX ORDER — SODIUM CHLORIDE, SODIUM LACTATE, POTASSIUM CHLORIDE, CALCIUM CHLORIDE 600; 310; 30; 20 MG/100ML; MG/100ML; MG/100ML; MG/100ML
75 INJECTION, SOLUTION INTRAVENOUS CONTINUOUS
Status: DISCONTINUED | OUTPATIENT
Start: 2019-07-05 | End: 2019-07-05 | Stop reason: HOSPADM

## 2019-07-05 RX ORDER — HYDROCODONE BITARTRATE AND ACETAMINOPHEN 5; 325 MG/1; MG/1
1 TABLET ORAL EVERY 4 HOURS PRN
Qty: 20 TABLET | Refills: 0 | Status: SHIPPED | OUTPATIENT
Start: 2019-07-05 | End: 2019-07-15

## 2019-07-05 RX ORDER — DEXAMETHASONE SODIUM PHOSPHATE 4 MG/ML
INJECTION, SOLUTION INTRA-ARTICULAR; INTRALESIONAL; INTRAMUSCULAR; INTRAVENOUS; SOFT TISSUE AS NEEDED
Status: DISCONTINUED | OUTPATIENT
Start: 2019-07-05 | End: 2019-07-05 | Stop reason: SURG

## 2019-07-05 RX ADMIN — CEFAZOLIN SODIUM 1000 MG: 1 SOLUTION INTRAVENOUS at 09:07

## 2019-07-05 RX ADMIN — SODIUM CHLORIDE, SODIUM LACTATE, POTASSIUM CHLORIDE, AND CALCIUM CHLORIDE 75 ML/HR: .6; .31; .03; .02 INJECTION, SOLUTION INTRAVENOUS at 07:26

## 2019-07-05 RX ADMIN — MIDAZOLAM 2 MG: 1 INJECTION INTRAMUSCULAR; INTRAVENOUS at 09:03

## 2019-07-05 RX ADMIN — FENTANYL CITRATE 50 MCG: 50 INJECTION, SOLUTION INTRAMUSCULAR; INTRAVENOUS at 09:04

## 2019-07-05 RX ADMIN — FUROSEMIDE 10 MG: 10 INJECTION, SOLUTION INTRAMUSCULAR; INTRAVENOUS at 09:32

## 2019-07-05 RX ADMIN — DEXAMETHASONE SODIUM PHOSPHATE 4 MG: 4 INJECTION, SOLUTION INTRAMUSCULAR; INTRAVENOUS at 09:25

## 2019-07-05 RX ADMIN — GLYCOPYRROLATE 0.2 MG: 0.2 INJECTION, SOLUTION INTRAMUSCULAR; INTRAVENOUS at 09:05

## 2019-07-05 RX ADMIN — PROPOFOL 150 MG: 10 INJECTION, EMULSION INTRAVENOUS at 09:09

## 2019-07-05 RX ADMIN — SODIUM CHLORIDE, SODIUM LACTATE, POTASSIUM CHLORIDE, AND CALCIUM CHLORIDE: .6; .31; .03; .02 INJECTION, SOLUTION INTRAVENOUS at 08:52

## 2019-07-05 RX ADMIN — ONDANSETRON 4 MG: 2 INJECTION INTRAMUSCULAR; INTRAVENOUS at 09:36

## 2019-07-05 RX ADMIN — FENTANYL CITRATE 50 MCG: 50 INJECTION, SOLUTION INTRAMUSCULAR; INTRAVENOUS at 09:33

## 2019-07-05 NOTE — NURSING NOTE
Patient anxious, upset in regard to needing to void prior to discharge  Scant, drop size amount of pink tinged urine noted Argentina Berumen notified  Instructed by Dr Isabel Berumen to discharge patient  without voiding

## 2019-07-05 NOTE — ANESTHESIA POSTPROCEDURE EVALUATION
Post-Op Assessment Note    CV Status:  Stable  Pain Score: 0    Pain management: adequate     Mental Status:  Awake   Hydration Status:  Stable   PONV Controlled:  None   Airway Patency:  Patent   Post Op Vitals Reviewed: Yes      Staff: CRNA           BP      Temp      Pulse    Resp      SpO2

## 2019-07-05 NOTE — OP NOTE
OPERATIVE REPORT  PATIENT NAME: Ashish Luo    :  1940  MRN: [id number]  Pt Location: [facility] OR ROOM 02    SURGERY DATE: 2019    Surgeon(s) and Role:     Rudy Hudson MD - Primary    Preop Diagnosis:  Retention of urine [R33 9]    Post-Op Diagnosis Codes:     * Retention of urine [R33 9]    Procedure(s) (LRB):  CYSTOSCOPY WITH INSERTION UROLIFT (N/A)    Specimen(s):  * No specimens in log *    Estimated Blood Loss:   Minimal    Drains:  Urethral Catheter Latex;Straight-tip 20 Fr  (Active)   Number of days: 0       Anesthesia Type:   General    Operative Indications:  Retention of urine [R33 9]      Operative Findings:  Successful placement of 7 Uro lift clips  Complications:   None    Procedure and Technique:  After informed consent was obtained the patient was brought to the operating room  Preoperative antibiotics had been given  Bilateral sequential compressive devices placed on the lower extremities for DVT prophylaxis  The patient was sedated and prepped and draped in standard sterile fashion in dorsal lithotomy position  A 20F cystoscope was inserted into the bladder  The bladder was inspected and no urothelial lesions were noted  The cystoscopy bridge was replaced with a UroLift delivery device  The first treatment site was the patient's left side approximately 1 5cm distal to the bladder neck  The distal tip of the delivery device was then angled laterally approximately 20 degrees at this position to compress the lateral lobe  The trigger was pulled, thereby deploying a needle containing the implant through the prostate  The needle was then retracted, allowing one end of the implant to be delivered to the capsular surface of the prostate  The implant was then tensioned to assure capsular seating and removal of slack monofilament    The device was then angled back toward midline and slowly advanced proximally (typically 3 to 4 mm) until cystoscopic verification of the monofilament being centered in the delivery bay  The urethral end piece was then affixed to the monofilament thereby tailoring the size of the implant  Excess filament was then severed  The delivery device was then re-advanced into the bladder  The delivery device was then replaced with cystoscope and bridge and the implant location and opening effect was confirmed cystoscopically  The same procedure was then repeated on the right side, and two additional implants were delivered just proximal to the veru montanum, again one on right and one on left side of the prostate, following the same technique  Cystoscopy then revealed a persistent area of obstruction, and 3more implants were delivered in the mid prostate (1 on the left and 2 on the right)  A final cystoscopy was conducted first to inspect the location and state of each implant and second, to confirm the presence of a continuous anterior channel was present through the prostatic urethra with irrigation flow turned off  The cystoscope was removed and a 20 [place] Ovalle catheter was placed  Patient was awoken and taken to the postanesthesia care unit in stable condition     I was present for the entire procedure    Patient Disposition:  PACU     SIGNATURE: Sol Woodard MD  DATE: July 5, 2019  TIME: 9:51 AM

## 2019-07-05 NOTE — DISCHARGE INSTRUCTIONS
Follow-up with surgeon as already scheduled  Discharge Instructions Following UroLift® Transprostatic Implant Treatment    GENERAL EXPECTATIONS  Some men may experience discomfort after the procedure  On occasion, some bloody discharge may be apparent from the penis  You may have soreness in the lower abdomen, and it may be uncomfortable to sit  You may experience the need to urinate more frequently and with greater urgency  These are all normal reactions to the procedure  It is important to take care of yourself the next couple of days to facilitate a speedy recovery  The following are some suggestions:  1  Have someone drive you home after the procedure  2  Drink plenty of water  3  Take your medication as prescribed  4  If you have a catheter placed, do not engage in strenuous activity until your catheter has been removed  You may take a shower but avoid a bath while you have a catheter  MEDICATIONS  Take the following medications as directed:  Cipro  pyridium   Norco       When taking pain medications, you may experience dizziness or drowsiness  Do not drink alcohol or drive when you are taking these medications  If you are given an antibiotic to prevent urinary tract infection, it is important to finish all medications as directed  *If you have a catheter placed, see instructions for Care of Catheter and Instructions for Self Removal of Catheter  COMPLICATIONS  You should contact your physician, at [phone]  if you experience any of the followin  Temperature above 101 5° (taken by mouth)  2  Excessive urinary bleeding or bleeding from the penis  3  Continuous bladder spasms  4  Painful, swollen and/or inflated testicle(s) or scrotum  5  Unable to void spontaneously or the indwelling catheter is not draining urine or is blocked  If you need immediate attention, go to the hospital emergency room for treatment  Always call your physician before going to the emergency room   If your doctor suggests that you go to the emergency room or other facility for catheterization for inability to urinate, be sure to tell the facility personnel to use a Coudé (pronounced -day) tipped catheter  Phenazopyridine (By mouth)   Phenazopyridine (bzy-sp-oaj-PIR-i-luzmaria)  Relieves symptoms caused by urinary tract infections and other urinary problems  Brand Name(s): Azo Standard, Azo Urinary Pain Relief, Azo Urinary Tract Health, Baridium, Leader Urinary Pain Relief, Preferred Urinary Pain Relief, Pyridium, Quality Choice Azo, [employer] Urinary Pain Relief, Urinary Pain Relief, Woman's Wellbeing UTI Relief   There may be other brand names for this medicine  When This Medicine Should Not Be Used: You should not use this medicine if you have had an allergic reaction to phenazopyridine  How to Use This Medicine:   Tablet  · Your doctor will tell you how much to take and how often  · Swallow the tablets whole, do not crush or chew  · You may take the medicine with food to avoid an upset stomach  If a dose is missed:   · Take the missed dose as soon as possible  · Skip the missed dose if it is almost time for your next regular dose  · You should not use two doses at the same time  How to Store and Dispose of This Medicine:   · Store at room temperature, away from moisture and direct light  · Ask your pharmacist, doctor, or health caregiver about the best way to dispose of any outdated medicine or medicine no longer needed  · Keep all medicine out of the reach of children  Drugs and Foods to Avoid:      Ask your doctor or pharmacist before using any other medicine, including over-the-counter medicines, vitamins, and herbal products  Warnings While Using This Medicine:   · Check with your doctor before taking phenazopyridine if you have kidney or liver problems or are pregnant or breastfeeding  · This medicine can turn urine a red or orange color   This is normal   · This medicine may stain soft contact lenses  You may not want to wear your contacts while taking this medicine  Possible Side Effects While Using This Medicine:   Call your doctor right away if you notice any of these side effects:  · Skin rash or hives, trouble breathing  · Yellowing of the skin or eyes  If you notice these less serious side effects, talk with your doctor:   · Indigestion or stomach upset  · Dizziness  · Headache  If you notice other side effects that you think are caused by this medicine, tell your doctor  Call your doctor for medical advice about side effects  You may report side effects to FDA at 0-150-FDA-1690  © 2017 [facility] Information is for End User's use only and may not be sold, redistributed or otherwise used for commercial purposes  The above information is an  only  It is not intended as medical advice for individual conditions or treatments  Talk to your doctor, nurse or pharmacist before following any medical regimen to see if it is safe and effective for you

## 2019-07-05 NOTE — H&P
UROLOGY H&P NOTE     Patient Identifiers: Saad Greenfield (MRN [id number])    Date of Service: 2019    History of Present Illness:     Saad Greenfield is a 70 y o  old with a history of BPH incomplete bladder emptying who presents for elective Uro lift procedure  The patient recently had a UTI and has finished a course of Bactrim  He otherwise denies any changes to his health  He has no new complaints      Past Medical, Past Surgical History:     Past Medical History:   Diagnosis Date    BPH (benign prostatic hyperplasia)     Diverticulitis of colon     Herniated lumbar intervertebral disc     Kidney mass     Murmur     diagnosed as a child 6 YO    Osteoarthritis     Preoperative cardiovascular examination 2018   :    Past Surgical History:   Procedure Laterality Date    HERNIA REPAIR      JOINT REPLACEMENT Bilateral     KNEE ARTHROSCOPY      bilateral    OR TOTAL HIP ARTHROPLASTY Left 2017    Procedure: ANTERIOR TOTAL HIP ARTHROPLASTY;  Surgeon: Greyson Rowe MD;  [facility];  Service: Orthopedics    OR TOTAL HIP ARTHROPLASTY Right 1/3/2018    Procedure: ANTERIOR TOTAL HIP ARTHROPLASTY;  Surgeon: Greyson Rowe MD;  [facility];  Service: Orthopedics    OR TOTAL KNEE ARTHROPLASTY Left 2019    Procedure: TOTAL KNEE ARTHROPLASTY;  Surgeon: Greyson Rowe MD;  [facility];  Service: Orthopedics   :    Medications, Allergies:     Current Facility-Administered Medications   Medication Dose Route Frequency    ceFAZolin (ANCEF) IVPB (premix) 1,000 mg  1,000 mg Intravenous Once    lactated ringers infusion  75 mL/hr Intravenous Continuous       Allergies:  No Known Allergies:    Social and Family History:   Social History:   Social History     Tobacco Use    Smoking status: Former Smoker     Packs/day: 1 00     Years: 15 00     Pack years: 15 00     Last attempt to quit: 1980     Years since quittin 5    Smokeless tobacco: Never Used Substance Use Topics    Alcohol use: Yes     Alcohol/week: 2 0 standard drinks     Types: 2 Cans of beer per week     Frequency: 4 or more times a week     Drinks per session: 1 or 2     Comment: beer daily     Drug use: No        Social History     Tobacco Use   Smoking Status Former Smoker    Packs/day: 1 00    Years: 15 00    Pack years: 15 00    Last attempt to quit: 1980    Years since quittin 5   Smokeless Tobacco Never Used       Family History:  Family History   Problem Relation Age of Onset    Cancer Mother     Cancer Father     Cancer Brother     Heart disease Brother    :     Review of Systems:     General: Fever, chills, or night sweats: negative  Cardiac: Negative for chest pain  Pulmonary: Negative for shortness of breath  Gastrointestinal: Abdominal pain negative  Nausea, vomiting, or diarrhea negative,  Genitourinary: See HPI above  Patient does not have hematuria  All other systems queried were negative  Physical Exam:   General: Patient is pleasant and in NAD  Awake and alert  /80   Pulse (!) 51   Temp 98 1 °F (36 7 °C) (Oral)   Resp 18   Ht 5' 7 5" (1 715 m)   Wt 66 7 kg (147 lb)   SpO2 96%   BMI 22 68 kg/m² Temp (24hrs), Av 1 °F (36 7 °C), Min:98 1 °F (36 7 °C), Max:98 1 °F (36 7 °C)  current; Temperature: 98 1 °F (36 7 °C)  No intake/output data recorded  Cardiac: Peripheral edema: negative  Pulmonary: Non-labored breathing  Abdomen: Soft, non-tender, non-distended  No surgical scars  No masses, tenderness, hernias noted  Genitourinary: Negative CVA tenderness, negative suprapubic tenderness        Labs:     Lab Results   Component Value Date    HGB 9 6 (L) 2019    HCT 28 2 (L) 2019    WBC 10 14 2019     2019   ]    Lab Results   Component Value Date     2017    K 4 1 2019    CL 91 (L) 2019    CO2 25 2019    BUN 10 2019    CREATININE 0 82 2019    CALCIUM 7 5 (L) 2019 GLUCOSE 88 12/05/2017   ]    Imaging:   I personally reviewed the images and report of the following studies, and reviewed them with the patient:    none      ASSESSMENT:     70 y o  old male with  BPH and incomplete bladder emptying  PLAN:     We will proceed with Uro lift procedure today  We again discussed the risks and benefits and the perioperative course  All of his questions were answered          Render MD Melissa

## 2019-07-05 NOTE — ANESTHESIA PREPROCEDURE EVALUATION
Review of Systems/Medical History  Patient summary reviewed  Chart reviewed      Cardiovascular  EKG reviewed, Valvular heart disease ,    Pulmonary       GI/Hepatic            Endo/Other     GYN       Hematology   Musculoskeletal    Arthritis     Neurology   Psychology           Physical Exam    Airway    Mallampati score: I  TM Distance: >3 FB  Neck ROM: full     Dental   Comment: Upper front caps and implants,     Cardiovascular  Cardiovascular exam normal    Pulmonary      Other Findings        Anesthesia Plan  ASA Score- 2     Anesthesia Type- general with ASA Monitors  Additional Monitors:   Airway Plan: LMA  Plan Factors-    Induction- intravenous  Postoperative Plan-     Informed Consent- Anesthetic plan and risks discussed with patient  I personally reviewed this patient with the CRNA  Discussed and agreed on the Anesthesia Plan with the CRNA  Gracia Valentino

## 2019-07-07 LAB
ATRIAL RATE: 53 BPM
P AXIS: 82 DEGREES
PR INTERVAL: 182 MS
QRS AXIS: 82 DEGREES
QRSD INTERVAL: 88 MS
QT INTERVAL: 458 MS
QTC INTERVAL: 578 MS
T WAVE AXIS: 59 DEGREES
VENTRICULAR RATE: 96 BPM

## 2019-07-07 PROCEDURE — 93010 ELECTROCARDIOGRAM REPORT: CPT | Performed by: INTERNAL MEDICINE

## 2019-07-08 ENCOUNTER — TELEPHONE (OUTPATIENT)
Dept: UROLOGY | Facility: HOSPITAL | Age: 79
End: 2019-07-08

## 2019-07-08 NOTE — TELEPHONE ENCOUNTER
Post Op Note    Charmaine River is a 70 y o  male s/p urolift performed 07/05/2019  Charmaine River is a patient of Dr Alberto Jain and is seen at the [facility] office  How would you rate your pain on a scale from 1 to 10, 10 being the worst pain ever? 0  Have you had a fever? No    Have your bowel movements been regular? No Just had one this am   Do you have any difficulty urinating? Yes    Do you have any other questions or concerns that I can address at this time? Patient is having to straight cath at night as he is still trickling urine  Said better today as he had a BM  Also discussed with Dr Alberto Jain and he can bee a little swollen still from surgery

## 2019-08-14 ENCOUNTER — OFFICE VISIT (OUTPATIENT)
Dept: UROLOGY | Facility: HOSPITAL | Age: 79
End: 2019-08-14
Payer: COMMERCIAL

## 2019-08-14 VITALS
HEIGHT: 68 IN | DIASTOLIC BLOOD PRESSURE: 80 MMHG | BODY MASS INDEX: 23.46 KG/M2 | SYSTOLIC BLOOD PRESSURE: 128 MMHG | HEART RATE: 55 BPM | WEIGHT: 154.8 LBS

## 2019-08-14 DIAGNOSIS — R33.9 INCOMPLETE BLADDER EMPTYING: Primary | ICD-10-CM

## 2019-08-14 DIAGNOSIS — N40.0 BENIGN PROSTATIC HYPERPLASIA, UNSPECIFIED WHETHER LOWER URINARY TRACT SYMPTOMS PRESENT: ICD-10-CM

## 2019-08-14 LAB — POST-VOID RESIDUAL VOLUME, ML POC: 369.56 ML

## 2019-08-14 PROCEDURE — 99213 OFFICE O/P EST LOW 20 MIN: CPT | Performed by: NURSE PRACTITIONER

## 2019-08-14 PROCEDURE — 51798 US URINE CAPACITY MEASURE: CPT | Performed by: NURSE PRACTITIONER

## 2019-08-14 NOTE — PROGRESS NOTES
8/14/2019    Shaun Coffey  1940  [id number]        Assessment  BPH s/p Urolift (7/5/2019)    Discussion  Yani Kidd is a 70 y o  male being managed by Pushpa Rees  A bladder ultrasound was obtained today in the office and PVR was 369 ml  Prior PVR was 900ml  He is doing well with no complaints at this time  He will continue to perform intermittent self catheterization once a day after voiding  He will continue to take Flomax daily  He will return in 6 months for follow-up with PVR  We discussed that at this time if he is doing well PVR has continued to decrease we can then discontinue self catheterization and Flomax  All questions were answered  History of Present Illness  70 y o  male with a history of BPH s/p Urolift (7/5/2019) presents today for 1 month follow up  He is performing intermittent self catheterization once a day  He denies any complications with this  He continues to take Flomax daily  He feels he has a good stream   He denies any lower urinary tract symptoms  He denies any gross hematuria  He denies any issues from his surgery  Review of Systems  Review of Systems   Constitutional: Negative  HENT: Negative  Respiratory: Negative  Cardiovascular: Negative  Gastrointestinal: Negative  Genitourinary:        As per HPI   Musculoskeletal: Negative  Skin: Negative  Neurological: Negative  Hematological: Negative  AUA SYMPTOM SCORE      Most Recent Value   AUA SYMPTOM SCORE   How often have you had a sensation of not emptying your bladder completely after you finished urinating? 0   How often have you had to urinate again less than two hours after you finished urinating? 1   How often have you found you stopped and started again several times when you urinate?  0   How often have you found it difficult to postpone urination? 0   How often have you had a weak urinary stream?  1   How often have you had to push or strain to begin urination? 0   How many times did you most typically get up to urinate from the time you went to bed at night until the time you got up in the morning? 1   Quality of Life: If you were to spend the rest of your life with your urinary condition just the way it is now, how would you feel about that?  0   AUA SYMPTOM SCORE  3        Urinary Incontinence Screening      Most Recent Value   Urinary Incontinence   Urinary Incontinence? No   Incomplete emptying? No   Urinary frequency? Yes [at times]   Urinary urgency? No   Urinary hesitancy? No   Dysuria (painful difficult urination)? No   Nocturia (waking up to use the bathroom)? Yes [1x]   Straining (having to push to go)?   No   Weak stream?  Yes   Intermittent stream?  No            Past Medical History  Past Medical History:   Diagnosis Date    BPH (benign prostatic hyperplasia)     Diverticulitis of colon     Herniated lumbar intervertebral disc     Kidney mass     Murmur [birth year]1947    diagnosed as a child 8 YO    Osteoarthritis     Preoperative cardiovascular examination 2/16/2018       Past Surgical History  Past Surgical History:   Procedure Laterality Date    HERNIA REPAIR      JOINT REPLACEMENT Bilateral     KNEE ARTHROSCOPY      bilateral    NJ CYSTOURETHRO W/IMPLANT N/A 7/5/2019    Procedure: CYSTOSCOPY WITH INSERTION Ame Mccormick;  Surgeon: Ravindra Daigle MD;  [facility];  Service: Urology    NJ TOTAL HIP ARTHROPLASTY Left 6/12/2017    Procedure: ANTERIOR TOTAL HIP ARTHROPLASTY;  Surgeon: Bibiana Chisholm MD;  [facility];  Service: Orthopedics    NJ TOTAL HIP ARTHROPLASTY Right 1/3/2018    Procedure: ANTERIOR TOTAL HIP ARTHROPLASTY;  Surgeon: Bibiana Chisholm MD;  [facility];  Service: Orthopedics    NJ TOTAL KNEE ARTHROPLASTY Left 2/20/2019    Procedure: TOTAL KNEE ARTHROPLASTY;  Surgeon: Bibiana Chisholm MD;  [facility];  Service: Orthopedics        Past Family History  Family History   Problem Relation Age of Onset  Cancer Mother     Cancer Father     Cancer Brother     Heart disease Brother        Past Social history  Social History     Socioeconomic History    Marital status: /Civil Union     Spouse name: Not on file    Number of children: Not on file    Years of education: Not on file    Highest education level: Not on file   Occupational History    Not on file   Social Needs    Financial resource strain: Not on file    Food insecurity:     Worry: Not on file     Inability: Not on file    Transportation needs:     Medical: Not on file     Non-medical: Not on file   Tobacco Use    Smoking status: Former Smoker     Packs/day:  00     Years: 15 00     Pack years: 15      Last attempt to quit:      Years since quittin 6    Smokeless tobacco: Never Used   Substance and Sexual Activity    Alcohol use:  Yes     Alcohol/week: 2 0 standard drinks     Types: 2 Cans of beer per week     Frequency: 4 or more times a week     Drinks per session: 1 or 2     Comment: beer daily     Drug use: No    Sexual activity: Yes   Lifestyle    Physical activity:     Days per week: Not on file     Minutes per session: Not on file    Stress: Not on file   Relationships    Social connections:     Talks on phone: Not on file     Gets together: Not on file     Attends Worship service: Not on file     Active member of club or organization: Not on file     Attends meetings of clubs or organizations: Not on file     Relationship status: Not on file    Intimate partner violence:     Fear of current or ex partner: Not on file     Emotionally abused: Not on file     Physically abused: Not on file     Forced sexual activity: Not on file   Other Topics Concern    Not on file   Social History Narrative    Not on file       Current Medications  Current Outpatient Medications   Medication Sig Dispense Refill    multivitamin (THERAGRAN) TABS Take 1 tablet by mouth daily      sildenafil (VIAGRA) 100 mg tablet Take 1 tablet (100 mg total) by mouth daily as needed for erectile dysfunction 10 tablet 11    tamsulosin (FLOMAX) 0 4 mg Take 1 capsule (0 4 mg total) by mouth daily with dinner 90 capsule 3    phenazopyridine (PYRIDIUM) 100 mg tablet Take 1 tablet (100 mg total) by mouth 3 (three) times a day as needed (dysuria) (Patient not taking: Reported on 8/14/2019) 20 tablet 0     No current facility-administered medications for this visit  Allergies  No Known Allergies    Past Medical History, Social History, Family History, medications and allergies were reviewed and updated as appropriate  Vitals  Vitals:    08/14/19 1454   BP: 128/80   BP Location: Left arm   Patient Position: Sitting   Cuff Size: Adult   Pulse: 55   Weight: 70 2 kg (154 lb 12 8 oz)   Height: 5' 7 75" (1 721 m)       Physical Exam  Skin: warm, dry, intact  Pulmonary: Non-labored breathing  Abdomen: Soft, non-tender, non-distended  Musculoskeletal: AROM with no joint deformity or tenderness    Neurology: Alert and oriented          Results  Lab Results   Component Value Date    PSA 0 8 04/14/2017    PSA 0 9 04/01/2016     Lab Results   Component Value Date    GLUCOSE 88 12/05/2017    CALCIUM 7 5 (L) 02/22/2019     12/05/2017    K 4 1 02/22/2019    CO2 25 02/22/2019    CL 91 (L) 02/22/2019    BUN 10 02/22/2019    CREATININE 0 82 02/22/2019     Lab Results   Component Value Date    WBC 10 14 02/22/2019    HGB 9 6 (L) 02/22/2019    HCT 28 2 (L) 02/22/2019    MCV 98 02/22/2019     02/22/2019       Recent Results (from the past 1 hour(s))   POCT Measure PVR    Collection Time: 08/14/19  2:52 PM   Result Value Ref Range    POST-VOID RESIDUAL VOLUME, ML  56 mL   ]

## 2019-08-20 ENCOUNTER — PREP FOR PROCEDURE (OUTPATIENT)
Dept: OBGYN CLINIC | Facility: HOSPITAL | Age: 79
End: 2019-08-20

## 2019-08-20 PROBLEM — M17.11 PRIMARY OSTEOARTHRITIS OF RIGHT KNEE: Status: ACTIVE | Noted: 2019-08-20

## 2019-09-17 ENCOUNTER — APPOINTMENT (OUTPATIENT)
Dept: LAB | Facility: HOSPITAL | Age: 79
End: 2019-09-17
Payer: COMMERCIAL

## 2019-09-17 ENCOUNTER — OFFICE VISIT (OUTPATIENT)
Dept: FAMILY MEDICINE CLINIC | Facility: CLINIC | Age: 79
End: 2019-09-17
Payer: COMMERCIAL

## 2019-09-17 ENCOUNTER — OFFICE VISIT (OUTPATIENT)
Dept: OBGYN CLINIC | Facility: HOSPITAL | Age: 79
End: 2019-09-17
Payer: COMMERCIAL

## 2019-09-17 ENCOUNTER — HOSPITAL ENCOUNTER (OUTPATIENT)
Dept: RADIOLOGY | Facility: HOSPITAL | Age: 79
Discharge: HOME | End: 2019-09-17
Attending: ORTHOPAEDIC SURGERY
Payer: COMMERCIAL

## 2019-09-17 VITALS
HEART RATE: 56 BPM | RESPIRATION RATE: 15 BRPM | OXYGEN SATURATION: 99 % | TEMPERATURE: 96.9 F | HEIGHT: 68 IN | BODY MASS INDEX: 23.34 KG/M2 | WEIGHT: 154 LBS | DIASTOLIC BLOOD PRESSURE: 78 MMHG | SYSTOLIC BLOOD PRESSURE: 120 MMHG

## 2019-09-17 VITALS
DIASTOLIC BLOOD PRESSURE: 86 MMHG | HEIGHT: 68 IN | HEART RATE: 52 BPM | SYSTOLIC BLOOD PRESSURE: 135 MMHG | BODY MASS INDEX: 23.49 KG/M2 | WEIGHT: 155 LBS

## 2019-09-17 DIAGNOSIS — Z00.00 MEDICARE ANNUAL WELLNESS VISIT, SUBSEQUENT: Primary | ICD-10-CM

## 2019-09-17 DIAGNOSIS — Z96.652 AFTERCARE FOLLOWING LEFT KNEE JOINT REPLACEMENT SURGERY: ICD-10-CM

## 2019-09-17 DIAGNOSIS — M25.561 CHRONIC PAIN OF RIGHT KNEE: ICD-10-CM

## 2019-09-17 DIAGNOSIS — E78.5 HYPERLIPIDEMIA, UNSPECIFIED HYPERLIPIDEMIA TYPE: ICD-10-CM

## 2019-09-17 DIAGNOSIS — G89.29 CHRONIC PAIN OF RIGHT KNEE: Primary | ICD-10-CM

## 2019-09-17 DIAGNOSIS — M25.561 CHRONIC PAIN OF RIGHT KNEE: Primary | ICD-10-CM

## 2019-09-17 DIAGNOSIS — G89.29 CHRONIC PAIN OF RIGHT KNEE: ICD-10-CM

## 2019-09-17 DIAGNOSIS — Z47.1 AFTERCARE FOLLOWING LEFT KNEE JOINT REPLACEMENT SURGERY: ICD-10-CM

## 2019-09-17 DIAGNOSIS — Z23 NEED FOR VACCINATION AGAINST STREPTOCOCCUS PNEUMONIAE: ICD-10-CM

## 2019-09-17 DIAGNOSIS — M17.11 PRIMARY OSTEOARTHRITIS OF RIGHT KNEE: ICD-10-CM

## 2019-09-17 DIAGNOSIS — Z12.5 SCREENING FOR PROSTATE CANCER: ICD-10-CM

## 2019-09-17 LAB
ABO GROUP BLD: NORMAL
ALBUMIN SERPL BCP-MCNC: 4 G/DL (ref 3.5–5)
ALP SERPL-CCNC: 25 U/L (ref 46–116)
ALT SERPL W P-5'-P-CCNC: 29 U/L (ref 12–78)
ANION GAP SERPL CALCULATED.3IONS-SCNC: 2 MMOL/L (ref 4–13)
APTT PPP: 29 SECONDS (ref 23–37)
AST SERPL W P-5'-P-CCNC: 29 U/L (ref 5–45)
BASOPHILS # BLD AUTO: 0.05 THOUSANDS/ΜL (ref 0–0.1)
BASOPHILS NFR BLD AUTO: 1 % (ref 0–1)
BILIRUB SERPL-MCNC: 1.58 MG/DL (ref 0.2–1)
BLD GP AB SCN SERPL QL: NEGATIVE
BUN SERPL-MCNC: 13 MG/DL (ref 5–25)
CALCIUM SERPL-MCNC: 9.1 MG/DL (ref 8.3–10.1)
CHLORIDE SERPL-SCNC: 103 MMOL/L (ref 100–108)
CO2 SERPL-SCNC: 29 MMOL/L (ref 21–32)
CREAT SERPL-MCNC: 1 MG/DL (ref 0.6–1.3)
CRP SERPL QL: <3 MG/L
EOSINOPHIL # BLD AUTO: 0.05 THOUSAND/ΜL (ref 0–0.61)
EOSINOPHIL NFR BLD AUTO: 1 % (ref 0–6)
ERYTHROCYTE [DISTWIDTH] IN BLOOD BY AUTOMATED COUNT: 13.9 % (ref 11.6–15.1)
EST. AVERAGE GLUCOSE BLD GHB EST-MCNC: 103 MG/DL
FERRITIN SERPL-MCNC: 62 NG/ML (ref 8–388)
GFR SERPL CREATININE-BSD FRML MDRD: 72 ML/MIN/1.73SQ M
GLUCOSE SERPL-MCNC: 82 MG/DL (ref 65–140)
HBA1C MFR BLD: 5.2 % (ref 4.2–6.3)
HCT VFR BLD AUTO: 46.9 % (ref 36.5–49.3)
HGB BLD-MCNC: 15.5 G/DL (ref 12–17)
IMM GRANULOCYTES # BLD AUTO: 0.03 THOUSAND/UL (ref 0–0.2)
IMM GRANULOCYTES NFR BLD AUTO: 1 % (ref 0–2)
INR PPP: 1.24 (ref 0.84–1.19)
IRON SATN MFR SERPL: 36 %
IRON SERPL-MCNC: 129 UG/DL (ref 65–175)
LYMPHOCYTES # BLD AUTO: 1.18 THOUSANDS/ΜL (ref 0.6–4.47)
LYMPHOCYTES NFR BLD AUTO: 18 % (ref 14–44)
MCH RBC QN AUTO: 33.3 PG (ref 26.8–34.3)
MCHC RBC AUTO-ENTMCNC: 33 G/DL (ref 31.4–37.4)
MCV RBC AUTO: 101 FL (ref 82–98)
MONOCYTES # BLD AUTO: 0.43 THOUSAND/ΜL (ref 0.17–1.22)
MONOCYTES NFR BLD AUTO: 7 % (ref 4–12)
NEUTROPHILS # BLD AUTO: 4.71 THOUSANDS/ΜL (ref 1.85–7.62)
NEUTS SEG NFR BLD AUTO: 72 % (ref 43–75)
NRBC BLD AUTO-RTO: 0 /100 WBCS
PLATELET # BLD AUTO: 267 THOUSANDS/UL (ref 149–390)
PMV BLD AUTO: 10.1 FL (ref 8.9–12.7)
POTASSIUM SERPL-SCNC: 4.4 MMOL/L (ref 3.5–5.3)
PROT SERPL-MCNC: 7.4 G/DL (ref 6.4–8.2)
PROTHROMBIN TIME: 15.2 SECONDS (ref 11.6–14.5)
RBC # BLD AUTO: 4.66 MILLION/UL (ref 3.88–5.62)
RH BLD: POSITIVE
SODIUM SERPL-SCNC: 134 MMOL/L (ref 136–145)
SPECIMEN EXPIRATION DATE: NORMAL
TIBC SERPL-MCNC: 363 UG/DL (ref 250–450)
WBC # BLD AUTO: 6.45 THOUSAND/UL (ref 4.31–10.16)

## 2019-09-17 PROCEDURE — 83550 IRON BINDING TEST: CPT

## 2019-09-17 PROCEDURE — 85730 THROMBOPLASTIN TIME PARTIAL: CPT

## 2019-09-17 PROCEDURE — G0009 ADMIN PNEUMOCOCCAL VACCINE: HCPCS | Performed by: FAMILY MEDICINE

## 2019-09-17 PROCEDURE — 86901 BLOOD TYPING SEROLOGIC RH(D): CPT

## 2019-09-17 PROCEDURE — 86850 RBC ANTIBODY SCREEN: CPT

## 2019-09-17 PROCEDURE — 80053 COMPREHEN METABOLIC PANEL: CPT

## 2019-09-17 PROCEDURE — 86900 BLOOD TYPING SEROLOGIC ABO: CPT

## 2019-09-17 PROCEDURE — 1125F AMNT PAIN NOTED PAIN PRSNT: CPT | Performed by: FAMILY MEDICINE

## 2019-09-17 PROCEDURE — 1101F PT FALLS ASSESS-DOCD LE1/YR: CPT | Performed by: FAMILY MEDICINE

## 2019-09-17 PROCEDURE — 1170F FXNL STATUS ASSESSED: CPT | Performed by: FAMILY MEDICINE

## 2019-09-17 PROCEDURE — 36415 COLL VENOUS BLD VENIPUNCTURE: CPT

## 2019-09-17 PROCEDURE — 83036 HEMOGLOBIN GLYCOSYLATED A1C: CPT

## 2019-09-17 PROCEDURE — 90670 PCV13 VACCINE IM: CPT | Performed by: FAMILY MEDICINE

## 2019-09-17 PROCEDURE — 86140 C-REACTIVE PROTEIN: CPT

## 2019-09-17 PROCEDURE — G0439 PPPS, SUBSEQ VISIT: HCPCS | Performed by: FAMILY MEDICINE

## 2019-09-17 PROCEDURE — 85025 COMPLETE CBC W/AUTO DIFF WBC: CPT

## 2019-09-17 PROCEDURE — 82728 ASSAY OF FERRITIN: CPT

## 2019-09-17 PROCEDURE — 83540 ASSAY OF IRON: CPT

## 2019-09-17 PROCEDURE — 99213 OFFICE O/P EST LOW 20 MIN: CPT | Performed by: ORTHOPAEDIC SURGERY

## 2019-09-17 PROCEDURE — 85610 PROTHROMBIN TIME: CPT

## 2019-09-17 PROCEDURE — 73560 X-RAY EXAM OF KNEE 1 OR 2: CPT

## 2019-09-17 RX ORDER — FOLIC ACID 1 MG/1
1 TABLET ORAL DAILY
Qty: 30 TABLET | Refills: 0 | Status: SHIPPED | OUTPATIENT
Start: 2019-09-17 | End: 2020-09-23 | Stop reason: ALTCHOICE

## 2019-09-17 RX ORDER — ASCORBIC ACID 500 MG
500 TABLET ORAL 2 TIMES DAILY
Qty: 60 TABLET | Refills: 0 | Status: SHIPPED | OUTPATIENT
Start: 2019-09-17 | End: 2020-09-23 | Stop reason: ALTCHOICE

## 2019-09-17 RX ORDER — FERROUS SULFATE TAB EC 324 MG (65 MG FE EQUIVALENT) 324 (65 FE) MG
324 TABLET DELAYED RESPONSE ORAL
Qty: 60 TABLET | Refills: 0 | Status: SHIPPED | OUTPATIENT
Start: 2019-09-17 | End: 2020-09-23 | Stop reason: ALTCHOICE

## 2019-09-17 NOTE — PATIENT INSTRUCTIONS
Medicare Preventive Visit Patient Instructions  Thank you for completing your Welcome to Medicare Visit or Medicare Annual Wellness Visit today  Your next wellness visit will be due in one year (9/17/2020)  The screening/preventive services that you may require over the next 5-10 years are detailed below  Some tests may not apply to you based off risk factors and/or age  Screening tests ordered at today's visit but not completed yet may show as past due  Also, please note that scanned in results may not display below  Preventive Screenings:  Service Recommendations Previous Testing/Comments   Colorectal Cancer Screening  · Colonoscopy    · Fecal Occult Blood Test (FOBT)/Fecal Immunochemical Test (FIT)  · Fecal DNA/Cologuard Test  · Flexible Sigmoidoscopy Age: 54-65 years old   Colonoscopy: every 10 years (May be performed more frequently if at higher risk)  OR  FOBT/FIT: every 1 year  OR  Cologuard: every 3 years  OR  Sigmoidoscopy: every 5 years  Screening may be recommended earlier than age 48 if at higher risk for colorectal cancer  Also, an individualized decision between you and your healthcare provider will decide whether screening between the ages of 74-80 would be appropriate   Colonoscopy: 03/13/2006  FOBT/FIT: Not on file  Cologuard: Not on file  Sigmoidoscopy: Not on file    Screening Current     Prostate Cancer Screening Individualized decision between patient and health care provider in men between ages of 53-78   Medicare will cover every 12 months beginning on the day after your 50th birthday PSA: 0 8 ng/mL     Screening Not Indicated     Hepatitis C Screening Once for adults born between 1945 and 1965  More frequently in patients at high risk for Hepatitis C Hep C Antibody: Not on file    Screening Not Indicated   Diabetes Screening 1-2 times per year if you're at risk for diabetes or have pre-diabetes Fasting glucose: 75 mg/dL   A1C: 5 4 %    Screening Current   Cholesterol Screening Once every 5 years if you don't have a lipid disorder  May order more often based on risk factors  Lipid panel: 04/14/2017    Screening Current      Other Preventive Screenings Covered by Medicare:  1  Abdominal Aortic Aneurysm (AAA) Screening: covered once if your at risk  You're considered to be at risk if you have a family history of AAA or a male between the age of 73-68 who smoking at least 100 cigarettes in your lifetime  2  Lung Cancer Screening: covers low dose CT scan once per year if you meet all of the following conditions: (1) Age 50-69; (2) No signs or symptoms of lung cancer; (3) Current smoker or have quit smoking within the last 15 years; (4) You have a tobacco smoking history of at least 30 pack years (packs per day x number of years you smoked); (5) You get a written order from a healthcare provider  3  Glaucoma Screening: covered annually if you're considered high risk: (1) You have diabetes OR (2) Family history of glaucoma OR (3)  aged 48 and older OR (3)  American aged 72 and older  3  Osteoporosis Screening: covered every 2 years if you meet one of the following conditions: (1) Have a vertebral abnormality; (2) On glucocorticoid therapy for more than 3 months; (3) Have primary hyperparathyroidism; (4) On osteoporosis medications and need to assess response to drug therapy  5  HIV Screening: covered annually if you're between the age of 12-76  Also covered annually if you are younger than 13 and older than 72 with risk factors for HIV infection  For pregnant patients, it is covered up to 3 times per pregnancy      Immunizations:  Immunization Recommendations   Influenza Vaccine Annual influenza vaccination during flu season is recommended for all persons aged >= 6 months who do not have contraindications   Pneumococcal Vaccine (Prevnar and Pneumovax)  * Prevnar = PCV13  * Pneumovax = PPSV23 Adults 25-60 years old: 1-3 doses may be recommended based on certain risk factors  Adults 72 years old: Prevnar (PCV13) vaccine recommended followed by Pneumovax (PPSV23) vaccine  If already received PPSV23 since turning 65, then PCV13 recommended at least one year after PPSV23 dose  Hepatitis B Vaccine 3 dose series if at intermediate or high risk (ex: diabetes, end stage renal disease, liver disease)   Tetanus (Td) Vaccine - COST NOT COVERED BY MEDICARE PART B Following completion of primary series, a booster dose should be given every 10 years to maintain immunity against tetanus  Td may also be given as tetanus wound prophylaxis  Tdap Vaccine - COST NOT COVERED BY MEDICARE PART B Recommended at least once for all adults  For pregnant patients, recommended with each pregnancy  Shingles Vaccine (Shingrix) - COST NOT COVERED BY MEDICARE PART B  2 shot series recommended in those aged 48 and above     Health Maintenance Due:      Topic Date Due    CRC Screening: Colonoscopy  03/13/2016     Immunizations Due:      Topic Date Due    Pneumococcal Vaccine: 65+ Years (2 of 2 - PCV13) 04/06/2017    INFLUENZA VACCINE  07/01/2019     Advance Directives   What are advance directives? Advance directives are legal documents that state your wishes and plans for medical care  These plans are made ahead of time in case you lose your ability to make decisions for yourself  Advance directives can apply to any medical decision, such as the treatments you want, and if you want to donate organs  What are the types of advance directives? There are many types of advance directives, and each state has rules about how to use them  You may choose a combination of any of the following:  · Living will: This is a written record of the treatment you want  You can also choose which treatments you do not want, which to limit, and which to stop at a certain time  This includes surgery, medicine, IV fluid, and tube feedings  · Durable power of  for healthcare [facility]):   This is a written record that states who you want to make healthcare choices for you when you are unable to make them for yourself  This person, called a proxy, is usually a family member or a friend  You may choose more than 1 proxy  · Do not resuscitate (DNR) order:  A DNR order is used in case your heart stops beating or you stop breathing  It is a request not to have certain forms of treatment, such as CPR  A DNR order may be included in other types of advance directives  · Medical directive: This covers the care that you want if you are in a coma, near death, or unable to make decisions for yourself  You can list the treatments you want for each condition  Treatment may include pain medicine, surgery, blood transfusions, dialysis, IV or tube feedings, and a ventilator (breathing machine)  · Values history: This document has questions about your views, beliefs, and how you feel and think about life  This information can help others choose the care that you would choose  Why are advance directives important? An advance directive helps you control your care  Although spoken wishes may be used, it is better to have your wishes written down  Spoken wishes can be misunderstood, or not followed  Treatments may be given even if you do not want them  An advance directive may make it easier for your family to make difficult choices about your care  © Copyright [employer] 2018 Information is for End User's use only and may not be sold, redistributed or otherwise used for commercial purposes   All illustrations and images included in CareNotes® are the copyrighted property of [employer]  or [address] [place] [address] [employer]

## 2019-09-17 NOTE — PROGRESS NOTES
Assessment/Plan:     Diagnoses and all orders for this visit:    Medicare annual wellness visit, subsequent    Need for vaccination against Streptococcus pneumoniae  -     PNEUMOCOCCAL CONJUGATE VACCINE 13-VALENT GREATER THAN 6 MONTHS    Screening for prostate cancer  -     PSA, Total Screen; Future    Hyperlipidemia, unspecified hyperlipidemia type  -     Lipid panel; Future      Medicare wellness visit completed  Patient will be having a right knee replacement in near future will follow-up for a preop clearance at that time  Labs ordered  Pneumonia shot given today  He can follow up in 1 year or sooner if needed depending on when his surgery scheduled      Subjective:     Chief Complaint   Patient presents with   [facility] Wellness Visit     subsequent exam        Patient ID: Carroll Siegel is a 70 y o  male  Patient for Medicare wellness visit  He reports no acute physical complaints other than his pain in his knee  Which will be having a joint replacement surgery soon  Otherwise he is feeling well      The following portions of the patient's history were reviewed and updated as appropriate: allergies, current medications, past family history, past medical history, past social history, past surgical history and problem list     Review of Systems   Constitutional: Negative  HENT: Negative  Eyes: Negative  Respiratory: Negative  Cardiovascular: Negative  Gastrointestinal: Negative  Endocrine: Negative  Genitourinary: Negative  Musculoskeletal: Positive for arthralgias  Skin: Negative  Allergic/Immunologic: Negative  Neurological: Negative  Hematological: Negative  Psychiatric/Behavioral: Negative  All other systems reviewed and are negative          Objective:    Vitals:    09/17/19 0756   BP: 120/78   BP Location: Left arm   Patient Position: Sitting   Cuff Size: Standard   Pulse: 56   Resp: 15   Temp: (!) 96 9 °F (36 1 °C)   TempSrc: Tympanic   SpO2: 99%   Weight: 69 9 kg (154 lb)   Height: 5' 7 75" (1 721 m)          Physical Exam   Constitutional: He is oriented to person, place, and time  He appears well-developed and well-nourished  No distress  HENT:   Head: Normocephalic  Right Ear: External ear normal    Left Ear: External ear normal    Nose: Nose normal    Mouth/Throat: Oropharynx is clear and moist    Eyes: Pupils are equal, round, and reactive to light  Conjunctivae and EOM are normal  Right eye exhibits no discharge  Left eye exhibits no discharge  Neck: Normal range of motion  Cardiovascular: Normal rate, regular rhythm and normal heart sounds  Pulmonary/Chest: Effort normal and breath sounds normal    Abdominal: Soft  Bowel sounds are normal  He exhibits no distension  There is no tenderness  Musculoskeletal: Normal range of motion  Neurological: He is alert and oriented to person, place, and time  No cranial nerve deficit  Skin: Skin is warm and dry  No rash noted  Psychiatric: He has a normal mood and affect  His behavior is normal  Judgment and thought content normal    Nursing note and vitals reviewed  Assessment and Plan:     Problem List Items Addressed This Visit     None      Visit Diagnoses     Medicare annual wellness visit, subsequent    -  Primary    Need for vaccination against Streptococcus pneumoniae        Relevant Orders    PNEUMOCOCCAL CONJUGATE VACCINE 13-VALENT GREATER THAN 6 MONTHS (Completed)    Screening for prostate cancer        Relevant Orders    PSA, Total Screen    Hyperlipidemia, unspecified hyperlipidemia type        Relevant Orders    Lipid panel           Preventive health issues were discussed with patient, and age appropriate screening tests were ordered as noted in patient's After Visit Summary  Personalized health advice and appropriate referrals for health education or preventive services given if needed, as noted in patient's After Visit Summary       History of Present Illness:     Patient presents for Medicare Annual Wellness visit    Patient Care Team:  Marcus Oneil DO as PCP - General  MD Marcus Virk DO Hellen Forehand, DO     Problem List:     Patient Active Problem List   Diagnosis    S/P total hip arthroplasty    Preoperative cardiovascular examination    Primary osteoarthritis of left knee    Sinus bradycardia    S/P total knee arthroplasty, left    Back pain    Urinary retention    BPH (benign prostatic hyperplasia)    Diverticulitis of colon    Fullness in clavicular area    Heart murmur    Kidney mass    Left-sided low back pain with left-sided sciatica    Knee pain    Leg pain    Neoplasm of skin    Osteoarthritis of hip    Primary osteoarthritis of both hips    Pain of both hip joints    Primary osteoarthritis of both knees    Erectile dysfunction    Retention of urine    Primary osteoarthritis of right knee      Past Medical and Surgical History:     Past Medical History:   Diagnosis Date    BPH (benign prostatic hyperplasia)     Diverticulitis of colon     Herniated lumbar intervertebral disc     Kidney mass     Murmur 08/1945    diagnosed as a child 8 YO    Osteoarthritis     Preoperative cardiovascular examination 2/16/2018     Past Surgical History:   Procedure Laterality Date    HERNIA REPAIR      JOINT REPLACEMENT Bilateral     KNEE ARTHROSCOPY      bilateral    VT CYSTOURETHRO W/IMPLANT N/A 7/5/2019    Procedure: CYSTOSCOPY WITH INSERTION Wilheladalia Lion;  Surgeon: Ravindra Daigle MD;  [facility];  Service: Urology    VT TOTAL HIP ARTHROPLASTY Left 6/12/2017    Procedure: ANTERIOR TOTAL HIP ARTHROPLASTY;  Surgeon: Bibiana Chisholm MD;  [facility];  Service: Orthopedics    VT TOTAL HIP ARTHROPLASTY Right 1/3/2018    Procedure: ANTERIOR TOTAL HIP ARTHROPLASTY;  Surgeon: Bibiana Chisholm MD;  [facility];  Service: Orthopedics    VT TOTAL KNEE ARTHROPLASTY Left 2/20/2019    Procedure: TOTAL KNEE ARTHROPLASTY;  Surgeon: Erika Saldaña Ronald Jeffries MD;  [facility];  Service: Orthopedics      Family History:     Family History   Problem Relation Age of Onset    Cancer Mother     Cancer Father     Cancer Brother     Heart disease Brother       Social History:     Social History     Socioeconomic History    Marital status: /Civil Union     Spouse name: None    Number of children: None    Years of education: None    Highest education level: None   Occupational History    None   Social Needs    Financial resource strain: None    Food insecurity:     Worry: None     Inability: None    Transportation needs:     Medical: None     Non-medical: None   Tobacco Use    Smoking status: Former Smoker     Packs/day:      Years: 15 00     Pack years: 15 00     Last attempt to quit:      Years since quittin 7    Smokeless tobacco: Never Used   Substance and Sexual Activity    Alcohol use:  Yes     Alcohol/week: 2 0 standard drinks     Types: 2 Cans of beer per week     Frequency: 4 or more times a week     Drinks per session: 1 or 2     Comment: beer daily     Drug use: No    Sexual activity: Yes   Lifestyle    Physical activity:     Days per week: None     Minutes per session: None    Stress: None   Relationships    Social connections:     Talks on phone: None     Gets together: None     Attends Hindu service: None     Active member of club or organization: None     Attends meetings of clubs or organizations: None     Relationship status: None    Intimate partner violence:     Fear of current or ex partner: None     Emotionally abused: None     Physically abused: None     Forced sexual activity: None   Other Topics Concern    None   Social History Narrative    None       Medications and Allergies:     Current Outpatient Medications   Medication Sig Dispense Refill    multivitamin (THERAGRAN) TABS Take 1 tablet by mouth daily      sildenafil (VIAGRA) 100 mg tablet Take 1 tablet (100 mg total) by mouth daily as needed for erectile dysfunction 10 tablet 11    tamsulosin (FLOMAX) 0 4 mg Take 1 capsule (0 4 mg total) by mouth daily with dinner 90 capsule 3    ascorbic acid (VITAMIN C) 500 mg tablet Take 1 tablet (500 mg total) by mouth 2 (two) times a day 60 tablet 0    enoxaparin (LOVENOX) 40 mg/0 4 mL Inject 0 4 mL (40 mg total) under the skin daily Start after surgery (Patient not taking: Reported on 9/17/2019) 30 Syringe 0    ferrous sulfate 324 (65 Fe) mg Take 1 tablet (324 mg total) by mouth 2 (two) times a day before meals 60 tablet 0    folic acid (FOLVITE) 1 mg tablet Take 1 tablet (1 mg total) by mouth daily 30 tablet 0     No current facility-administered medications for this visit  No Known Allergies   Immunizations:     Immunization History   Administered Date(s) Administered    INFLUENZA 10/11/2018    Influenza Quadrivalent Preservative Free 3 years and older IM 10/23/2017    Influenza Split High Dose Preservative Free IM 10/28/2014, 10/23/2015, 10/23/2015, 10/12/2016, 10/23/2017    Pneumococcal Conjugate 13-Valent 09/17/2019    Pneumococcal Polysaccharide PPV23 04/06/2016    Zoster 06/08/2016      Health Maintenance:         Topic Date Due    CRC Screening: Colonoscopy  05/04/2026         Topic Date Due    INFLUENZA VACCINE  07/01/2019      Medicare Health Risk Assessment:     /78 (BP Location: Left arm, Patient Position: Sitting, Cuff Size: Standard)   Pulse 56   Temp (!) 96 9 °F (36 1 °C) (Tympanic)   Resp 15   Ht 5' 7 75" (1 721 m)   Wt 69 9 kg (154 lb)   SpO2 99%   BMI 23 59 kg/m²      Junito Begum is here for his Subsequent Wellness visit  Last Medicare Wellness visit information reviewed, patient interviewed and updates made to the record today  Health Risk Assessment:   Patient rates overall health as good  Patient feels that their physical health rating is same  Eyesight was rated as same  Hearing was rated as same   Patient feels that their emotional and mental health rating is same  Pain experienced in the last 7 days has been some  Patient's pain rating has been 5/10  Patient states that he has experienced no weight loss or gain in last 6 months  Depression Screening:   PHQ-2 Score: 0      Fall Risk Screening: In the past year, patient has experienced: no history of falling in past year      Home Safety:  Patient does not have trouble with stairs inside or outside of their home  Patient has working smoke alarms and has working carbon monoxide detector  Home safety hazards include: none  Nutrition:   Current diet is Regular and Limited junk food  Medications:   Patient is currently taking over-the-counter supplements  OTC medications include: see medication list  Patient is able to manage medications  Activities of Daily Living (ADLs)/Instrumental Activities of Daily Living (IADLs):   Walk and transfer into and out of bed and chair?: Yes  Dress and groom yourself?: Yes    Feed yourself?  Yes  Do your laundry/housekeeping?: Yes  Manage your money, pay your bills and track your expenses?: Yes  Make your own meals?: Yes    Do your own shopping?: Yes    Previous Hospitalizations:   Any hospitalizations or ED visits within the last 12 months?: No      Advance Care Planning:   Living will: No    Durable POA for healthcare: No    Advanced directive: No    Advanced directive counseling given: No    Five wishes given: No    End of Life Decisions reviewed with patient: Yes    Provider agrees with end of life decisions: Yes      PREVENTIVE SCREENINGS      Cardiovascular Screening:    General: Screening Current      Diabetes Screening:     General: Screening Current      Colorectal Cancer Screening:     General: Screening Current      Prostate Cancer Screening:    General: Screening Not Indicated      Osteoporosis Screening:    General: Screening Not Indicated      Abdominal Aortic Aneurysm (AAA) Screening:    Risk factors include: tobacco use        General: Screening Not Indicated      Lung Cancer Screening:     General: Screening Not Indicated      Hepatitis C Screening:    General: Screening Not Indicated    Other Counseling Topics:   Alcohol use counseling, car/seat belt/driving safety and calcium and vitamin D intake and regular weightbearing exercise         Duong Page, DO

## 2019-09-17 NOTE — PROGRESS NOTES
70 y o  male presenting to the office for 7 month follow up status post left total knee arthroplasty (dos: 2/20/19)  Patient states that his left knee is doing well at this time  He has no functional limitations due to his left knee  Patient continues to have numbness surrounding the surgical site  Patient denies any other radicular symptoms  Patient denies symptoms of an infection  Patient denies any instability  Patient has significant pain in his right knee, especially with initiating movement  He states that this has significantly limiting his recreational activities  Patient denies any other acute or associated complaints  ROS  Review of Systems   Constitutional: Negative for fever and unexpected weight change  HENT: Negative for hearing loss, nosebleeds and sore throat  Eyes: Negative for pain, redness and visual disturbance  Respiratory: Negative for cough, shortness of breath and wheezing  Cardiovascular: Negative for chest pain, palpitations and leg swelling  Gastrointestinal: Negative for abdominal pain, nausea and vomiting  Endocrine: Negative for polydipsia and polyuria  Genitourinary: Negative for dysuria and hematuria  Skin: Negative for rash and wound  Neurological: Negative for dizziness and headaches  Psychiatric/Behavioral: Negative for agitation and suicidal ideas         Past Medical History:   Diagnosis Date    BPH (benign prostatic hyperplasia)     Diverticulitis of colon     Herniated lumbar intervertebral disc     Kidney mass     Murmur [id number]/4177    diagnosed as a child 6 YO    Osteoarthritis     Preoperative cardiovascular examination 2/16/2018     Past Surgical History:   Procedure Laterality Date    HERNIA REPAIR      JOINT REPLACEMENT Bilateral     KNEE ARTHROSCOPY      bilateral    LA CYSTOURETHRO W/IMPLANT N/A 7/5/2019    Procedure: CYSTOSCOPY WITH INSERTION UROLIFT;  Surgeon: Chen Mendoza MD;  Location: [facility];  Service: Urology    LA TOTAL HIP ARTHROPLASTY Left 6/12/2017    Procedure: ANTERIOR TOTAL HIP ARTHROPLASTY;  Surgeon: Braulio Jain MD;  [facility];  Service: Orthopedics    NJ TOTAL HIP ARTHROPLASTY Right 1/3/2018    Procedure: ANTERIOR TOTAL HIP ARTHROPLASTY;  Surgeon: Braulio Jain MD;  [facility];  Service: Orthopedics    NJ TOTAL KNEE ARTHROPLASTY Left 2/20/2019    Procedure: TOTAL KNEE ARTHROPLASTY;  Surgeon: Braulio Jain MD;  [facility];  Service: Orthopedics     Results Reviewed     None          Physical Exam  Physical Exam   Constitutional: He is oriented to person, place, and time  He appears well-developed and well-nourished  HENT:   Head: Normocephalic and atraumatic  Eyes: Pupils are equal, round, and reactive to light  EOM are normal    Neck: Neck supple  No tracheal deviation present  Cardiovascular: Normal rate and regular rhythm  Pulmonary/Chest: Effort normal and breath sounds normal    Abdominal: There is no guarding  Musculoskeletal:        Right knee: He exhibits no effusion  Left knee: He exhibits no effusion  Neurological: He is alert and oriented to person, place, and time  Skin: Skin is warm and dry  Psychiatric: He has a normal mood and affect  His behavior is normal      Right Knee Exam     Muscle Strength   The patient has normal right knee strength  Tenderness   The patient is experiencing no tenderness  Range of Motion   Extension: 0   Flexion: 130     Other   Erythema: absent  Sensation: normal  Swelling: none  Effusion: no effusion present      Left Knee Exam     Muscle Strength   The patient has normal left knee strength  Tenderness   The patient is experiencing no tenderness       Range of Motion   Extension: 0   Flexion: 120     Tests   Varus: negative Valgus: negative    Other   Erythema: absent  Sensation: normal  Effusion: no effusion present        Imaging  I personally reviewed these images  :  Stable appearance of left total knee prosthesis  Significant degenerative changes noted in the right knee, which has interval progression since last imaging studies  Assessment/Plan  70 y o  male 7 month follow up status post left total knee arthroplasty   · Continue with activities as tolerated  · Continue with physical therapy activities  · Take antibiotics prior to any dental appointments  · This is a life-long requirement  · Follow up in 5 month for evaluation with x-ray      OPERATIVE DISCUSSION    Jerome Hashimoto 70 y o  would like to proceed with Right total knee arthroplasty  Patient reports that his quality of life has diminished due to pain and functional disability that interferes with activities of daily living  Smoking status: never smoked  BMI: Body mass index is 23 92 kg/m²     Radiology findings: subchondral sclerosis, joint space narrowing and osteophyte formation  Previous failed treatments: analgesics, muscle strengthening/flexibility, activity modification and cortisone injections    Right knee exam:  · Patient ambulates without assistance  · No signs of active infection of knee joint or surrounding skin  · Crepitus present  · Swelling none  · Knee extension 0  · Knee flexion 130    Device Coniderations:   · DePuy Total Knee   · Rotating Platform    Surgical Considerations:  · Discussed risks and benefits of surgery  · Infection  · DVT  · Fracture  · Pre-operative work up  · Medical Clearance by PCP  · Other Clearances needed: Cardiology and Urology  · Blood Management Protocol  · PO Iron, Ferritin and Vitamin D  · Blood Work  · CBC and differential  · Iron Panel  · Consideration of Iron Infusions for significant iron deficiency  · Type and Screen  · Possible transfusion discussed and consented  · CMP  · Hgb A1c  · Goal is 7 0 or less  · PT/PTT  · CRP  · Urinalysis with reflex  · If positive, will require treatment and test for cure  · Chest X-Ray  · EKG  · Pre Procedure interventions  · Height and Weight  · Fingerstick Glucose Check  · Chlorhexidine wash  · Bilateral Nasal Swab  · Peridex mouthwash  · IV Antibiotics  · Ancef Standard, Vancomycin for history of MRSA  · Tranexamic Acid     Discharge Planning:  · Postoperative pain management: Standard Protocol  · Planned discharge to home with outpatient physical therapy  · Daily assessments and recommendations by PT will be taken into consideration post-operatively  · DVT prophylaxis: Lovenox x 28 days  · Medical equipment: Walker and commode chair

## 2019-09-17 NOTE — PRE-PROCEDURE INSTRUCTIONS
Pre-Surgery Instructions:   Medication Instructions    multivitamin (THERAGRAN) TABS Instructed patient per Anesthesia Guidelines   tamsulosin (FLOMAX) 0 4 mg Instructed patient per Anesthesia Guidelines  Patient had PAT appt  Medication list reviewed & instructed  Per pt: sx r/s to 10/23  (epic still scheduled for 10/14) will clarify with office  As of 6/27 pt to start folic acid, vit C, iron and continue along with MV until 10/22  Advised as of 10/16 only tylenol products  Am DOS no meds  Pt has lovenox rx @ pharmacy  - aware injection is for post op use only   Showering instructions given by surgeon office  Cloths, soap, IS given @ PAT appt  All instructions verbally understood by patient  All questions answered  Callback number provided

## 2019-09-19 ENCOUNTER — OFFICE VISIT (OUTPATIENT)
Dept: FAMILY MEDICINE CLINIC | Facility: CLINIC | Age: 79
End: 2019-09-19
Payer: COMMERCIAL

## 2019-09-19 VITALS
OXYGEN SATURATION: 98 % | SYSTOLIC BLOOD PRESSURE: 128 MMHG | DIASTOLIC BLOOD PRESSURE: 88 MMHG | WEIGHT: 154.2 LBS | TEMPERATURE: 96.9 F | HEIGHT: 68 IN | HEART RATE: 57 BPM | BODY MASS INDEX: 23.37 KG/M2

## 2019-09-19 DIAGNOSIS — M17.11 PRIMARY OSTEOARTHRITIS OF RIGHT KNEE: Primary | ICD-10-CM

## 2019-09-19 DIAGNOSIS — R01.1 HEART MURMUR: ICD-10-CM

## 2019-09-19 PROCEDURE — 99213 OFFICE O/P EST LOW 20 MIN: CPT | Performed by: FAMILY MEDICINE

## 2019-09-19 NOTE — PROGRESS NOTES
Assessment/Plan:     Diagnoses and all orders for this visit:    Primary osteoarthritis of right knee    Heart murmur      patient is medically cleared for surgery  He does have an upcoming a cardiology appointment so as long as Cardiology clears him he will be cleared by me        Subjective:     Chief Complaint   Patient presents with    Pre-op Exam     pre op clearance for TKR        Patient ID: Lamonte Amaya is a 70 y o  male  Patient is here for preop clearance for right knee replacement  She has no acute new complaints today  Dr Emeka Dillon will be performing the surgery on October 23, 2019      The following portions of the patient's history were reviewed and updated as appropriate: allergies, current medications, past family history, past medical history, past social history, past surgical history and problem list     Review of Systems   Constitutional: Negative  HENT: Negative  Eyes: Negative  Respiratory: Negative  Cardiovascular: Negative  Gastrointestinal: Negative  Endocrine: Negative  Genitourinary: Negative  Musculoskeletal: Negative  Skin: Negative  Allergic/Immunologic: Negative  Neurological: Negative  Hematological: Negative  Psychiatric/Behavioral: Negative  All other systems reviewed and are negative  Objective:    Vitals:    09/19/19 1421   BP: 128/88   BP Location: Left arm   Patient Position: Sitting   Cuff Size: Standard   Pulse: 57   Temp: (!) 96 9 °F (36 1 °C)   TempSrc: Tympanic   SpO2: 98%   Weight: 69 9 kg (154 lb 3 2 oz)   Height: 5' 7 5" (1 715 m)          Physical Exam   Constitutional: He is oriented to person, place, and time  He appears well-developed and well-nourished  No distress  HENT:   Head: Normocephalic  Right Ear: External ear normal    Left Ear: External ear normal    Nose: Nose normal    Mouth/Throat: Oropharynx is clear and moist    Eyes: Pupils are equal, round, and reactive to light   Conjunctivae and EOM are normal  Right eye exhibits no discharge  Left eye exhibits no discharge  Neck: Normal range of motion  Cardiovascular: Normal rate, regular rhythm and normal heart sounds  Pulmonary/Chest: Effort normal and breath sounds normal    Abdominal: Soft  Bowel sounds are normal  He exhibits no distension  There is no tenderness  Musculoskeletal: Normal range of motion  Neurological: He is alert and oriented to person, place, and time  No cranial nerve deficit  Skin: Skin is warm and dry  No rash noted  Psychiatric: He has a normal mood and affect  His behavior is normal  Judgment and thought content normal    Nursing note and vitals reviewed

## 2019-09-20 LAB
CHOLEST SERPL-MCNC: 160 MG/DL (ref 100–199)
HDLC SERPL-MCNC: 62 MG/DL
LDLC SERPL CALC-MCNC: 79 MG/DL (ref 0–99)
PSA SERPL-MCNC: 2.4 NG/ML (ref 0–4)
SL AMB VLDL CHOLESTEROL CALC: 19 MG/DL (ref 5–40)
TRIGL SERPL-MCNC: 95 MG/DL (ref 0–149)

## 2019-09-23 ENCOUNTER — TELEPHONE (OUTPATIENT)
Dept: UROLOGY | Facility: AMBULATORY SURGERY CENTER | Age: 79
End: 2019-09-23

## 2019-09-23 NOTE — TELEPHONE ENCOUNTER
Called Rena Gomez back and she states he just has blood when he does his C1C - suggested to use more lube  Not to stop at this time until further evaluation   He has a f/u in February

## 2019-09-23 NOTE — TELEPHONE ENCOUNTER
Pt of Dr Brian Grider had Urolift in July  He has been using catheters, but noticed over the past week a small spot of blood on his underwear  He stopped the cath for 3 days, there was no sign of blood  When he used it again there was another spot of blood  He is asking if he should discontinue using the catheters? Please call to speak to him or his spouse if he not home

## 2019-09-25 ENCOUNTER — OFFICE VISIT (OUTPATIENT)
Dept: CARDIOLOGY CLINIC | Facility: CLINIC | Age: 79
End: 2019-09-25
Payer: COMMERCIAL

## 2019-09-25 VITALS
HEART RATE: 54 BPM | HEIGHT: 68 IN | SYSTOLIC BLOOD PRESSURE: 120 MMHG | BODY MASS INDEX: 23.19 KG/M2 | DIASTOLIC BLOOD PRESSURE: 80 MMHG | WEIGHT: 153 LBS

## 2019-09-25 DIAGNOSIS — I48.3 TYPICAL ATRIAL FLUTTER (HCC): ICD-10-CM

## 2019-09-25 DIAGNOSIS — M17.11 PRIMARY OSTEOARTHRITIS OF RIGHT KNEE: ICD-10-CM

## 2019-09-25 DIAGNOSIS — Z01.810 PRE-OPERATIVE CARDIOVASCULAR EXAMINATION: Primary | ICD-10-CM

## 2019-09-25 PROCEDURE — 99214 OFFICE O/P EST MOD 30 MIN: CPT | Performed by: INTERNAL MEDICINE

## 2019-09-25 PROCEDURE — 93000 ELECTROCARDIOGRAM COMPLETE: CPT | Performed by: INTERNAL MEDICINE

## 2019-09-25 NOTE — PATIENT INSTRUCTIONS
Will get an ultrasound of your heart  Strongly recommend thinking about the blood thinners- Eliquis, Xarelto for atrial flutter  Follow-up in three months

## 2019-09-25 NOTE — LETTER
September 25, 2019     Yanet Barber PA-C  [facility] [place] [address]    Patient: Casandra Mchugh   YOB: 1940   Date of Visit: 9/25/2019       Dear Dr Shabnam Mir:    Thank you for referring Dov Rivera to me for evaluation  Below are my notes for this consultation  If you have questions, please do not hesitate to call me  I look forward to following your patient along with you  Sincerely,        Milad Tee MD        CC: DO Milad Rincon MD  9/25/2019  2:19 PM  Sign at close encounter    Cardiology Consultation     Casandra Mchugh  [id number]  1940  [facility] & [address] [address]      1  Sinus bradycardia  POCT ECG   2  Primary osteoarthritis of right knee  Ambulatory referral to Cardiology   3  Pre-operative cardiovascular examination  POCT ECG   4  Typical atrial flutter (HCC)  Echo complete with contrast if indicated       Discussion/Summary: Mr Leslie Mccord is a 70year old male is here for preoperative risk stratification prior to right knee total arthroplasty  1  Preoperative risk stratification prior to right total knee arthroplasty  - Patient is very functional at baseline and able to achieve more than 4 METS with no limitations  - His RCRI is 0 which translates to 0 4% risk of cardiac event during surgery  - He is at moderate acceptable risk and can undergo surgery    2  Typical Atrial flutter with slow ventricular response  - Patient is not symptomatic with being in atrial flutter, this was seen on an EKG in July as well  - CHADVASC-2 which translates to 2 2% risk of stroke per year  - Discussed in detail benefits and risks of being on anticoagulation and especially with newer agents how the risk is relatively lower especially in an active person like him and the benefits of being on anticoagulation (annual risk of stroke jose with CHADVASC of 2)   Patient is reluctant to start anticoagulation at this time  - Strongly recommend that he be on anticoagulation with atrial flutter, also discussed CTI ablation as a permanent treatment option but even for that he would have to be on anticoagulation  - He is reluctant and understands the risks of not being on anticoagulation, he would like to think about it more and will get back to us  He is little hesitant to start especially prior to planned surgery  - Patient would like to discuss with his PCP as well and get back to us      Follow-up in three months to discuss anticoagulation  Lives in [facility] and would prefer follow-up there if possible  History of Present Illness: Mr Radha Holcomb is a 70year old male is here for preoperative risk stratification prior to right knee total arthroplasty  Patient is very functional at baseline  He denies any symptoms of chest pain or pressure, no shortness of breath at rest or with exertion  Able to walk a flight of stairs with no limitation  No symptoms of palpitations, dizziness or lightheadedness  No swelling of the lower extremities  No syncope  Reports intermittent symptoms of vertigo once every few months which subsides by itself  Exercises 3-4 times a week for 30-40 minutes on the bike  No prior cardiac history of CAD, HTN, arrhythmias or heart failure  Smoked for 20 years and quit at Age 38, social alcohol use, no illicit drug use   Retired, lives with wife, worked a desk job in the past      EKG in the office- Typical atrial flutter with ventricular rate of 54 bpm  Atrial flutter with slow ventricular response on EKG from July 2019 as well    Patient Active Problem List   Diagnosis    S/P total hip arthroplasty    Preoperative cardiovascular examination    Primary osteoarthritis of left knee    Sinus bradycardia    S/P total knee arthroplasty, left    Back pain    Urinary retention    BPH (benign prostatic hyperplasia)    Diverticulitis of colon    Fullness in clavicular area    Heart murmur    Kidney mass    Left-sided low back pain with left-sided sciatica    Knee pain    Leg pain    Neoplasm of skin    Osteoarthritis of hip    Primary osteoarthritis of both hips    Pain of both hip joints    Primary osteoarthritis of both knees    Erectile dysfunction    Retention of urine    Primary osteoarthritis of right knee     Past Medical History:   Diagnosis Date    BPH (benign prostatic hyperplasia)     Diverticulitis of colon     Herniated lumbar intervertebral disc     Kidney mass     Murmur 1947    diagnosed as a child 6 YO    Osteoarthritis     Preoperative cardiovascular examination 2018     Social History     Socioeconomic History    Marital status: /Civil Union     Spouse name: Not on file    Number of children: Not on file    Years of education: Not on file    Highest education level: Not on file   Occupational History    Not on file   Social Needs    Financial resource strain: Not on file    Food insecurity:     Worry: Not on file     Inability: Not on file    Transportation needs:     Medical: Not on file     Non-medical: Not on file   Tobacco Use    Smoking status: Former Smoker     Packs/day: 1 00     Years: 15 00     Pack years: 15 00     Last attempt to quit: 1980     Years since quittin 7    Smokeless tobacco: Never Used   Substance and Sexual Activity    Alcohol use:  Yes     Alcohol/week: 2 0 standard drinks     Types: 2 Cans of beer per week     Frequency: 4 or more times a week     Drinks per session: 1 or 2     Comment: beer daily     Drug use: No    Sexual activity: Yes   Lifestyle    Physical activity:     Days per week: Not on file     Minutes per session: Not on file    Stress: Not on file   Relationships    Social connections:     Talks on phone: Not on file     Gets together: Not on file     Attends [employer] service: Not on file     Active member of club or organization: Not on file     Attends meetings of clubs or organizations: Not on file     Relationship status: Not on file    Intimate partner violence:     Fear of current or ex partner: Not on file     Emotionally abused: Not on file     Physically abused: Not on file     Forced sexual activity: Not on file   Other Topics Concern    Not on file   Social History Narrative    Not on file      Family History   Problem Relation Age of Onset    Cancer Mother     Cancer Father     Cancer Brother     Heart disease Brother      Past Surgical History:   Procedure Laterality Date    HERNIA REPAIR      JOINT REPLACEMENT Bilateral     KNEE ARTHROSCOPY      bilateral    MI CYSTOURETHRO W/IMPLANT N/A 7/5/2019    Procedure: CYSTOSCOPY WITH INSERTION Isishahab Ghotra;  Surgeon: Natalie Murray MD;  Location[facility];  Service: Urology    MI TOTAL HIP ARTHROPLASTY Left 6/12/2017    Procedure: ANTERIOR TOTAL HIP ARTHROPLASTY;  Surgeon: Damian Tian MD;  [facility];  Service: Orthopedics    MI TOTAL HIP ARTHROPLASTY Right 1/3/2018    Procedure: ANTERIOR TOTAL HIP ARTHROPLASTY;  Surgeon: Damian Tian MD;  [facility];  Service: Orthopedics    MI TOTAL KNEE ARTHROPLASTY Left 2/20/2019    Procedure: TOTAL KNEE ARTHROPLASTY;  Surgeon: Damian Tian MD;  [facility];  Service: Orthopedics       Current Outpatient Medications:     ascorbic acid (VITAMIN C) 500 mg tablet, Take 1 tablet (500 mg total) by mouth 2 (two) times a day, Disp: 60 tablet, Rfl: 0    ferrous sulfate 324 (65 Fe) mg, Take 1 tablet (324 mg total) by mouth 2 (two) times a day before meals, Disp: 60 tablet, Rfl: 0    folic acid (FOLVITE) 1 mg tablet, Take 1 tablet (1 mg total) by mouth daily, Disp: 30 tablet, Rfl: 0    multivitamin (THERAGRAN) TABS, Take 1 tablet by mouth daily, Disp: , Rfl:     sildenafil (VIAGRA) 100 mg tablet, Take 1 tablet (100 mg total) by mouth daily as needed for erectile dysfunction, Disp: 10 tablet, Rfl: 11    tamsulosin (FLOMAX) 0 4 mg, Take 1 capsule (0 4 mg total) by mouth daily with dinner, Disp: 90 capsule, Rfl: 3    enoxaparin (LOVENOX) 40 mg/0 4 mL, Inject 0 4 mL (40 mg total) under the skin daily Start after surgery (Patient not taking: Reported on 9/25/2019), Disp: 30 Syringe, Rfl: 0  No Known Allergies      Labs:  Orders Only on 09/19/2019   Component Date Value    Cholesterol, Total 09/19/2019 160     Triglycerides 09/19/2019 95     HDL 09/19/2019 62     VLDL Cholesterol Calcula* 09/19/2019 19     LDL Direct 09/19/2019 79     Prostate Specific Antige* 09/19/2019 2 4    Appointment on 09/17/2019   Component Date Value    Sodium 09/17/2019 134*    Potassium 09/17/2019 4 4     Chloride 09/17/2019 103     CO2 09/17/2019 29     ANION GAP 09/17/2019 2*    BUN 09/17/2019 13     Creatinine 09/17/2019 1 00     Glucose 09/17/2019 82     Calcium 09/17/2019 9 1     AST 09/17/2019 29     ALT 09/17/2019 29     Alkaline Phosphatase 09/17/2019 25*    Total Protein 09/17/2019 7 4     Albumin 09/17/2019 4 0     Total Bilirubin 09/17/2019 1 58*    eGFR 09/17/2019 72     WBC 09/17/2019 6 45     RBC 09/17/2019 4 66     Hemoglobin 09/17/2019 15 5     Hematocrit 09/17/2019 46 9     MCV 09/17/2019 101*    MCH 09/17/2019 33 3     MCHC 09/17/2019 33 0     RDW 09/17/2019 13 9     MPV 09/17/2019 10 1     Platelets 43/53/5018 267     nRBC 09/17/2019 0     Neutrophils Relative 09/17/2019 72     Immat GRANS % 09/17/2019 1     Lymphocytes Relative 09/17/2019 18     Monocytes Relative 09/17/2019 7     Eosinophils Relative 09/17/2019 1     Basophils Relative 09/17/2019 1     Neutrophils Absolute 09/17/2019 4 71     Immature Grans Absolute 09/17/2019 0 03     Lymphocytes Absolute 09/17/2019 1 18     Monocytes Absolute 09/17/2019 0 43     Eosinophils Absolute 09/17/2019 0 05     Basophils Absolute 09/17/2019 0 05     CRP 09/17/2019 <3 0     Protime 09/17/2019 15 2*    INR 09/17/2019 1 24*    PTT 09/17/2019 29     ABO Grouping 09/17/2019 A     Rh Factor 09/17/2019 Positive     Antibody Screen 09/17/2019 Negative     Specimen Expiration Date 09/17/2019 73322471     Hemoglobin A1C 09/17/2019 5 2     EAG 09/17/2019 103     Iron Saturation 09/17/2019 36     TIBC 09/17/2019 363     Iron 09/17/2019 129     Ferritin 09/17/2019 62    Office Visit on 08/14/2019   Component Date Value    POST-VOID RESIDUAL VOLUM* 08/14/2019 369 56    Admission on 07/05/2019, Discharged on 07/05/2019   Component Date Value    Ventricular Rate 07/05/2019 96     Atrial Rate 07/05/2019 53     MA Interval 07/05/2019 182     QRSD Interval 07/05/2019 88     QT Interval 07/05/2019 458     QTC Interval 07/05/2019 578     P Axis 07/05/2019 82     QRS Axis 07/05/2019 82     T Wave [place] 07/05/2019 59    Appointment on 06/24/2019   Component Date Value    Urine Culture 06/24/2019 >100,000 cfu/ml Staphylococcus lugdunensis*   Office Visit on 05/28/2019   Component Date Value    POST-VOID RESIDUAL VOLUM* 05/28/2019 878    Procedure visit on 05/24/2019   Component Date Value    LEUKOCYTE ESTERASE,UA 05/24/2019 +     Cy Gregorio 05/24/2019 -     SL AMB POCT UROBILINOGEN 05/24/2019 -     POCT URINE PROTEIN 05/24/2019 -      PH,UA 05/24/2019 5 0     BLOOD,UA 05/24/2019 -     SPECIFIC GRAVITY,UA 05/24/2019 1 010     KETONES,UA 05/24/2019 -     BILIRUBIN,UA 05/24/2019 --     GLUCOSE, UA 05/24/2019 -      COLOR,UA 05/24/2019 yellow     CLARITY,UA 05/24/2019 clear    Office Visit on 04/15/2019   Component Date Value    POST-VOID RESIDUAL VOLUM* 04/15/2019 585 05         Imaging: Xr Knee 1 Or 2 Vw Left    Result Date: 9/21/2019  Narrative: LEFT KNEE INDICATION:  Follow up surgery  COMPARISON: Left knee radiograph 5/17/2019 VIEWS:  AP and lateral IMAGES:  2 FINDINGS: Total knee arthroplasty appears in satisfactory position  No evidence of hardware failure  There is no acute fracture or dislocation  There is a small joint effusion   No lytic or blastic lesions are seen  Soft tissues are unremarkable  Impression: Unremarkable appearance of total knee arthroplasty  Workstation performed: [id number]     Xr Knee 1 Or 2 Vw Right    Result Date: 9/21/2019  Narrative: RIGHT KNEE INDICATION:   M25 561: Pain in right knee G89 29: Other chronic pain  COMPARISON:  None VIEWS:  XR KNEE 1 OR 2 VW RIGHT FINDINGS: There is no acute fracture or dislocation  There is no joint effusion  Moderate tricompartmental osteoarthritis as evidenced by joint space narrowing, osteophyte formation and subchondral sclerosis  No lytic or blastic lesions are seen  Soft tissues are unremarkable  Impression: No acute osseous abnormality  Degenerative changes as described  Workstation performed: [id number]       ECG: EKG in the office- Typical atrial flutter with ventricular rate of 54 bpm    Review of Systems:  Review of Systems   Constitutional: Negative for activity change, appetite change, chills, diaphoresis, fatigue and fever  HENT: Negative for congestion  Respiratory: Negative for cough, chest tightness, shortness of breath and wheezing  Cardiovascular: Negative for chest pain, palpitations and leg swelling  Gastrointestinal: Negative for abdominal pain, diarrhea, nausea and vomiting  Genitourinary: Negative for difficulty urinating and dysuria  Musculoskeletal: Negative for back pain  Skin: Negative for color change and rash  Neurological: Negative for dizziness, syncope, facial asymmetry, weakness, light-headedness, numbness and headaches  Psychiatric/Behavioral: Negative for confusion           Vitals:    09/25/19 1309   BP: 120/80   Pulse: (!) 54      Vitals:    09/25/19 1309   Weight: 69 4 kg (153 lb)     Height: 5' 7 5" (171 5 cm)     Physical Exam:  General appearance:  Appears stated age, alert, well appearing and in no distress  HEENT:  PERRLA, EOMI, no scleral icterus, no conjunctival pallor  NECK:  Supple, No elevated JVP, no thyromegaly, no carotid bruits  HEART:  Regular rate and rhythm, normal S1/S2, no S3/S4, no murmur or rub  LUNGS:  Clear to auscultation bilaterally  ABDOMEN:  Soft, non-tender, positive bowel sounds, no rebound or guarding, no organomegaly   EXTREMITIES:  No edema  VASCULAR:  Normal pedal pulses   SKIN: No lesions or rashes on exposed skin  NEURO:  CN II-XII intact, no focal deficits

## 2019-09-25 NOTE — PROGRESS NOTES
Cardiology Consultation     Shana Zazueta  [id number]  1940  HEART & VASCULAR [facility] [place]  [address] [address]      1  Sinus bradycardia  POCT ECG   2  Primary osteoarthritis of right knee  Ambulatory referral to Cardiology   3  Pre-operative cardiovascular examination  POCT ECG   4  Typical atrial flutter (HCC)  Echo complete with contrast if indicated       Discussion/Summary: Mr Marilu Aj is a 70year old male is here for preoperative risk stratification prior to right knee total arthroplasty  1  Preoperative risk stratification prior to right total knee arthroplasty  - Patient is very functional at baseline and able to achieve more than 4 METS with no limitations  - His RCRI is 0 which translates to 0 4% risk of cardiac event during surgery  - He is at moderate acceptable risk and can undergo surgery    2  Typical Atrial flutter with slow ventricular response  - Patient is not symptomatic with being in atrial flutter, this was seen on an EKG in July as well  - CHADVASC-2 which translates to 2 2% risk of stroke per year  - Discussed in detail benefits and risks of being on anticoagulation and especially with newer agents how the risk is relatively lower especially in an active person like him and the benefits of being on anticoagulation (annual risk of stroke jose with CHADVASC of 2)  Patient is reluctant to start anticoagulation at this time  - Strongly recommend that he be on anticoagulation with atrial flutter, also discussed CTI ablation as a permanent treatment option but even for that he would have to be on anticoagulation  - He is reluctant and understands the risks of not being on anticoagulation, he would like to think about it more and will get back to us   He is little hesitant to start especially prior to planned surgery  - Patient would like to discuss with his PCP as well and get back to us  - Will get an echo to evaluate EF, valvular function and atrial size  Follow-up in three months to discuss anticoagulation  Lives in [place] [place] and would prefer follow-up there if possible  History of Present Illness: Mr Jake Pat is a 70year old male is here for preoperative risk stratification prior to right knee total arthroplasty  Patient is very functional at baseline  He denies any symptoms of chest pain or pressure, no shortness of breath at rest or with exertion  Able to walk a flight of stairs with no limitation  No symptoms of palpitations, dizziness or lightheadedness  No swelling of the lower extremities  No syncope  Reports intermittent symptoms of vertigo once every few months which subsides by itself  Exercises 3-4 times a week for 30-40 minutes on the bike  No prior cardiac history of CAD, HTN, arrhythmias or heart failure  Smoked for 20 years and quit at Age 38, social alcohol use, no illicit drug use   Retired, lives with wife, worked a desk job in the past      EKG in the office- Typical atrial flutter with ventricular rate of 54 bpm  Atrial flutter with slow ventricular response on EKG from July 2019 as well    Patient Active Problem List   Diagnosis    S/P total hip arthroplasty    Preoperative cardiovascular examination    Primary osteoarthritis of left knee    Sinus bradycardia    S/P total knee arthroplasty, left    Back pain    Urinary retention    BPH (benign prostatic hyperplasia)    Diverticulitis of colon    Fullness in clavicular area    Heart murmur    Kidney mass    Left-sided low back pain with left-sided sciatica    Knee pain    Leg pain    Neoplasm of skin    Osteoarthritis of hip    Primary osteoarthritis of both hips    Pain of both hip joints    Primary osteoarthritis of both knees    Erectile dysfunction    Retention of urine    Primary osteoarthritis of right knee     Past Medical History:   Diagnosis Date    BPH (benign prostatic hyperplasia)     Diverticulitis of colon     Herniated lumbar intervertebral disc     Kidney mass     Murmur     diagnosed as a child 8 YO    Osteoarthritis     Preoperative cardiovascular examination 2018     Social History     Socioeconomic History    Marital status: /Civil Union     Spouse name: Not on file    Number of children: Not on file    Years of education: Not on file    Highest education level: Not on file   Occupational History    Not on file   Social Needs    Financial resource strain: Not on file    Food insecurity:     Worry: Not on file     Inability: Not on file    Transportation needs:     Medical: Not on file     Non-medical: Not on file   Tobacco Use    Smoking status: Former Smoker     Packs/day: 1 00     Years: 15 00     Pack years: 15 00     Last attempt to quit:      Years since quittin 7    Smokeless tobacco: Never Used   Substance and Sexual Activity    Alcohol use:  Yes     Alcohol/week: 2 0 standard drinks     Types: 2 Cans of beer per week     Frequency: 4 or more times a week     Drinks per session: 1 or 2     Comment: beer daily     Drug use: No    Sexual activity: Yes   Lifestyle    Physical activity:     Days per week: Not on file     Minutes per session: Not on file    Stress: Not on file   Relationships    Social connections:     Talks on phone: Not on file     Gets together: Not on file     Attends Oriental orthodox service: Not on file     Active member of club or organization: Not on file     Attends meetings of clubs or organizations: Not on file     Relationship status: Not on file    Intimate partner violence:     Fear of current or ex partner: Not on file     Emotionally abused: Not on file     Physically abused: Not on file     Forced sexual activity: Not on file   Other Topics Concern    Not on file   Social History Narrative    Not on file      Family History   Problem Relation Age of Onset    Cancer Mother     Cancer Father     Cancer Brother     Heart disease Brother      Past Surgical History:   Procedure Laterality Date    HERNIA REPAIR      JOINT REPLACEMENT Bilateral     KNEE ARTHROSCOPY      bilateral    RI CYSTOURETHRO W/IMPLANT N/A 7/5/2019    Procedure: CYSTOSCOPY WITH INSERTION UROLIFT;  Surgeon: Lissy Martel MD;  [facility];  Service: Urology    RI TOTAL HIP ARTHROPLASTY Left 6/12/2017    Procedure: ANTERIOR TOTAL HIP ARTHROPLASTY;  Surgeon: Shannan Sapp MD;  [facility];  Service: Orthopedics    RI TOTAL HIP ARTHROPLASTY Right 1/3/2018    Procedure: ANTERIOR TOTAL HIP ARTHROPLASTY;  Surgeon: Shannan Sapp MD;  [facility];  Service: Orthopedics    RI TOTAL KNEE ARTHROPLASTY Left 2/20/2019    Procedure: TOTAL KNEE ARTHROPLASTY;  Surgeon: Shannan Sapp MD;  [facility];  Service: Orthopedics       Current Outpatient Medications:     ascorbic acid (VITAMIN C) 500 mg tablet, Take 1 tablet (500 mg total) by mouth 2 (two) times a day, Disp: 60 tablet, Rfl: 0    ferrous sulfate 324 (65 Fe) mg, Take 1 tablet (324 mg total) by mouth 2 (two) times a day before meals, Disp: 60 tablet, Rfl: 0    folic acid (FOLVITE) 1 mg tablet, Take 1 tablet (1 mg total) by mouth daily, Disp: 30 tablet, Rfl: 0    multivitamin (THERAGRAN) TABS, Take 1 tablet by mouth daily, Disp: , Rfl:     sildenafil (VIAGRA) 100 mg tablet, Take 1 tablet (100 mg total) by mouth daily as needed for erectile dysfunction, Disp: 10 tablet, Rfl: 11    tamsulosin (FLOMAX) 0 4 mg, Take 1 capsule (0 4 mg total) by mouth daily with dinner, Disp: 90 capsule, Rfl: 3    enoxaparin (LOVENOX) 40 mg/0 4 mL, Inject 0 4 mL (40 mg total) under the skin daily Start after surgery (Patient not taking: Reported on 9/25/2019), Disp: 30 Syringe, Rfl: 0  No Known Allergies      Labs:  Orders Only on 09/19/2019   Component Date Value    Cholesterol, Total 09/19/2019 160     Triglycerides 09/19/2019 95     HDL 09/19/2019 62     VLDL Cholesterol Calcula* 09/19/2019 19     LDL Direct 09/19/2019 79     Prostate Specific Antige* 09/19/2019 2 4    Appointment on 09/17/2019   Component Date Value    Sodium 09/17/2019 134*    Potassium 09/17/2019 4 4     Chloride 09/17/2019 103     CO2 09/17/2019 29     ANION GAP 09/17/2019 2*    BUN 09/17/2019 13     Creatinine 09/17/2019 1 00     Glucose 09/17/2019 82     Calcium 09/17/2019 9 1     AST 09/17/2019 29     ALT 09/17/2019 29     Alkaline Phosphatase 09/17/2019 25*    Total Protein 09/17/2019 7 4     Albumin 09/17/2019 4 0     Total Bilirubin 09/17/2019 1 58*    eGFR 09/17/2019 72     WBC 09/17/2019 6 45     RBC 09/17/2019 4 66     Hemoglobin 09/17/2019 15 5     Hematocrit 09/17/2019 46 9     MCV 09/17/2019 101*    MCH 09/17/2019 33 3     MCHC 09/17/2019 33 0     RDW 09/17/2019 13 9     MPV 09/17/2019 10 1     Platelets 19/47/1738 267     nRBC 09/17/2019 0     Neutrophils Relative 09/17/2019 72     Immat GRANS % 09/17/2019 1     Lymphocytes Relative 09/17/2019 18     Monocytes Relative 09/17/2019 7     Eosinophils Relative 09/17/2019 1     Basophils Relative 09/17/2019 1     Neutrophils Absolute 09/17/2019 4 71     Immature Grans Absolute 09/17/2019 0 03     Lymphocytes Absolute 09/17/2019 1 18     Monocytes Absolute 09/17/2019 0 43     Eosinophils Absolute 09/17/2019 0 05     Basophils Absolute 09/17/2019 0 05     CRP 09/17/2019 <3 0     Protime 09/17/2019 15 2*    INR 09/17/2019 1 24*    PTT 09/17/2019 29     ABO Grouping 09/17/2019 A     Rh Factor 09/17/2019 Positive     Antibody Screen 09/17/2019 Negative     Specimen Expiration Date 09/17/2019 14018108     Hemoglobin A1C 09/17/2019 5 2     EAG 09/17/2019 103     Iron Saturation 09/17/2019 36     TIBC 09/17/2019 363     Iron 09/17/2019 129     Ferritin 09/17/2019 62    Office Visit on 08/14/2019   Component Date Value    POST-VOID RESIDUAL VOLUM* 08/14/2019 369 56    Admission on 07/05/2019, Discharged on 07/05/2019   Component Date Value    Ventricular Rate 07/05/2019 96     Atrial Rate 07/05/2019 53     WY Interval 07/05/2019 182     QRSD Interval 07/05/2019 88     QT Interval 07/05/2019 458     QTC Interval 07/05/2019 578     P Axis 07/05/2019 82     QRS Axis 07/05/2019 82     T Wave [place] 07/05/2019 59    Appointment on 06/24/2019   Component Date Value    Urine Culture 06/24/2019 >100,000 cfu/ml Staphylococcus lugdunensis*   Office Visit on 05/28/2019   Component Date Value    POST-VOID RESIDUAL VOLUM* 05/28/2019 878    Procedure visit on 05/24/2019   Component Date Value    LEUKOCYTE ESTERASE,UA 05/24/2019 +     Leotis [place] 05/24/2019 -     SL AMB POCT UROBILINOGEN 05/24/2019 -     POCT URINE PROTEIN 05/24/2019 -      PH,UA 05/24/2019 5 0     BLOOD,UA 05/24/2019 -     SPECIFIC GRAVITY,UA 05/24/2019 1 010     KETONES,UA 05/24/2019 -     BILIRUBIN,UA 05/24/2019 --     GLUCOSE, UA 05/24/2019 -      COLOR,UA 05/24/2019 yellow     CLARITY,UA 05/24/2019 clear    Office Visit on 04/15/2019   Component Date Value    POST-VOID RESIDUAL VOLUM* 04/15/2019 585 05         Imaging: Xr Knee 1 Or 2 Vw Left    Result Date: 9/21/2019  Narrative: LEFT KNEE INDICATION:  Follow up surgery  COMPARISON: Left knee radiograph 5/17/2019 VIEWS:  AP and lateral IMAGES:  2 FINDINGS: Total knee arthroplasty appears in satisfactory position  No evidence of hardware failure  There is no acute fracture or dislocation  There is a small joint effusion  No lytic or blastic lesions are seen  Soft tissues are unremarkable  Impression: Unremarkable appearance of total knee arthroplasty  Workstation performed: [id number]     Xr Knee 1 Or 2 Vw Right    Result Date: 9/21/2019  Narrative: RIGHT KNEE INDICATION:   M25 561: Pain in right knee G89 29: Other chronic pain  COMPARISON:  None VIEWS:  XR KNEE 1 OR 2 VW RIGHT FINDINGS: There is no acute fracture or dislocation  There is no joint effusion   Moderate tricompartmental osteoarthritis as evidenced by joint space narrowing, osteophyte formation and subchondral sclerosis  No lytic or blastic lesions are seen  Soft tissues are unremarkable  Impression: No acute osseous abnormality  Degenerative changes as described  Workstation performed: [id number]       ECG: EKG in the office- Typical atrial flutter with ventricular rate of 54 bpm    Review of Systems:  Review of Systems   Constitutional: Negative for activity change, appetite change, chills, diaphoresis, fatigue and fever  HENT: Negative for congestion  Respiratory: Negative for cough, chest tightness, shortness of breath and wheezing  Cardiovascular: Negative for chest pain, palpitations and leg swelling  Gastrointestinal: Negative for abdominal pain, diarrhea, nausea and vomiting  Genitourinary: Negative for difficulty urinating and dysuria  Musculoskeletal: Negative for back pain  Skin: Negative for color change and rash  Neurological: Negative for dizziness, syncope, facial asymmetry, weakness, light-headedness, numbness and headaches  Psychiatric/Behavioral: Negative for confusion           Vitals:    09/25/19 1309   BP: 120/80   Pulse: (!) 54      Vitals:    09/25/19 1309   Weight: 69 4 kg (153 lb)     Height: 5' 7 5" (171 5 cm)     Physical Exam:  General appearance:  Appears stated age, alert, well appearing and in no distress  HEENT:  PERRLA, EOMI, no scleral icterus, no conjunctival pallor  NECK:  Supple, No elevated JVP, no thyromegaly, no carotid bruits  HEART:  Bradycardic, normal S1/S2, no S3/S4, no murmur or rub  LUNGS:  Clear to auscultation bilaterally  ABDOMEN:  Soft, non-tender, positive bowel sounds, no rebound or guarding, no organomegaly   EXTREMITIES:  No edema  VASCULAR:  Normal pedal pulses   SKIN: No lesions or rashes on exposed skin  NEURO:  CN II-XII intact, no focal deficits

## 2019-09-25 NOTE — H&P (VIEW-ONLY)
Cardiology Consultation     Manish Ramirez  [id number]  1940  HEART & VASCULAR [facility] [place]  [address] [address]      1  Sinus bradycardia  POCT ECG   2  Primary osteoarthritis of right knee  Ambulatory referral to Cardiology   3  Pre-operative cardiovascular examination  POCT ECG   4  Typical atrial flutter (HCC)  Echo complete with contrast if indicated       Discussion/Summary: Mr Ge Trinidad is a 70year old male is here for preoperative risk stratification prior to right knee total arthroplasty  1  Preoperative risk stratification prior to right total knee arthroplasty  - Patient is very functional at baseline and able to achieve more than 4 METS with no limitations  - His RCRI is 0 which translates to 0 4% risk of cardiac event during surgery  - He is at moderate acceptable risk and can undergo surgery    2  Typical Atrial flutter with slow ventricular response  - Patient is not symptomatic with being in atrial flutter, this was seen on an EKG in July as well  - CHADVASC-2 which translates to 2 2% risk of stroke per year  - Discussed in detail benefits and risks of being on anticoagulation and especially with newer agents how the risk is relatively lower especially in an active person like him and the benefits of being on anticoagulation (annual risk of stroke jose with CHADVASC of 2)  Patient is reluctant to start anticoagulation at this time  - Strongly recommend that he be on anticoagulation with atrial flutter, also discussed CTI ablation as a permanent treatment option but even for that he would have to be on anticoagulation  - He is reluctant and understands the risks of not being on anticoagulation, he would like to think about it more and will get back to us   He is little hesitant to start especially prior to planned surgery  - Patient would like to discuss with his PCP as well and get back to us  - Will get an echo to evaluate EF, valvular function and atrial size  Follow-up in three months to discuss anticoagulation  Lives in [facility] and would prefer follow-up there if possible  History of Present Illness: Mr Carly Maradiaga is a 70year old male is here for preoperative risk stratification prior to right knee total arthroplasty  Patient is very functional at baseline  He denies any symptoms of chest pain or pressure, no shortness of breath at rest or with exertion  Able to walk a flight of stairs with no limitation  No symptoms of palpitations, dizziness or lightheadedness  No swelling of the lower extremities  No syncope  Reports intermittent symptoms of vertigo once every few months which subsides by itself  Exercises 3-4 times a week for 30-40 minutes on the bike  No prior cardiac history of CAD, HTN, arrhythmias or heart failure  Smoked for 20 years and quit at Age 38, social alcohol use, no illicit drug use   Retired, lives with wife, worked a desk job in the past      EKG in the office- Typical atrial flutter with ventricular rate of 54 bpm  Atrial flutter with slow ventricular response on EKG from July 2019 as well    Patient Active Problem List   Diagnosis    S/P total hip arthroplasty    Preoperative cardiovascular examination    Primary osteoarthritis of left knee    Sinus bradycardia    S/P total knee arthroplasty, left    Back pain    Urinary retention    BPH (benign prostatic hyperplasia)    Diverticulitis of colon    Fullness in clavicular area    Heart murmur    Kidney mass    Left-sided low back pain with left-sided sciatica    Knee pain    Leg pain    Neoplasm of skin    Osteoarthritis of hip    Primary osteoarthritis of both hips    Pain of both hip joints    Primary osteoarthritis of both knees    Erectile dysfunction    Retention of urine    Primary osteoarthritis of right knee     Past Medical History:   Diagnosis Date    BPH (benign prostatic hyperplasia)     Diverticulitis of colon     Herniated lumbar intervertebral disc     Kidney mass     Murmur     diagnosed as a child 6 YO    Osteoarthritis     Preoperative cardiovascular examination 2018     Social History     Socioeconomic History    Marital status: /Civil Union     Spouse name: Not on file    Number of children: Not on file    Years of education: Not on file    Highest education level: Not on file   Occupational History    Not on file   Social Needs    Financial resource strain: Not on file    Food insecurity:     Worry: Not on file     Inability: Not on file    Transportation needs:     Medical: Not on file     Non-medical: Not on file   Tobacco Use    Smoking status: Former Smoker     Packs/day: 1 00     Years: 15 00     Pack years: 15 00     Last attempt to quit:      Years since quittin 7    Smokeless tobacco: Never Used   Substance and Sexual Activity    Alcohol use:  Yes     Alcohol/week: 2 0 standard drinks     Types: 2 Cans of beer per week     Frequency: 4 or more times a week     Drinks per session: 1 or 2     Comment: beer daily     Drug use: No    Sexual activity: Yes   Lifestyle    Physical activity:     Days per week: Not on file     Minutes per session: Not on file    Stress: Not on file   Relationships    Social connections:     Talks on phone: Not on file     Gets together: Not on file     Attends [employer] service: Not on file     Active member of club or organization: Not on file     Attends meetings of clubs or organizations: Not on file     Relationship status: Not on file    Intimate partner violence:     Fear of current or ex partner: Not on file     Emotionally abused: Not on file     Physically abused: Not on file     Forced sexual activity: Not on file   Other Topics Concern    Not on file   Social History Narrative    Not on file      Family History   Problem Relation Age of Onset    Cancer Mother     Cancer Father     Cancer Brother     Heart disease Brother      Past Surgical History:   Procedure Laterality Date    HERNIA REPAIR      JOINT REPLACEMENT Bilateral     KNEE ARTHROSCOPY      bilateral    RI CYSTOURETHRO W/IMPLANT N/A 7/5/2019    Procedure: CYSTOSCOPY WITH INSERTION UROLIFT;  Surgeon: Noah Amador MD;  [facility];  Service: Urology    RI TOTAL HIP ARTHROPLASTY Left 6/12/2017    Procedure: ANTERIOR TOTAL HIP ARTHROPLASTY;  Surgeon: Godwin Jacobsen MD;  [facility];  Service: Orthopedics    RI TOTAL HIP ARTHROPLASTY Right 1/3/2018    Procedure: ANTERIOR TOTAL HIP ARTHROPLASTY;  Surgeon: Godwin Jacobsen MD;  [facility];  Service: Orthopedics    RI TOTAL KNEE ARTHROPLASTY Left 2/20/2019    Procedure: TOTAL KNEE ARTHROPLASTY;  Surgeon: Godwin Jacobsen MD;  [facility];  Service: Orthopedics       Current Outpatient Medications:     ascorbic acid (VITAMIN C) 500 mg tablet, Take 1 tablet (500 mg total) by mouth 2 (two) times a day, Disp: 60 tablet, Rfl: 0    ferrous sulfate 324 (65 Fe) mg, Take 1 tablet (324 mg total) by mouth 2 (two) times a day before meals, Disp: 60 tablet, Rfl: 0    folic acid (FOLVITE) 1 mg tablet, Take 1 tablet (1 mg total) by mouth daily, Disp: 30 tablet, Rfl: 0    multivitamin (THERAGRAN) TABS, Take 1 tablet by mouth daily, Disp: , Rfl:     sildenafil (VIAGRA) 100 mg tablet, Take 1 tablet (100 mg total) by mouth daily as needed for erectile dysfunction, Disp: 10 tablet, Rfl: 11    tamsulosin (FLOMAX) 0 4 mg, Take 1 capsule (0 4 mg total) by mouth daily with dinner, Disp: 90 capsule, Rfl: 3    enoxaparin (LOVENOX) 40 mg/0 4 mL, Inject 0 4 mL (40 mg total) under the skin daily Start after surgery (Patient not taking: Reported on 9/25/2019), Disp: 30 Syringe, Rfl: 0  No Known Allergies      Labs:  Orders Only on 09/19/2019   Component Date Value    Cholesterol, Total 09/19/2019 160     Triglycerides 09/19/2019 95     HDL 09/19/2019 62     VLDL Cholesterol Calcula* 09/19/2019 19     LDL Direct 09/19/2019 79     Prostate Specific Antige* 09/19/2019 2 4    Appointment on 09/17/2019   Component Date Value    Sodium 09/17/2019 134*    Potassium 09/17/2019 4 4     Chloride 09/17/2019 103     CO2 09/17/2019 29     ANION GAP 09/17/2019 2*    BUN 09/17/2019 13     Creatinine 09/17/2019 1 00     Glucose 09/17/2019 82     Calcium 09/17/2019 9 1     AST 09/17/2019 29     ALT 09/17/2019 29     Alkaline Phosphatase 09/17/2019 25*    Total Protein 09/17/2019 7 4     Albumin 09/17/2019 4 0     Total Bilirubin 09/17/2019 1 58*    eGFR 09/17/2019 72     WBC 09/17/2019 6 45     RBC 09/17/2019 4 66     Hemoglobin 09/17/2019 15 5     Hematocrit 09/17/2019 46 9     MCV 09/17/2019 101*    MCH 09/17/2019 33 3     MCHC 09/17/2019 33 0     RDW 09/17/2019 13 9     MPV 09/17/2019 10 1     Platelets 36/10/9198 267     nRBC 09/17/2019 0     Neutrophils Relative 09/17/2019 72     Immat GRANS % 09/17/2019 1     Lymphocytes Relative 09/17/2019 18     Monocytes Relative 09/17/2019 7     Eosinophils Relative 09/17/2019 1     Basophils Relative 09/17/2019 1     Neutrophils Absolute 09/17/2019 4 71     Immature Grans Absolute 09/17/2019 0 03     Lymphocytes Absolute 09/17/2019 1 18     Monocytes Absolute 09/17/2019 0 43     Eosinophils Absolute 09/17/2019 0 05     Basophils Absolute 09/17/2019 0 05     CRP 09/17/2019 <3 0     Protime 09/17/2019 15 2*    INR 09/17/2019 1 24*    PTT 09/17/2019 29     ABO Grouping 09/17/2019 A     Rh Factor 09/17/2019 Positive     Antibody Screen 09/17/2019 Negative     Specimen Expiration Date 09/17/2019 [id number]     Hemoglobin A1C 09/17/2019 5 2     EAG 09/17/2019 103     Iron Saturation 09/17/2019 36     TIBC 09/17/2019 363     Iron 09/17/2019 129     Ferritin 09/17/2019 62    Office Visit on 08/14/2019   Component Date Value    POST-VOID RESIDUAL VOLUM* 08/14/2019 369 56    Admission on 07/05/2019, Discharged on 07/05/2019   Component Date Value    Ventricular Rate 07/05/2019 96     Atrial Rate 07/05/2019 53     HI Interval 07/05/2019 182     QRSD Interval 07/05/2019 88     QT Interval 07/05/2019 458     QTC Interval 07/05/2019 578     P Axis 07/05/2019 82     QRS Axis 07/05/2019 82     T Wave [place] 07/05/2019 59    Appointment on 06/24/2019   Component Date Value    Urine Culture 06/24/2019 >100,000 cfu/ml Staphylococcus lugdunensis*   Office Visit on 05/28/2019   Component Date Value    POST-VOID RESIDUAL VOLUM* 05/28/2019 878    Procedure visit on 05/24/2019   Component Date Value    LEUKOCYTE ESTERASE,UA 05/24/2019 +     Karolee Olden 05/24/2019 -     [facility] AMB POCT UROBILINOGEN 05/24/2019 -     POCT URINE PROTEIN 05/24/2019 -      PH,UA 05/24/2019 5 0     BLOOD,UA 05/24/2019 -     SPECIFIC GRAVITY,UA 05/24/2019 1 010     KETONES,UA 05/24/2019 -     BILIRUBIN,UA 05/24/2019 --     GLUCOSE, UA 05/24/2019 -      COLOR,UA 05/24/2019 yellow     CLARITY,UA 05/24/2019 clear    Office Visit on 04/15/2019   Component Date Value    POST-VOID RESIDUAL VOLUM* 04/15/2019 585 05         Imaging: Xr Knee 1 Or 2 Vw Left    Result Date: 9/21/2019  Narrative: LEFT KNEE INDICATION:  Follow up surgery  COMPARISON: Left knee radiograph 5/17/2019 VIEWS:  AP and lateral IMAGES:  2 FINDINGS: Total knee arthroplasty appears in satisfactory position  No evidence of hardware failure  There is no acute fracture or dislocation  There is a small joint effusion  No lytic or blastic lesions are seen  Soft tissues are unremarkable  Impression: Unremarkable appearance of total knee arthroplasty  Workstation performed: [id number]     Xr Knee 1 Or 2 Vw Right    Result Date: 9/21/2019  Narrative: RIGHT KNEE INDICATION:   M25 561: Pain in right knee G89 29: Other chronic pain  COMPARISON:  None VIEWS:  XR KNEE 1 OR 2 VW RIGHT FINDINGS: There is no acute fracture or dislocation  There is no joint effusion   Moderate tricompartmental osteoarthritis as evidenced by joint space narrowing, osteophyte formation and subchondral sclerosis  No lytic or blastic lesions are seen  Soft tissues are unremarkable  Impression: No acute osseous abnormality  Degenerative changes as described  Workstation performed: [id number]       ECG: EKG in the office- Typical atrial flutter with ventricular rate of 54 bpm    Review of Systems:  Review of Systems   Constitutional: Negative for activity change, appetite change, chills, diaphoresis, fatigue and fever  HENT: Negative for congestion  Respiratory: Negative for cough, chest tightness, shortness of breath and wheezing  Cardiovascular: Negative for chest pain, palpitations and leg swelling  Gastrointestinal: Negative for abdominal pain, diarrhea, nausea and vomiting  Genitourinary: Negative for difficulty urinating and dysuria  Musculoskeletal: Negative for back pain  Skin: Negative for color change and rash  Neurological: Negative for dizziness, syncope, facial asymmetry, weakness, light-headedness, numbness and headaches  Psychiatric/Behavioral: Negative for confusion           Vitals:    09/25/19 1309   BP: 120/80   Pulse: (!) 54      Vitals:    09/25/19 1309   Weight: 69 4 kg (153 lb)     Height: 5' 7 5" (171 5 cm)     Physical Exam:  General appearance:  Appears stated age, alert, well appearing and in no distress  HEENT:  PERRLA, EOMI, no scleral icterus, no conjunctival pallor  NECK:  Supple, No elevated JVP, no thyromegaly, no carotid bruits  HEART:  Bradycardic, normal S1/S2, no S3/S4, no murmur or rub  LUNGS:  Clear to auscultation bilaterally  ABDOMEN:  Soft, non-tender, positive bowel sounds, no rebound or guarding, no organomegaly   EXTREMITIES:  No edema  VASCULAR:  Normal pedal pulses   SKIN: No lesions or rashes on exposed skin  NEURO:  CN II-XII intact, no focal deficits

## 2019-09-26 ENCOUNTER — HOSPITAL ENCOUNTER (OUTPATIENT)
Dept: NON INVASIVE DIAGNOSTICS | Facility: CLINIC | Age: 79
Discharge: HOME | End: 2019-09-26
Payer: COMMERCIAL

## 2019-09-26 DIAGNOSIS — I48.3 TYPICAL ATRIAL FLUTTER (HCC): ICD-10-CM

## 2019-09-26 PROCEDURE — 93306 TTE W/DOPPLER COMPLETE: CPT | Performed by: INTERNAL MEDICINE

## 2019-09-26 PROCEDURE — 93306 TTE W/DOPPLER COMPLETE: CPT

## 2019-10-04 ENCOUNTER — TELEPHONE (OUTPATIENT)
Dept: OBGYN CLINIC | Facility: HOSPITAL | Age: 79
End: 2019-10-04

## 2019-10-10 ENCOUNTER — TELEPHONE (OUTPATIENT)
Dept: CARDIOLOGY CLINIC | Facility: CLINIC | Age: 79
End: 2019-10-10

## 2019-10-10 NOTE — TELEPHONE ENCOUNTER
Received call from Pre Admission Testing regarding this patient's cardiac clearance  You saw him in the office on 9/25 and cleared him for upcoming total knee arthroplasty on 10/23  On 9/26, the patient completed the echo you ordered  [facility] Pre Admission Testing is requesting an addendum to ov note stating you have seen echo results and patient still cleared for surgery

## 2019-10-14 NOTE — TELEPHONE ENCOUNTER
Preoperative Elective Admission Assessment- Spoke with pt         Living Situation: Lives with wifeJolly in 2 story home located in a [address]: 2 story home, walk-in shower  Pt resides on 1st floor                      Steps: #3 steps to enter      First Floor Setup: Yes      Post-op Caregiver: Brandon manzano Transport: Jolly manzano      Outpatient Physical Therapy Site: [facility]  Pt has initial outpatient PT eval scheduled for 2/25 @ 1100      DME: Pt has a RW, cane, and raised toilet seat      Patient's Current Level of Function: Pt currently ambulates independently of devices and is independent with his ADLs     Medication Management: Pt self manages his meds and uses a spreadsheet to do so                      Preferred Pharmacy: [facility] in [place]                      Blood Management Vitamins: Pt confirms he is taking his vit c, folic acid, iron and MV daily                     Post-op anticoagulant: Pt has filled and picked up his post-op lovenox, pt advised this is for after surgery use only      DC Plan: Pt plans to be DCd to home and plans to attend outpatient PT at [facility]                      Barriers to DC identified preoperatively: None identified       BMI: 23 92 at [facility] 104 9/17      Caresense: Pt declined                       RAPT: Score 10, already in caresense                       ACE/ARB Form: Declined                       HOOS/KOOS: Score 39 625, already in caresense       Patient Education:   Pt educated on post-op pain, early mobilization (POD0), indication for/use of incentive spirometer (10x/hour while awake) and indication for/use of foot/leg pumps (18 hours/day)  Pt  educated that our goal, if at all possible, is to appropriately discharge patient based off their post-op function while striving to maintain maximal independence   If possible, the goal is to discharge patient to home and for them to attend outpatient physical therapy  I educated patient on the many benefits of outpt PT(Including maintaining independence, additional resources at outpt site, better outcomes etc  )  Also educated on how home PT vs  outpt PT is determined (while inpt)  Pt denies having any questions at this time   Pt encouraged to call me with any questions, concerns or issues

## 2019-10-22 ENCOUNTER — ANESTHESIA EVENT (OUTPATIENT)
Dept: PERIOP | Facility: HOSPITAL | Age: 79
DRG: 470 | End: 2019-10-22
Payer: COMMERCIAL

## 2019-10-22 NOTE — ANESTHESIA PREPROCEDURE EVALUATION
Review of Systems/Medical History  Patient summary reviewed        Cardiovascular  Dysrhythmias ,   Comment: Hx A flutter, slow vent rate, no symptoms, Not on any anticoagulants    Transthoracic Echocardiogram  2D, M-mode, Doppler, and Color Doppler     Study date:  26-Sep-2019     Patient: Lex Sams  MR number: [id number]  Account number: [de-identified]  : 1940  Age: 70 years  Gender: Male  Status: Outpatient  Location: [facility]  Height: 67 in  Weight: 153 lb  BP: 120/ 80 mmHg     Indications: Atrial flutter      Diagnoses: I48 1 - Atrial flutter     Sonographer:  Philip REDDY, RCS  Primary Physician:  Ho Caruso DO  Referring Physician:  Malik Nicholson MD  Group:  [facility]  Interpreting Physician:  Ammon Cosme MD     SUMMARY     LEFT VENTRICLE:  Systolic function was normal by visual assessment  Ejection fraction was estimated to be 55 %  There were no regional wall motion abnormalities  Wall thickness was mildly increased      AORTIC VALVE:  There was trace regurgitation      TRICUSPID VALVE:  There was trace regurgitation      AORTA:  The root exhibited moderate dilation      HISTORY: PRIOR HISTORY: Bradycardia, Murmur  ,  Pulmonary       GI/Hepatic            Endo/Other     GYN       Hematology   Musculoskeletal       Neurology   Psychology           Physical Exam    Airway    Mallampati score: II  TM Distance: >3 FB  Neck ROM: full     Dental       Cardiovascular      Pulmonary      Other Findings        Anesthesia Plan  ASA Score- 2     Anesthesia Type- regional and general with ASA Monitors  Additional Monitors:   Airway Plan:         Plan Factors-    Induction- intravenous  Postoperative Plan-     Informed Consent- Anesthetic plan and risks discussed with patient  I personally reviewed this patient with the CRNA  Discussed and agreed on the Anesthesia Plan with the CRNA  Joanie Sanders block   Will avoid spinal as pt has fears about urinary retention and has been told that is more likely to happen with a spinal (by urologist?, unclear who told him)

## 2019-10-23 ENCOUNTER — ANESTHESIA (OUTPATIENT)
Dept: PERIOP | Facility: HOSPITAL | Age: 79
DRG: 470 | End: 2019-10-23
Payer: COMMERCIAL

## 2019-10-23 ENCOUNTER — HOSPITAL ENCOUNTER (INPATIENT)
Facility: HOSPITAL | Age: 79
LOS: 1 days | Discharge: HOME | DRG: 470 | End: 2019-10-24
Attending: ORTHOPAEDIC SURGERY | Admitting: ORTHOPAEDIC SURGERY
Payer: COMMERCIAL

## 2019-10-23 DIAGNOSIS — Z47.1 AFTERCARE FOLLOWING RIGHT KNEE JOINT REPLACEMENT SURGERY: Primary | ICD-10-CM

## 2019-10-23 DIAGNOSIS — R33.9 URINARY RETENTION: ICD-10-CM

## 2019-10-23 DIAGNOSIS — Z96.651 AFTERCARE FOLLOWING RIGHT KNEE JOINT REPLACEMENT SURGERY: Primary | ICD-10-CM

## 2019-10-23 PROBLEM — I48.92 ATRIAL FLUTTER (HCC): Status: ACTIVE | Noted: 2019-10-23

## 2019-10-23 LAB
ABO GROUP BLD: NORMAL
BLD GP AB SCN SERPL QL: NEGATIVE
ERYTHROCYTE [DISTWIDTH] IN BLOOD BY AUTOMATED COUNT: 13.4 % (ref 11.6–15.1)
HCT VFR BLD AUTO: 46.1 % (ref 36.5–49.3)
HGB BLD-MCNC: 15.1 G/DL (ref 12–17)
MCH RBC QN AUTO: 33.8 PG (ref 26.8–34.3)
MCHC RBC AUTO-ENTMCNC: 32.8 G/DL (ref 31.4–37.4)
MCV RBC AUTO: 103 FL (ref 82–98)
PLATELET # BLD AUTO: 285 THOUSANDS/UL (ref 149–390)
PMV BLD AUTO: 9.3 FL (ref 8.9–12.7)
RBC # BLD AUTO: 4.47 MILLION/UL (ref 3.88–5.62)
RH BLD: POSITIVE
SPECIMEN EXPIRATION DATE: NORMAL
WBC # BLD AUTO: 9.81 THOUSAND/UL (ref 4.31–10.16)

## 2019-10-23 PROCEDURE — 97163 PT EVAL HIGH COMPLEX 45 MIN: CPT

## 2019-10-23 PROCEDURE — C1713 ANCHOR/SCREW BN/BN,TIS/BN: HCPCS | Performed by: ORTHOPAEDIC SURGERY

## 2019-10-23 PROCEDURE — C1776 JOINT DEVICE (IMPLANTABLE): HCPCS | Performed by: ORTHOPAEDIC SURGERY

## 2019-10-23 PROCEDURE — 0SRC0J9 REPLACEMENT OF RIGHT KNEE JOINT WITH SYNTHETIC SUBSTITUTE, CEMENTED, OPEN APPROACH: ICD-10-PCS | Performed by: ORTHOPAEDIC SURGERY

## 2019-10-23 PROCEDURE — G8979 MOBILITY GOAL STATUS: HCPCS

## 2019-10-23 PROCEDURE — 27447 TOTAL KNEE ARTHROPLASTY: CPT | Performed by: ORTHOPAEDIC SURGERY

## 2019-10-23 PROCEDURE — 3E0T3BZ INTRODUCTION OF ANESTHETIC AGENT INTO PERIPHERAL NERVES AND PLEXI, PERCUTANEOUS APPROACH: ICD-10-PCS | Performed by: ANESTHESIOLOGY

## 2019-10-23 PROCEDURE — C9290 INJ, BUPIVACAINE LIPOSOME: HCPCS | Performed by: ANESTHESIOLOGY

## 2019-10-23 PROCEDURE — 86900 BLOOD TYPING SEROLOGIC ABO: CPT | Performed by: ORTHOPAEDIC SURGERY

## 2019-10-23 PROCEDURE — 86901 BLOOD TYPING SEROLOGIC RH(D): CPT | Performed by: ORTHOPAEDIC SURGERY

## 2019-10-23 PROCEDURE — 86850 RBC ANTIBODY SCREEN: CPT | Performed by: ORTHOPAEDIC SURGERY

## 2019-10-23 PROCEDURE — 85027 COMPLETE CBC AUTOMATED: CPT | Performed by: PHYSICIAN ASSISTANT

## 2019-10-23 PROCEDURE — G8978 MOBILITY CURRENT STATUS: HCPCS

## 2019-10-23 DEVICE — SMARTSET HIGH PERFORMANCE MV MEDIUM VISCOSITY BONE CEMENT 40G
Type: IMPLANTABLE DEVICE | Site: KNEE | Status: FUNCTIONAL
Brand: SMARTSET

## 2019-10-23 DEVICE — ATTUNE KNEE SYSTEM TIBIAL INSERT ROTATING PLATFORM POSTERIOR STABILIZED 7 8MM AOX
Type: IMPLANTABLE DEVICE | Site: KNEE | Status: FUNCTIONAL
Brand: ATTUNE

## 2019-10-23 DEVICE — ATTUNE PATELLA MEDIALIZED DOME 38MM CEMENTED AOX
Type: IMPLANTABLE DEVICE | Site: KNEE | Status: FUNCTIONAL
Brand: ATTUNE

## 2019-10-23 DEVICE — ATTUNE KNEE SYSTEM TIBIAL BASE ROTATING PLATFORM SIZE 6 CEMENTED
Type: IMPLANTABLE DEVICE | Site: KNEE | Status: FUNCTIONAL
Brand: ATTUNE

## 2019-10-23 DEVICE — ATTUNE KNEE SYSTEM FEMORAL POSTERIOR STABILIZED SIZE 7 RIGHT CEMENTED
Type: IMPLANTABLE DEVICE | Site: KNEE | Status: FUNCTIONAL
Brand: ATTUNE

## 2019-10-23 RX ORDER — EPHEDRINE SULFATE 50 MG/ML
INJECTION INTRAVENOUS AS NEEDED
Status: DISCONTINUED | OUTPATIENT
Start: 2019-10-23 | End: 2019-10-23 | Stop reason: SURG

## 2019-10-23 RX ORDER — SODIUM CHLORIDE, SODIUM LACTATE, POTASSIUM CHLORIDE, CALCIUM CHLORIDE 600; 310; 30; 20 MG/100ML; MG/100ML; MG/100ML; MG/100ML
1.5 INJECTION, SOLUTION INTRAVENOUS CONTINUOUS
Status: DISCONTINUED | OUTPATIENT
Start: 2019-10-23 | End: 2019-10-24

## 2019-10-23 RX ORDER — GLYCOPYRROLATE 0.2 MG/ML
0.2 INJECTION INTRAMUSCULAR; INTRAVENOUS ONCE
Status: COMPLETED | OUTPATIENT
Start: 2019-10-23 | End: 2019-10-23

## 2019-10-23 RX ORDER — CEFAZOLIN SODIUM 2 G/50ML
2000 SOLUTION INTRAVENOUS ONCE
Status: COMPLETED | OUTPATIENT
Start: 2019-10-23 | End: 2019-10-23

## 2019-10-23 RX ORDER — OXYCODONE HYDROCHLORIDE 5 MG/1
2.5 TABLET ORAL EVERY 4 HOURS PRN
Status: DISCONTINUED | OUTPATIENT
Start: 2019-10-23 | End: 2019-10-24 | Stop reason: HOSPADM

## 2019-10-23 RX ORDER — OXYCODONE HYDROCHLORIDE 5 MG/1
TABLET ORAL
Qty: 30 TABLET | Refills: 0 | Status: SHIPPED | OUTPATIENT
Start: 2019-10-23 | End: 2019-11-12 | Stop reason: SDUPTHER

## 2019-10-23 RX ORDER — MAGNESIUM HYDROXIDE 1200 MG/15ML
LIQUID ORAL AS NEEDED
Status: DISCONTINUED | OUTPATIENT
Start: 2019-10-23 | End: 2019-10-23 | Stop reason: HOSPADM

## 2019-10-23 RX ORDER — GABAPENTIN 100 MG/1
100 CAPSULE ORAL EVERY 8 HOURS SCHEDULED
Status: DISCONTINUED | OUTPATIENT
Start: 2019-10-23 | End: 2019-10-24 | Stop reason: HOSPADM

## 2019-10-23 RX ORDER — DOCUSATE SODIUM 100 MG/1
100 CAPSULE, LIQUID FILLED ORAL 2 TIMES DAILY
Qty: 10 CAPSULE | Refills: 0 | Status: SHIPPED | OUTPATIENT
Start: 2019-10-23 | End: 2020-09-23 | Stop reason: ALTCHOICE

## 2019-10-23 RX ORDER — SODIUM CHLORIDE, SODIUM LACTATE, POTASSIUM CHLORIDE, CALCIUM CHLORIDE 600; 310; 30; 20 MG/100ML; MG/100ML; MG/100ML; MG/100ML
125 INJECTION, SOLUTION INTRAVENOUS CONTINUOUS
Status: DISCONTINUED | OUTPATIENT
Start: 2019-10-23 | End: 2019-10-23

## 2019-10-23 RX ORDER — FENTANYL CITRATE/PF 50 MCG/ML
25 SYRINGE (ML) INJECTION
Status: DISCONTINUED | OUTPATIENT
Start: 2019-10-23 | End: 2019-10-23

## 2019-10-23 RX ORDER — ROCURONIUM BROMIDE 10 MG/ML
INJECTION, SOLUTION INTRAVENOUS AS NEEDED
Status: DISCONTINUED | OUTPATIENT
Start: 2019-10-23 | End: 2019-10-23 | Stop reason: SURG

## 2019-10-23 RX ORDER — PANTOPRAZOLE SODIUM 40 MG/1
40 TABLET, DELAYED RELEASE ORAL
Status: DISCONTINUED | OUTPATIENT
Start: 2019-10-24 | End: 2019-10-24 | Stop reason: HOSPADM

## 2019-10-23 RX ORDER — FENTANYL CITRATE 50 UG/ML
INJECTION, SOLUTION INTRAMUSCULAR; INTRAVENOUS AS NEEDED
Status: DISCONTINUED | OUTPATIENT
Start: 2019-10-23 | End: 2019-10-23 | Stop reason: SURG

## 2019-10-23 RX ORDER — CHLORHEXIDINE GLUCONATE 4 G/100ML
SOLUTION TOPICAL DAILY PRN
Status: DISCONTINUED | OUTPATIENT
Start: 2019-10-23 | End: 2019-10-23

## 2019-10-23 RX ORDER — ACETAMINOPHEN 325 MG/1
650 TABLET ORAL EVERY 6 HOURS PRN
Status: DISCONTINUED | OUTPATIENT
Start: 2019-10-23 | End: 2019-10-24 | Stop reason: HOSPADM

## 2019-10-23 RX ORDER — HYDROMORPHONE HCL/PF 1 MG/ML
0.2 SYRINGE (ML) INJECTION
Status: DISCONTINUED | OUTPATIENT
Start: 2019-10-23 | End: 2019-10-23 | Stop reason: HOSPADM

## 2019-10-23 RX ORDER — NEOSTIGMINE METHYLSULFATE 1 MG/ML
INJECTION INTRAVENOUS AS NEEDED
Status: DISCONTINUED | OUTPATIENT
Start: 2019-10-23 | End: 2019-10-23 | Stop reason: SURG

## 2019-10-23 RX ORDER — CALCIUM CARBONATE 200(500)MG
1000 TABLET,CHEWABLE ORAL DAILY PRN
Status: DISCONTINUED | OUTPATIENT
Start: 2019-10-23 | End: 2019-10-24 | Stop reason: HOSPADM

## 2019-10-23 RX ORDER — OXYCODONE HYDROCHLORIDE 5 MG/1
5 TABLET ORAL EVERY 4 HOURS PRN
Status: DISCONTINUED | OUTPATIENT
Start: 2019-10-23 | End: 2019-10-24 | Stop reason: HOSPADM

## 2019-10-23 RX ORDER — PROPOFOL 10 MG/ML
INJECTION, EMULSION INTRAVENOUS CONTINUOUS PRN
Status: DISCONTINUED | OUTPATIENT
Start: 2019-10-23 | End: 2019-10-23 | Stop reason: SURG

## 2019-10-23 RX ORDER — CEFAZOLIN SODIUM 1 G/50ML
1000 SOLUTION INTRAVENOUS EVERY 8 HOURS
Status: COMPLETED | OUTPATIENT
Start: 2019-10-23 | End: 2019-10-24

## 2019-10-23 RX ORDER — GABAPENTIN 300 MG/1
300 CAPSULE ORAL ONCE
Status: DISCONTINUED | OUTPATIENT
Start: 2019-10-23 | End: 2019-10-23

## 2019-10-23 RX ORDER — GLYCOPYRROLATE 0.2 MG/ML
INJECTION INTRAMUSCULAR; INTRAVENOUS AS NEEDED
Status: DISCONTINUED | OUTPATIENT
Start: 2019-10-23 | End: 2019-10-23 | Stop reason: SURG

## 2019-10-23 RX ORDER — PROPOFOL 10 MG/ML
INJECTION, EMULSION INTRAVENOUS AS NEEDED
Status: DISCONTINUED | OUTPATIENT
Start: 2019-10-23 | End: 2019-10-23 | Stop reason: SURG

## 2019-10-23 RX ORDER — CHLORHEXIDINE GLUCONATE 0.12 MG/ML
15 RINSE ORAL ONCE
Status: COMPLETED | OUTPATIENT
Start: 2019-10-23 | End: 2019-10-23

## 2019-10-23 RX ORDER — PENICILLIN V POTASSIUM 500 MG/1
500 TABLET ORAL SEE ADMIN INSTRUCTIONS
COMMUNITY

## 2019-10-23 RX ORDER — GABAPENTIN 100 MG/1
100 CAPSULE ORAL 3 TIMES DAILY
Qty: 90 CAPSULE | Refills: 0 | Status: SHIPPED | OUTPATIENT
Start: 2019-10-23 | End: 2019-10-25 | Stop reason: ALTCHOICE

## 2019-10-23 RX ORDER — ONDANSETRON 2 MG/ML
4 INJECTION INTRAMUSCULAR; INTRAVENOUS ONCE AS NEEDED
Status: DISCONTINUED | OUTPATIENT
Start: 2019-10-23 | End: 2019-10-23 | Stop reason: HOSPADM

## 2019-10-23 RX ORDER — TAMSULOSIN HYDROCHLORIDE 0.4 MG/1
0.4 CAPSULE ORAL
Status: DISCONTINUED | OUTPATIENT
Start: 2019-10-23 | End: 2019-10-24 | Stop reason: HOSPADM

## 2019-10-23 RX ORDER — HYDROMORPHONE HCL/PF 1 MG/ML
SYRINGE (ML) INJECTION AS NEEDED
Status: DISCONTINUED | OUTPATIENT
Start: 2019-10-23 | End: 2019-10-23 | Stop reason: SURG

## 2019-10-23 RX ORDER — ACETAMINOPHEN 325 MG/1
975 TABLET ORAL ONCE
Status: COMPLETED | OUTPATIENT
Start: 2019-10-23 | End: 2019-10-23

## 2019-10-23 RX ORDER — ONDANSETRON 2 MG/ML
INJECTION INTRAMUSCULAR; INTRAVENOUS AS NEEDED
Status: DISCONTINUED | OUTPATIENT
Start: 2019-10-23 | End: 2019-10-23 | Stop reason: SURG

## 2019-10-23 RX ORDER — DOCUSATE SODIUM 100 MG/1
100 CAPSULE, LIQUID FILLED ORAL 2 TIMES DAILY
Status: DISCONTINUED | OUTPATIENT
Start: 2019-10-23 | End: 2019-10-24 | Stop reason: HOSPADM

## 2019-10-23 RX ADMIN — IRON SUCROSE 300 MG: 20 INJECTION, SOLUTION INTRAVENOUS at 22:18

## 2019-10-23 RX ADMIN — ONDANSETRON 4 MG: 2 INJECTION INTRAMUSCULAR; INTRAVENOUS at 15:12

## 2019-10-23 RX ADMIN — HYDROMORPHONE HYDROCHLORIDE 0.2 MG: 1 INJECTION, SOLUTION INTRAMUSCULAR; INTRAVENOUS; SUBCUTANEOUS at 16:20

## 2019-10-23 RX ADMIN — SODIUM CHLORIDE, SODIUM LACTATE, POTASSIUM CHLORIDE, AND CALCIUM CHLORIDE 125 ML/HR: .6; .31; .03; .02 INJECTION, SOLUTION INTRAVENOUS at 12:18

## 2019-10-23 RX ADMIN — OXYCODONE HYDROCHLORIDE 5 MG: 5 TABLET ORAL at 23:41

## 2019-10-23 RX ADMIN — PROPOFOL 200 MG: 10 INJECTION, EMULSION INTRAVENOUS at 13:07

## 2019-10-23 RX ADMIN — GLYCOPYRROLATE 0.2 MG: 0.2 INJECTION, SOLUTION INTRAMUSCULAR; INTRAVENOUS at 16:20

## 2019-10-23 RX ADMIN — FENTANYL CITRATE 25 MCG: 50 INJECTION, SOLUTION INTRAMUSCULAR; INTRAVENOUS at 13:43

## 2019-10-23 RX ADMIN — CEFAZOLIN SODIUM 1000 MG: 1 SOLUTION INTRAVENOUS at 21:29

## 2019-10-23 RX ADMIN — TAMSULOSIN HYDROCHLORIDE 0.4 MG: 0.4 CAPSULE ORAL at 17:43

## 2019-10-23 RX ADMIN — EPHEDRINE SULFATE 10 MG: 50 INJECTION, SOLUTION INTRAVENOUS at 13:22

## 2019-10-23 RX ADMIN — DOCUSATE SODIUM 100 MG: 100 CAPSULE, LIQUID FILLED ORAL at 17:43

## 2019-10-23 RX ADMIN — GLYCOPYRROLATE 0.4 MG: 0.2 INJECTION, SOLUTION INTRAMUSCULAR; INTRAVENOUS at 15:12

## 2019-10-23 RX ADMIN — PROPOFOL 100 MCG/KG/MIN: 10 INJECTION, EMULSION INTRAVENOUS at 13:13

## 2019-10-23 RX ADMIN — ACETAMINOPHEN 975 MG: 325 TABLET ORAL at 11:59

## 2019-10-23 RX ADMIN — SODIUM CHLORIDE, SODIUM LACTATE, POTASSIUM CHLORIDE, AND CALCIUM CHLORIDE 1.5 ML/KG/HR: .6; .31; .03; .02 INJECTION, SOLUTION INTRAVENOUS at 16:00

## 2019-10-23 RX ADMIN — CEFAZOLIN SODIUM 2000 MG: 2 SOLUTION INTRAVENOUS at 13:18

## 2019-10-23 RX ADMIN — ROCURONIUM BROMIDE 10 MG: 50 INJECTION, SOLUTION INTRAVENOUS at 13:44

## 2019-10-23 RX ADMIN — NEOSTIGMINE METHYLSULFATE 2 MG: 1 INJECTION, SOLUTION INTRAVENOUS at 15:12

## 2019-10-23 RX ADMIN — TRANEXAMIC ACID 1000 MG: 1 INJECTION, SOLUTION INTRAVENOUS at 13:20

## 2019-10-23 RX ADMIN — HYDROMORPHONE HYDROCHLORIDE 0.5 MG: 1 INJECTION, SOLUTION INTRAMUSCULAR; INTRAVENOUS; SUBCUTANEOUS at 13:50

## 2019-10-23 RX ADMIN — SODIUM CHLORIDE, SODIUM LACTATE, POTASSIUM CHLORIDE, AND CALCIUM CHLORIDE: .6; .31; .03; .02 INJECTION, SOLUTION INTRAVENOUS at 15:12

## 2019-10-23 RX ADMIN — CHLORHEXIDINE GLUCONATE 0.12% ORAL RINSE 15 ML: 1.2 LIQUID ORAL at 12:05

## 2019-10-23 RX ADMIN — ROCURONIUM BROMIDE 50 MG: 50 INJECTION, SOLUTION INTRAVENOUS at 13:07

## 2019-10-23 RX ADMIN — HYDROMORPHONE HYDROCHLORIDE 0.5 MG: 1 INJECTION, SOLUTION INTRAMUSCULAR; INTRAVENOUS; SUBCUTANEOUS at 14:10

## 2019-10-23 NOTE — PHYSICAL THERAPY NOTE
PHYSICAL THERAPY Evaluation NOTE    Patient Name: Deo CHAPARRO Date: 10/23/2019     AGE:   70 y o  Mrn:   [id number]  ADMIT DX:  Primary osteoarthritis of right knee [M17 11]    Past Medical History:   Diagnosis Date    BPH (benign prostatic hyperplasia)     Diverticulitis of colon     Herniated lumbar intervertebral disc     Kidney mass     Murmur 08/1947    diagnosed as a child 8 YO    Osteoarthritis     Preoperative cardiovascular examination 2/16/2018     Length Of Stay: 0  PHYSICAL THERAPY EVALUATION :    10/23/19 6896   Note Type   Note type Eval only   Pain Assessment   Pain Assessment 0-10   Pain Score 3   Pain Type Surgical pain   Pain Location Knee   Pain Orientation Right   Home Living   Type of Home House  (2 SH, 1st floor setup 3 Small MORENA)   Home Layout Two level;Stairs to enter with rails; Performs ADLs on one level   Bathroom Shower/Tub Tub/shower unit   Bathroom Toilet Standard   Bathroom Equipment Toilet raiser   Bathroom Accessibility Accessible   Home Equipment Walker;Cane   Prior Function   Level of [place] Independent with ADLs and functional mobility   Lives With Spouse   Receives Help From Family   ADL Assistance Independent   IADLs Independent   Falls in the last 6 months 0   Vocational Retired   Comments PTA, Pt  reports INDEP with ADLs, iADLs and functional mobility without an Ad   Restrictions/Precautions   Weight Bearing Precautions Per Order Yes   RLE Weight Bearing Per Order WBAT   Other Precautions Pain; Fall Risk;Multiple lines   General   Additional Pertinent History Pt  is a 71 yo M who presents POD 0 s/p R TKA   WBAT    Family/Caregiver Present No   Cognition   Overall Cognitive Status WFL   Arousal/Participation Alert   Orientation Level Oriented X4   Memory Within functional limits   Following Commands Follows all commands and directions without difficulty   Comments Pt  was identified with full name and birthdate   RLE Assessment   RLE Assessment   (grossly 4-/5)   LLE Assessment   LLE Assessment   (grossly 4-/5)   Coordination   Movements are Fluid and Coordinated 1   Sensation WFL   Light Touch   RLE Light Touch Grossly intact   LLE Light Touch Grossly intact   Bed Mobility   Supine to Sit 6  Modified independent   Additional items HOB elevated   Sit to Supine Unable to assess   Additional Comments Pt  seated OOB in chair prior to PT session   Transfers   Sit to Stand 6  Modified independent   Additional items Increased time required   Stand to Sit 6  Modified independent   Additional items Increased time required   Ambulation/Elevation   Gait pattern Narrow BRIAN; Forward Flexion;Decreased foot clearance; Short stride; Step to;Excessively slow   Gait Assistance 5  Supervision   Additional items Assist x 1;Verbal cues  (for posture and AD managment)   Assistive Device Rolling walker   Distance 5'x1 bed to chair   Balance   Static Sitting Good   Dynamic Sitting Fair +   Static Standing Fair   Dynamic Standing Fair -   Ambulatory Fair -   Endurance Deficit   Endurance Deficit No   Activity Tolerance   Activity Tolerance Patient tolerated treatment well   Nurse Made Aware Spoke to RN    Assessment   Prognosis Good   Problem List Decreased strength;Decreased range of motion; Impaired balance;Decreased mobility; Decreased coordination;Decreased safety awareness;Pain;Orthopedic restrictions   Assessment Pt  is a 69 yo M who presents POD 0 s/p R TKA  WBAT  order placed for PT eval and tx, w/ activity order of up w/ A  pt presents w/ comorbidities of back pain, BPH, heart murmur, OA of both hips, OA of both Knees, s/p JUDIE, s/p L TKA and personal factors of living in 2 story house, mobilizing w/ assistive device, stair(s) to enter home, unable to perform dynamic tasks in community, unable to perform physical activity, inability to perform IADLs and inability to live alone   pt presents w/ pain, weakness, decreased ROM, impaired balance, gait deviations, decreased safety awareness, orthopedic restrictions and fall risk  these impairments are evident in findings from physical examination (weakness, decreased ROM and impaired coordination), mobility assessment (need for Supervision assist w/ all phases of mobility when usually mobilizing independently, tolerance to only 5 feet of ambulation and need for cueing for mobility technique), and Barthel Index: 75/100  pt needed input for task focus and mobility technique  pt is at risk for falls due to physical and safety awareness deficits  pt's clinical presentation is unstable/unpredictable (evident in need for assist w/ all phases of mobility when usually mobilizing independently, tolerance to only 75 feet of ambulation, pain impacting overall mobility status, need for input for mobility technique, need for input for task focus and mobility technique and need for input for mobility technique/safety)  pt needs inpatient PT tx to improve mobility deficits  discharge recommendation is for outpatient PT and home w/ family support to reduce fall risk and maximize level of functional independence  Goals   Patient Goals to go home tomorrow   STG Expiration Date 11/02/19   Short Term Goal #1 pt will: Increase bilateral LE strength 1/2 grade to facilitate independent mobility, Perform all bed mobility tasks independently to decrease fall risk factors, Perform all transfers independently to improve independence, Ambulate 250 ft  with roller walker modified independent w/o LOB, Navigate 3 stairs w/ supervision with appropriate UE support, to facilitate return to previous living environment, Increase all balance 1/2 grade to decrease risk for falls and Improve Barthel Index score to 100 or greater to facilitate independence   PT Treatment Day 0   Plan   Treatment/Interventions Functional transfer training;LE strengthening/ROM; Elevations; Therapeutic exercise; Endurance training;Patient/family training;Equipment eval/education; Bed mobility;Gait training;Spoke to nursing   PT Frequency   (BID)   Recommendation   Recommendation Outpatient PT; Home with family support   Equipment Recommended Walker   Barthel Index   Feeding 10   Bathing 5   Grooming Score 5   Dressing Score 10   Bladder Score 10   Bowels Score 10   Toilet Use Score 10   Transfers (Bed/Chair) Score 15   Mobility (Level Surface) Score 0   Stairs Score 0   Barthel Index Score 75     Skilled PT recommended while in hospital and upon DC to progress pt toward treatment goals       Brad Montanez, PT, DPT 10/23/2019

## 2019-10-23 NOTE — PLAN OF CARE
Problem: Potential for Falls  Goal: Patient will remain free of falls  Description  INTERVENTIONS:  - Assess patient frequently for physical needs  -  Identify cognitive and physical deficits and behaviors that affect risk of falls    -  [place] fall precautions as indicated by assessment   - Educate patient/family on patient safety including physical limitations  - Instruct patient to call for assistance with activity based on assessment  - Modify environment to reduce risk of injury  - Consider OT/PT consult to assist with strengthening/mobility  Outcome: Progressing     Problem: PAIN - ADULT  Goal: Verbalizes/displays adequate comfort level or baseline comfort level  Description  Interventions:  - Encourage patient to monitor pain and request assistance  - Assess pain using appropriate pain scale  - Administer analgesics based on type and severity of pain and evaluate response  - Implement non-pharmacological measures as appropriate and evaluate response  - Consider cultural and social influences on pain and pain management  - Notify physician/advanced practitioner if interventions unsuccessful or patient reports new pain  Outcome: Progressing     Problem: MUSCULOSKELETAL - ADULT  Goal: Maintain or return mobility to safest level of function  Description  INTERVENTIONS:  - Assess patient's ability to carry out ADLs; assess patient's baseline for ADL function and identify physical deficits which impact ability to perform ADLs (bathing, care of mouth/teeth, toileting, grooming, dressing, etc )  - Assess/evaluate cause of self-care deficits   - Assess range of motion  - Assess patient's mobility  - Assess patient's need for assistive devices and provide as appropriate  - Encourage maximum independence but intervene and supervise when necessary  - Involve family in performance of ADLs  - Assess for home care needs following discharge   - Consider OT consult to assist with ADL evaluation and planning for discharge  - Provide patient education as appropriate  Outcome: Progressing  Goal: Maintain proper alignment of affected body part  Description  INTERVENTIONS:  - Support, maintain and protect limb and body alignment  - Provide patient/ family with appropriate education  Outcome: Progressing

## 2019-10-23 NOTE — PLAN OF CARE
Problem: PHYSICAL THERAPY ADULT  Goal: Performs mobility at highest level of function for planned discharge setting  See evaluation for individualized goals  Description  Treatment/Interventions: Functional transfer training, LE strengthening/ROM, Elevations, Therapeutic exercise, Endurance training, Patient/family training, Equipment eval/education, Bed mobility, Gait training, Spoke to nursing  Equipment Recommended: [place]       See flowsheet documentation for full assessment, interventions and recommendations  Outcome: Progressing  Note:   Prognosis: Good  Problem List: Decreased strength, Decreased range of motion, Impaired balance, Decreased mobility, Decreased coordination, Decreased safety awareness, Pain, Orthopedic restrictions  Assessment: Pt  is a 71 yo M who presents POD 0 s/p R TKA  WBAT  order placed for PT eval and tx, w/ activity order of up w/ A  pt presents w/ comorbidities of back pain, BPH, heart murmur, OA of both hips, OA of both Knees, s/p JUDIE, s/p L TKA and personal factors of living in 2 story house, mobilizing w/ assistive device, stair(s) to enter home, unable to perform dynamic tasks in community, unable to perform physical activity, inability to perform IADLs and inability to live alone  pt presents w/ pain, weakness, decreased ROM, impaired balance, gait deviations, decreased safety awareness, orthopedic restrictions and fall risk  these impairments are evident in findings from physical examination (weakness, decreased ROM and impaired coordination), mobility assessment (need for Supervision assist w/ all phases of mobility when usually mobilizing independently, tolerance to only 5 feet of ambulation and need for cueing for mobility technique), and Barthel Index: 75/100  pt needed input for task focus and mobility technique  pt is at risk for falls due to physical and safety awareness deficits   pt's clinical presentation is unstable/unpredictable (evident in need for assist w/ all phases of mobility when usually mobilizing independently, tolerance to only 75 feet of ambulation, pain impacting overall mobility status, need for input for mobility technique, need for input for task focus and mobility technique and need for input for mobility technique/safety)  pt needs inpatient PT tx to improve mobility deficits  discharge recommendation is for outpatient PT and home w/ family support to reduce fall risk and maximize level of functional independence  Recommendation: Outpatient PT, Home with family support          See flowsheet documentation for full assessment

## 2019-10-23 NOTE — DISCHARGE INSTRUCTIONS
Discharge Instructions - Minda Coffey 70 y o  male MRN: [id number]  Unit/Bed#: PACU 10    Weight Bearing Status:                                           Weight Bearing as tolerated to the right lower extremity  DVT prophylaxis:  Complete course of Lovenox as directed    Pain:  Continue analgesics as directed    Showering Instructions: You may shower with this dressing on     Dressing Instructions:   Keep dressing clean and intact until follow up appointment  Driving Instructions:  No driving until cleared by Orthopaedic Surgery  PT/OT:  Continue PT/OT on outpatient basis as directed    Appt Instructions: If you do not have your appointment, please call the clinic at [phone]  Otherwise followup as scheduled    Contact the office sooner if you experience any increased numbness/tingling in the extremities

## 2019-10-23 NOTE — ANESTHESIA POSTPROCEDURE EVALUATION
Post-Op Assessment Note    CV Status:  Stable  Pain Score: 0    Pain management: adequate     Mental Status:  Alert and awake   Hydration Status:  Euvolemic   PONV Controlled:  Controlled   Airway Patency:  Patent   Post Op Vitals Reviewed: Yes      Staff: CRNA           /86 (10/23/19 1535)    Temp 98 3 °F (36 8 °C) (10/23/19 1535)    Pulse (!) 46 (10/23/19 1535)   Resp 15 (10/23/19 1535)    SpO2 98 % (10/23/19 1535)

## 2019-10-23 NOTE — INTERVAL H&P NOTE
H&P reviewed  After examining the patient I find no changes in the patients condition since the H&P had been written  /88   Pulse (!) 53   Temp 97 9 °F (36 6 °C) (Tympanic)   Resp 18   SpO2 99%       Vitals:    10/23/19 1149   BP: 121/88   Pulse: (!) 53   Resp: 18   Temp: 97 9 °F (36 6 °C)   SpO2: 99%

## 2019-10-23 NOTE — OP NOTE
OPERATIVE REPORT  PATIENT NAME: Henri Dumont    :  1940  MRN: [id number]  Pt Location: [facility] OR ROOM 18    SURGERY DATE: 10/23/2019    Surgeon(s) and Role:     * Brad White MD - Primary     * Dereck Luis PA-C - Assisting     * Sharad Becker - Assisting    Primary osteoarthritis of right knee [M17 11]    Post-Op Diagnosis Codes:     * Primary osteoarthritis of right knee [M17 11]    Procedure(s):  ARTHROPLASTY KNEE TOTAL    Specimen(s):  * No specimens in log *    Estimated Blood Loss:   150 mL    Drains:  Closed/Suction Drain Right Knee Accordion 10 Fr  (Active)   Number of days: 0       Anesthesia Type:   Choice    Operative Indications:  Primary osteoarthritis of right knee [M17 11]  Severe DJD right  Knee  This patient failed conservative treatment including injections, NSAID by mouth, physical therapy and bracing    Operative Findings:  Severe wear of all three joint surfaces right knee    Complications:   None    Procedure and Technique:  The patient was administered a nerve block for postoperative pain management and   then an epidural block for postoperative pain management, some   sedation, and then prepped and draped in the usual sterile fashion for   an operation on the right knee  A midline incision was made  Sharp   dissection was carried out down through the subcutaneous tissue  The knee capsule was exposed and a medial parapatellar incision was made  Medial and   lateral dissections were carried out along the proximal tibia  The   patella was everted  The distal femur and   proximal tibia had synovial hypertrophy and this was removed using the   Bovie  The step drill was then inserted in the intercondylar notch to the appropriate depth  Next the femoral guide was inserted and the distal femoral cutting guide   was applied to the distal femur  It was set at 10 mm for the distal cut and for degrees of   valgus   The cutting block was affixed to the distal femur and a distal   femoral cut was made  Sizing guide was applied and a size 7 was found   to be the appropriate size  The size 7 femoral cutting block was   applied  Posterior condylar, posterior chamfer cuts were made, and   anterior femoral and anterior chamfer cuts were made  The    was next used and the appropriate box cut was made  Trial reduction   was carried out with the size 7 provisional and it fit appropriately  Attention was then drawn to the tibial side  The external guide was   applied to the tibia in the appropriate fashion  A Caliper was placed   over the deepest defect in the medial compartment of the tibia  The   tibial cutting guide was applied to the proximal tibia using the drop   cari for rotational guidance  The 3 degree posterior slope cutting block was affixed to the proximal tibia and the tibial cut was made  The  extension   gap was measured and it was at 8 mm  The tibial side was   then addressed  The size 6 tibial plate was applied with the drop cari   guidance and then this was affixed to the tibia  The collet was   applied and drilling ensued to the appropriate depth  The keel punch   was next inserted  A trial reduction was carried out with the size7   femoral component and the 8 mm poly, and this provided good stability and   full extension and good stability in flexion  Attention was drawn to   the patella  The patella cutting guide was applied after the thickness of the patella was measured  The appropriate sleeve was placed on the cutting guide and the  patella cut   was made  The 38 mm patella button was found to be appropriate  Drilling ensued through that drill guide and a trial reduction of the   patella revealed the tracking was good and the knee was in full   extension and could flex easily  There was no tendency toward lateral subluxation of the patella  The provisional components were   removed  The knee was thoroughly irrigated with sterile saline   solution via pulsating jet lavage  The Aquamantys was used to secure   hemostasis in the knee  Cement was mixed in the usual fashion and was   pressurized into the tibia  The tibial component was applied  Excess   cement was removed  Cement was pressurized into the femur  The femoral   component was applied  Excess cement was removed  The 8 mm poly   posterior stabilized rotating platform insert was applied and the knee   was placed into full extension  Excess cement was removed  Cement was   pressurized in the patella  The patella button was applied  Excess   cement was removed  The patella button chavez was applied  Once the   cement cured, the area was inspected for any loose cement and the knee   was thoroughly irrigated with sterile saline solution  The tourniquet was released  Hemostasis was secured using the Aquamantys  The knee was once again irrigated with   sterile saline solution  A dilute betadine solution was poured in the wound and then suctioned out  A Hemovac drain was left deep in the wound  The capsule was closed using 0 Vicryl in a figure-of-eight fashion  The subcutaneous tissues were   closed using 2-0 Vicryl in an inverted fashion  The skin was closed   Using 3 O Monocryl in a subcuticular fashion  The skin was reprepped with chlorhexidine prep  A Prineo dressing was applied  The patient tolerated the procedure well   and left the operating room in good condition          I was present for the entire procedure    Patient Disposition:  PACU     SIGNATURE: Brittney Pulido MD  DATE: October 23, 2019  TIME: 3:45 PM

## 2019-10-24 VITALS
RESPIRATION RATE: 18 BRPM | DIASTOLIC BLOOD PRESSURE: 67 MMHG | HEIGHT: 68 IN | OXYGEN SATURATION: 99 % | WEIGHT: 152 LBS | TEMPERATURE: 98.5 F | BODY MASS INDEX: 23.04 KG/M2 | HEART RATE: 86 BPM | SYSTOLIC BLOOD PRESSURE: 116 MMHG

## 2019-10-24 PROBLEM — D62 POSTOPERATIVE ANEMIA DUE TO ACUTE BLOOD LOSS: Status: ACTIVE | Noted: 2019-10-24

## 2019-10-24 PROBLEM — M17.11 PRIMARY OSTEOARTHRITIS OF RIGHT KNEE: Status: RESOLVED | Noted: 2019-08-20 | Resolved: 2019-10-24

## 2019-10-24 LAB
ANION GAP SERPL CALCULATED.3IONS-SCNC: 8 MMOL/L (ref 4–13)
BUN SERPL-MCNC: 11 MG/DL (ref 5–25)
CALCIUM SERPL-MCNC: 8.5 MG/DL (ref 8.3–10.1)
CHLORIDE SERPL-SCNC: 105 MMOL/L (ref 100–108)
CO2 SERPL-SCNC: 25 MMOL/L (ref 21–32)
CREAT SERPL-MCNC: 1.03 MG/DL (ref 0.6–1.3)
ERYTHROCYTE [DISTWIDTH] IN BLOOD BY AUTOMATED COUNT: 13.4 % (ref 11.6–15.1)
GFR SERPL CREATININE-BSD FRML MDRD: 70 ML/MIN/1.73SQ M
GLUCOSE SERPL-MCNC: 106 MG/DL (ref 65–140)
HCT VFR BLD AUTO: 37.2 % (ref 36.5–49.3)
HGB BLD-MCNC: 12.3 G/DL (ref 12–17)
MCH RBC QN AUTO: 33.6 PG (ref 26.8–34.3)
MCHC RBC AUTO-ENTMCNC: 33.1 G/DL (ref 31.4–37.4)
MCV RBC AUTO: 102 FL (ref 82–98)
PLATELET # BLD AUTO: 230 THOUSANDS/UL (ref 149–390)
PMV BLD AUTO: 10.3 FL (ref 8.9–12.7)
POTASSIUM SERPL-SCNC: 4.4 MMOL/L (ref 3.5–5.3)
RBC # BLD AUTO: 3.66 MILLION/UL (ref 3.88–5.62)
SODIUM SERPL-SCNC: 138 MMOL/L (ref 136–145)
WBC # BLD AUTO: 11.72 THOUSAND/UL (ref 4.31–10.16)

## 2019-10-24 PROCEDURE — 80048 BASIC METABOLIC PNL TOTAL CA: CPT | Performed by: PHYSICIAN ASSISTANT

## 2019-10-24 PROCEDURE — 97166 OT EVAL MOD COMPLEX 45 MIN: CPT

## 2019-10-24 PROCEDURE — NS001 PR NO SIGNATURE OR ATTESTATION: Performed by: ORTHOPAEDIC SURGERY

## 2019-10-24 PROCEDURE — G8987 SELF CARE CURRENT STATUS: HCPCS

## 2019-10-24 PROCEDURE — 97110 THERAPEUTIC EXERCISES: CPT

## 2019-10-24 PROCEDURE — NC001 PR NO CHARGE: Performed by: ORTHOPAEDIC SURGERY

## 2019-10-24 PROCEDURE — 97116 GAIT TRAINING THERAPY: CPT

## 2019-10-24 PROCEDURE — 85027 COMPLETE CBC AUTOMATED: CPT | Performed by: PHYSICIAN ASSISTANT

## 2019-10-24 PROCEDURE — G8989 SELF CARE D/C STATUS: HCPCS

## 2019-10-24 PROCEDURE — G8988 SELF CARE GOAL STATUS: HCPCS

## 2019-10-24 RX ORDER — SODIUM CHLORIDE, SODIUM LACTATE, POTASSIUM CHLORIDE, CALCIUM CHLORIDE 600; 310; 30; 20 MG/100ML; MG/100ML; MG/100ML; MG/100ML
50 INJECTION, SOLUTION INTRAVENOUS CONTINUOUS
Status: DISCONTINUED | OUTPATIENT
Start: 2019-10-24 | End: 2019-10-24 | Stop reason: HOSPADM

## 2019-10-24 RX ORDER — METHOCARBAMOL 500 MG/1
500 TABLET, FILM COATED ORAL EVERY 6 HOURS SCHEDULED
Status: DISCONTINUED | OUTPATIENT
Start: 2019-10-24 | End: 2019-10-24 | Stop reason: HOSPADM

## 2019-10-24 RX ORDER — BUPIVACAINE HYDROCHLORIDE 5 MG/ML
INJECTION, SOLUTION PERINEURAL
Status: DISCONTINUED | OUTPATIENT
Start: 2019-10-24 | End: 2019-10-24 | Stop reason: SURG

## 2019-10-24 RX ADMIN — IRON SUCROSE 300 MG: 20 INJECTION, SOLUTION INTRAVENOUS at 09:22

## 2019-10-24 RX ADMIN — OXYCODONE HYDROCHLORIDE 2.5 MG: 5 TABLET ORAL at 09:27

## 2019-10-24 RX ADMIN — ENOXAPARIN SODIUM 40 MG: 40 INJECTION SUBCUTANEOUS at 05:15

## 2019-10-24 RX ADMIN — CEFAZOLIN SODIUM 1000 MG: 1 SOLUTION INTRAVENOUS at 05:16

## 2019-10-24 RX ADMIN — MORPHINE SULFATE 2 MG: 2 INJECTION, SOLUTION INTRAMUSCULAR; INTRAVENOUS at 00:29

## 2019-10-24 RX ADMIN — DOCUSATE SODIUM 100 MG: 100 CAPSULE, LIQUID FILLED ORAL at 09:14

## 2019-10-24 RX ADMIN — ACETAMINOPHEN 650 MG: 325 TABLET ORAL at 09:14

## 2019-10-24 RX ADMIN — PANTOPRAZOLE SODIUM 40 MG: 40 TABLET, DELAYED RELEASE ORAL at 05:15

## 2019-10-24 RX ADMIN — MORPHINE SULFATE 2 MG: 2 INJECTION, SOLUTION INTRAMUSCULAR; INTRAVENOUS at 10:35

## 2019-10-24 RX ADMIN — METHOCARBAMOL TABLETS 500 MG: 500 TABLET, COATED ORAL at 11:50

## 2019-10-24 RX ADMIN — OXYCODONE HYDROCHLORIDE 5 MG: 5 TABLET ORAL at 05:27

## 2019-10-24 RX ADMIN — BUPIVACAINE HYDROCHLORIDE 10 ML: 5 INJECTION, SOLUTION PERINEURAL at 09:22

## 2019-10-24 NOTE — DISCHARGE SUMMARY
ORTHOPEDICS DISCHARGE SUMMARY  Despina Gowers 70 y o  male MRN: [id number]  Unit/Bed#: -25    Attending Physician: Carson Andrews    Admitting diagnosis: Primary osteoarthritis of right knee [M17 11]    Discharge diagnosis: Primary osteoarthritis of right knee [M17 11]    Date of admission: 10/23/2019    Date of discharge: 10/24/19         Procedure: R TKA    HPI:  This is a 70y o  year old male that presented to the office with signs and symptoms of right knee osteoarthritis  They tried and failed conservative treatment measures and wished to proceed with surgical intervention  The risks, benefits, and complications of the procedure were discussed with the patient and informed consent was obtained  Hospital Course: The patient was admitted to the hospital on 10/23/2019 and underwent an uncomplicated right total knee arthroplasty  They were transferred to the floor after a brief stay in the post-anesthesia care unit  Their pain was well managed with IV and oral pain medications  They began therapy on post operative day #1  Lovenox was also started for DVT prophylaxis 12 hours post operatively  Hemovac drain was removed on POD1  Patient required straight catheterization several times, but he states he does not want a ramirez or urology evaluation because this is situation is usual for him  On discharge date pt was cleared by PT and the medicine team and determined to be safe for discharge  Daily discussion was had with the patient, nursing staff, orthopaedic team, and family members if present  All questions were answered to the patients satisifaction        0   Lab Value Date/Time    HGB 12 3 10/24/2019 0503    HGB 15 1 10/23/2019 1600    HGB 15 5 09/17/2019 1146    HGB 9 6 (L) 02/22/2019 0452    HGB 11 5 (L) 02/21/2019 0534    HGB 14 9 01/24/2019 1108    HGB 10 1 (L) 01/05/2018 0439    HGB 10 7 (L) 01/04/2018 0540    HGB 14 1 12/05/2017 1403    HGB 11 2 (L) 06/13/2017 0436    HGB 13 6 05/12/2017 1327    HGB 15 0 04/01/2016 0000       Vital signs remained stable and pt was resuscitated with IVF as needed   Body mass index is 23 11 kg/m²  Discharge Instructions: The patient was discharged weight bearing as tolerated to the right lower extremity  Lovenox will be continued for 28 days  Continue PT/OT  Take pain medications as instructed  Discharge Medications: For the complete list of discharge medications, please refer to the patient's medication reconciliation

## 2019-10-24 NOTE — UTILIZATION REVIEW
Initial Clinical Review    Elective Inpatient surgical procedure    Age/Sex: 70 y o  male     Surgery Date: 10/23    Procedure: S/P ARTHROPLASTY KNEE TOTAL    Anesthesia: Choice    Admission Orders: Date/Time/Statement: Inpatient Admission Orders (From admission, onward)     Ordered        10/23/19 1954  Inpatient Admission  Once                   Orders Placed This Encounter   Procedures    Inpatient Admission     Surgery authorized as inpatient     Standing Status:   Standing     Number of Occurrences:   1     Order Specific Question:   Admitting Physician     Answer:   Leigh Welsh [[id number]]     Order Specific Question:   Level of Care     Answer:   Med Surg [[id number]]     Order Specific Question:   Estimated length of stay     Answer:   Inpatient Only Surgery     Vital Signs: /79   Pulse 68   Temp 98 3 °F (36 8 °C)   Resp 17   Ht 5' 8" (1 727 m)   Wt 68 9 kg (152 lb)   SpO2 99%   BMI 23 11 kg/m²      Diet: Regular  Mobility: Up with assistance  DVT Prophylaxis: Sequential compression device    Medications/Pain Control:     Medications:  docusate sodium 100 mg Oral BID   enoxaparin 40 mg Subcutaneous Q24H [facility]   gabapentin 100 mg Oral Q8H [facility]   iron sucrose 300 mg Intravenous Once   pantoprazole 40 mg Oral Early Morning   tamsulosin 0 4 mg Oral Daily With Dinner       lactated ringers 50 mL/hr Intravenous Continuous       acetaminophen 650 mg Oral Q6H PRN   calcium carbonate 1,000 mg Oral Daily PRN   lactated ringers 1,000 mL Intravenous Once PRN   And      lactated ringers 1,000 mL Intravenous Once PRN   morphine injection 2 mg Intravenous Q2H PRN  10/24 x1   oxyCODONE 2 5 mg Oral Q4H PRN 10/24 x1   oxyCODONE 5 mg Oral Q4H PRN 10/23 x1, 10/24  x1   sodium chloride 1,000 mL Intravenous Once PRN   And      sodium chloride 1,000 mL Intravenous Once PRN       Network Utilization Review Department  [email or web address]  ATTENTION: Please call with any questions or concerns to [phone] and carefully listen to the prompts so that you are directed to the right person  All voicemails are confidential   Jeffrey Delcid all requests for admission clinical reviews, approved or denied determinations and any other requests to dedicated fax number below belonging to the campus where the patient is receiving treatment    FACILITY NAME UR FAX NUMBER   ADMISSION DENIALS (Administrative/Medical Necessity) [address] [facility] (Maternity/NICU/Pediatrics) [phone]   [facility] [address] [facility] [address] [facility] [phone]   [address] [place] [address] [address] [facility] [phone]   Wendy Bailon [address] [facility] [address] [phone]

## 2019-10-24 NOTE — PLAN OF CARE
Problem: PHYSICAL THERAPY ADULT  Goal: Performs mobility at highest level of function for planned discharge setting  See evaluation for individualized goals  Description  Treatment/Interventions: Functional transfer training, LE strengthening/ROM, Elevations, Therapeutic exercise, Endurance training, Patient/family training, Equipment eval/education, Bed mobility, Gait training, Spoke to nursing  Equipment Recommended: Chilo French       See flowsheet documentation for full assessment, interventions and recommendations  Note:   Prognosis: Good  Problem List: Decreased strength, Decreased endurance, Impaired balance, Pain, Orthopedic restrictions, Decreased safety awareness, Decreased mobility, Decreased coordination  Assessment: Pt required increased rest breaks during LE exercises with increased pain  Ambulated with slow antalgic gait  Step to gait for first 125' then able to demonstrated improved step length and emerging step through gait  Pt will benefit from continued inpt skilled PT to maximize functional mobility & safety  Recommendation: Outpatient PT     PT - OK to Discharge: No    See flowsheet documentation for full assessment

## 2019-10-24 NOTE — ASSESSMENT & PLAN NOTE
· Continue post op pain control measures as prescribed  Follow bowel regimen to help decrease narcotic induced constipation  Follow post operative hemoglobin with serial CBC and treat accordingly  Monitor WBC and fever curve post op while encouraging use of incentive spirometer  DVT prophylaxis in place and reviewed

## 2019-10-24 NOTE — PROGRESS NOTES
Pt  bladder scanned for >999mL  Straight cathed for 1200mL with post cath residual of 557mL  Melissa Meek PA-C notified, new orders to decrease LR to 50mL and resume pt 's home straight cath routine

## 2019-10-24 NOTE — PHYSICAL THERAPY NOTE
PHYSICAL THERAPY NOTE          Patient Name: Severo Hartmann  KAIQH'B Date: 10/24/2019     10/24/19 0945   Pain Assessment   Pain Assessment 0-10   Pain Score 5   Pain Type Surgical pain   Pain Location Knee   Pain Orientation Right   Restrictions/Precautions   Weight Bearing Precautions Per Order Yes   RUE Weight Bearing Per Order WBAT   LUE Weight Bearing Per Order WBAT   RLE Weight Bearing Per Order WBAT   LLE Weight Bearing Per Order WBAT   Other Precautions Fall Risk;Pain;Multiple lines;WBS   General   Chart Reviewed Yes   Family/Caregiver Present No   Cognition   Orientation Level Oriented X4   Subjective   Subjective "I feel good right now but I know as soon as I move it I will have pain"   Bed Mobility   Supine to Sit 6  Modified independent   Sit to Supine Unable to assess   Transfers   Sit to Stand 5  Supervision   Additional items Increased time required   Stand to Sit 5  Supervision   Additional items Increased time required   Ambulation/Elevation   Gait pattern Excessively slow; Shuffling;Decreased R stance;Decreased foot clearance   Gait Assistance 5  Supervision   Additional items Assist x 1   Assistive Device Rolling walker   Distance 125+125   Balance   Static Sitting Good   Dynamic Sitting Fair +   Ambulatory Fair +   Endurance Deficit   Endurance Deficit Yes   Endurance Deficit Description dizziness with ambulation   Activity Tolerance   Activity Tolerance Treatment limited secondary to medical complications (Comment)   Nurse Made Aware yes, nsg gave clearance to work with pt   Exercises   TKR 20 reps; Sitting;AROM;AAROM  (AAROM for LAQ)   Assessment   Prognosis Good   Problem List Decreased strength;Decreased endurance; Impaired balance;Pain;Orthopedic restrictions;Decreased safety awareness;Decreased mobility; Decreased coordination   Assessment Pt required increased rest breaks during LE exercises with increased pain  Ambulated with slow antalgic gait  Step to gait for first 125' then able to demonstrated improved step length and emerging step through gait  Pt will benefit from continued inpt skilled PT to maximize functional mobility & safety  Goals   Patient Goals To go home   STG Expiration Date 11/02/19   PT Treatment Day 1   Plan   Treatment/Interventions Functional transfer training;LE strengthening/ROM; Bed mobility;Gait training;Spoke to case management;Spoke to nursing; Patient/family training   Progress Slow progress, decreased activity tolerance   PT Frequency Other (Comment)  (BID)   Recommendation   Recommendation Outpatient PT   Equipment Recommended Walker   PT - OK to Discharge Karin Rodrigues, PT

## 2019-10-24 NOTE — ANESTHESIA PROCEDURE NOTES
Peripheral Block    Patient location during procedure: holding area  Start time: 10/23/2019 12:32 PM  Reason for block: procedure for pain, at surgeon's request and post-op pain management  Staffing  Anesthesiologist: Meera Burch MD  Performed: anesthesiologist   Preanesthetic Checklist  Completed: patient identified, site marked, surgical consent, pre-op evaluation, timeout performed, IV checked, risks and benefits discussed and monitors and equipment checked  Peripheral Block  Patient position: supine  Prep: ChloraPrep  Patient monitoring: heart rate, cardiac monitor, continuous pulse ox and frequent blood pressure checks  Block type: adductor canal block  Laterality: right  Injection technique: single-shot  Procedures: ultrasound guided, Ultrasound guidance required for the procedure to increase accuracy and safety of medication placement and decrease risk of complications  Ultrasound permanent image savedbupivacaine (MARCAINE) 0 5 % perineural infiltration, 10 mL  Needle  Needle type: Stimuplex   Needle gauge: 22 G  Needle length: 15 cm  Needle localization: ultrasound guidance  Needle insertion depth: 8 cm  Test dose: negative  Assessment  Injection assessment: incremental injection, local visualized surrounding nerve on ultrasound, negative aspiration for heme and no paresthesia on injection  Heart rate change: no  Slow fractionated injection: yes  Post-procedure:  site cleaned  patient tolerated the procedure well with no immediate complications  Additional Notes  Exparel 20 cc and Bupivicaine 0 5% 10 cc   Artery seen well and drug deposited also at individual nerves where seen

## 2019-10-24 NOTE — PLAN OF CARE
Problem: PHYSICAL THERAPY ADULT  Goal: Performs mobility at highest level of function for planned discharge setting  See evaluation for individualized goals  Description  Treatment/Interventions: Functional transfer training, LE strengthening/ROM, Elevations, Therapeutic exercise, Endurance training, Patient/family training, Equipment eval/education, Bed mobility, Gait training, Spoke to nursing  Equipment Recommended: Eloy Tamez       See flowsheet documentation for full assessment, interventions and recommendations  Outcome: Progressing  Note:   Prognosis: Good  Problem List: Decreased strength, Decreased endurance, Decreased range of motion, Impaired balance, Decreased mobility, Pain, Orthopedic restrictions  Assessment: Pt seen for PT treatment session  Pt pleasant and agreeable to participate in therapy  Pt performed STS throughout session with supervision  Pt demonstrated ability to ambulation 125 ft x 2 with RW and supervision  Pt able to negotiate 3 steps with HHA and supervision  Pt educated on how family can assist pt on steps at home  Pt educated on [address] for safe stair negotiation  Pt performed LE therex as outlined above  Pt left upright in bedside chair with all needs in reach  Pt will benefit from skilled therapy in order to address current impairments and functional limitations  PT to follow pt and recommending home with family support and OPPT once medically cleared  Recommendation: Home with family support, Outpatient PT     PT - OK to Discharge: Yes(once medically cleared)    See flowsheet documentation for full assessment

## 2019-10-24 NOTE — PROGRESS NOTES
Subjective: No acute events overnight  No acute distress          Lab Results   Component Value Date/Time    WBC 11 72 (H) 10/24/2019 05:03 AM    WBC 0-5 12/05/2017 02:03 PM    WBC 5 6 12/05/2017 02:03 PM    HGB 12 3 10/24/2019 05:03 AM    HGB 14 1 12/05/2017 02:03 PM       Vitals:    10/24/19 0652   BP: 128/79   Pulse: 68   Resp: 17   Temp: 98 3 °F (36 8 °C)   SpO2: 99%     Musculoskeletal: RLE  Dressing C/D/I  Motor and sensation grossly intact distally  Drain in place  Toes warm and pink X 5    Assessment: 70 y o  male post op day #1 from R TKA    Plan:  WBAT RLE  Drain check  Pain control  DVT ppx  PT/OT  Will continue to assess for acute blood loss anemia  Dispo: pending drain check, PT

## 2019-10-24 NOTE — CONSULTS
Consultation - Acute Pain Service   Pietro Owens 70 y o  male MRN: [id number]  Unit/Bed#: -01 Encounter: [id number]               Assessment/Plan     Assessment:   71 yo s/p TKA right doing well  Plan:   Continue current therapies on pathyway  APS sign off  Thank you for the Consult  Please contact [facility] ( [phone]) with any further questions    History of Present Illness    Admit Date:  10/23/2019  Hospital Day:  1 day  Primary Service:  Orthopedic Surgery  Attending Provider:  Greg Pulido MD  Reason for Consult / Principal Problem: s/p adductor canal block with exparel  Hx and PE limited by: none  HPI: Pietro Owens is a 70y o  year old male who presents with osteoarthritis of the right knee  Patient was taken to the OR by Dr Ezra Bhatt on 10/23/19 for a right TKA      Inpatient consult to Acute Pain Service  Consult performed by: Luda Guaman MD  Consult ordered by: Kenyon Jones MD          Review of Systems    Historical Information   Past Medical History:   Diagnosis Date    BPH (benign prostatic hyperplasia)     Diverticulitis of colon     Herniated lumbar intervertebral disc     Kidney mass     Murmur 08/1947    diagnosed as a child 6 YO    Osteoarthritis     Preoperative cardiovascular examination 2/16/2018     Past Surgical History:   Procedure Laterality Date    HERNIA REPAIR      JOINT REPLACEMENT Bilateral     KNEE ARTHROSCOPY      bilateral    NH CYSTOURETHRO W/IMPLANT N/A 7/5/2019    Procedure: CYSTOSCOPY WITH INSERTION Marlon Cortez;  Surgeon: Jeri Younger MD;  [facility];  Service: Urology    NH TOTAL HIP ARTHROPLASTY Left 6/12/2017    Procedure: ANTERIOR TOTAL HIP ARTHROPLASTY;  Surgeon: Greg Pulido MD;  [facility];  Service: Orthopedics    NH TOTAL HIP ARTHROPLASTY Right 1/3/2018    Procedure: ANTERIOR TOTAL HIP ARTHROPLASTY;  Surgeon: Greg Pulido MD;  [facility];  Service: Orthopedics    NH TOTAL KNEE ARTHROPLASTY Left 2019    Procedure: TOTAL KNEE ARTHROPLASTY;  Surgeon: Nathalia Mendieta MD;  [facility];  Service: Orthopedics    UT TOTAL KNEE ARTHROPLASTY Right 10/23/2019    Procedure: ARTHROPLASTY KNEE TOTAL;  Surgeon: Nathalia Mendieta MD;  [facility];  Service: Orthopedics     Social History   Social History     Substance and Sexual Activity   Alcohol Use Yes    Alcohol/week: 2 0 standard drinks    Types: 2 Cans of beer per week    Frequency: 4 or more times a week    Drinks per session: 1 or 2    Comment: beer daily      Social History     Substance and Sexual Activity   Drug Use No     Social History     Tobacco Use   Smoking Status Former Smoker    Packs/day: 1 00    Years: 15 00    Pack years: 15 00    Last attempt to quit: 1980    Years since quittin 8   Smokeless Tobacco Never Used     Family History: non-contributory    Meds/Allergies   all current active meds have been reviewed    No Known Allergies    Objective   Temp:  [98 °F (36 7 °C)-99 4 °F (37 4 °C)] 98 5 °F (36 9 °C)  HR:  [46-86] 86  Resp:  [14-21] 18  BP: (103-159)/(65-94) 116/67    Intake/Output Summary (Last 24 hours) at 10/24/2019 1443  Last data filed at 10/24/2019 1105  Gross per 24 hour   Intake 2588 33 ml   Output 4332 ml   Net -1743 67 ml       Physical Exam  Alert and oriented  Adequate chest excursion    Lab Results: I have personally reviewed pertinent labs  Imaging Studies: I have personally reviewed pertinent reports  EKG, Pathology, and Other Studies: I have personally reviewed pertinent reports  Counseling / Coordination of Care  Total floor / unit time spent today Level 1 = 20 minutes  Greater than 50% of total time was spent with the patient and / or family counseling and / or coordination of care  A description of the counseling / coordination of care: Counseled patient on duration of analgesia and distribution of the blocks

## 2019-10-24 NOTE — PROGRESS NOTES
Internal Medicine Progress Note  Patient: Lamberto Frost  Age/sex: 70 y o  male  Medical Record #: [id number]      ASSESSMENT/PLAN: (Interval History)  Lamberto Frost is seen and examined and management for following issues:    * S/P total knee arthroplasty, right  Assessment & Plan  · Adequate pain control; remains afebrile  No postoperative nausea  Follow post operative hemoglobin with serial CBC and treat accordingly  Monitor WBC and fever curve post op while encouraging use of incentive spirometer  DVT prophylaxis in place and reviewed  Postoperative anemia due to acute blood loss  Assessment & Plan  · Hemoglobin decreased by a 3 grams since admission hemoglobin however still greater than 12 0 continue to monitor with CBC  Atrial flutter ([address] )  Assessment & Plan  · Accompanied by a slow ventricular response  · Patient had preoperative cardiac clearance echocardiogram demonstrated preserved ejection fraction  · Recommended patient be on anticoagulation however patient defers with Cardiology; re-educated to same risk factors associated with CVA here but at this point defers anticoagulation; understands risk associated with increased CVA opportunity    BPH (benign prostatic hyperplasia)  Assessment & Plan  · Continue with Flomax  ·  monitor for voiding for any evidence of urinary retention; straight cath at night before bed as patient was doing prior to admission        Subjective/ HPI: Patient seen and examined  Patients overnight issues or events were reviewed with nursing or staff during rounds or morning huddle session  New or overnight issues include the following:  No overnight events  No related nursing issues  ROS:   A 10 point ROS was performed; negative except as noted above         Review of Scheduled Meds:    Current Facility-Administered Medications:  acetaminophen 650 mg Oral Q6H PRN Scott Vickers PA-C    calcium carbonate 1,000 mg Oral Daily PRN Scott Vickers PA-C    docusate sodium 100 mg Oral BID Mike Oakley PA-C    enoxaparin 40 mg Subcutaneous Q24H [address] Mike Oakley PA-C    gabapentin 100 mg Oral [facility] Mike Oakley PA-C    iron sucrose 300 mg Intravenous Once MARIFER Leone    lactated ringers 1,000 mL Intravenous Once PRN Mike Oakley PA-C    And        lactated ringers 1,000 mL Intravenous Once PRN Mike Oakley PA-C    lactated ringers 50 mL/hr Intravenous Continuous Mike Oakley PA-C Last Rate: Stopped (10/24/19 0700)   morphine injection 2 mg Intravenous Q2H PRN Mike Oakley PA-C    oxyCODONE 2 5 mg Oral Q4H PRN Mike Oakley PA-C    oxyCODONE 5 mg Oral Q4H PRN Mike Oakley PA-C    pantoprazole 40 mg Oral Early Morning Mike Oakley PA-C    sodium chloride 1,000 mL Intravenous Once PRN Mike Oakley PA-C    And        sodium chloride 1,000 mL Intravenous Once PRN Mike Oakley PA-C    tamsulosin 0 4 mg Oral Daily With Anahi Butler PA-C        Labs:     Results from last 7 days   Lab Units 10/24/19  0503 10/23/19  1600   WBC Thousand/uL 11 72* 9 81   HEMOGLOBIN g/dL 12 3 15 1   HEMATOCRIT % 37 2 46 1   PLATELETS Thousands/uL 230 285     Results from last 7 days   Lab Units 10/24/19  0503   SODIUM mmol/L 138   POTASSIUM mmol/L 4 4   CHLORIDE mmol/L 105   CO2 mmol/L 25   BUN mg/dL 11   CREATININE mg/dL 1 03   CALCIUM mg/dL 8 5                      Lab Results   Component Value Date    URINECX >100,000 cfu/ml Staphylococcus lugdunensis (A) 06/24/2019       Input and Output Summary (last 24 hours):        Intake/Output Summary (Last 24 hours) at 10/24/2019 0918  Last data filed at 10/24/2019 0700  Gross per 24 hour   Intake 2288 33 ml   Output 2732 ml   Net -443 67 ml       Imaging:     No orders to display       *Labs /Radiology studiesReviewed  *Medications reviewed and reconciled as needed  *Please refer to order section for additional ordered labs studies  *Case discussed with primary attending during morning huddle case rounds    Vitals:   Temp (24hrs), Av 5 °F (36 9 °C), Min:97 9 °F (36 6 °C), Max:99 4 °F (37 4 °C)    Temp:  [97 9 °F (36 6 °C)-99 4 °F (37 4 °C)] 98 3 °F (36 8 °C)  HR:  [46-73] 68  Resp:  [14-21] 17  BP: (103-159)/(65-94) 128/79  SpO2:  [97 %-100 %] 99 %  Body mass index is 23 11 kg/m²  Physical Exam:   HEENT:  Head: Normocephalic, no lesions, without obvious abnormality  CARDIAC:  regular rate and rhythm, S1, S2 normal, no murmur, click, rub or gallop  LUNGS:  normal air entry, lungs clear to auscultation  ABDOMEN:  soft, non-tender  Bowel sounds normal  No masses, no organomegaly  EXTREMITIES:  extremities normal, warm and well-perfused; no cyanosis, clubbing, or edema  NEURO:   normal without focal findings, mental status, speech normal, alert and oriented x3, BRIANDA and reflexes normal and symmetric  INCISION:  C/D/I      Invasive Devices     Peripheral Intravenous Line            Peripheral IV 10/23/19 Left Arm less than 1 day          Drain            Closed/Suction Drain Right Knee Accordion 10 Fr  less than 1 day                   Code Status: Level 1 - Full Code  Current Length of Stay: 1 day(s)    Total floor / unit time spent today 30 minutes with more than 50% spent counseling/coordinating care  Counseling includes discussion with patient re: progress  and discussion with patient of his/her current medical state/information  Coordination of patient's care was performed in conjunction with primary service  Time invested included review of patient's labs, vitals, and management of their comorbidities with continued monitoring  In addition, this patient was discussed with medical team including physician and advanced extenders  The care of the patient was extensively discussed and appropriate treatment plan was formulated unique for this patient  ** Please Note: Fluency Direct voice to text software may have been used in the creation of this document   Audio transcription errors may occur**                      Some

## 2019-10-24 NOTE — OCCUPATIONAL THERAPY NOTE
[address] Evaluation     Patient Name: Shira Haque  PRYUE'U Date: 10/24/2019  Problem List  Principal Problem:    S/P total knee arthroplasty, right  Active Problems:    BPH (benign prostatic hyperplasia)    Atrial flutter (HCC)    Postoperative anemia due to acute blood loss    Past Medical History  Past Medical History:   Diagnosis Date    BPH (benign prostatic hyperplasia)     Diverticulitis of colon     Herniated lumbar intervertebral disc     Kidney mass     Murmur 08/1947    diagnosed as a child 6 YO    Osteoarthritis     Preoperative cardiovascular examination 2/16/2018     Past Surgical History  Past Surgical History:   Procedure Laterality Date    HERNIA REPAIR      JOINT REPLACEMENT Bilateral     KNEE ARTHROSCOPY      bilateral    ID CYSTOURETHRO W/IMPLANT N/A 7/5/2019    Procedure: CYSTOSCOPY WITH INSERTION Chele Maddison;  Surgeon: Rick Mota MD;  [facility];  Service: Urology    ID TOTAL HIP ARTHROPLASTY Left 6/12/2017    Procedure: ANTERIOR TOTAL HIP ARTHROPLASTY;  Surgeon: Jose Fajardo MD;  [facility];  Service: Orthopedics    ID TOTAL HIP ARTHROPLASTY Right 1/3/2018    Procedure: ANTERIOR TOTAL HIP ARTHROPLASTY;  Surgeon: Jose Fajardo MD;  [facility];  Service: Orthopedics    ID TOTAL KNEE ARTHROPLASTY Left 2/20/2019    Procedure: TOTAL KNEE ARTHROPLASTY;  Surgeon: Jose Fajardo MD;  [facility];  Service: Orthopedics    ID TOTAL KNEE ARTHROPLASTY Right 10/23/2019    Procedure: ARTHROPLASTY KNEE TOTAL;  Surgeon: Jose Fajardo MD;  [facility];  Service: Orthopedics         10/24/19 1001   Note Type   Note type Eval only   Restrictions/Precautions   Weight Bearing Precautions Per Order Yes   RUE Weight Bearing Per Order WBAT   LUE Weight Bearing Per Order WBAT   RLE Weight Bearing Per Order WBAT   LLE Weight Bearing Per Order WBAT   Pain Assessment   Pain Assessment 0-10   Pain Score 4   Pain Type Acute pain Home Living   Type of [address] [facility] Two level;Stairs to enter with rails; Able to live on main level with bedroom/bathroom   Bathroom Shower/Tub Tub/shower unit   Bathroom Toilet Standard   Bathroom Equipment Toilet raiser   Home Equipment Walker;Cane   Prior Function   Level of [place] Independent with ADLs and functional mobility   Lives With [place] Help From Family   ADL Assistance Independent   IADLs Independent   Falls in the last 6 months 0   Vocational Retired   Lifestyle   Autonomy I adls and mobility, i iadls - shares homemaking with family   Reciprocal Relationships supportive family -wife is able to provide assist prn   Service to Others retired   Intrinsic Gratification active pta   Subjective   Subjective "I would like to go home today"   ADL   Eating Assistance 7  Independent   Grooming Assistance 5  [address] [facility] 5  Supervision/Setup   LB [address] 4  [address] 5  Supervision/Setup   LB Dressing Assistance 4  [address]   5  Supervision/Setup   Bed Mobility   Additional Comments oob    Transfers   Sit to [address] to Sit 5  Supervision   Stand pivot 5  Supervision   Functional Mobility   Functional Mobility 5  Supervision   Additional items Rolling walker   Balance   Static Sitting Good   Dynamic Sitting Fair +   C/Eric Poole [place]onilla +   Activity Tolerance   Activity Tolerance Patient limited by fatigue;Patient limited by pain   RUE Assessment   RUE Assessment WFL   LUE Assessment   LUE Assessment WFL   Cognition   Overall Cognitive Status WFL   Assessment   Limitation Decreased ADL status; Decreased endurance;Decreased self-care trans;Decreased high-level ADLs   Prognosis Good   Assessment Pt is a 70 y o  male who was admitted to [facility] on 10/23/2019 with S/P total knee arthroplasty, right    Pt's problem list also includes PMH of previous surgery and bph, herniated lumbar disc, kidney mass, oa, heart murmur  At baseline pt was completing adls and mobility independently - I iadls - shares homemaking with family  Pt lives with spouse in 2 story home with 1st floor setup prn  Currently pt requires min assist for overall ADLS and sba for functional mobility/transfers  Pt currently presents with impairments in the following categories -difficulty performing ADLS and difficulty performing IADLS  activity tolerance, endurance and standing balance/tolerance  These impairments, as well as pt's fatigue, pain, orthopedic restricitions  and WBS   limit pt's ability to safely engage in all baseline areas of occupation, includingbathing, dressing, functional mobility/transfers, community mobility, laundry , driving, house maintenance, meal prep, cleaning, social participation  and leisure activities however has supportive wife who is able to provide assist prn - has all necessary dme from prior sx - reviewed LB dressing techniques with good understanding -  From OT standpoint, recommend home with family support  upon D/C   No further acute OT needs indicated at this time - Recommend continued oob for meals, ambulation to/from BR, setup for self care tasks and mobility in hallway with nursing/restorative - d/c from caseload with above recommendations   Goals   Patient Goals go home    Plan   OT Frequency Eval only   Recommendation   OT Discharge Recommendation Home with family support   OT - OK to Discharge Yes   Barthel Index   Feeding 10   Bathing 0   Grooming Score 5   Dressing Score 5   Bladder Score 10   Bowels Score 10   Toilet Use Score 10   Transfers (Bed/Chair) Score 15   Mobility (Level Surface) Score 10   Stairs Score 5   Barthel Index Score 80       [place], [place]

## 2019-10-24 NOTE — ASSESSMENT & PLAN NOTE
· Continue with Flomax  · Ovalle catheter postoperatively to be discontinued in the morning postop day 2 monitor for voiding for any evidence of urinary retention

## 2019-10-24 NOTE — PLAN OF CARE
Problem: Potential for Falls  Goal: Patient will remain free of falls  Description  INTERVENTIONS:  - Assess patient frequently for physical needs  -  Identify cognitive and physical deficits and behaviors that affect risk of falls    -  [place] fall precautions as indicated by assessment   - Educate patient/family on patient safety including physical limitations  - Instruct patient to call for assistance with activity based on assessment  - Modify environment to reduce risk of injury  - Consider OT/PT consult to assist with strengthening/mobility  Outcome: Adequate for Discharge     Problem: PAIN - ADULT  Goal: Verbalizes/displays adequate comfort level or baseline comfort level  Description  Interventions:  - Encourage patient to monitor pain and request assistance  - Assess pain using appropriate pain scale  - Administer analgesics based on type and severity of pain and evaluate response  - Implement non-pharmacological measures as appropriate and evaluate response  - Consider cultural and social influences on pain and pain management  - Notify physician/advanced practitioner if interventions unsuccessful or patient reports new pain  Outcome: Adequate for Discharge     Problem: MUSCULOSKELETAL - ADULT  Goal: Maintain or return mobility to safest level of function  Description  INTERVENTIONS:  - Assess patient's ability to carry out ADLs; assess patient's baseline for ADL function and identify physical deficits which impact ability to perform ADLs (bathing, care of mouth/teeth, toileting, grooming, dressing, etc )  - Assess/evaluate cause of self-care deficits   - Assess range of motion  - Assess patient's mobility  - Assess patient's need for assistive devices and provide as appropriate  - Encourage maximum independence but intervene and supervise when necessary  - Involve family in performance of ADLs  - Assess for home care needs following discharge   - Consider OT consult to assist with ADL evaluation and planning for discharge  - Provide patient education as appropriate  Outcome: Adequate for Discharge  Goal: Maintain proper alignment of affected body part  Description  INTERVENTIONS:  - Support, maintain and protect limb and body alignment  - Provide patient/ family with appropriate education  Outcome: Adequate for Discharge

## 2019-10-24 NOTE — CONSULTS
Consult Note - Kalyan Leonard 1940, 70 y o  male MRN: [id number]    Unit/Bed#: -[facility] Encounter: [id number]    Primary Care Provider: Blaise Butcher DO   Date and time admitted to hospital: 10/23/2019 10:44 AM        * S/P total knee arthroplasty, right  Assessment & Plan  · Continue post op pain control measures as prescribed  Follow bowel regimen to help decrease narcotic induced constipation  Follow post operative hemoglobin with serial CBC and treat accordingly  Monitor WBC and fever curve post op while encouraging use of incentive spirometer  DVT prophylaxis in place and reviewed        Atrial flutter ([address] )  Assessment & Plan  · Accompanied by a slow ventricular response  · Patient had preoperative cardiac clearance echocardiogram demonstrated preserved ejection fraction  · Recommended patient be on anticoagulation however patient defers with Cardiology; re-educated to same risk factors associated with CVA here but at this point defers anticoagulation; understands risk associated with increased CVA opportunity    BPH (benign prostatic hyperplasia)  Assessment & Plan  · Continue with Flomax  · Ovalle catheter postoperatively to be discontinued in the morning postop day 2 monitor for voiding for any evidence of urinary retention        Review of Systems:    Review of systems positive for mild postoperative pain; other 10 point review of systems negative    Past Medical and Surgical History:     Past Medical History:   Diagnosis Date    BPH (benign prostatic hyperplasia)     Diverticulitis of colon     Herniated lumbar intervertebral disc     Kidney mass     Murmur 08[birth year]1947    diagnosed as a child 8 YO    Osteoarthritis     Preoperative cardiovascular examination 2/16/2018       Past Surgical History:   Procedure Laterality Date    HERNIA REPAIR      JOINT REPLACEMENT Bilateral     KNEE ARTHROSCOPY      bilateral    MN CYSTOURETHRO W/IMPLANT N/A 7/5/2019    Procedure: CYSTOSCOPY WITH INSERTION Morenita Calzada;  Surgeon: Tristan Dykes MD;  [facility];  Service: Urology    HI TOTAL HIP ARTHROPLASTY Left 6/12/2017    Procedure: ANTERIOR TOTAL HIP ARTHROPLASTY;  Surgeon: Guanakito Spear MD;  [facility];  Service: Orthopedics    HI TOTAL HIP ARTHROPLASTY Right 1/3/2018    Procedure: ANTERIOR TOTAL HIP ARTHROPLASTY;  Surgeon: Guanakito Spear MD;  [facility];  Service: Orthopedics    HI TOTAL KNEE ARTHROPLASTY Left 2/20/2019    Procedure: TOTAL KNEE ARTHROPLASTY;  Surgeon: Guanakito Spear MD;  [facility];  Service: Orthopedics       Meds/Allergies:    PTA meds:   Prior to Admission Medications   Prescriptions Last Dose Informant Patient Reported?  Taking?   ascorbic acid (VITAMIN C) 500 mg tablet 10/22/2019 at Unknown time Self No Yes   Sig: Take 1 tablet (500 mg total) by mouth 2 (two) times a day   enoxaparin (LOVENOX) 40 mg/0 4 mL  Self No No   Sig: Inject 0 4 mL (40 mg total) under the skin daily Start after surgery   Patient not taking: Reported on 9/25/2019   ferrous sulfate 324 (65 Fe) mg 10/22/2019 at Unknown time Self No Yes   Sig: Take 1 tablet (324 mg total) by mouth 2 (two) times a day before meals   folic acid (FOLVITE) 1 mg tablet 10/22/2019 at Unknown time Self No Yes   Sig: Take 1 tablet (1 mg total) by mouth daily   multivitamin (THERAGRAN) TABS Past Week at Unknown time Self Yes Yes   Sig: Take 1 tablet by mouth daily   penicillin V potassium (VEETID) 500 mg tablet   Yes Yes   Sig: Take 500 mg by mouth see administration instructions 4 tabs 1 hour before dental work   sildenafil (VIAGRA) 100 mg tablet 10/12/2019 Self No No   Sig: Take 1 tablet (100 mg total) by mouth daily as needed for erectile dysfunction   tamsulosin (FLOMAX) 0 4 mg 10/21/2019 at Unknown time Self No Yes   Sig: Take 1 capsule (0 4 mg total) by mouth daily with dinner      Facility-Administered Medications: None       Allergies: No Known Allergies    Social History:     Marital Status: /Civil Union    Substance Use History:   Social History     Substance and Sexual Activity   Alcohol Use Yes    Alcohol/week: 2 0 standard drinks    Types: 2 Cans of beer per week    Frequency: 4 or more times a week    Drinks per session: 1 or 2    Comment: beer daily      Social History     Tobacco Use   Smoking Status Former Smoker    Packs/day: 1 00    Years: 15 00    Pack years: 15 00    Last attempt to quit: 1980    Years since quittin 8   Smokeless Tobacco Never Used     Social History     Substance and Sexual Activity   Drug Use No       Family History:    Family History   Problem Relation Age of Onset    Cancer Mother     Cancer Father     Cancer Brother     Heart disease Brother        Physical Exam:     Vitals:   Blood Pressure: 123/90 (10/23/19 1846)  Pulse: 73 (10/23/19 1846)  Temperature: 98 °F (36 7 °C) (10/23/19 1846)  Temp Source: Tympanic (10/23/19 1149)  Respirations: 18 (10/23/19 1846)  Height: 5' 8" (172 7 cm) (10/23/19 1701)  Weight - Scale: 68 9 kg (152 lb) (10/23/19 1701)  SpO2: 99 % (10/23/19 1846)    Physical Exam     PERRLA, EOMI, no JVD, no bruits  Regular rhythm 2/6 systolic murmur PMI normal  Clear bilaterally no rales rhonchi or wheezes good breath sounds bilaterally  Soft positive bowel sounds no organomegaly no distension  No edema dorsal pedis pulse intact negative Homans sign  Awake alert oriented x3 no focality  Skin examination normal no rashes  Affect normal    Invasive Devices     Peripheral Intravenous Line            Peripheral IV 10/23/19 Left Arm less than 1 day          Drain            Closed/Suction Drain Right Knee Accordion 10 Fr  less than 1 day                 Additional Data:     Lab Results: I have personally reviewed pertinent reports        Results from last 7 days   Lab Units 10/23/19  1600   WBC Thousand/uL 9 81   HEMOGLOBIN g/dL 15 1   HEMATOCRIT % 46 1   PLATELETS Thousands/uL 285           Invalid input(s): LABALBU          Lab Results Component Value Date/Time    HGBA1C 5 2 09/17/2019 11:46 AM    HGBA1C 5 4 01/24/2019 11:08 AM    HGBA1C 5 0 12/05/2017 02:03 PM    HGBA1C 5 3 05/12/2017 01:27 PM           Imaging: I have personally reviewed pertinent reports  No orders to display       EKG, Pathology, and Other Studies Reviewed on Admission:   · EKG:  Atrial flutter with slow ventricular response    ** Please Note: This note has been constructed using a voice recognition system   **

## 2019-10-24 NOTE — SOCIAL WORK
CM discussed pt in care coordination rounds  CM met with patient and explained cm role  Pt alert and oriented  Pt reports he lives in a 2 story home w/wife w/1st floor setup  Pt reports being independent PTA, reports good support at home, he drives and has transport home w/wife for d/c  Pt reports DME: rw, raised toilet seat, cane, reports prev VNA w/[facility], and rehab  Pts pharmacy is [facility] in [place]  No POA  Pt denies hx/admission for drug/etoh and psych/mental health  Pt reports he filled his Lovenox  PT/OT recommendation: Outpt PT  Pt Pt has setup for Outpt PT  CM reviewed d/c planning process including the following: identifying help at home, patient preference for d/c planning needs, Discharge Lounge, [facility] Meds to Bed program, availability of treatment team to discuss questions or concerns patient and/or family may have regarding understanding medications and recognizing signs and symptoms once discharged  CM also encouraged patient to follow up with all recommended appointments after discharge  Patient advised of importance for patient and family to participate in managing patients medical well being    Discharge checklist discussed with patient

## 2019-10-24 NOTE — ASSESSMENT & PLAN NOTE
· Continue with Flomax  ·  monitor for voiding for any evidence of urinary retention; straight cath at night before bed as patient was doing prior to admission

## 2019-10-24 NOTE — ASSESSMENT & PLAN NOTE
· Accompanied by a slow ventricular response  · Patient had preoperative cardiac clearance echocardiogram demonstrated preserved ejection fraction  · Recommended patient be on anticoagulation however patient defers with Cardiology; re-educated to same risk factors associated with CVA here but at this point defers anticoagulation; understands risk associated with increased CVA opportunity

## 2019-10-24 NOTE — PHYSICAL THERAPY NOTE
PHYSICAL THERAPY TREATMENT NOTE          Patient Name: Richelle Phan  SHCAJ'Q Date: 10/24/2019     10/24/19 1400   Pain Assessment   Pain Assessment 0-10   Pain Score 4   Pain Type Acute pain;Surgical pain   Pain Location Knee   Pain Orientation Right   Hospital Pain Intervention(s) Repositioned; Ambulation/increased activity; Rest   Response to Interventions tolerated   Restrictions/Precautions   Weight Bearing Precautions Per Order Yes   RLE Weight Bearing Per Order WBAT   LLE Weight Bearing Per Order WBAT   Other Precautions Pain; Fall Risk;WBS   General   Chart Reviewed Yes   Response to Previous Treatment Patient with no complaints from previous session  Family/Caregiver Present Yes   Cognition   Overall Cognitive Status WFL   Subjective   Subjective "I will be okay at home, I have a lot of help"   Bed Mobility   Additional Comments OOB in chair upon PT arrival   Pt left upright in bedside chair with all needs in reach at end of therapy session  Transfers   Sit to Stand 5  Supervision   Stand to Sit 5  Supervision   Additional Comments Transfers with RW  Ambulation/Elevation   Gait pattern Short stride; Step to;Decreased R stance   Gait Assistance 5  Supervision   Additional items Assist x 1   Assistive Device Rolling walker   Distance 125 ft x 2   Stair Management Assistance 5  Supervision   Additional items Assist x 1;Verbal cues   Stair Management Technique Foreward; Step to pattern; Other (Comment)  (HHA)   Number of Stairs 3   Balance   Static Sitting Good   Dynamic Sitting Fair +   Static Standing Fair +   Dynamic Standing Fair +   Ambulatory Fair +   Endurance Deficit   Endurance Deficit Yes   Endurance Deficit Description pain, fatigue   Activity Tolerance   Activity Tolerance Patient limited by pain; Patient tolerated treatment well   Nurse Made Aware RN cleared pt to be seen by PT   Exercises   TKR Sitting;15 reps;Bilateral  (x2)   Assessment   Prognosis Good   Problem List Decreased strength;Decreased endurance;Decreased range of motion; Impaired balance;Decreased mobility;Pain;Orthopedic restrictions   Assessment Pt seen for PT treatment session  Pt pleasant and agreeable to participate in therapy  Pt performed STS throughout session with supervision  Pt demonstrated ability to ambulation 125 ft x 2 with RW and supervision  Pt able to negotiate 3 steps with HHA and supervision  Pt educated on how family can assist pt on steps at home  Pt educated on [address] for safe stair negotiation  Pt performed LE therex as outlined above  Pt left upright in bedside chair with all needs in reach  Pt will benefit from skilled therapy in order to address current impairments and functional limitations  PT to follow pt and recommending home with family support and OPPT once medically cleared  Goals   Patient Goals to go home today   STG Expiration Date 11/02/19   PT Treatment Day 2   Plan   Treatment/Interventions Functional transfer training;LE strengthening/ROM; Elevations; Therapeutic exercise; Endurance training;Patient/family training;Equipment eval/education; Bed mobility;Gait training;Spoke to nursing   Progress Progressing toward goals   PT Frequency 7x/wk; Twice a day   Recommendation   Recommendation Home with family support; Outpatient PT   Equipment Recommended Walker   PT - OK to Discharge Yes  (once medically cleared)       Alfonso Meth, PT, DPT

## 2019-10-24 NOTE — ASSESSMENT & PLAN NOTE
· Hemoglobin decreased by a 3 grams since admission hemoglobin however still greater than 12 0 continue to monitor with CBC

## 2019-10-24 NOTE — PROGRESS NOTES
Internal Medicine Progress Note  Patient: Kuldip Cooper  Age/sex: 70 y o  male  Medical Record #: [id number]      ASSESSMENT/PLAN:  Kuldpi Cooper is seen and examined and mangement for following issues:    * S/P total knee arthroplasty, right  Assessment & Plan  · Continue post op pain control measures as prescribed  Follow bowel regimen to help decrease narcotic induced constipation  Follow post operative hemoglobin with serial CBC and treat accordingly  Monitor WBC and fever curve post op while encouraging use of incentive spirometer  DVT prophylaxis in place and reviewed         Atrial flutter ([address] )  Assessment & Plan  · Accompanied by a slow ventricular response  · Patient had preoperative cardiac clearance echocardiogram demonstrated preserved ejection fraction  · Recommended patient be on anticoagulation however patient defers with Cardiology; re-educated to same risk factors associated with CVA here but at this point defers anticoagulation; understands risk associated with increased CVA opportunity     BPH (benign prostatic hyperplasia)  Assessment & Plan  · Continue with Flomax  · Ovalle catheter postoperatively; having post void residual and retention; IVF decreased by orthopedics and pt returned to home straight cath schedule    Post operative blood loss anemia:  Cont venofer protocol; hgb 15 3 down to 12 post operatively; repeat cbc in a m  Subjective: Patient seen and examined  Patients overnight issues or events were reviewed with nursing or staff during rounds or morning huddle session   New or overnight issues include the following: R TKA pain controlled Aflutter rates stable refuses AC BPH required straight cath; orders per ortho    ROS:   GI: denies abdominal pain, change bowel habits or reflux symptoms  Neuro: Denies any headache, new vision changes, new neuropathies,new weaknesses   Respiratory: No Cough, SOB, denies wheeze  Musculoskeletal: +right knee pain  Cardiovascular: No CP, palpitations , denies perception of rapid heartbeat  : denies any new urinary burning or frequency    Review of Scheduled Meds:    Current Facility-Administered Medications:  acetaminophen 650 mg Oral Q6H PRN Melissa Meek PA-C    calcium carbonate 1,000 mg Oral Daily PRN Melissa Meek, PA-RICHARD    docusate sodium 100 mg Oral BID ANNA Chavez-RICHARD    enoxaparin 40 mg Subcutaneous Q24H [address] Melissa Meek PA-C    gabapentin 100 mg Oral [facility] ANNA Chavez-RICHARD    lactated ringers 1,000 mL Intravenous Once PRN ANNA Chavez-RICHARD    And        lactated ringers 1,000 mL Intravenous Once PRN ANNA Chavez-RICHARD    lactated ringers 50 mL/hr Intravenous Continuous Melissa Meek PA-C Last Rate: Stopped (10/24/19 0700)   morphine injection 2 mg Intravenous Q2H PRN ANNA Chavez-RICHARD    oxyCODONE 2 5 mg Oral Q4H PRN ANNA Chavez-RICHARD    oxyCODONE 5 mg Oral Q4H PRN ANNA Chavez-RICHARD    pantoprazole 40 mg Oral Early Morning Melissa Meek PA-C    sodium chloride 1,000 mL Intravenous Once PRN Melissa Meek PA-C    And        sodium chloride 1,000 mL Intravenous Once PRN Melissa Meek PA-C    tamsulosin 0 4 mg Oral Daily With Juan Monson PA-C        Labs:     Results from last 7 days   Lab Units 10/24/19  0503 10/23/19  1600   WBC Thousand/uL 11 72* 9 81   HEMOGLOBIN g/dL 12 3 15 1   HEMATOCRIT % 37 2 46 1   PLATELETS Thousands/uL 230 285     Results from last 7 days   Lab Units 10/24/19  0503   SODIUM mmol/L 138   POTASSIUM mmol/L 4 4   CHLORIDE mmol/L 105   CO2 mmol/L 25   BUN mg/dL 11   CREATININE mg/dL 1 03   CALCIUM mg/dL 8 5                      Invasive Devices     Peripheral Intravenous Line            Peripheral IV 10/23/19 Left Arm less than 1 day          Drain            Closed/Suction Drain Right Knee Accordion 10 Fr  less than 1 day                 *Labs reviewed  *Radiology studies reviewed  *Medications reviewed and reconciled as needed  *Please refer to order section for additional ordered labs studies    Physical Examination:  Vitals:   Vitals:    10/23/19 2001 10/23/19 2237 10/24/19 0249 10/24/19 0652   BP: 120/88 114/92 123/65 128/79   Pulse: 59 (!) 54 68 68   Resp: 18 18 18 17   Temp: 98 4 °F (36 9 °C) 98 8 °F (37 1 °C) 99 4 °F (37 4 °C) 98 3 °F (36 8 °C)   TempSrc:       SpO2: 99% 98% 99% 99%   Weight:       Height:           GEN: NAD  RESP: CTAB, no R/R/W, good expiratory effort, breath sounds equal  CV: +S1 S2, regular rate, no rubs, PMI normal  ABD: soft, NT, ND, normal BS   : straight cath prn;   EXT: DP pulses intact b/l; good cap refill; dressing/drain intact  Skin: no rashes , no lesions  Neuro: AAOx3 no focality on exam;    Total time spent: At least 35 minutes, with more than 50% spent counseling/coordinating care  Counseling includes discussion with patient re: progress  and discussion with patient of his/her current medical state/information  Coordination of patient's care was performed in conjunction with primary service  Time invested included review of patient's labs, vitals, and management of their comorbidities with continued monitoring  In addition, this patient was discussed with medical team including physician and advanced extenders  The care of the patient was extensively discussed and appropriate treatment plan was formulated unique for this patient  ** Please Note: Dragon 360 Dictation voice to text software may have been used in the creation of this document   **

## 2019-10-24 NOTE — ASSESSMENT & PLAN NOTE
· Adequate pain control; remains afebrile  No postoperative nausea  Follow post operative hemoglobin with serial CBC and treat accordingly  Monitor WBC and fever curve post op while encouraging use of incentive spirometer  DVT prophylaxis in place and reviewed

## 2019-10-25 ENCOUNTER — TRANSITIONAL CARE MANAGEMENT (OUTPATIENT)
Dept: FAMILY MEDICINE CLINIC | Facility: CLINIC | Age: 79
End: 2019-10-25

## 2019-10-25 NOTE — UTILIZATION REVIEW
Notification of Inpatient Admission/Inpatient Authorization Request  This is a Notification of Inpatient Admission/Request for Inpatient Authorization for our facility [facility]  Be advised that this patient was admitted to our facility under Inpatient Status  Please contact the Utilization Review Department where the patient is receiving care services for additional admission information  [facility]: [facility] ([address] [place] [address])  Place of Service Code: 21   Place of Service Name: [address]  Presentation Date & Time: 10/23/2019 10:44 AM  Inpatient Admission Date & Time: 10/23/19 1954  Discharge Date & Time: 10/24/2019  3:55 PM   Discharge Disposition (if discharged): Home/Self Care  Attending Physician and NPI#: Keon Reeves Md [[id number]]  Admission Orders (From admission, onward)     Ordered        10/23/19 1954  Inpatient Admission  Once                   Network Utilization Review Department  Phone: [phone]; Fax [phone]  [email or web address]  ATTENTION: Please call with any questions or concerns to [phone]  and carefully listen to the prompts so that you are directed to the right person  Send all requests for admission clinical reviews, approved or denied determinations and any other requests to fax [phone]   All voicemails are confidential

## 2019-10-28 ENCOUNTER — TELEPHONE (OUTPATIENT)
Dept: OBGYN CLINIC | Facility: HOSPITAL | Age: 79
End: 2019-10-28

## 2019-10-28 NOTE — TELEPHONE ENCOUNTER
Patient contacted today to complete a postoperative follow-up call assessment  Patient reports morning are always tough      Patient's after visit summary and after visit summary medication list were reviewed at this time  Patient reports taking oxycodone 3 times a day  Patient also reports taking Tylenol twice a day, extra-strength  Patient reports trying to limit myself on medication    Patient reports current 7/10 pain at this time at top of the knee and calf    Patient reports his pain gets better with moving    Patient also reports taking taking Lovenox  Patient reports Lovenox injections are going fine," and having " a lot of practice" and denies any bleeding  Patient reports taking Colace twice a day the [facility], and denies having a bowel movement  Patient reports passing gas, denies any nausea, vomiting, and/or abdominal pain  Patient was recommended to take MiraLax, and increase fluids and fiber today  Patient reports swelling is the same" as time of DC and is noticeable  "in the foot " Patient denied icing and was educated to do so 20 minutes on every 2-3 hours with a towel in between the ice in the skin  Patient was understanding  Patient reports dressing is dry and denies any drainage  Patient reports ambulating with a walker and doing fine    Patient denies any falls  Patient reports having outpatient physical therapy through [facility], today, Wednesday and Friday this week  Patient denies any chest pain, shortness of breath, dizziness, fever  Patient reports some pain in his calf to foot near the ankle  Patient reports the pain is "all the time" but worse with moving    The patient denies any redness or tenderness to touch  Patient reports "little swelling" at foot  Patient was instructed to continue to monitor that area for pain, and tenderness to touch, redness, or warmth  Patient was agreeable to do so and instructed to keep me updated    Patient reports waking up with night sweats last night, but denies any fever  And was instructed to monitor temperature and for other symptoms  pt encouraged to call me with questions, concerns or issues

## 2019-10-31 ENCOUNTER — OFFICE VISIT (OUTPATIENT)
Dept: OBGYN CLINIC | Facility: HOSPITAL | Age: 79
End: 2019-10-31

## 2019-10-31 ENCOUNTER — HOSPITAL ENCOUNTER (OUTPATIENT)
Dept: RADIOLOGY | Facility: HOSPITAL | Age: 79
Discharge: HOME | End: 2019-10-31
Attending: ORTHOPAEDIC SURGERY
Payer: COMMERCIAL

## 2019-10-31 DIAGNOSIS — Z96.651 S/P TOTAL KNEE ARTHROPLASTY, RIGHT: ICD-10-CM

## 2019-10-31 DIAGNOSIS — Z96.651 S/P TOTAL KNEE ARTHROPLASTY, RIGHT: Primary | ICD-10-CM

## 2019-10-31 PROCEDURE — 73560 X-RAY EXAM OF KNEE 1 OR 2: CPT

## 2019-10-31 PROCEDURE — 99024 POSTOP FOLLOW-UP VISIT: CPT | Performed by: ORTHOPAEDIC SURGERY

## 2019-10-31 NOTE — PROGRESS NOTES
70 y o male s/p R TKA on 10/23/2019  He has been doing well  He continues to go to outpatient physical therapy      Review of Systems  Review of systems negative unless otherwise specified in HPI    Past Medical History  Past Medical History:   Diagnosis Date    BPH (benign prostatic hyperplasia)     Diverticulitis of colon     Herniated lumbar intervertebral disc     Kidney mass     Murmur 08/1947    diagnosed as a child 6 YO    Osteoarthritis     Preoperative cardiovascular examination 2/16/2018       Past Surgical History  Past Surgical History:   Procedure Laterality Date    HERNIA REPAIR      JOINT REPLACEMENT Bilateral     KNEE ARTHROSCOPY      bilateral    MA CYSTOURETHRO W/IMPLANT N/A 7/5/2019    Procedure: CYSTOSCOPY WITH INSERTION Sarajane Pear;  Surgeon: Kristy Fry MD;  [facility];  Service: Urology    MA TOTAL HIP ARTHROPLASTY Left 6/12/2017    Procedure: ANTERIOR TOTAL HIP ARTHROPLASTY;  Surgeon: Natalio Hernandes MD;  [facility];  Service: Orthopedics    MA TOTAL HIP ARTHROPLASTY Right 1/3/2018    Procedure: ANTERIOR TOTAL HIP ARTHROPLASTY;  Surgeon: Natalio Hernandes MD;  [facility];  Service: Orthopedics    MA TOTAL KNEE ARTHROPLASTY Left 2/20/2019    Procedure: TOTAL KNEE ARTHROPLASTY;  Surgeon: Natalio Hernandes MD;  [facility];  Service: Orthopedics    MA TOTAL KNEE ARTHROPLASTY Right 10/23/2019    Procedure: ARTHROPLASTY KNEE TOTAL;  Surgeon: Natalio Hernandes MD;  [facility];  Service: Orthopedics       Current Medications  Current Outpatient Medications on File Prior to Visit   Medication Sig Dispense Refill    ascorbic acid (VITAMIN C) 500 mg tablet Take 1 tablet (500 mg total) by mouth 2 (two) times a day 60 tablet 0    docusate sodium (COLACE) 100 mg capsule Take 1 capsule (100 mg total) by mouth 2 (two) times a day 10 capsule 0    enoxaparin (LOVENOX) 40 mg/0 4 mL Inject 0 4 mL (40 mg total) under the skin daily Start after surgery 30 Syringe 0    ferrous sulfate 324 (65 Fe) mg Take 1 tablet (324 mg total) by mouth 2 (two) times a day before meals 60 tablet 0    folic acid (FOLVITE) 1 mg tablet Take 1 tablet (1 mg total) by mouth daily 30 tablet 0    multivitamin (THERAGRAN) TABS Take 1 tablet by mouth daily      oxyCODONE (ROXICODONE) 5 mg immediate release tablet Take 1 tablets PO q 4 hours PRN Pain 30 tablet 0    penicillin V potassium (VEETID) 500 mg tablet Take 500 mg by mouth see administration instructions 4 tabs 1 hour before dental work      sildenafil (VIAGRA) 100 mg tablet Take 1 tablet (100 mg total) by mouth daily as needed for erectile dysfunction 10 tablet 11    tamsulosin (FLOMAX) 0 4 mg Take 1 capsule (0 4 mg total) by mouth daily with dinner 90 capsule 3     No current facility-administered medications on file prior to visit  Recent Labs [facility])  0   Lab Value Date/Time    HCT 37 2 10/24/2019 0503    HCT 40 0 12/05/2017 1403    HGB 12 3 10/24/2019 0503    HGB 14 1 12/05/2017 1403    WBC 11 72 (H) 10/24/2019 0503    WBC 0-5 12/05/2017 1403    WBC 5 6 12/05/2017 1403    INR 1 24 (H) 09/17/2019 1146    INR 1 1 12/05/2017 1403    CRP <3 0 09/17/2019 1146    GLUCOSE 88 12/05/2017 1403    HGBA1C 5 2 09/17/2019 1146    HGBA1C 5 0 12/05/2017 1403         Physical exam  · General: Awake, Alert, Oriented  · Eyes: Pupils equal, round and reactive to light  · Heart: regular rate and rhythm  · Lungs: No audible wheezing  · Abdomen: soft  RLE  Incision and dressing is clean/dry/intact  Knee ROM from 15 degrees to 80 degrees  Stable to varus/valgus stress    Imaging  XR right knee shows TKA in appropriate position    Procedure  None today    Assessment/Plan:   70 y  o male s/p R TKA on 10/23/2019  Doing well    Plan:  WBAT RLE  Continue outpatient physical therapy  Follow up in 1 week to remove dressing

## 2019-11-06 ENCOUNTER — OFFICE VISIT (OUTPATIENT)
Dept: FAMILY MEDICINE CLINIC | Facility: CLINIC | Age: 79
End: 2019-11-06
Payer: COMMERCIAL

## 2019-11-06 VITALS
WEIGHT: 152.2 LBS | DIASTOLIC BLOOD PRESSURE: 70 MMHG | HEART RATE: 55 BPM | BODY MASS INDEX: 23.07 KG/M2 | HEIGHT: 68 IN | OXYGEN SATURATION: 96 % | SYSTOLIC BLOOD PRESSURE: 120 MMHG | TEMPERATURE: 98.7 F

## 2019-11-06 DIAGNOSIS — Z96.651 S/P TOTAL KNEE ARTHROPLASTY, RIGHT: Primary | ICD-10-CM

## 2019-11-06 DIAGNOSIS — I48.92 ATRIAL FLUTTER, UNSPECIFIED TYPE (HCC): ICD-10-CM

## 2019-11-06 PROCEDURE — 99495 TRANSJ CARE MGMT MOD F2F 14D: CPT | Performed by: FAMILY MEDICINE

## 2019-11-08 ENCOUNTER — OFFICE VISIT (OUTPATIENT)
Dept: OBGYN CLINIC | Facility: HOSPITAL | Age: 79
End: 2019-11-08

## 2019-11-08 VITALS — HEART RATE: 68 BPM | DIASTOLIC BLOOD PRESSURE: 75 MMHG | SYSTOLIC BLOOD PRESSURE: 123 MMHG

## 2019-11-08 DIAGNOSIS — Z96.651 AFTERCARE FOLLOWING RIGHT KNEE JOINT REPLACEMENT SURGERY: Primary | ICD-10-CM

## 2019-11-08 DIAGNOSIS — Z47.1 AFTERCARE FOLLOWING RIGHT KNEE JOINT REPLACEMENT SURGERY: Primary | ICD-10-CM

## 2019-11-08 PROCEDURE — 1160F RVW MEDS BY RX/DR IN RCRD: CPT | Performed by: PHYSICIAN ASSISTANT

## 2019-11-08 PROCEDURE — 4040F PNEUMOC VAC/ADMIN/RCVD: CPT | Performed by: PHYSICIAN ASSISTANT

## 2019-11-08 PROCEDURE — 1111F DSCHRG MED/CURRENT MED MERGE: CPT | Performed by: PHYSICIAN ASSISTANT

## 2019-11-08 PROCEDURE — 99024 POSTOP FOLLOW-UP VISIT: CPT | Performed by: PHYSICIAN ASSISTANT

## 2019-11-12 ENCOUNTER — TELEPHONE (OUTPATIENT)
Dept: OBGYN CLINIC | Facility: CLINIC | Age: 79
End: 2019-11-12

## 2019-11-12 DIAGNOSIS — Z96.651 AFTERCARE FOLLOWING RIGHT KNEE JOINT REPLACEMENT SURGERY: ICD-10-CM

## 2019-11-12 DIAGNOSIS — Z47.1 AFTERCARE FOLLOWING RIGHT KNEE JOINT REPLACEMENT SURGERY: ICD-10-CM

## 2019-11-12 RX ORDER — OXYCODONE HYDROCHLORIDE 5 MG/1
TABLET ORAL
Qty: 30 TABLET | Refills: 0 | Status: SHIPPED | OUTPATIENT
Start: 2019-11-12 | End: 2020-09-23 | Stop reason: ALTCHOICE

## 2019-11-12 NOTE — TELEPHONE ENCOUNTER
Pt contacted Call Center requested refill of their medication  Medication Name: oxycodone    Dosage of Med: 5 mg      Frequency of Med: 1 per day      Remaining Medication: 0    Pharmacy and Location: [facility]    Please call once ordered        Pt  Preferred Callback Phone Number:  [phone]      Thank you

## 2019-12-06 ENCOUNTER — HOSPITAL ENCOUNTER (OUTPATIENT)
Dept: RADIOLOGY | Facility: HOSPITAL | Age: 79
Discharge: HOME | End: 2019-12-06
Payer: COMMERCIAL

## 2019-12-06 ENCOUNTER — OFFICE VISIT (OUTPATIENT)
Dept: OBGYN CLINIC | Facility: HOSPITAL | Age: 79
End: 2019-12-06

## 2019-12-06 VITALS — SYSTOLIC BLOOD PRESSURE: 132 MMHG | HEART RATE: 71 BPM | DIASTOLIC BLOOD PRESSURE: 80 MMHG

## 2019-12-06 DIAGNOSIS — Z96.651 AFTERCARE FOLLOWING RIGHT KNEE JOINT REPLACEMENT SURGERY: Primary | ICD-10-CM

## 2019-12-06 DIAGNOSIS — Z47.1 AFTERCARE FOLLOWING RIGHT KNEE JOINT REPLACEMENT SURGERY: ICD-10-CM

## 2019-12-06 DIAGNOSIS — Z96.651 AFTERCARE FOLLOWING RIGHT KNEE JOINT REPLACEMENT SURGERY: ICD-10-CM

## 2019-12-06 DIAGNOSIS — Z47.1 AFTERCARE FOLLOWING RIGHT KNEE JOINT REPLACEMENT SURGERY: Primary | ICD-10-CM

## 2019-12-06 PROCEDURE — 99024 POSTOP FOLLOW-UP VISIT: CPT | Performed by: PHYSICIAN ASSISTANT

## 2019-12-06 PROCEDURE — 73560 X-RAY EXAM OF KNEE 1 OR 2: CPT

## 2019-12-06 PROCEDURE — 4040F PNEUMOC VAC/ADMIN/RCVD: CPT | Performed by: PHYSICIAN ASSISTANT

## 2019-12-06 PROCEDURE — 1111F DSCHRG MED/CURRENT MED MERGE: CPT | Performed by: PHYSICIAN ASSISTANT

## 2019-12-06 PROCEDURE — 1160F RVW MEDS BY RX/DR IN RCRD: CPT | Performed by: PHYSICIAN ASSISTANT

## 2019-12-10 ENCOUNTER — TELEPHONE (OUTPATIENT)
Dept: CARDIOLOGY CLINIC | Facility: CLINIC | Age: 79
End: 2019-12-10

## 2019-12-10 NOTE — TELEPHONE ENCOUNTER
Called patient to schedule F/U appt, spoke with patient per patient he will hold of on F/U appt with Cardiologist  Informed patient to call office when ready to schedule

## 2019-12-12 NOTE — PROGRESS NOTES
Assessment/Plan:     Diagnoses and all orders for this visit:    S/P total knee arthroplasty, right    Atrial flutter, unspecified type [facility])      patient is doing very well post surgery  His orthopedic follow-up  He has no complaints today  His wound is healing well  He can follow up as directed next year for his Medicare wellness visit      Subjective:     Cc:  T cm for recent hospitalization for knee replacement     Patient ID: Amaris Ma is a 70 y o  male   ]    TCM Call (since 10/6/2019)     Date and time call was made  10/25/2019 11:47 AM    Hospital care reviewed  Records reviewed    Patient was hospitialized at  [facility]    Date of Admission  10/23/19    Date of discharge  10/24/19    Diagnosis  Right Total Knee Replacement     Disposition  Home    Were the patients medications reviewed and updated  Yes    Current Symptoms  None      TCM Call (since 10/6/2019)     Post hospital issues  Poor ADL (Activities of Daily Living) performance; Reduced activity    Should patient be enrolled in anticoag monitoring? No    Scheduled for follow up?   Yes    Patients specialists  Other (comment)    Other specialists names  ortho    Did you obtain your prescribed medications  Yes    Do you need help managing your prescriptions or medications  No    Is transportation to your appointment needed  Yes    Specify why  unable to drive due to surgery     I have advised the patient to call PCP with any new or worsening symptoms  JUANA Avendano     Living Arrangements  Spouse or Significiant other    Support System  None    Are you recieving any outpatient services  Yes    What type of services  physical therapy    Are you recieving home care services  No    Are you using any community resources  No    Current waiver services  No    Have you fallen in the last 12 months  No    Interperter language line needed  No    Counseling  Patient    Counseling topics  instructions for management          Patient is HISTORY OF PRESENT ILLNESS  Isaiah St is a 82 y.o. female. HPI In for [facility] visit. No co dyspnea. No dizziness or lightheadedness. Some swelling in feet. Made her own breakfast this am, fixed her own breakfast this am without problems. Was admitted for hypotension and discharged on 12-10. Brings in all her med bottles today. Past Medical History:   Diagnosis Date    Arrhythmia     atrial fibrillation, chronic. Stress test 2011 essent. normal.  Mild-mod AR on ECHO    Atrial fibrillation ([address]     Atrial flutter with rapid ventricular response ([address] 11/21/2019    Chronic atrial fibrillation 11/21/2019    GI bleeding     Hypertension     Refusal of blood transfusions as patient is Muslim     Thyroid disease     hypothyroid       Social History     Socioeconomic History    Marital status:      Spouse name: Not on file    Number of children: Not on file    Years of education: Not on file    Highest education level: Not on file   Tobacco Use    Smoking status: Never Smoker    Smokeless tobacco: Never Used   Substance and Sexual Activity    Alcohol use: No    Drug use: No    Sexual activity: Never       Current Outpatient Medications   Medication Sig Dispense Refill    furosemide (LASIX) 20 mg tablet 1 tab po daily 90 Tab 3    lisinopril (PRINIVIL, ZESTRIL) 5 mg tablet Take 1 Tab by mouth daily. For heart 90 Tab 3    atenolol (TENORMIN) 50 mg tablet Take 0.5 Tabs by mouth daily. 30 Tab 2    potassium chloride (K-DUR, KLOR-CON) 20 mEq tablet TAKE 1 TABLET BY MOUTH once daily 60 Tab 12    pantoprazole (PROTONIX) 40 mg tablet Take 1 Tab by mouth daily. To keep stomach from bleeding. 30 Tab 5    dabigatran etexilate (PRADAXA) 75 mg capsule TAKE ONE CAPSULE BY MOUTH EVERY 12 HOURS 60 Cap 0    albuterol (PROVENTIL HFA, VENTOLIN HFA, PROAIR HFA) 90 mcg/actuation inhaler Take 2 Puffs by inhalation every four (4) hours as needed for Wheezing or Shortness of Breath.  1 Inhaler 5    here for transition of care for knee replacement hospitalization  He reports no complaints  His pain is controlled  His knee is becoming more flexible  He is doing physical therapy and tolerating well        The following portions of the patient's history were reviewed and updated as appropriate: allergies, current medications, past family history, past medical history, past social history, past surgical history and problem list     Review of Systems   Constitutional: Negative  HENT: Negative  Eyes: Negative  Respiratory: Negative  Cardiovascular: Negative  Gastrointestinal: Negative  Endocrine: Negative  Genitourinary: Negative  Musculoskeletal: Negative  Skin: Negative  Allergic/Immunologic: Negative  Neurological: Negative  Hematological: Negative  Psychiatric/Behavioral: Negative  All other systems reviewed and are negative  Objective:    Vitals:    11/06/19 1452   BP: 120/70   BP Location: Right arm   Patient Position: Sitting   Cuff Size: Standard   Pulse: 55   Temp: 98 7 °F (37 1 °C)   TempSrc: Tympanic   SpO2: 96%   Weight: 69 kg (152 lb 3 2 oz)   Height: 5' 8" (1 727 m)          Physical Exam   Constitutional: He is oriented to person, place, and time  He appears well-developed and well-nourished  No distress  HENT:   Head: Normocephalic  Right Ear: External ear normal    Left Ear: External ear normal    Nose: Nose normal    Mouth/Throat: Oropharynx is clear and moist    Eyes: Pupils are equal, round, and reactive to light  Conjunctivae and EOM are normal  Right eye exhibits no discharge  Left eye exhibits no discharge  Neck: Normal range of motion  Cardiovascular: Normal rate, regular rhythm and normal heart sounds  Pulmonary/Chest: Effort normal and breath sounds normal    Abdominal: Soft  Bowel sounds are normal  He exhibits no distension  There is no tenderness  Musculoskeletal: Normal range of motion     Neurological: He is alert and oriented levothyroxine (SYNTHROID) 100 mcg tablet TAKE ONE TABLET DAILY FOR THYROID 30 Tab 12    traZODone (DESYREL) 150 mg tablet TAKE 1 TABLET BY MOUTH NIGHTLY FOR SLEEP. 30 Tab 12    fluticasone propionate (FLONASE) 50 mcg/actuation nasal spray 2 Sprays by Both Nostrils route daily.  diphenhydrAMINE (BENADRYL) 25 mg capsule Take 25 mg by mouth every six (6) hours as needed.  acetaminophen (TYLENOL ARTHRITIS PAIN) 650 mg TbER Take 650 mg by mouth every eight (8) hours.  CALCIUM 600 + D tablet TAKE 1 TABLET TWICE A DAY. (CALCIUM SUPPLEMENT) 60 Tab 0         ROS    Physical Exam  Vitals signs and nursing note reviewed. Constitutional:       Appearance: She is well-developed. HENT:      Right Ear: External ear normal.      Left Ear: External ear normal.   Neck:      Thyroid: No thyromegaly. Cardiovascular:      Rate and Rhythm: Normal rate and regular rhythm. Heart sounds: Normal heart sounds. Pulmonary:      Breath sounds: Normal breath sounds. No wheezing. Abdominal:      General: Bowel sounds are normal. There is no distension. Palpations: Abdomen is soft. There is no mass. Tenderness: There is no tenderness. Musculoskeletal:      Comments: 1+ edema bilaterally   Lymphadenopathy:      Cervical: No cervical adenopathy. ASSESSMENT and PLAN  No orders of the defined types were placed in this encounter. Diagnoses and all orders for this visit:    1. Acute on chronic diastolic heart failure ([address]    2. Hypotension due to drugs      Follow-up and Dispositions    · Return in about 1 week (around 12/19/2019). to person, place, and time  No cranial nerve deficit  Skin: Skin is warm and dry  No rash noted  Psychiatric: He has a normal mood and affect  His behavior is normal  Judgment and thought content normal    Nursing note and vitals reviewed

## 2020-01-17 ENCOUNTER — HOSPITAL ENCOUNTER (OUTPATIENT)
Dept: RADIOLOGY | Facility: HOSPITAL | Age: 80
Discharge: HOME | End: 2020-01-17
Payer: COMMERCIAL

## 2020-01-17 ENCOUNTER — OFFICE VISIT (OUTPATIENT)
Dept: OBGYN CLINIC | Facility: HOSPITAL | Age: 80
End: 2020-01-17

## 2020-01-17 VITALS
SYSTOLIC BLOOD PRESSURE: 135 MMHG | WEIGHT: 153 LBS | BODY MASS INDEX: 23.19 KG/M2 | DIASTOLIC BLOOD PRESSURE: 80 MMHG | HEART RATE: 69 BPM | HEIGHT: 68 IN

## 2020-01-17 DIAGNOSIS — Z47.1 AFTERCARE FOLLOWING RIGHT KNEE JOINT REPLACEMENT SURGERY: Primary | ICD-10-CM

## 2020-01-17 DIAGNOSIS — Z96.651 AFTERCARE FOLLOWING RIGHT KNEE JOINT REPLACEMENT SURGERY: ICD-10-CM

## 2020-01-17 DIAGNOSIS — M17.12 PRIMARY OSTEOARTHRITIS OF LEFT KNEE: ICD-10-CM

## 2020-01-17 DIAGNOSIS — Z96.651 AFTERCARE FOLLOWING RIGHT KNEE JOINT REPLACEMENT SURGERY: Primary | ICD-10-CM

## 2020-01-17 DIAGNOSIS — Z47.1 AFTERCARE FOLLOWING RIGHT KNEE JOINT REPLACEMENT SURGERY: ICD-10-CM

## 2020-01-17 PROCEDURE — 99024 POSTOP FOLLOW-UP VISIT: CPT | Performed by: PHYSICIAN ASSISTANT

## 2020-01-17 PROCEDURE — 73560 X-RAY EXAM OF KNEE 1 OR 2: CPT

## 2020-01-17 PROCEDURE — 4040F PNEUMOC VAC/ADMIN/RCVD: CPT | Performed by: PHYSICIAN ASSISTANT

## 2020-01-17 PROCEDURE — 1160F RVW MEDS BY RX/DR IN RCRD: CPT | Performed by: PHYSICIAN ASSISTANT

## 2020-03-04 ENCOUNTER — OFFICE VISIT (OUTPATIENT)
Dept: UROLOGY | Facility: HOSPITAL | Age: 80
End: 2020-03-04
Payer: COMMERCIAL

## 2020-03-04 VITALS
DIASTOLIC BLOOD PRESSURE: 80 MMHG | HEART RATE: 82 BPM | BODY MASS INDEX: 23.79 KG/M2 | WEIGHT: 157 LBS | HEIGHT: 68 IN | SYSTOLIC BLOOD PRESSURE: 130 MMHG

## 2020-03-04 DIAGNOSIS — R33.9 URINARY RETENTION: ICD-10-CM

## 2020-03-04 DIAGNOSIS — N40.0 BENIGN PROSTATIC HYPERPLASIA, UNSPECIFIED WHETHER LOWER URINARY TRACT SYMPTOMS PRESENT: Primary | ICD-10-CM

## 2020-03-04 LAB — POST-VOID RESIDUAL VOLUME, ML POC: 999 ML

## 2020-03-04 PROCEDURE — 3008F BODY MASS INDEX DOCD: CPT | Performed by: NURSE PRACTITIONER

## 2020-03-04 PROCEDURE — 51798 US URINE CAPACITY MEASURE: CPT | Performed by: NURSE PRACTITIONER

## 2020-03-04 PROCEDURE — 1160F RVW MEDS BY RX/DR IN RCRD: CPT | Performed by: NURSE PRACTITIONER

## 2020-03-04 PROCEDURE — 99214 OFFICE O/P EST MOD 30 MIN: CPT | Performed by: NURSE PRACTITIONER

## 2020-03-04 PROCEDURE — 1036F TOBACCO NON-USER: CPT | Performed by: NURSE PRACTITIONER

## 2020-03-04 PROCEDURE — 4040F PNEUMOC VAC/ADMIN/RCVD: CPT | Performed by: NURSE PRACTITIONER

## 2020-03-04 NOTE — PROGRESS NOTES
3/4/2020    [employer] [address]  1940  [id number]      Assessment  -BPH s/p Urolift (7/5/19)    Discussion/Plan  Sam Aguilar is a 70 y o  male being managed by Dr Hieu Moser  -PVR is 999 mL, however patient voided unmeasured amount prior to leaving office  He is in no acute distress and denies any suprapubic discomfort or distention  We did discuss catheterizing in office today as well as increasing his CIC  Patient does not feel this is warranted as he is asymptomatic and wishes to continue performing CIC only once daily in the evening  He will continue to take tamsulosin 0 4 mg HS  Review dietary and behavior modifications and encouraged patient to practice timed and double voiding  Because he has had no recent urinary tract infections and last creatinine from 10/24/2019 was 1 03, we will continue to monitor at this time  -follow-up in 6 months with repeat PVR and BMP  He was instructed to call with any issues  -All questions answered, patient agrees with plan      History of Present Illness  70 y o  male with a history of BPH s/p Urolift (7/5/19) presents today for follow up  Patient continues to take Flomax 0 4mg HS  His previous post void residual assessment was 369 mL, previously 900 mL  He continues to catheterize once daily in the evening  Patient denies any suprapubic discomfort or distention  He also denies any additional lower urinary tract symptoms, gross hematuria, or dysuria  Patient is able to pass catheter without any difficulty  Last PSA 9/19/19 was 2 4  He denies any strong family history of prostate cancer  Review of Systems  Review of Systems   Constitutional: Negative  HENT: Negative  Respiratory: Negative  Cardiovascular: Negative  Gastrointestinal: Negative  Genitourinary: Negative for decreased urine volume, difficulty urinating, dysuria, flank pain, frequency, hematuria and urgency  Musculoskeletal: Negative  Skin: Negative      Neurological: Negative  Psychiatric/Behavioral: Negative  AUA SYMPTOM SCORE      Most Recent Value   AUA SYMPTOM SCORE   How often have you had a sensation of not emptying your bladder completely after you finished urinating? 1   How often have you had to urinate again less than two hours after you finished urinating? 0   How often have you found you stopped and started again several times when you urinate? 1   How often have you found it difficult to postpone urination? 0   How often have you had a weak urinary stream?  1   How often have you had to push or strain to begin urination? 0   How many times did you most typically get up to urinate from the time you went to bed at night until the time you got up in the morning?   1   Quality of Life: If you were to spend the rest of your life with your urinary condition just the way it is now, how would you feel about that?  2   AUA SYMPTOM SCORE  4          Past Medical History  Past Medical History:   Diagnosis Date    BPH (benign prostatic hyperplasia)     Diverticulitis of colon     Herniated lumbar intervertebral disc     Kidney mass     Murmur 08/1947    diagnosed as a child 6 YO    Osteoarthritis     Preoperative cardiovascular examination 2/16/2018       Past Social History  Past Surgical History:   Procedure Laterality Date    HERNIA REPAIR      JOINT REPLACEMENT Bilateral     KNEE ARTHROSCOPY      bilateral    KS CYSTOURETHRO W/IMPLANT N/A 7/5/2019    Procedure: CYSTOSCOPY WITH INSERTION David Martinez;  Surgeon: Ruperto Gonsales MD;  [facility];  Service: Urology    KS TOTAL HIP ARTHROPLASTY Left 6/12/2017    Procedure: ANTERIOR TOTAL HIP ARTHROPLASTY;  Surgeon: Chio Boyer MD;  [facility];  Service: Orthopedics    KS TOTAL HIP ARTHROPLASTY Right 1/3/2018    Procedure: ANTERIOR TOTAL HIP ARTHROPLASTY;  Surgeon: Chio Boyer MD;  [facility];  Service: Orthopedics    KS TOTAL KNEE ARTHROPLASTY Left 2/20/2019    Procedure: TOTAL KNEE ARTHROPLASTY;  Surgeon: Randell Presley MD;  [facility];  Service: Orthopedics    CA TOTAL KNEE ARTHROPLASTY Right 10/23/2019    Procedure: ARTHROPLASTY KNEE TOTAL;  Surgeon: Randell Presley MD;  [facility];  Service: Orthopedics       Past Family History  Family History   Problem Relation Age of Onset    Cancer Mother     Cancer Father     Cancer Brother     Heart disease Brother        Past Social history  Social History     Socioeconomic History    Marital status: /Civil Union     Spouse name: Not on file    Number of children: Not on file    Years of education: Not on file    Highest education level: Not on file   Occupational History    Not on file   Social Needs    Financial resource strain: Not on file    Food insecurity:     Worry: Not on file     Inability: Not on file    Transportation needs:     Medical: Not on file     Non-medical: Not on file   Tobacco Use    Smoking status: Former Smoker     Packs/day:      Years: 15      Pack years: 15      Last attempt to quit: 1980     Years since quittin 1    Smokeless tobacco: Never Used   Substance and Sexual Activity    Alcohol use:  Yes     Alcohol/week: 2 0 standard drinks     Types: 2 Cans of beer per week     Frequency: 4 or more times a week     Drinks per session: 1 or 2     Comment: beer daily     Drug use: No    Sexual activity: Yes   Lifestyle    Physical activity:     Days per week: Not on file     Minutes per session: Not on file    Stress: Not on file   Relationships    Social connections:     Talks on phone: Not on file     Gets together: Not on file     Attends Sikh service: Not on file     Active member of club or organization: Not on file     Attends meetings of clubs or organizations: Not on file     Relationship status: Not on file    Intimate partner violence:     Fear of current or ex partner: Not on file     Emotionally abused: Not on file     Physically abused: Not on file     Forced sexual activity: Not on file   Other Topics Concern    Not on file   Social History Narrative    Not on file       Current Medications  Current Outpatient Medications   Medication Sig Dispense Refill    ascorbic acid (VITAMIN C) 500 mg tablet Take 1 tablet (500 mg total) by mouth 2 (two) times a day 60 tablet 0    docusate sodium (COLACE) 100 mg capsule Take 1 capsule (100 mg total) by mouth 2 (two) times a day 10 capsule 0    enoxaparin (LOVENOX) 40 mg/0 4 mL Inject 0 4 mL (40 mg total) under the skin daily Start after surgery 30 Syringe 0    ferrous sulfate 324 (65 Fe) mg Take 1 tablet (324 mg total) by mouth 2 (two) times a day before meals 60 tablet 0    folic acid (FOLVITE) 1 mg tablet Take 1 tablet (1 mg total) by mouth daily 30 tablet 0    multivitamin (THERAGRAN) TABS Take 1 tablet by mouth daily      oxyCODONE (ROXICODONE) 5 mg immediate release tablet Take 1 tablets PO q 4 hours PRN Pain 30 tablet 0    penicillin V potassium (VEETID) 500 mg tablet Take 500 mg by mouth see administration instructions 4 tabs 1 hour before dental work      sildenafil (VIAGRA) 100 mg tablet Take 1 tablet (100 mg total) by mouth daily as needed for erectile dysfunction 10 tablet 11    tamsulosin (FLOMAX) 0 4 mg Take 1 capsule (0 4 mg total) by mouth daily with dinner 90 capsule 3     No current facility-administered medications for this visit  Allergies  No Known Allergies    Past Medical History, Social History, Family History, medications and allergies were reviewed  Vitals  There were no vitals filed for this visit  Physical Exam  Physical Exam   Constitutional: He is oriented to person, place, and time  He appears well-developed and well-nourished  HENT:   Head: Normocephalic  Eyes: Pupils are equal, round, and reactive to light  Neck: Normal range of motion  Cardiovascular: Normal rate and regular rhythm  Pulmonary/Chest: Effort normal    Abdominal: Soft   Normal appearance  He exhibits no distension  There is no tenderness  There is no CVA tenderness  Genitourinary:   Genitourinary Comments: No suprapubic discomfort or distention   Musculoskeletal: Normal range of motion  Neurological: He is alert and oriented to person, place, and time  Skin: Skin is warm and dry  Psychiatric: He has a normal mood and affect  His behavior is normal  Judgment and thought content normal        Results    I have personally reviewed all pertinent lab results and reviewed with patient  Lab Results   Component Value Date    PSA 2 4 09/19/2019    PSA 0 8 04/14/2017    PSA 0 9 04/01/2016     Lab Results   Component Value Date    GLUCOSE 88 12/05/2017    CALCIUM 8 5 10/24/2019     12/05/2017    K 4 4 10/24/2019    CO2 25 10/24/2019     10/24/2019    BUN 11 10/24/2019    CREATININE 1 03 10/24/2019     Lab Results   Component Value Date    WBC 11 72 (H) 10/24/2019    HGB 12 3 10/24/2019    HCT 37 2 10/24/2019     (H) 10/24/2019     10/24/2019     No results found for this or any previous visit (from the past 1 hour(s))

## 2020-04-15 DIAGNOSIS — R33.9 URINARY RETENTION: ICD-10-CM

## 2020-04-15 RX ORDER — TAMSULOSIN HYDROCHLORIDE 0.4 MG/1
0.4 CAPSULE ORAL
Qty: 90 CAPSULE | Refills: 3 | Status: SHIPPED | OUTPATIENT
Start: 2020-04-15 | End: 2021-03-17 | Stop reason: SINTOL

## 2020-07-18 NOTE — INTERVAL H&P NOTE
H&P reviewed  After examining the patient I find no changes in the patients condition since the H&P had been written  RANGE OF MOTION LIMITED/visible flattening of left shoulder contour

## 2020-07-29 ENCOUNTER — TELEPHONE (OUTPATIENT)
Dept: FAMILY MEDICINE CLINIC | Facility: CLINIC | Age: 80
End: 2020-07-29

## 2020-07-29 NOTE — TELEPHONE ENCOUNTER
Patient called  He asked if you would like to order an lab work for him, since he is having his medicare wellness  He has an order for a bmp from his urologist, so he thought maybe you would have more to add  He said he needs to use [facility] due to his insurance  I told him I would call him when orders are in the computer  [phone]  Thank you

## 2020-07-30 NOTE — TELEPHONE ENCOUNTER
He cannot get his labs until after September 19th  His appointment is for September 23rd  I would call him 1st to see if he wants to push his appointment back a week or 2 and we can order labs cause   I am afraid that by ordering them to soon and or getting them done too soon to be a problem

## 2020-09-02 LAB
BUN SERPL-MCNC: 11 MG/DL (ref 8–27)
BUN/CREAT SERPL: 10 (ref 10–24)
CALCIUM SERPL-MCNC: 9.6 MG/DL (ref 8.6–10.2)
CHLORIDE SERPL-SCNC: 100 MMOL/L (ref 96–106)
CO2 SERPL-SCNC: 26 MMOL/L (ref 20–29)
CREAT SERPL-MCNC: 1.12 MG/DL (ref 0.76–1.27)
GLUCOSE SERPL-MCNC: 92 MG/DL (ref 65–99)
POTASSIUM SERPL-SCNC: 5.4 MMOL/L (ref 3.5–5.2)
SL AMB EGFR AFRICAN AMERICAN: 72 ML/MIN/1.73
SL AMB EGFR NON AFRICAN AMERICAN: 63 ML/MIN/1.73
SODIUM SERPL-SCNC: 137 MMOL/L (ref 134–144)

## 2020-09-04 DIAGNOSIS — E87.5 SERUM POTASSIUM ELEVATED: Primary | ICD-10-CM

## 2020-09-09 ENCOUNTER — OFFICE VISIT (OUTPATIENT)
Dept: UROLOGY | Facility: HOSPITAL | Age: 80
End: 2020-09-09
Payer: COMMERCIAL

## 2020-09-09 VITALS
WEIGHT: 157 LBS | SYSTOLIC BLOOD PRESSURE: 138 MMHG | HEIGHT: 68 IN | DIASTOLIC BLOOD PRESSURE: 80 MMHG | TEMPERATURE: 97.9 F | BODY MASS INDEX: 23.79 KG/M2 | HEART RATE: 58 BPM

## 2020-09-09 DIAGNOSIS — N40.1 BENIGN PROSTATIC HYPERPLASIA WITH INCOMPLETE BLADDER EMPTYING: ICD-10-CM

## 2020-09-09 DIAGNOSIS — R39.14 BENIGN PROSTATIC HYPERPLASIA WITH INCOMPLETE BLADDER EMPTYING: ICD-10-CM

## 2020-09-09 DIAGNOSIS — R33.9 URINARY RETENTION: Primary | ICD-10-CM

## 2020-09-09 LAB
BUN SERPL-MCNC: 10 MG/DL (ref 8–27)
BUN/CREAT SERPL: 10 (ref 10–24)
CALCIUM SERPL-MCNC: 9.6 MG/DL (ref 8.6–10.2)
CHLORIDE SERPL-SCNC: 97 MMOL/L (ref 96–106)
CO2 SERPL-SCNC: 25 MMOL/L (ref 20–29)
CREAT SERPL-MCNC: 1 MG/DL (ref 0.76–1.27)
GLUCOSE SERPL-MCNC: 88 MG/DL (ref 65–99)
POST-VOID RESIDUAL VOLUME, ML POC: 887 ML
POTASSIUM SERPL-SCNC: 4.8 MMOL/L (ref 3.5–5.2)
SL AMB EGFR AFRICAN AMERICAN: 83 ML/MIN/1.73
SL AMB EGFR NON AFRICAN AMERICAN: 72 ML/MIN/1.73
SODIUM SERPL-SCNC: 135 MMOL/L (ref 134–144)

## 2020-09-09 PROCEDURE — 99214 OFFICE O/P EST MOD 30 MIN: CPT | Performed by: NURSE PRACTITIONER

## 2020-09-09 PROCEDURE — 51798 US URINE CAPACITY MEASURE: CPT | Performed by: NURSE PRACTITIONER

## 2020-09-09 NOTE — PROGRESS NOTES
9/9/2020    William Side Woulfe  1940  [id number]      Assessment  -BPH s/p Urolift (7/5/19)  -Incomplete bladder emptying    Discussion/Plan  Rodney Greenfield is a 68 y o  male being managed by Dr Thrasher Mt  1  BPH s/p Urolift (7/5/19) and incomplete bladder emptying- his PVR in the office today is 887 milliliters  There is no evidence of any suprapubic discomfort or distention  Recent creatinine was 1 0  He has a known history of high postvoid residual volumes  Fortunately, he has had no recent urinary tract infections  We discussed increasing straight catheterization to at least twice daily, but patient refuses  He is already reluctant to straight catheterization once daily  We did discuss continuing tamsulosin 0 4 milligram HS which he is amenable to  Patient states he has chronic incomplete bladder emptying and remains asymptomatic  He is not interested in any additional treatment or management  He is willing to return back to the office in 6 months for re-evaluation    -All questions answered, patient agrees with plan      History of Present Illness  68 y o  male with a history of BPH presents today for follow up  Patient underwent UroLift surgery on 07/05/2019  He remains on tamsulosin 0 4 milligram HS for incomplete bladder emptying  Patient is noted to have significantly high postvoid residual volumes during prior office visits  He continues to perform intermittent catheterization once daily at night  He has had no recent urinary tract infections  Patient denies any gross hematuria or dysuria  No changes to his overall health  Review of Systems  Review of Systems   Constitutional: Negative  HENT: Negative  Respiratory: Negative  Cardiovascular: Negative  Gastrointestinal: Negative  Genitourinary: Negative for decreased urine volume, difficulty urinating, dysuria, flank pain, frequency, hematuria and urgency  Musculoskeletal: Negative  Skin: Negative      Neurological: Negative  Psychiatric/Behavioral: Negative  AUA SYMPTOM SCORE      Most Recent Value   AUA SYMPTOM SCORE   How often have you had a sensation of not emptying your bladder completely after you finished urinating? 1   How often have you had to urinate again less than two hours after you finished urinating? 0   How often have you found you stopped and started again several times when you urinate? 1   How often have you found it difficult to postpone urination? 1   How often have you had a weak urinary stream?  0   How often have you had to push or strain to begin urination?   0   How many times did you most typically get up to urinate from the time you went to bed at night until the time you got up in the morning?  0   Quality of Life: If you were to spend the rest of your life with your urinary condition just the way it is now, how would you feel about that?  1   AUA SYMPTOM SCORE  3          Past Medical History  Past Medical History:   Diagnosis Date    BPH (benign prostatic hyperplasia)     Diverticulitis of colon     Herniated lumbar intervertebral disc     Kidney mass     Murmur 08[birth year]1947    diagnosed as a child 6 YO    Osteoarthritis     Preoperative cardiovascular examination 2/16/2018       Past Social History  Past Surgical History:   Procedure Laterality Date    HERNIA REPAIR      JOINT REPLACEMENT Bilateral     KNEE ARTHROSCOPY      bilateral    MT CYSTOURETHRO W/IMPLANT N/A 7/5/2019    Procedure: CYSTOSCOPY WITH INSERTION Heather Johns;  Surgeon: Talia Cota MD;  [facility];  Service: Urology    MT TOTAL HIP ARTHROPLASTY Left 6/12/2017    Procedure: ANTERIOR TOTAL HIP ARTHROPLASTY;  Surgeon: Anahy Cifuentes MD;  [facility];  Service: Orthopedics    MT TOTAL HIP ARTHROPLASTY Right 1/3/2018    Procedure: ANTERIOR TOTAL HIP ARTHROPLASTY;  Surgeon: Anahy Cifuentes MD;  [facility];  Service: Orthopedics    MT TOTAL KNEE ARTHROPLASTY Left 2/20/2019    Procedure: TOTAL KNEE ARTHROPLASTY;  Surgeon: Diane Loera MD;  [facility];  Service: Orthopedics    NH TOTAL KNEE ARTHROPLASTY Right 10/23/2019    Procedure: ARTHROPLASTY KNEE TOTAL;  Surgeon: Diane Loera MD;  [facility];  Service: Orthopedics       Past Family History  Family History   Problem Relation Age of Onset    Cancer Mother     Cancer Father     Cancer Brother     Heart disease Brother        Past Social history  Social History     Socioeconomic History    Marital status: /Civil Union     Spouse name: Not on file    Number of children: Not on file    Years of education: Not on file    Highest education level: Not on file   Occupational History    Not on file   Social Needs    Financial resource strain: Not on file    Food insecurity     Worry: Not on file     Inability: Not on file   [employer] needs     Medical: Not on file     Non-medical: Not on file   Tobacco Use    Smoking status: Former Smoker     Packs/day:      Years: 15      Pack years: 15      Last attempt to quit:      Years since quittin 7    Smokeless tobacco: Never Used   Substance and Sexual Activity    Alcohol use:  Yes     Alcohol/week: 2 0 standard drinks     Types: 2 Cans of beer per week     Frequency: 4 or more times a week     Drinks per session: 1 or 2     Comment: beer daily     Drug use: No    Sexual activity: Yes   Lifestyle    Physical activity     Days per week: Not on file     Minutes per session: Not on file    Stress: Not on file   Relationships    Social connections     Talks on phone: Not on file     Gets together: Not on file     Attends Synagogue service: Not on file     Active member of club or organization: Not on file     Attends meetings of clubs or organizations: Not on file     Relationship status: Not on file    Intimate partner violence     Fear of current or ex partner: Not on file     Emotionally abused: Not on file     Physically abused: Not on file     Forced sexual activity: Not on file   Other Topics Concern    Not on file   Social History Narrative    Not on file       Current Medications  Current Outpatient Medications   Medication Sig Dispense Refill    multivitamin (THERAGRAN) TABS Take 1 tablet by mouth daily      penicillin V potassium (VEETID) 500 mg tablet Take 500 mg by mouth see administration instructions 4 tabs 1 hour before dental work      sildenafil (VIAGRA) 100 mg tablet Take 1 tablet (100 mg total) by mouth daily as needed for erectile dysfunction 10 tablet 11    tamsulosin (FLOMAX) 0 4 mg Take 1 capsule (0 4 mg total) by mouth daily with dinner 90 capsule 3    ascorbic acid (VITAMIN C) 500 mg tablet Take 1 tablet (500 mg total) by mouth 2 (two) times a day (Patient not taking: Reported on 9/9/2020) 60 tablet 0    docusate sodium (COLACE) 100 mg capsule Take 1 capsule (100 mg total) by mouth 2 (two) times a day (Patient not taking: Reported on 9/9/2020) 10 capsule 0    enoxaparin (LOVENOX) 40 mg/0 4 mL Inject 0 4 mL (40 mg total) under the skin daily Start after surgery (Patient not taking: Reported on 9/9/2020) 30 Syringe 0    ferrous sulfate 324 (65 Fe) mg Take 1 tablet (324 mg total) by mouth 2 (two) times a day before meals (Patient not taking: Reported on 9/9/2020) 60 tablet 0    folic acid (FOLVITE) 1 mg tablet Take 1 tablet (1 mg total) by mouth daily (Patient not taking: Reported on 9/9/2020) 30 tablet 0    oxyCODONE (ROXICODONE) 5 mg immediate release tablet Take 1 tablets PO q 4 hours PRN Pain (Patient not taking: Reported on 9/9/2020) 30 tablet 0     No current facility-administered medications for this visit  Allergies  No Known Allergies    Past Medical History, Social History, Family History, medications and allergies were reviewed      Vitals  Vitals:    09/09/20 1252   BP: 138/80   BP Location: Left arm   Patient Position: Sitting   Cuff Size: Adult   Pulse: 58   Temp: 97 9 °F (36 6 °C)   Weight: 71 2 kg (157 lb)   Height: 5' 8" (1 727 m)       Physical Exam  Physical Exam  Constitutional:       Appearance: Normal appearance  He is well-developed  HENT:      Head: Normocephalic  Eyes:      Pupils: Pupils are equal, round, and reactive to light  Neck:      Musculoskeletal: Normal range of motion  Pulmonary:      Effort: Pulmonary effort is normal    Abdominal:      Palpations: Abdomen is soft  Genitourinary:     Comments: No suprapubic discomfort or distention  Musculoskeletal: Normal range of motion  Skin:     General: Skin is warm and dry  Neurological:      General: No focal deficit present  Mental Status: He is alert and oriented to person, place, and time  Psychiatric:         Mood and Affect: Mood normal          Behavior: Behavior normal          Thought Content:  Thought content normal          Judgment: Judgment normal          Results    I have personally reviewed all pertinent lab results and reviewed with patient  Lab Results   Component Value Date    PSA 2 4 09/19/2019    PSA 0 8 04/14/2017    PSA 0 9 04/01/2016     Lab Results   Component Value Date    GLUCOSE 88 12/05/2017    CALCIUM 8 5 10/24/2019     12/05/2017    K 4 8 09/08/2020    CO2 25 09/08/2020    CL 97 09/08/2020    BUN 10 09/08/2020    CREATININE 1 00 09/08/2020     Lab Results   Component Value Date    WBC 11 72 (H) 10/24/2019    HGB 12 3 10/24/2019    HCT 37 2 10/24/2019     (H) 10/24/2019     10/24/2019     Recent Results (from the past 1 hour(s))   POCT Measure PVR    Collection Time: 09/09/20  1:08 PM   Result Value Ref Range    POST-VOID RESIDUAL VOLUME, ML  mL

## 2020-09-23 ENCOUNTER — OFFICE VISIT (OUTPATIENT)
Dept: FAMILY MEDICINE CLINIC | Facility: CLINIC | Age: 80
End: 2020-09-23
Payer: COMMERCIAL

## 2020-09-23 ENCOUNTER — TELEPHONE (OUTPATIENT)
Dept: FAMILY MEDICINE CLINIC | Facility: CLINIC | Age: 80
End: 2020-09-23

## 2020-09-23 VITALS
SYSTOLIC BLOOD PRESSURE: 142 MMHG | WEIGHT: 154 LBS | BODY MASS INDEX: 23.34 KG/M2 | HEIGHT: 68 IN | HEART RATE: 56 BPM | DIASTOLIC BLOOD PRESSURE: 82 MMHG | TEMPERATURE: 96.4 F

## 2020-09-23 DIAGNOSIS — I48.92 ATRIAL FLUTTER, UNSPECIFIED TYPE (HCC): ICD-10-CM

## 2020-09-23 DIAGNOSIS — Z00.00 MEDICARE ANNUAL WELLNESS VISIT, SUBSEQUENT: Primary | ICD-10-CM

## 2020-09-23 PROCEDURE — G0439 PPPS, SUBSEQ VISIT: HCPCS | Performed by: FAMILY MEDICINE

## 2020-09-23 PROCEDURE — 1125F AMNT PAIN NOTED PAIN PRSNT: CPT | Performed by: FAMILY MEDICINE

## 2020-09-23 PROCEDURE — 1170F FXNL STATUS ASSESSED: CPT | Performed by: FAMILY MEDICINE

## 2020-09-23 PROCEDURE — 1101F PT FALLS ASSESS-DOCD LE1/YR: CPT | Performed by: FAMILY MEDICINE

## 2020-09-23 PROCEDURE — 3288F FALL RISK ASSESSMENT DOCD: CPT | Performed by: FAMILY MEDICINE

## 2020-09-23 PROCEDURE — 3725F SCREEN DEPRESSION PERFORMED: CPT | Performed by: FAMILY MEDICINE

## 2020-09-23 NOTE — PATIENT INSTRUCTIONS
Medicare Preventive Visit Patient Instructions  Thank you for completing your Welcome to Medicare Visit or Medicare Annual Wellness Visit today  Your next wellness visit will be due in one year (9/23/2021)  The screening/preventive services that you may require over the next 5-10 years are detailed below  Some tests may not apply to you based off risk factors and/or age  Screening tests ordered at today's visit but not completed yet may show as past due  Also, please note that scanned in results may not display below  Preventive Screenings:  Service Recommendations Previous Testing/Comments   Colorectal Cancer Screening  · Colonoscopy    · Fecal Occult Blood Test (FOBT)/Fecal Immunochemical Test (FIT)  · Fecal DNA/Cologuard Test  · Flexible Sigmoidoscopy Age: 54-65 years old   Colonoscopy: every 10 years (May be performed more frequently if at higher risk)  OR  FOBT/FIT: every 1 year  OR  Cologuard: every 3 years  OR  Sigmoidoscopy: every 5 years  Screening may be recommended earlier than age 48 if at higher risk for colorectal cancer  Also, an individualized decision between you and your healthcare provider will decide whether screening between the ages of 74-80 would be appropriate  Colonoscopy: 05/04/2016  FOBT/FIT: Not on file  Cologuard: Not on file  Sigmoidoscopy: Not on file         Prostate Cancer Screening Individualized decision between patient and health care provider in men between ages of 53-78   Medicare will cover every 12 months beginning on the day after your 50th birthday PSA: 2 4 ng/mL          Hepatitis C Screening Once for adults born between 1945 and 1965  More frequently in patients at high risk for Hepatitis C Hep C Antibody: Not on file       Diabetes Screening 1-2 times per year if you're at risk for diabetes or have pre-diabetes Fasting glucose: 75 mg/dL   A1C: 5 2 %       Cholesterol Screening Once every 5 years if you don't have a lipid disorder   May order more often based on risk factors  Lipid panel: 09/19/2019          Other Preventive Screenings Covered by Medicare:  1  Abdominal Aortic Aneurysm (AAA) Screening: covered once if your at risk  You're considered to be at risk if you have a family history of AAA or a male between the age of 73-68 who smoking at least 100 cigarettes in your lifetime  2  Lung Cancer Screening: covers low dose CT scan once per year if you meet all of the following conditions: (1) Age 50-69; (2) No signs or symptoms of lung cancer; (3) Current smoker or have quit smoking within the last 15 years; (4) You have a tobacco smoking history of at least 30 pack years (packs per day x number of years you smoked); (5) You get a written order from a healthcare provider  3  Glaucoma Screening: covered annually if you're considered high risk: (1) You have diabetes OR (2) Family history of glaucoma OR (3)  aged 48 and older OR (3)  American aged 72 and older  3  Osteoporosis Screening: covered every 2 years if you meet one of the following conditions: (1) Have a vertebral abnormality; (2) On glucocorticoid therapy for more than 3 months; (3) Have primary hyperparathyroidism; (4) On osteoporosis medications and need to assess response to drug therapy  5  HIV Screening: covered annually if you're between the age of 12-76  Also covered annually if you are younger than 13 and older than 72 with risk factors for HIV infection  For pregnant patients, it is covered up to 3 times per pregnancy  Immunizations:  Immunization Recommendations   Influenza Vaccine Annual influenza vaccination during flu season is recommended for all persons aged >= 6 months who do not have contraindications   Pneumococcal Vaccine (Prevnar and Pneumovax)  * Prevnar = PCV13  * Pneumovax = PPSV23 Adults 25-60 years old: 1-3 doses may be recommended based on certain risk factors  Adults 72 years old: Prevnar (PCV13) vaccine recommended followed by Pneumovax (PPSV23) vaccine   If already received PPSV23 since turning 65, then PCV13 recommended at least one year after PPSV23 dose  Hepatitis B Vaccine 3 dose series if at intermediate or high risk (ex: diabetes, end stage renal disease, liver disease)   Tetanus (Td) Vaccine - COST NOT COVERED BY MEDICARE PART B Following completion of primary series, a booster dose should be given every 10 years to maintain immunity against tetanus  Td may also be given as tetanus wound prophylaxis  Tdap Vaccine - COST NOT COVERED BY MEDICARE PART B Recommended at least once for all adults  For pregnant patients, recommended with each pregnancy  Shingles Vaccine (Shingrix) - COST NOT COVERED BY MEDICARE PART B  2 shot series recommended in those aged 48 and above     Health Maintenance Due:  There are no preventive care reminders to display for this patient  Immunizations Due:      Topic Date Due    Influenza Vaccine  07/01/2020     Advance Directives   What are advance directives? Advance directives are legal documents that state your wishes and plans for medical care  These plans are made ahead of time in case you lose your ability to make decisions for yourself  Advance directives can apply to any medical decision, such as the treatments you want, and if you want to donate organs  What are the types of advance directives? There are many types of advance directives, and each state has rules about how to use them  You may choose a combination of any of the following:  · Living will: This is a written record of the treatment you want  You can also choose which treatments you do not want, which to limit, and which to stop at a certain time  This includes surgery, medicine, IV fluid, and tube feedings  · Durable power of  for healthcare [facility]): This is a written record that states who you want to make healthcare choices for you when you are unable to make them for yourself   This person, called a proxy, is usually a family member or a friend  You may choose more than 1 proxy  · Do not resuscitate (DNR) order:  A DNR order is used in case your heart stops beating or you stop breathing  It is a request not to have certain forms of treatment, such as CPR  A DNR order may be included in other types of advance directives  · Medical directive: This covers the care that you want if you are in a coma, near death, or unable to make decisions for yourself  You can list the treatments you want for each condition  Treatment may include pain medicine, surgery, blood transfusions, dialysis, IV or tube feedings, and a ventilator (breathing machine)  · Values history: This document has questions about your views, beliefs, and how you feel and think about life  This information can help others choose the care that you would choose  Why are advance directives important? An advance directive helps you control your care  Although spoken wishes may be used, it is better to have your wishes written down  Spoken wishes can be misunderstood, or not followed  Treatments may be given even if you do not want them  An advance directive may make it easier for your family to make difficult choices about your care  © Copyright [employer] 2018 Information is for End User's use only and may not be sold, redistributed or otherwise used for commercial purposes   All illustrations and images included in CareNotes® are the copyrighted property of [employer]  or [address] [place], [place] [address] [place]

## 2020-09-23 NOTE — PROGRESS NOTES
Assessment/Plan:       Diagnoses and all orders for this visit:    Medicare annual wellness visit, subsequent    Atrial flutter, unspecified type [facility])      patient is declining to see a cardiologist for his a flutter  And is not willing to take any of their advice from his visit about his atrial flutter  Medicare wellness visit completed  Patient will follow-up with orthopedics and Urology   and follow-up with me in 1 year sooner if needed      Subjective:     Chief Complaint   Patient presents with    Medicare Wellness Visit     67 y/o male        Patient ID: Vira Adams is a 68 y o  male  Patient is here for Medicare wellness visit  Other than his regular orthopedic issues he has no other complaints today        The following portions of the patient's history were reviewed and updated as appropriate: allergies, current medications, past family history, past medical history, past social history, past surgical history and problem list     Review of Systems   Constitutional: Negative  HENT: Negative  Eyes: Negative  Respiratory: Negative  Cardiovascular: Negative  Gastrointestinal: Negative  Endocrine: Negative  Genitourinary: Negative  Musculoskeletal: Positive for arthralgias  Skin: Negative  Allergic/Immunologic: Negative  Neurological: Negative  Hematological: Negative  Psychiatric/Behavioral: Negative  All other systems reviewed and are negative  Objective:    Vitals:    09/23/20 0759   BP: 142/82   BP Location: Right arm   Patient Position: Sitting   Cuff Size: Standard   Pulse: 56   Temp: (!) 96 4 °F (35 8 °C)   TempSrc: Tympanic   Weight: 69 9 kg (154 lb)   Height: 5' 8" (1 727 m)          Physical Exam  Vitals signs and nursing note reviewed  Constitutional:       General: He is not in acute distress  Appearance: He is well-developed  HENT:      Head: Normocephalic        Right Ear: External ear normal       Left Ear: External ear normal  Nose: Nose normal    Eyes:      General:         Right eye: No discharge  Left eye: No discharge  Conjunctiva/sclera: Conjunctivae normal       Pupils: Pupils are equal, round, and reactive to light  Neck:      Musculoskeletal: Normal range of motion  Cardiovascular:      Rate and Rhythm: Normal rate and regular rhythm  Heart sounds: Normal heart sounds  Pulmonary:      Effort: Pulmonary effort is normal       Breath sounds: Normal breath sounds  Abdominal:      General: Bowel sounds are normal  There is no distension  Palpations: Abdomen is soft  Tenderness: There is no abdominal tenderness  Musculoskeletal: Normal range of motion  Skin:     General: Skin is warm and dry  Findings: No rash  Neurological:      Mental Status: He is alert and oriented to person, place, and time  Cranial Nerves: No cranial nerve deficit  Psychiatric:         Behavior: Behavior normal          Thought Content: Thought content normal          Judgment: Judgment normal           Assessment and Plan:     Problem List Items Addressed This Visit        Cardiovascular and Mediastinum    Atrial flutter ([address] )      Other Visit Diagnoses     Medicare annual wellness visit, subsequent    -  Primary           Preventive health issues were discussed with patient, and age appropriate screening tests were ordered as noted in patient's After Visit Summary  Personalized health advice and appropriate referrals for health education or preventive services given if needed, as noted in patient's After Visit Summary       History of Present Illness:     Patient presents for Medicare Annual Wellness visit    Patient Care Team:  Ron Melo DO as PCP - General  MD Ron Rodriguez DO Perla Kalata, DO     Problem List:     Patient Active Problem List   Diagnosis    S/P total hip arthroplasty    Preoperative cardiovascular examination    Primary osteoarthritis of left knee    Sinus bradycardia  S/P total knee arthroplasty, left    Back pain    Urinary retention    BPH (benign prostatic hyperplasia)    Diverticulitis of colon    Fullness in clavicular area    Heart murmur    Kidney mass    Left-sided low back pain with left-sided sciatica    Knee pain    Leg pain    Neoplasm of skin    Osteoarthritis of hip    Primary osteoarthritis of both hips    Pain of both hip joints    Erectile dysfunction    Retention of urine    S/P total knee arthroplasty, right    Atrial flutter (HCC)    Postoperative anemia due to acute blood loss      Past Medical and Surgical History:     Past Medical History:   Diagnosis Date    BPH (benign prostatic hyperplasia)     Diverticulitis of colon     Herniated lumbar intervertebral disc     Kidney mass     Murmur 08/1945    diagnosed as a child 8 YO    Osteoarthritis     Preoperative cardiovascular examination 2/16/2018     Past Surgical History:   Procedure Laterality Date    HERNIA REPAIR      JOINT REPLACEMENT Bilateral     KNEE ARTHROSCOPY      bilateral    [place] CYSTOURETHRO W/IMPLANT N/A 7/5/2019    Procedure: CYSTOSCOPY WITH INSERTION Ryanne Goodman;  Surgeon: Teodoro Trejo MD;  [facility];  Service: Urology    [place] TOTAL HIP ARTHROPLASTY Left 6/12/2017    Procedure: ANTERIOR TOTAL HIP ARTHROPLASTY;  Surgeon: Delroy Andrade MD;  [facility];  Service: Orthopedics    [place] TOTAL HIP ARTHROPLASTY Right 1/3/2018    Procedure: ANTERIOR TOTAL HIP ARTHROPLASTY;  Surgeon: Delroy Andrade MD;  [facility];  Service: Orthopedics    [place] TOTAL KNEE ARTHROPLASTY Left 2/20/2019    Procedure: TOTAL KNEE ARTHROPLASTY;  Surgeon: Delroy Andrade MD;  [facility];  Service: Orthopedics    [place] TOTAL KNEE ARTHROPLASTY Right 10/23/2019    Procedure: ARTHROPLASTY KNEE TOTAL;  Surgeon: Delroy Andrade MD;  [facility];  Service: Orthopedics      Family History:     Family History   Problem Relation Age of Onset    Cancer Mother  Cancer Father     Cancer Brother     Heart disease Brother       Social History:        Social History     Socioeconomic History    Marital status: /Civil Union     Spouse name: None    Number of children: None    Years of education: None    Highest education level: None   Occupational History    None   Social Needs    Financial resource strain: Not hard at all   [place] insecurity     Worry: Never true     Inability: Never true    Transportation needs     Medical: No     Non-medical: No   Tobacco Use    Smoking status: Former Smoker     Packs/day: 1 00     Years: 15 00     Pack years: 15 00     Last attempt to quit:      Years since quittin 7    Smokeless tobacco: Never Used   Substance and Sexual Activity    Alcohol use: Yes     Alcohol/week: 2 0 standard drinks     Types: 2 Cans of beer per week     Frequency: 4 or more times a week     Drinks per session: 1 or 2     Comment: beer daily     Drug use: No    Sexual activity: Yes   Lifestyle    Physical activity     Days per week: 3 days     Minutes per session: 30 min    Stress:  Only a little   Relationships    Social connections     Talks on phone: Twice a week     Gets together: Once a week     Attends Spiritism service: More than 4 times per year     Active member of club or organization: Yes     Attends meetings of clubs or organizations: Never     Relationship status:     Intimate partner violence     Fear of current or ex partner: No     Emotionally abused: No     Physically abused: No     Forced sexual activity: No   Other Topics Concern    None   Social History Narrative    None      Medications and Allergies:     Current Outpatient Medications   Medication Sig Dispense Refill    multivitamin (THERAGRAN) TABS Take 1 tablet by mouth daily      penicillin V potassium (VEETID) 500 mg tablet Take 500 mg by mouth see administration instructions 4 tabs 1 hour before dental work      sildenafil (VIAGRA) 100 mg tablet Take 1 tablet (100 mg total) by mouth daily as needed for erectile dysfunction 10 tablet 11    tamsulosin (FLOMAX) 0 4 mg Take 1 capsule (0 4 mg total) by mouth daily with dinner 90 capsule 3     No current facility-administered medications for this visit  No Known Allergies   Immunizations:     Immunization History   Administered Date(s) Administered    INFLUENZA 10/11/2018    Influenza Quadrivalent Preservative Free 3 years and older IM 10/23/2017    Influenza Split High Dose Preservative Free IM 10/28/2014, 10/23/2015, 10/23/2015, 10/12/2016, 10/23/2017    Pneumococcal Conjugate 13-Valent 09/17/2019    Pneumococcal Polysaccharide PPV23 04/06/2016    Zoster 06/08/2016    Zoster Vaccine Recombinant 01/27/2020, 09/10/2020    influenza, trivalent, adjuvanted 09/19/2019      Health Maintenance: There are no preventive care reminders to display for this patient  Topic Date Due    Influenza Vaccine  07/01/2020      Medicare Health Risk Assessment:     /82 (BP Location: Right arm, Patient Position: Sitting, Cuff Size: Standard)   Pulse 56   Temp (!) 96 4 °F (35 8 °C) (Tympanic)   Ht 5' 8" (1 727 m)   Wt 69 9 kg (154 lb)   BMI 23 42 kg/m²      Randalyn Cabot is here for his Subsequent Wellness visit  Last Medicare Wellness visit information reviewed, patient interviewed and updates made to the record today  Health Risk Assessment:   Patient rates overall health as good  Patient feels that their physical health rating is same  Eyesight was rated as same  Hearing was rated as same  Patient feels that their emotional and mental health rating is same  Pain experienced in the last 7 days has been none  Patient states that he has experienced no weight loss or gain in last 6 months  Depression Screening:   PHQ-2 Score: 0      Fall Risk Screening:    In the past year, patient has experienced: no history of falling in past year      Home Safety:  Patient does not have trouble with stairs inside or outside of their home  Patient has working smoke alarms and has working carbon monoxide detector  Home safety hazards include: none  Nutrition:   Current diet is Regular  Medications:   Patient is currently taking over-the-counter supplements  OTC medications include: see medication list  Patient is able to manage medications  Activities of Daily Living (ADLs)/Instrumental Activities of Daily Living (IADLs):   Walk and transfer into and out of bed and chair?: Yes  Dress and groom yourself?: Yes    Bathe or shower yourself?: Yes    Feed yourself?  Yes  Do your laundry/housekeeping?: Yes  Manage your money, pay your bills and track your expenses?: Yes  Make your own meals?: Yes    Do your own shopping?: Yes    Previous Hospitalizations:   Any hospitalizations or ED visits within the last 12 months?: No      Advance Care Planning:   Living will: No    Durable POA for healthcare: No    Advanced directive: Yes    Advanced directive counseling given: No    Five wishes given: No    End of Life Decisions reviewed with patient: Yes    Provider agrees with end of life decisions: Yes      Comments: Going to     Cognitive Screening:   Provider or family/friend/caregiver concerned regarding cognition?: No    PREVENTIVE SCREENINGS      Cardiovascular Screening:    General: Screening Current      Diabetes Screening:     General: Screening Current      Colorectal Cancer Screening:     General: Screening Current      Prostate Cancer Screening:    General: Screening Current      Osteoporosis Screening:    General: Screening Not Indicated      Abdominal Aortic Aneurysm (AAA) Screening:    Risk factors include: tobacco use        General: Screening Not Indicated      Lung Cancer Screening:     General: Screening Not Indicated      Hepatitis C Screening:    General: Screening Not Indicated    Other Counseling Topics:   Alcohol use counseling, car/seat belt/driving safety, skin self-exam, sunscreen and regular weightbearing exercise and calcium and vitamin D intake         Duong Page, DO

## 2020-09-23 NOTE — TELEPHONE ENCOUNTER
Patient called  He forgot to let us know that he had the series of Shingrix vaccine through the [place]  First dose was given 1/27/2020  Second dose was given 9/10/2020  He asked if we could put this in his chart

## 2020-10-13 ENCOUNTER — OFFICE VISIT (OUTPATIENT)
Dept: OBGYN CLINIC | Facility: HOSPITAL | Age: 80
End: 2020-10-13
Payer: COMMERCIAL

## 2020-10-13 ENCOUNTER — HOSPITAL ENCOUNTER (OUTPATIENT)
Dept: RADIOLOGY | Facility: HOSPITAL | Age: 80
Discharge: HOME | End: 2020-10-13
Attending: ORTHOPAEDIC SURGERY
Payer: COMMERCIAL

## 2020-10-13 VITALS
HEART RATE: 48 BPM | SYSTOLIC BLOOD PRESSURE: 153 MMHG | DIASTOLIC BLOOD PRESSURE: 84 MMHG | BODY MASS INDEX: 23.54 KG/M2 | WEIGHT: 154.8 LBS

## 2020-10-13 DIAGNOSIS — Z96.651 AFTERCARE FOLLOWING RIGHT KNEE JOINT REPLACEMENT SURGERY: Primary | ICD-10-CM

## 2020-10-13 DIAGNOSIS — M25.561 ACUTE PAIN OF RIGHT KNEE: ICD-10-CM

## 2020-10-13 DIAGNOSIS — Z96.652 AFTERCARE FOLLOWING LEFT KNEE JOINT REPLACEMENT SURGERY: ICD-10-CM

## 2020-10-13 DIAGNOSIS — Z47.1 AFTERCARE FOLLOWING RIGHT KNEE JOINT REPLACEMENT SURGERY: Primary | ICD-10-CM

## 2020-10-13 DIAGNOSIS — Z47.1 AFTERCARE FOLLOWING LEFT KNEE JOINT REPLACEMENT SURGERY: ICD-10-CM

## 2020-10-13 PROCEDURE — 1036F TOBACCO NON-USER: CPT | Performed by: ORTHOPAEDIC SURGERY

## 2020-10-13 PROCEDURE — 1160F RVW MEDS BY RX/DR IN RCRD: CPT | Performed by: ORTHOPAEDIC SURGERY

## 2020-10-13 PROCEDURE — 99213 OFFICE O/P EST LOW 20 MIN: CPT | Performed by: ORTHOPAEDIC SURGERY

## 2020-10-13 PROCEDURE — 73560 X-RAY EXAM OF KNEE 1 OR 2: CPT

## 2020-10-13 RX ORDER — A/SINGAPORE/GP1908/2015 IVR-180 (AN A/MICHIGAN/45/2015 (H1N1)PDM09-LIKE VIRUS, A/HONG KONG/4801/2014, NYMC X-263B (H3N2) (AN A/HONG KONG/4801/2014-LIKE VIRUS), AND B/BRISBANE/60/2008, WILD TYPE (A B/BRISBANE/60/2008-LIKE VIRUS) 15; 15; 15 UG/.5ML; UG/.5ML; UG/.5ML
INJECTION, SUSPENSION INTRAMUSCULAR
COMMUNITY
Start: 2020-10-03

## 2020-10-19 ENCOUNTER — TELEPHONE (OUTPATIENT)
Dept: OBGYN CLINIC | Facility: HOSPITAL | Age: 80
End: 2020-10-19

## 2020-12-18 DIAGNOSIS — N52.9 ERECTILE DYSFUNCTION, UNSPECIFIED ERECTILE DYSFUNCTION TYPE: ICD-10-CM

## 2020-12-21 RX ORDER — SILDENAFIL 100 MG/1
100 TABLET, FILM COATED ORAL DAILY PRN
Qty: 10 TABLET | Refills: 9 | Status: SHIPPED | OUTPATIENT
Start: 2020-12-21 | End: 2021-09-14 | Stop reason: SDUPTHER

## 2021-01-20 DIAGNOSIS — Z23 ENCOUNTER FOR IMMUNIZATION: ICD-10-CM

## 2021-02-01 NOTE — PLAN OF CARE
Problem: PHYSICAL THERAPY ADULT  Goal: Performs mobility at highest level of function for planned discharge setting  See evaluation for individualized goals  Description  Treatment/Interventions: Functional transfer training, LE strengthening/ROM, Elevations, Therapeutic exercise, Endurance training, Cognitive reorientation, Patient/family training, Equipment eval/education, Bed mobility, Gait training, Compensatory technique education, Continued evaluation, Spoke to nursing, OT, Spoke to MD  Equipment Recommended: (pt has a walker)       See flowsheet documentation for full assessment, interventions and recommendations     Note:   Prognosis: Excellent  Problem List: Decreased strength, Decreased range of motion, Decreased endurance, Impaired balance, Decreased mobility, Decreased coordination, Decreased cognition, Impaired judgement, Decreased safety awareness, Pain, Orthopedic restrictions, Decreased skin integrity  Assessment: Pt is a 70 y o  male admitted to [facility] on 2/20/2019 S/P total knee arthroplasty, left; is WBAT LLE Pt exhibits significant impairments with weakness, decreased ROM, impaired balance, decreased endurance, impaired coordination, gait deviations, pain, decreased activity tolerance, decreased functional mobility tolerance, decreased safety awareness, impaired judgement, fall risk, orthopedic restrictions and decreased skin integrity; these impact independence with mobility, ADLs, and IADLs; requires supervision w transf and amb 75 ft using RW LLE WBAT; gaot pattern antalgic walker reliant using step to pattern objective measures on the Barthel Index also reveal limitations;  therapy prognosis is impacted by relevant co morbidities as noted in evaluation; clinical presentation is currently unstable/unpredictable - pt is on cont pulse oximetry, neurovasc checks, presents w pain and phys impairments as noted- a regression from baseline; PTA, pt was Independent with mobility lives in multilevel w fam support available prn skilled PT is indicated to to optimize functional independence and discharge planning; pending functional progress, PT recommendation at discharge is OP PT and home with family support         Recommendation: Outpatient PT, Home with family support          See flowsheet documentation for full assessment  Xolair Pregnancy And Lactation Text: This medication is Pregnancy Category B and is considered safe during pregnancy. This medication is excreted in breast milk.

## 2021-03-09 DIAGNOSIS — N28.89 KIDNEY MASS: Primary | ICD-10-CM

## 2021-03-17 ENCOUNTER — OFFICE VISIT (OUTPATIENT)
Dept: UROLOGY | Facility: HOSPITAL | Age: 81
End: 2021-03-17
Payer: COMMERCIAL

## 2021-03-17 VITALS
DIASTOLIC BLOOD PRESSURE: 72 MMHG | BODY MASS INDEX: 23.19 KG/M2 | SYSTOLIC BLOOD PRESSURE: 132 MMHG | HEART RATE: 66 BPM | WEIGHT: 153 LBS | HEIGHT: 68 IN

## 2021-03-17 DIAGNOSIS — R33.9 URINARY RETENTION: Primary | ICD-10-CM

## 2021-03-17 DIAGNOSIS — N52.9 ERECTILE DYSFUNCTION, UNSPECIFIED ERECTILE DYSFUNCTION TYPE: ICD-10-CM

## 2021-03-17 LAB — POST-VOID RESIDUAL VOLUME, ML POC: 773 ML

## 2021-03-17 PROCEDURE — 1036F TOBACCO NON-USER: CPT | Performed by: NURSE PRACTITIONER

## 2021-03-17 PROCEDURE — 51798 US URINE CAPACITY MEASURE: CPT | Performed by: NURSE PRACTITIONER

## 2021-03-17 PROCEDURE — 99213 OFFICE O/P EST LOW 20 MIN: CPT | Performed by: NURSE PRACTITIONER

## 2021-03-17 PROCEDURE — 1160F RVW MEDS BY RX/DR IN RCRD: CPT | Performed by: NURSE PRACTITIONER

## 2021-03-17 NOTE — PROGRESS NOTES
3/17/2021    Tiffany Coffey  1940  [id number]      Assessment  -BPH s/p Urolift (7/2019)  -Incomplete bladder emptying     Discussion/Plan  Letty Ziegler is a 68 y o  male being managed by our office    1  BPH s/p Urolift (7/5/19) and incomplete bladder emptying-   PVR in the office today is 773 mL  He is completely asymptomatic and has had no recent urinary tract infections  Patient states he does not need to increase CIC  He feels that he is emptying his bladder each night when he catheterizes  Patient also verbalized understanding of risks with increased bladder scan volumes, but states his previous testing was unremarkable  I discussed with patient that his last renal ultrasound was performed in 2016  I would recommend repeating imaging to ensure there is no evidence of hydronephrosis , as well as checking a BMP  Patient requests that we call with results  Discussed increasing CIC and restarting an alpha-blocker, but he defers  Also reviewed option for further evaluation with flexible cystoscopy, but he does not feel this is necessary at this time  Patient does not feel this is necessary and also reports previous side effects from tamsulosin  If results of ultrasound are unremarkable, I recommend continued observation which he is amenable to     -All questions answered, patient agrees with plan      History of Present Illness  68 y o  male with a history of BPH and incomplete bladder emptying presents today for follow up  Patient previously known to Dr Renaldo John  He underwent Urolift procedure in July 2019  Patient has since been experiencing incomplete bladder emptying and performing intermittent catheterization once daily before bed  He states he discontinued tamsulosin several months ago secondary to headaches  Patient has had significantly elevated post void residuals in the past, as high as almost 1L  He is asymptomatic, and comfortable with CIC, only once daily    His last creatinine from September 2020 was 1  Patient has not had any recent imaging  Renal ultrasound from 2016 showed no evidence of hydronephrosis, irregular thickening of the bladder wall was noted  Patient denies any lower urinary tract symptoms, gross hematuria, or dysuria  He is able to pass catheters without any difficulty  Patient continues to use sildenafil as needed prior to sexual activity  No additional changes to his overall health  Review of Systems  Review of Systems   Constitutional: Negative  HENT: Negative  Respiratory: Negative  Cardiovascular: Negative  Gastrointestinal: Negative  Genitourinary: Negative for decreased urine volume, difficulty urinating, dysuria, flank pain, frequency, hematuria and urgency  Musculoskeletal: Negative  Skin: Negative  Neurological: Negative  Psychiatric/Behavioral: Negative  AUA SYMPTOM SCORE      Most Recent Value   AUA SYMPTOM SCORE   How often have you had a sensation of not emptying your bladder completely after you finished urinating? 0   How often have you had to urinate again less than two hours after you finished urinating? 0   How often have you found you stopped and started again several times when you urinate? 1   How often have you found it difficult to postpone urination? 0   How often have you had a weak urinary stream?  0   How often have you had to push or strain to begin urination? 0   How many times did you most typically get up to urinate from the time you went to bed at night until the time you got up in the morning?   0 [cath at night]   Quality of Life: If you were to spend the rest of your life with your urinary condition just the way it is now, how would you feel about that?  3   AUA SYMPTOM SCORE  1          Past Medical History  Past Medical History:   Diagnosis Date    BPH (benign prostatic hyperplasia)     Diverticulitis of colon     Herniated lumbar intervertebral disc     Kidney mass     Murmur 99/2836 diagnosed as a child 6 YO    Osteoarthritis     Preoperative cardiovascular examination 2018       Past Social History  Past Surgical History:   Procedure Laterality Date    HERNIA REPAIR      JOINT REPLACEMENT Bilateral     KNEE ARTHROSCOPY      bilateral    [place] CYSTOURETHRO W/IMPLANT N/A 2019    Procedure: CYSTOSCOPY WITH INSERTION David Martinez;  Surgeon: Ruperto Gonsales MD;  [facility];  Service: Urology    [place] TOTAL HIP ARTHROPLASTY Left 2017    Procedure: ANTERIOR TOTAL HIP ARTHROPLASTY;  Surgeon: Chio Boyer MD;  [facility];  Service: Orthopedics    [place] TOTAL HIP ARTHROPLASTY Right 1/3/2018    Procedure: ANTERIOR TOTAL HIP ARTHROPLASTY;  Surgeon: Chio Boyer MD;  [facility];  Service: Orthopedics    [place] TOTAL KNEE ARTHROPLASTY Left 2019    Procedure: TOTAL KNEE ARTHROPLASTY;  Surgeon: Chio Boyer MD;  [facility];  Service: Orthopedics    [place] TOTAL KNEE ARTHROPLASTY Right 10/23/2019    Procedure: ARTHROPLASTY KNEE TOTAL;  Surgeon: Chio Boyer MD;  [facility];  Service: Orthopedics       Past Family History  Family History   Problem Relation Age of Onset    Cancer Mother     Cancer Father     Cancer Brother     Heart disease Brother        Past Social history  Social History     Socioeconomic History    Marital status: /Civil Union     Spouse name: Not on file    Number of children: Not on file    Years of education: Not on file    Highest education level: Not on file   Occupational History    Not on file   Social Needs    Financial resource strain: Not hard at all   [place] insecurity     Worry: Never true     Inability: Never true    Transportation needs     Medical: No     Non-medical: No   Tobacco Use    Smoking status: Former Smoker     Packs/day: 1 00     Years: 15 00     Pack years: 15 00     Quit date:      Years since quittin 2    Smokeless tobacco: Never Used   Substance and Sexual Activity    Alcohol use: Yes     Alcohol/week: 2 0 standard drinks     Types: 2 Cans of beer per week     Frequency: 4 or more times a week     Drinks per session: 1 or 2     Comment: beer daily     Drug use: No    Sexual activity: Yes   Lifestyle    Physical activity     Days per week: 3 days     Minutes per session: 30 min    Stress: Only a little   Relationships    Social connections     Talks on phone: Twice a week     Gets together: Once a week     Attends Adventism service: More than 4 times per year     Active member of club or organization: Yes     Attends meetings of clubs or organizations: Never     Relationship status:     Intimate partner violence     Fear of current or ex partner: No     Emotionally abused: No     Physically abused: No     Forced sexual activity: No   Other Topics Concern    Not on file   Social History Narrative    Not on file       Current Medications  Current Outpatient Medications   Medication Sig Dispense Refill    Fluad Quadrivalent 0 5 ML PRSY inject 0 5 milliliters intramuscularly      multivitamin (THERAGRAN) TABS Take 1 tablet by mouth daily      penicillin V potassium (VEETID) 500 mg tablet Take 500 mg by mouth see administration instructions 4 tabs 1 hour before dental work      sildenafil (VIAGRA) 100 mg tablet Take 1 tablet (100 mg total) by mouth daily as needed for erectile dysfunction 10 tablet 9     No current facility-administered medications for this visit  Allergies  No Known Allergies    Past Medical History, Social History, Family History, medications and allergies were reviewed  Vitals  Vitals:    03/17/21 1107   BP: 132/72   BP Location: Left arm   Patient Position: Sitting   Cuff Size: Adult   Pulse: 66   Weight: 69 4 kg (153 lb)   Height: 5' 8" (1 727 m)       Physical Exam  Physical Exam  Constitutional:       Appearance: Normal appearance  He is well-developed  HENT:      Head: Normocephalic     Eyes:      Pupils: Pupils are equal, round, and reactive to light  Neck:      Musculoskeletal: Normal range of motion  Pulmonary:      Effort: Pulmonary effort is normal    Abdominal:      Palpations: Abdomen is soft  Genitourinary:     Comments: No suprapubic discomfort or distention  Musculoskeletal: Normal range of motion  Skin:     General: Skin is warm and dry  Neurological:      General: No focal deficit present  Mental Status: He is alert and oriented to person, place, and time  Psychiatric:         Mood and Affect: Mood normal          Behavior: Behavior normal          Thought Content: Thought content normal          Judgment: Judgment normal          Results    I have personally reviewed all pertinent lab results and reviewed with patient  Lab Results   Component Value Date    PSA 2 4 09/19/2019    PSA 0 8 04/14/2017    PSA 0 9 04/01/2016     Lab Results   Component Value Date    GLUCOSE 88 12/05/2017    CALCIUM 8 5 10/24/2019     12/05/2017    K 4 8 09/08/2020    CO2 25 09/08/2020    CL 97 09/08/2020    BUN 10 09/08/2020    CREATININE 1 00 09/08/2020     Lab Results   Component Value Date    WBC 11 72 (H) 10/24/2019    HGB 12 3 10/24/2019    HCT 37 2 10/24/2019     (H) 10/24/2019     10/24/2019     No results found for this or any previous visit (from the past 1 hour(s))

## 2021-03-24 ENCOUNTER — HOSPITAL ENCOUNTER (OUTPATIENT)
Dept: ULTRASOUND IMAGING | Facility: HOSPITAL | Age: 81
Discharge: HOME | End: 2021-03-24
Payer: COMMERCIAL

## 2021-03-24 DIAGNOSIS — R33.9 URINARY RETENTION: ICD-10-CM

## 2021-03-24 PROCEDURE — 76770 US EXAM ABDO BACK WALL COMP: CPT

## 2021-08-11 ENCOUNTER — TELEPHONE (OUTPATIENT)
Dept: UROLOGY | Facility: AMBULATORY SURGERY CENTER | Age: 81
End: 2021-08-11

## 2021-08-11 NOTE — TELEPHONE ENCOUNTER
Patient last seen by Danielle Orellana is calling to ask if he needs to set up 6 months follow up or when does he need to return  He does use self catheters and needs to maintain his regular visit

## 2021-08-11 NOTE — TELEPHONE ENCOUNTER
Patient last seen 3/17/21  Patient down CIC  Patient wants to know when he should be seen again   Please advise 6 months one year

## 2021-09-14 ENCOUNTER — OFFICE VISIT (OUTPATIENT)
Dept: UROLOGY | Facility: HOSPITAL | Age: 81
End: 2021-09-14
Payer: COMMERCIAL

## 2021-09-14 VITALS
BODY MASS INDEX: 23.64 KG/M2 | DIASTOLIC BLOOD PRESSURE: 80 MMHG | HEART RATE: 51 BPM | HEIGHT: 68 IN | WEIGHT: 156 LBS | SYSTOLIC BLOOD PRESSURE: 122 MMHG

## 2021-09-14 DIAGNOSIS — N52.9 ERECTILE DYSFUNCTION, UNSPECIFIED ERECTILE DYSFUNCTION TYPE: ICD-10-CM

## 2021-09-14 DIAGNOSIS — R33.9 URINARY RETENTION: Primary | ICD-10-CM

## 2021-09-14 PROCEDURE — 99213 OFFICE O/P EST LOW 20 MIN: CPT | Performed by: NURSE PRACTITIONER

## 2021-09-14 RX ORDER — SILDENAFIL 100 MG/1
100 TABLET, FILM COATED ORAL AS NEEDED
Qty: 10 TABLET | Refills: 9 | Status: SHIPPED | OUTPATIENT
Start: 2021-09-14

## 2021-09-14 RX ORDER — SILDENAFIL 100 MG/1
100 TABLET, FILM COATED ORAL DAILY PRN
Qty: 10 TABLET | Refills: 9 | Status: SHIPPED | OUTPATIENT
Start: 2021-09-14 | End: 2021-09-14

## 2021-09-14 NOTE — PROGRESS NOTES
09/14/21    Jenise Coffey   1940   [id number]     Assessment  1 BPH s/p Urolift (7/5/2019)  2 Incomplete bladder emptying   3 ED    Discussion/Plan  1 BPH s/p Urolift (7/5/19)  2 Incomplete bladder emptying   Continue CIC once daily  Encouraged patient to consider CIC BID    [address]: 14 Fr coude catheter  Passing catheter without difficulty   3/2021 Renal US with PVR: No hydronephrosis  Bilateral renal cysts  Redemonstrated diffuse irregular bladder wall thickening and marked post void residual volume  Layering debris within the bladder  Marked post void residual urine volume  Estimated volume is 844 mL   BMP to be completed prior to follow up    We discussed increasing amount of times he performs CIC and discussed surgical options of Urolift revision or TURP due to incomplete bladder emptying s/p Urolift  Patient will consider this  Encouraged hydration with water to avoid infection or stone formation 48 oz daily  2 ED   Continue Sildenafil, refills provided  Patient will continue to CIC once daily  He will consider his surgical options  He wishes to return in 6 months with PVR  All questions answered at this time  BMP ordered to be completed prior to follow up  Mita [facility]   Lex Gamboa is a 80 y o  male with a history of BPH and incomplete bladder emptying presents today for follow up  Patient previously known to Dr Mat Dominguez  He underwent Urolift procedure in July 2019  Patient has since been experiencing incomplete bladder emptying and performing intermittent catheterization once daily at [facility]  He states he discontinued tamsulosin several months ago secondary to side effect of headache  Patient has had significantly elevated post void residuals in the past, as high as almost 1L  He is asymptomatic, and comfortable with CIC, only once daily  Patient refused to increase CIC, cystoscopy and refused alpha-blocker in the past     His last creatinine from September 2020 was 1  Renal ultrasound from 2016 and 2021 showed no evidence of hydronephrosis, irregular thickening of the bladder wall and high volume PVR  Patient denies any lower urinary tract symptoms, gross hematuria, or dysuria  He is able to pass catheters without any difficulty  Patient continues to use sildenafil as needed prior to sexual activity  No additional changes to his overall health  He had BMP performed at [facility] recently which we need to review  PMH: aflutter, skin cancer, ED, bradycardia, osteoarthritis, BPH, urinary retention     Review of Systems - History obtained from chart review and the patient  General ROS: negative  Psychological ROS: negative  Ophthalmic ROS: negative  ENT ROS: negative  Allergy and Immunology ROS: negative  Hematological and Lymphatic ROS: negative  Breast ROS: negative for breast lumps  Respiratory ROS: no cough, shortness of breath, or wheezing  Cardiovascular ROS: no chest pain or dyspnea on exertion  Gastrointestinal ROS: no abdominal pain, change in bowel habits, or black or bloody stools  Genito-Urinary ROS: positive for - erectile dysfunction  Musculoskeletal ROS: negative  Neurological ROS: no TIA or stroke symptoms  Dermatological ROS: negative     Objective  Physical Exam  Vitals and nursing note reviewed  Constitutional:       General: He is not in acute distress  Appearance: Normal appearance  He is well-developed and normal weight  He is not ill-appearing or diaphoretic  HENT:      Head: Normocephalic and atraumatic  Pulmonary:      Effort: Pulmonary effort is normal  No respiratory distress  Musculoskeletal:         General: Normal range of motion  Cervical back: Normal range of motion and neck supple  Skin:     General: Skin is warm and dry  Neurological:      General: No focal deficit present  Mental Status: He is alert and oriented to person, place, and time  Mental status is at baseline     Psychiatric:         Mood and Affect: Mood normal  Behavior: Behavior normal          Thought Content: Thought content normal          Judgment: Judgment normal          Component      Latest Ref Rng & Units 3/5/2019 4/15/2019 5/28/2019   POST-VOID RESIDUAL VOLUME, ML POC      mL 500 35 [phone]     Component      Latest Ref Rng & Units 8/14/2019 3/4/2020 9/9/2020   POST-VOID RESIDUAL VOLUME, ML POC      mL 369 [id number] [phone]     Component      Latest Ref Rng & Units 3/17/2021   POST-VOID RESIDUAL VOLUME, ML POC      mL 773         RENAL ULTRASOUND 3/24/2021     INDICATION:  R33 9: Retention of urine, unspecified      COMPARISON:  Renal ultrasound 5/19/2016     TECHNIQUE:  Ultrasound of the retroperitoneum was performed with a curvilinear transducer utilizing volumetric sweeps and still imaging techniques       KIDNEYS:     RIGHT kidney:  Normal size  Right renal size:  11 5 x 4 2 x 5 0 cm  Normal echogenicity  No suspicious contour-deforming mass detected  Upper pole cyst measuring 7 x 4 x 6 mm  No hydronephrosis  No shadowing calculi  No perinephric fluid collection       LEFT kidney:  Portions are obscured by shadowing from bowel gas  Normal size  Left renal size:  4 4 x 4 3 x 5 3 cm  Normal echogenicity  No suspicious contour-deforming mass detected  Multiple simple renal cysts for example in the lower pole measuring 4 4 x 3 5 x 2 5 cm and 4 1 x 2 8 x 3 5 cm  No hydronephrosis  No shadowing calculi  No perinephric fluid collection       URETERS:  Nonvisualized      BLADDER:   Redemonstrated diffuse irregular bladder wall thickening  Layering low-level internal echoes/debris within the bladder  Bilateral ureteral jets detected  Marked post void residual urine volume  Estimated volume is 844 mL      IMPRESSION:     1  No hydronephrosis  Bilateral renal cysts      2   Redemonstrated diffuse irregular bladder wall thickening and marked post void residual volume  Layering debris within the bladder      Andrew Benjamin

## 2021-09-27 LAB
BUN SERPL-MCNC: 10 MG/DL (ref 8–27)
BUN/CREAT SERPL: 9 (ref 10–24)
CALCIUM SERPL-MCNC: 9.7 MG/DL (ref 8.6–10.2)
CHLORIDE SERPL-SCNC: 101 MMOL/L (ref 96–106)
CO2 SERPL-SCNC: 26 MMOL/L (ref 20–29)
CREAT SERPL-MCNC: 1.12 MG/DL (ref 0.76–1.27)
GLUCOSE SERPL-MCNC: 90 MG/DL (ref 65–99)
POTASSIUM SERPL-SCNC: 5.6 MMOL/L (ref 3.5–5.2)
SL AMB EGFR AFRICAN AMERICAN: 72 ML/MIN/1.73
SL AMB EGFR NON AFRICAN AMERICAN: 62 ML/MIN/1.73
SODIUM SERPL-SCNC: 138 MMOL/L (ref 134–144)

## 2021-09-28 ENCOUNTER — OFFICE VISIT (OUTPATIENT)
Dept: FAMILY MEDICINE CLINIC | Facility: CLINIC | Age: 81
End: 2021-09-28
Payer: COMMERCIAL

## 2021-09-28 VITALS
HEIGHT: 67 IN | OXYGEN SATURATION: 97 % | RESPIRATION RATE: 14 BRPM | SYSTOLIC BLOOD PRESSURE: 122 MMHG | TEMPERATURE: 96.8 F | WEIGHT: 151 LBS | HEART RATE: 60 BPM | BODY MASS INDEX: 23.7 KG/M2 | DIASTOLIC BLOOD PRESSURE: 76 MMHG

## 2021-09-28 DIAGNOSIS — Z13.6 SCREENING FOR CARDIOVASCULAR CONDITION: ICD-10-CM

## 2021-09-28 DIAGNOSIS — I48.92 ATRIAL FLUTTER, UNSPECIFIED TYPE (HCC): ICD-10-CM

## 2021-09-28 DIAGNOSIS — Z12.5 SCREENING FOR PROSTATE CANCER: ICD-10-CM

## 2021-09-28 DIAGNOSIS — Z00.00 MEDICARE ANNUAL WELLNESS VISIT, SUBSEQUENT: Primary | ICD-10-CM

## 2021-09-28 DIAGNOSIS — N28.1 ACQUIRED BILATERAL RENAL CYSTS: ICD-10-CM

## 2021-09-28 DIAGNOSIS — E78.5 HYPERLIPIDEMIA, UNSPECIFIED HYPERLIPIDEMIA TYPE: ICD-10-CM

## 2021-09-28 DIAGNOSIS — E87.5 SERUM POTASSIUM ELEVATED: ICD-10-CM

## 2021-09-28 PROBLEM — D62 POSTOPERATIVE ANEMIA DUE TO ACUTE BLOOD LOSS: Status: RESOLVED | Noted: 2019-10-24 | Resolved: 2021-09-28

## 2021-09-28 PROBLEM — M17.12 PRIMARY OSTEOARTHRITIS OF LEFT KNEE: Status: RESOLVED | Noted: 2019-01-17 | Resolved: 2021-09-28

## 2021-09-28 PROCEDURE — 1036F TOBACCO NON-USER: CPT | Performed by: FAMILY MEDICINE

## 2021-09-28 PROCEDURE — 1170F FXNL STATUS ASSESSED: CPT | Performed by: FAMILY MEDICINE

## 2021-09-28 PROCEDURE — 3288F FALL RISK ASSESSMENT DOCD: CPT | Performed by: FAMILY MEDICINE

## 2021-09-28 PROCEDURE — 1101F PT FALLS ASSESS-DOCD LE1/YR: CPT | Performed by: FAMILY MEDICINE

## 2021-09-28 PROCEDURE — G0439 PPPS, SUBSEQ VISIT: HCPCS | Performed by: FAMILY MEDICINE

## 2021-09-28 PROCEDURE — 3725F SCREEN DEPRESSION PERFORMED: CPT | Performed by: FAMILY MEDICINE

## 2021-09-28 PROCEDURE — 1125F AMNT PAIN NOTED PAIN PRSNT: CPT | Performed by: FAMILY MEDICINE

## 2021-09-28 PROCEDURE — 1160F RVW MEDS BY RX/DR IN RCRD: CPT | Performed by: FAMILY MEDICINE

## 2021-09-28 NOTE — PATIENT INSTRUCTIONS
Medicare Preventive Visit Patient Instructions  Thank you for completing your Welcome to Medicare Visit or Medicare Annual Wellness Visit today  Your next wellness visit will be due in one year (9/29/2022)  The screening/preventive services that you may require over the next 5-10 years are detailed below  Some tests may not apply to you based off risk factors and/or age  Screening tests ordered at today's visit but not completed yet may show as past due  Also, please note that scanned in results may not display below  Preventive Screenings:  Service Recommendations Previous Testing/Comments   Colorectal Cancer Screening  · Colonoscopy    · Fecal Occult Blood Test (FOBT)/Fecal Immunochemical Test (FIT)  · Fecal DNA/Cologuard Test  · Flexible Sigmoidoscopy Age: 54-65 years old   Colonoscopy: every 10 years (May be performed more frequently if at higher risk)  OR  FOBT/FIT: every 1 year  OR  Cologuard: every 3 years  OR  Sigmoidoscopy: every 5 years  Screening may be recommended earlier than age 48 if at higher risk for colorectal cancer  Also, an individualized decision between you and your healthcare provider will decide whether screening between the ages of 74-80 would be appropriate  Colonoscopy: 05/04/2016  FOBT/FIT: Not on file  Cologuard: Not on file  Sigmoidoscopy: Not on file          Prostate Cancer Screening Individualized decision between patient and health care provider in men between ages of 53-78   Medicare will cover every 12 months beginning on the day after your 50th birthday PSA: 2 4 ng/mL           Hepatitis C Screening Once for adults born between 1945 and 1965  More frequently in patients at high risk for Hepatitis C Hep C Antibody: Not on file        Diabetes Screening 1-2 times per year if you're at risk for diabetes or have pre-diabetes Fasting glucose: 75 mg/dL   A1C: 5 2 %        Cholesterol Screening Once every 5 years if you don't have a lipid disorder   May order more often based on risk factors  Lipid panel: 09/19/2019           Other Preventive Screenings Covered by Medicare:  1  Abdominal Aortic Aneurysm (AAA) Screening: covered once if your at risk  You're considered to be at risk if you have a family history of AAA or a male between the age of 73-68 who smoking at least 100 cigarettes in your lifetime  2  Lung Cancer Screening: covers low dose CT scan once per year if you meet all of the following conditions: (1) Age 50-69; (2) No signs or symptoms of lung cancer; (3) Current smoker or have quit smoking within the last 15 years; (4) You have a tobacco smoking history of at least 30 pack years (packs per day x number of years you smoked); (5) You get a written order from a healthcare provider  3  Glaucoma Screening: covered annually if you're considered high risk: (1) You have diabetes OR (2) Family history of glaucoma OR (3)  aged 48 and older OR (3)  American aged 72 and older  3  Osteoporosis Screening: covered every 2 years if you meet one of the following conditions: (1) Have a vertebral abnormality; (2) On glucocorticoid therapy for more than 3 months; (3) Have primary hyperparathyroidism; (4) On osteoporosis medications and need to assess response to drug therapy  5  HIV Screening: covered annually if you're between the age of 12-76  Also covered annually if you are younger than 13 and older than 72 with risk factors for HIV infection  For pregnant patients, it is covered up to 3 times per pregnancy      Immunizations:  Immunization Recommendations   Influenza Vaccine Annual influenza vaccination during flu season is recommended for all persons aged >= 6 months who do not have contraindications   Pneumococcal Vaccine (Prevnar and Pneumovax)  * Prevnar = PCV13  * Pneumovax = PPSV23 Adults 25-60 years old: 1-3 doses may be recommended based on certain risk factors  Adults 72 years old: Prevnar (PCV13) vaccine recommended followed by Pneumovax (PPSV23) vaccine  If already received PPSV23 since turning 65, then PCV13 recommended at least one year after PPSV23 dose  Hepatitis B Vaccine 3 dose series if at intermediate or high risk (ex: diabetes, end stage renal disease, liver disease)   Tetanus (Td) Vaccine - COST NOT COVERED BY MEDICARE PART B Following completion of primary series, a booster dose should be given every 10 years to maintain immunity against tetanus  Td may also be given as tetanus wound prophylaxis  Tdap Vaccine - COST NOT COVERED BY MEDICARE PART B Recommended at least once for all adults  For pregnant patients, recommended with each pregnancy  Shingles Vaccine (Shingrix) - COST NOT COVERED BY MEDICARE PART B  2 shot series recommended in those aged 48 and above     Health Maintenance Due:      Topic Date Due    Hepatitis C Screening  Never done     Immunizations Due:  There are no preventive care reminders to display for this patient  Advance Directives   What are advance directives? Advance directives are legal documents that state your wishes and plans for medical care  These plans are made ahead of time in case you lose your ability to make decisions for yourself  Advance directives can apply to any medical decision, such as the treatments you want, and if you want to donate organs  What are the types of advance directives? There are many types of advance directives, and each state has rules about how to use them  You may choose a combination of any of the following:  · Living will: This is a written record of the treatment you want  You can also choose which treatments you do not want, which to limit, and which to stop at a certain time  This includes surgery, medicine, IV fluid, and tube feedings  · Durable power of  for healthcare [place] [facility]): This is a written record that states who you want to make healthcare choices for you when you are unable to make them for yourself   This person, called a proxy, is usually a family member or a friend  You may choose more than 1 proxy  · Do not resuscitate (DNR) order:  A DNR order is used in case your heart stops beating or you stop breathing  It is a request not to have certain forms of treatment, such as CPR  A DNR order may be included in other types of advance directives  · Medical directive: This covers the care that you want if you are in a coma, near death, or unable to make decisions for yourself  You can list the treatments you want for each condition  Treatment may include pain medicine, surgery, blood transfusions, dialysis, IV or tube feedings, and a ventilator (breathing machine)  · Values history: This document has questions about your views, beliefs, and how you feel and think about life  This information can help others choose the care that you would choose  Why are advance directives important? An advance directive helps you control your care  Although spoken wishes may be used, it is better to have your wishes written down  Spoken wishes can be misunderstood, or not followed  Treatments may be given even if you do not want them  An advance directive may make it easier for your family to make difficult choices about your care  © Copyright [employer] 2018 Information is for End User's use only and may not be sold, redistributed or otherwise used for commercial purposes   All illustrations and images included in CareNotes® are the copyrighted property of A [employer] A M [employer]  or [address] [place], [place] [address]

## 2021-09-28 NOTE — PROGRESS NOTES
Assessment/Plan:     Diagnoses and all orders for this visit:    Medicare annual wellness visit, subsequent    Atrial flutter, unspecified type ([address] )    Serum potassium elevated  -     Comprehensive metabolic panel; Future  -     UA (URINE) with reflex to Scope; Future  -     Comprehensive metabolic panel  -     UA (URINE) with reflex to Scope    Screening for cardiovascular condition  -     CBC; Future  -     Comprehensive metabolic panel; Future  -     UA (URINE) with reflex to Scope; Future  -     CBC  -     Comprehensive metabolic panel  -     UA (URINE) with reflex to Scope    Acquired bilateral renal cysts  -     CBC; Future  -     Comprehensive metabolic panel; Future  -     UA (URINE) with reflex to Scope; Future  -     CBC  -     Comprehensive metabolic panel  -     UA (URINE) with reflex to Scope    Hyperlipidemia, unspecified hyperlipidemia type  -     Lipid panel; Future  -     Lipid panel    Screening for prostate cancer  -     UA (URINE) with reflex to Scope; Future  -     PSA Total (Reflex To Free); Future  -     UA (URINE) with reflex to Scope  -     PSA Total (Reflex To Free)       Patient had blood work through Urology that showed elevated potassium   I do believe the sample was hemolyzed so will recheck his lab work he is also due for his other lab work as well and that was ordered   He is up-to-date on rest his health maintenance and he can follow up 1 year sooner if needed depending upon his labs      Subjective:     Chief Complaint   Patient presents with   [facility] Wellness Visit     subsequent        Patient ID: Jay Jay Sánchez is a 80 y o  male       Patient here for Medicare wellness visit   He reports no acute complaints today and is feeling well      The following portions of the patient's history were reviewed and updated as appropriate: allergies, current medications, past family history, past medical history, past social history, past surgical history and problem list     Review of Systems   Constitutional: Negative  HENT: Negative  Eyes: Negative  Respiratory: Negative  Cardiovascular: Negative  Gastrointestinal: Negative  Endocrine: Negative  Genitourinary: Negative  Musculoskeletal: Negative  Skin: Negative  Allergic/Immunologic: Negative  Neurological: Negative  Hematological: Negative  Psychiatric/Behavioral: Negative  All other systems reviewed and are negative  Objective:    Vitals:    09/28/21 0754   BP: 122/76   BP Location: Right arm   Patient Position: Sitting   Cuff Size: Standard   Pulse: 60   Resp: 14   Temp: (!) 96 8 °F (36 °C)   TempSrc: Tympanic   SpO2: 97%   Weight: 68 5 kg (151 lb)   Height: 5' 7" (1 702 m)          Physical Exam  Vitals and nursing note reviewed  Constitutional:       General: He is not in acute distress  Appearance: He is well-developed  HENT:      Head: Normocephalic  Right Ear: External ear normal       Left Ear: External ear normal       Nose: Nose normal    Eyes:      General:         Right eye: No discharge  Left eye: No discharge  Conjunctiva/sclera: Conjunctivae normal       Pupils: Pupils are equal, round, and reactive to light  Cardiovascular:      Rate and Rhythm: Normal rate and regular rhythm  Heart sounds: Normal heart sounds  Pulmonary:      Effort: Pulmonary effort is normal       Breath sounds: Normal breath sounds  Abdominal:      General: Bowel sounds are normal  There is no distension  Palpations: Abdomen is soft  Tenderness: There is no abdominal tenderness  Musculoskeletal:         General: Normal range of motion  Cervical back: Normal range of motion  Skin:     General: Skin is warm and dry  Findings: No rash  Comments:  Large seborrheic keratosis on left anterior hip that is unchanged from previous visits   Neurological:      Mental Status: He is alert and oriented to person, place, and time  Cranial Nerves:  No cranial nerve deficit  Psychiatric:         Behavior: Behavior normal          Thought Content: Thought content normal          Judgment: Judgment normal           Assessment and Plan:     Problem List Items Addressed This Visit        Cardiovascular and Mediastinum    Atrial flutter ([address] )      Other Visit Diagnoses     Medicare annual wellness visit, subsequent    -  Primary    Serum potassium elevated        Relevant Orders    Comprehensive metabolic panel    UA (URINE) with reflex to Scope    Screening for cardiovascular condition        Relevant Orders    CBC    Comprehensive metabolic panel    UA (URINE) with reflex to Scope    Acquired bilateral renal cysts        Relevant Orders    CBC    Comprehensive metabolic panel    UA (URINE) with reflex to Scope    Hyperlipidemia, unspecified hyperlipidemia type        Relevant Orders    Lipid panel    Screening for prostate cancer        Relevant Orders    UA (URINE) with reflex to Scope    PSA Total (Reflex To Free)          Depression Screening and Follow-up Plan:   Patient was screened for depression during today's encounter  They screened negative with a PHQ-2 score of 0  Preventive health issues were discussed with patient, and age appropriate screening tests were ordered as noted in patient's After Visit Summary  Personalized health advice and appropriate referrals for health education or preventive services given if needed, as noted in patient's After Visit Summary       History of Present Illness:     Patient presents for Medicare Annual Wellness visit    Patient Care Team:  Avelina Kline DO as PCP - General  MD Avelina Epperson DO Violeta Poet, DO     Problem List:     Patient Active Problem List   Diagnosis    S/P total hip arthroplasty    Preoperative cardiovascular examination    Sinus bradycardia    S/P total knee arthroplasty, left    Back pain    Urinary retention    BPH (benign prostatic hyperplasia)    Diverticulitis of colon    Fullness in clavicular area    Heart murmur    Kidney mass    Left-sided low back pain with left-sided sciatica    Leg pain    Neoplasm of skin    Osteoarthritis of hip    Primary osteoarthritis of both hips    Pain of both hip joints    Erectile dysfunction    Retention of urine    S/P total knee arthroplasty, right    Atrial flutter (HCC)      Past Medical and Surgical History:     Past Medical History:   Diagnosis Date    BPH (benign prostatic hyperplasia)     Diverticulitis of colon     Herniated lumbar intervertebral disc     Kidney mass     Murmur [id number]/5889    diagnosed as a child 6 YO    Osteoarthritis     Preoperative cardiovascular examination 2/16/2018     Past Surgical History:   Procedure Laterality Date    HERNIA REPAIR      JOINT REPLACEMENT Bilateral     KNEE ARTHROSCOPY      bilateral    OH CYSTOURETHRO W/IMPLANT N/A 7/5/2019    Procedure: CYSTOSCOPY WITH INSERTION Murtis Eugenia;  Surgeon: Lissy Martel MD;  [facility];  Service: Urology    OH TOTAL HIP ARTHROPLASTY Left 6/12/2017    Procedure: ANTERIOR TOTAL HIP ARTHROPLASTY;  Surgeon: Shannan Sapp MD;  [facility];  Service: Orthopedics    OH TOTAL HIP ARTHROPLASTY Right 1/3/2018    Procedure: ANTERIOR TOTAL HIP ARTHROPLASTY;  Surgeon: Shannan Sapp MD;  [facility];  Service: Orthopedics    OH TOTAL KNEE ARTHROPLASTY Left 2/20/2019    Procedure: TOTAL KNEE ARTHROPLASTY;  Surgeon: Shannan Sapp MD;  [facility];  Service: Orthopedics    OH TOTAL KNEE ARTHROPLASTY Right 10/23/2019    Procedure: ARTHROPLASTY KNEE TOTAL;  Surgeon: Shannan Sapp MD;  [facility];  Service: Orthopedics      Family History:     Family History   Problem Relation Age of Onset    Cancer Mother     Cancer Father     Cancer Brother     Heart disease Brother       Social History:     Social History     Socioeconomic History    Marital status: /Civil Union     Spouse name: None    Number of children: None    Years of education: None    Highest education level: None   Occupational History    None   Tobacco Use    Smoking status: Former Smoker     Packs/day: 1 00     Years: 15 00     Pack years: 15 00     Quit date:      Years since quittin 7    Smokeless tobacco: Never Used   Vaping Use    Vaping Use: Never used   Substance and Sexual Activity    Alcohol use: Yes     Alcohol/week: 2 0 standard drinks     Types: 2 Cans of beer per week     Comment: beer daily     Drug use: No    Sexual activity: Yes   Other Topics Concern    None   Social History Narrative    None     Social Determinants of Health     Financial Resource Strain: Low Risk     Difficulty of Paying Living Expenses: Not hard at all   Food Insecurity: No Food Insecurity    Worried About Running Out of Food in the Last Year: Never true    Gosia of Food in the Last Year: Never true   Transportation Needs: No Transportation Needs    Lack of Transportation (Medical): No    Lack of Transportation (Non-Medical):  No   Physical Activity:     Days of Exercise per Week:     Minutes of Exercise per Session:    Stress:     Feeling of Stress :    Social Connections:     Frequency of Communication with Friends and Family:     Frequency of Social Gatherings with Friends and Family:     Attends Islam Services:     Active Member of Clubs or Organizations:     Attends Club or Organization Meetings:     Marital Status:    Intimate Partner Violence:     Fear of Current or Ex-Partner:     Emotionally Abused:     Physically Abused:     Sexually Abused:       Medications and Allergies:     Current Outpatient Medications   Medication Sig Dispense Refill    multivitamin (THERAGRAN) TABS Take 1 tablet by mouth daily      penicillin V potassium (VEETID) 500 mg tablet Take 500 mg by mouth see administration instructions 4 tabs 1 hour before dental work      sildenafil (VIAGRA) 100 mg tablet Take 1 tablet (100 mg total) by mouth as needed for erectile dysfunction 10 tablet 9    Fluad Quadrivalent 0 5 ML PRSY inject 0 5 milliliters intramuscularly (Patient not taking: Reported on 9/28/2021)       No current facility-administered medications for this visit  No Known Allergies   Immunizations:     Immunization History   Administered Date(s) Administered    INFLUENZA 10/11/2018, 10/03/2020, 09/03/2021    Influenza Quadrivalent Preservative Free 3 years and older IM 10/23/2017    Influenza Split High Dose Preservative Free IM 10/28/2014, 10/23/2015, 10/23/2015, 10/12/2016, 10/23/2017    Pneumococcal Conjugate 13-Valent 09/17/2019    Pneumococcal Polysaccharide PPV23 04/06/2016    SARS-CoV-2 / COVID-19 mRNA IM (Moderna) 02/16/2021, 03/15/2021    Zoster 06/08/2016    Zoster Vaccine Recombinant 01/27/2020, 09/10/2020    influenza, trivalent, adjuvanted 09/19/2019      Health Maintenance:         Topic Date Due    Hepatitis C Screening  Never done     There are no preventive care reminders to display for this patient  Medicare Health Risk Assessment:     /76 (BP Location: Right arm, Patient Position: Sitting, Cuff Size: Standard)   Pulse 60   Temp (!) 96 8 °F (36 °C) (Tympanic)   Resp 14   Ht 5' 7" (1 702 m)   Wt 68 5 kg (151 lb)   SpO2 97%   BMI 23 65 kg/m²      Aleksander Brooks is here for his Subsequent Wellness visit  Last Medicare Wellness visit information reviewed, patient interviewed and updates made to the record today  Health Risk Assessment:   Patient rates overall health as very good  Patient feels that their physical health rating is slightly better  Patient is very satisfied with their life  Eyesight was rated as slightly worse  Hearing was rated as same  Patient feels that their emotional and mental health rating is same  Patients states they are never, rarely angry  Patient states they are never, rarely unusually tired/fatigued  Pain experienced in the last 7 days has been some   Patient's pain rating has been 3/10  Patient states that he has experienced no weight loss or gain in last 6 months  Depression Screening:   PHQ-2 Score: 0      Fall Risk Screening: In the past year, patient has experienced: no history of falling in past year      Home Safety:  Patient does not have trouble with stairs inside or outside of their home  Patient has working smoke alarms and has working carbon monoxide detector  Home safety hazards include: none  Nutrition:   Current diet is Regular  Medications:   Patient is currently taking over-the-counter supplements  OTC medications include: Multivitamin  Patient is able to manage medications  Activities of Daily Living (ADLs)/Instrumental Activities of Daily Living (IADLs):   Walk and transfer into and out of bed and chair?: Yes  Dress and groom yourself?: Yes    Bathe or shower yourself?: Yes    Feed yourself?  Yes  Do your laundry/housekeeping?: Yes  Manage your money, pay your bills and track your expenses?: Yes  Make your own meals?: Yes    Do your own shopping?: Yes    Previous Hospitalizations:   Any hospitalizations or ED visits within the last 12 months?: No      Advance Care Planning:   Living will: No    Durable POA for healthcare: No    Advanced directive: No    Advanced directive counseling given: Yes    End of Life Decisions reviewed with patient: Yes    Provider agrees with end of life decisions: Yes      Cognitive Screening:   Provider or family/friend/caregiver concerned regarding cognition?: No    PREVENTIVE SCREENINGS      Cardiovascular Screening:    General: Screening Current      Diabetes Screening:     General: Screening Current      Colorectal Cancer Screening:     General: Screening Current      Prostate Cancer Screening:    General: Screening Not Indicated and Screening Current      Osteoporosis Screening:    General: Screening Not Indicated      Abdominal Aortic Aneurysm (AAA) Screening:    Risk factors include: tobacco use        General: Screening Not Indicated      Lung Cancer Screening:     General: Screening Not Indicated      Hepatitis C Screening:    General: Screening Not Indicated    Screening, Brief Intervention, and Referral to Treatment (SBIRT)    Screening  Typical number of drinks in a day: 1  Typical number of drinks in a week: 7  Interpretation: Low risk drinking behavior  AUDIT-C Screenin) How often did you have a drink containing alcohol in the past year? 4 or more times a week  2) How many drinks did you have on a typical day when you were drinking in the past year? 1 to 2  3) How often did you have 6 or more drinks on one occasion in the past year? never    AUDIT-C Score: 4  Interpretation: Score 4-12 (male): POSITIVE screen for alcohol misuse    AUDIT Screenin) How often during the last year have you found that you were not able to stop drinking once you had started? 0 - never  5) How often during the last year have you failed to do what was normally expected from you because of drinking? 0 - never  6) How often during the last year have you needed a first drink in the morning to get yourself going after a heavy drinking session?  0 - never  7) How often during the last year have you had a feeling of guilt or remorse after drinking? 0 - never  8) How often during the last year have you been unable to remember what happened the night before because you had been drinking? 0 - never  9) Have you or someone else been injured as a result of your drinking? 0 - no  10) Has a relative or friend or a doctor or another health worker been concerned about your drinking or suggested you cut down? 0 - no    AUDIT Score: 4  Interpretation: Low risk alcohol consumption    Single Item Drug Screening:  How often have you used an illegal drug (including marijuana) or a prescription medication for non-medical reasons in the past year? never    Single Item Drug Screen Score: 0  Interpretation: Negative screen for possible drug use disorder    Brief Intervention  Alcohol & drug use screenings were reviewed  No concerns regarding substance use disorder identified  Other Counseling Topics:   Car/seat belt/driving safety, skin self-exam, sunscreen and regular weightbearing exercise and calcium and vitamin D intake         Duong Page, DO

## 2021-10-06 LAB
ALBUMIN SERPL-MCNC: 4.7 G/DL (ref 3.7–4.7)
ALBUMIN/GLOB SERPL: 2.1 {RATIO} (ref 1.2–2.2)
ALP SERPL-CCNC: 22 IU/L (ref 44–121)
ALT SERPL-CCNC: 17 IU/L (ref 0–44)
APPEARANCE UR: CLEAR
AST SERPL-CCNC: 33 IU/L (ref 0–40)
BACTERIA URNS QL MICRO: ABNORMAL
BILIRUB SERPL-MCNC: 1.9 MG/DL (ref 0–1.2)
BILIRUB UR QL STRIP: NEGATIVE
BUN SERPL-MCNC: 13 MG/DL (ref 8–27)
BUN/CREAT SERPL: 11 (ref 10–24)
CALCIUM SERPL-MCNC: 9.5 MG/DL (ref 8.6–10.2)
CASTS URNS QL MICRO: ABNORMAL /LPF
CHLORIDE SERPL-SCNC: 101 MMOL/L (ref 96–106)
CHOLEST SERPL-MCNC: 182 MG/DL (ref 100–199)
CHOLEST/HDLC SERPL: 2.6 RATIO (ref 0–5)
CO2 SERPL-SCNC: 25 MMOL/L (ref 20–29)
COLOR UR: YELLOW
CREAT SERPL-MCNC: 1.18 MG/DL (ref 0.76–1.27)
EPI CELLS #/AREA URNS HPF: ABNORMAL /HPF (ref 0–10)
ERYTHROCYTE [DISTWIDTH] IN BLOOD BY AUTOMATED COUNT: 12.3 % (ref 11.6–15.4)
GLOBULIN SER-MCNC: 2.2 G/DL (ref 1.5–4.5)
GLUCOSE SERPL-MCNC: 92 MG/DL (ref 65–99)
GLUCOSE UR QL: NEGATIVE
HCT VFR BLD AUTO: 41.8 % (ref 37.5–51)
HDLC SERPL-MCNC: 71 MG/DL
HGB BLD-MCNC: 13.7 G/DL (ref 13–17.7)
HGB UR QL STRIP: NEGATIVE
KETONES UR QL STRIP: NEGATIVE
LDLC SERPL CALC-MCNC: 98 MG/DL (ref 0–99)
LEUKOCYTE ESTERASE UR QL STRIP: ABNORMAL
MCH RBC QN AUTO: 31.9 PG (ref 26.6–33)
MCHC RBC AUTO-ENTMCNC: 32.8 G/DL (ref 31.5–35.7)
MCV RBC AUTO: 97 FL (ref 79–97)
MICRO URNS: ABNORMAL
NITRITE UR QL STRIP: POSITIVE
PH UR STRIP: 7 [PH] (ref 5–7.5)
PLATELET # BLD AUTO: 323 X10E3/UL (ref 150–450)
POTASSIUM SERPL-SCNC: 5.1 MMOL/L (ref 3.5–5.2)
PROT SERPL-MCNC: 6.9 G/DL (ref 6–8.5)
PROT UR QL STRIP: NEGATIVE
PSA SERPL-MCNC: 2.6 NG/ML (ref 0–4)
RBC # BLD AUTO: 4.29 X10E6/UL (ref 4.14–5.8)
RBC #/AREA URNS HPF: ABNORMAL /HPF (ref 0–2)
SL AMB EGFR AFRICAN AMERICAN: 67 ML/MIN/1.73
SL AMB EGFR NON AFRICAN AMERICAN: 58 ML/MIN/1.73
SL AMB REFLEX CRITERIA: NORMAL
SL AMB VLDL CHOLESTEROL CALC: 13 MG/DL (ref 5–40)
SODIUM SERPL-SCNC: 138 MMOL/L (ref 134–144)
SP GR UR: 1.01 (ref 1–1.03)
TRIGL SERPL-MCNC: 69 MG/DL (ref 0–149)
UROBILINOGEN UR STRIP-ACNC: 0.2 MG/DL (ref 0.2–1)
WBC # BLD AUTO: 4.8 X10E3/UL (ref 3.4–10.8)
WBC #/AREA URNS HPF: >30 /HPF (ref 0–5)
YEAST #/AREA URNS HPF: PRESENT /[HPF]

## 2021-10-07 ENCOUNTER — TELEPHONE (OUTPATIENT)
Dept: OTHER | Facility: OTHER | Age: 81
End: 2021-10-07

## 2022-03-14 ENCOUNTER — OFFICE VISIT (OUTPATIENT)
Dept: UROLOGY | Facility: HOSPITAL | Age: 82
End: 2022-03-14
Payer: COMMERCIAL

## 2022-03-14 VITALS
BODY MASS INDEX: 24.48 KG/M2 | SYSTOLIC BLOOD PRESSURE: 140 MMHG | WEIGHT: 156 LBS | DIASTOLIC BLOOD PRESSURE: 82 MMHG | HEART RATE: 59 BPM | HEIGHT: 67 IN

## 2022-03-14 DIAGNOSIS — R33.9 URINARY RETENTION: Primary | ICD-10-CM

## 2022-03-14 DIAGNOSIS — N52.9 ERECTILE DYSFUNCTION, UNSPECIFIED ERECTILE DYSFUNCTION TYPE: ICD-10-CM

## 2022-03-14 LAB — POST-VOID RESIDUAL VOLUME, ML POC: 820 ML

## 2022-03-14 PROCEDURE — 99213 OFFICE O/P EST LOW 20 MIN: CPT | Performed by: NURSE PRACTITIONER

## 2022-03-14 PROCEDURE — 1160F RVW MEDS BY RX/DR IN RCRD: CPT | Performed by: NURSE PRACTITIONER

## 2022-03-14 PROCEDURE — 1036F TOBACCO NON-USER: CPT | Performed by: NURSE PRACTITIONER

## 2022-03-14 PROCEDURE — 51798 US URINE CAPACITY MEASURE: CPT | Performed by: NURSE PRACTITIONER

## 2022-03-14 NOTE — PROGRESS NOTES
03/14/22    Tiffanie Coffey   1940   [id number]     Assessment  1 BPH s/p Urolift (7/5/2019)  2 Incomplete bladder emptying   3 ED     Discussion/Plan  1 BPH s/p Urolift (7/5/19)  2 Incomplete bladder emptying              Continue CIC once daily   PVR [facility]: 14 Fr coude catheter  Passing catheter without difficulty              3/2021 Renal US with PVR: No hydronephrosis  Bilateral renal cysts  Redemonstrated diffuse irregular bladder wall thickening and marked post void residual volume   Layering debris within the bladder  Marked post void residual urine volume   Estimated volume is 844 mL              BMP ordered  2 ED              Continue Sildenafil PRN     Patient will obtain BMP and continue to CIC once daily  Encouraged patient to consider increasing CIC to 2-3 times per day due to high residual of urine  He refuses at this time  He will follow up in 6 months or sooner if needed  Subjective  HPI   Erick Cooks is a 80 y  o  male with a history of BPH and incomplete bladder emptying presents today for follow up  Josesito Dallas previously known to Dr Marie Phillips Agent underwent Urolift procedure in July 2019  Josesito Dallas has since been experiencing incomplete bladder emptying and performing intermittent catheterization once daily at [facility]  He states he discontinued tamsulosin several months ago secondary to side effect of headache   Patient has had significantly elevated post void residuals in the past, as high as almost 1L   He is asymptomatic, and comfortable with CIC, only once daily  Patient refused to increase CIC  His last BMP was normal from Fall 2021  Renal ultrasound from 2016 and 2021 showed no evidence of hydronephrosis, irregular thickening of the bladder wall and high volume PVR   Patient denies any lower urinary tract symptoms, gross hematuria, or dysuria   He is able to pass catheters without any difficulty   Patient continues to use sildenafil as needed prior to sexual activity   No additional changes to his overall health  Component      Latest Ref Rng & Units 5/28/2019 8/14/2019 3/4/2020           2:52 PM  2:52 PM  1:48 PM   POST-VOID RESIDUAL VOLUME, ML POC      mL [phone]     Component      Latest Ref Rng & Units 9/9/2020 3/17/2021 3/14/2022           1:08 PM 11:14 AM  8:59 AM   POST-VOID RESIDUAL VOLUME, ML POC      mL [phone]         Component      Latest Ref Rng & Units 4/14/2017 9/19/2019 10/5/2021           7:37 AM  8:02 AM  8:05 AM   PSA, Total      0 0 - 4 0 ng/mL 0 8 2 4 2 6       Review of Systems - History obtained from chart review and the patient  General ROS: negative  Psychological ROS: negative  Breast ROS: negative for breast lumps  Respiratory ROS: no cough, shortness of breath, or wheezing  Cardiovascular ROS: no chest pain or dyspnea on exertion  Gastrointestinal ROS: no abdominal pain, change in bowel habits, or black or bloody stools  Genito-Urinary ROS: negative  Musculoskeletal ROS: negative  Neurological ROS: no TIA or stroke symptoms  Dermatological ROS: negative     Objective    Physical Exam  Vitals and nursing note reviewed  Constitutional:       General: He is awake  He is not in acute distress  Appearance: Normal appearance  He is well-developed, well-groomed and normal weight  He is not ill-appearing, toxic-appearing or diaphoretic  Pulmonary:      Effort: Pulmonary effort is normal    Abdominal:      Tenderness: There is no right CVA tenderness or left CVA tenderness  Musculoskeletal:         General: Normal range of motion  Cervical back: Normal range of motion and neck supple  Skin:     General: Skin is warm  Neurological:      General: No focal deficit present  Mental Status: He is alert and oriented to person, place, and time  Mental status is at baseline     Psychiatric:         Attention and Perception: Attention normal          Mood and Affect: Mood normal          Speech: Speech normal          Behavior: Behavior normal  Behavior is cooperative  Thought Content: Thought content normal          Cognition and Memory: Cognition normal          Judgment: Judgment normal              MARIFER Ramírez     I have spent 15 minutes with Patient  today in which greater than 50% of this time was spent in counseling/coordination of care regarding Risks and benefits of tx options, Intructions for management, Patient and family education, Importance of tx compliance, Risk factor reductions and Impressions

## 2022-04-05 LAB
BUN SERPL-MCNC: 12 MG/DL (ref 8–27)
BUN/CREAT SERPL: 11 (ref 10–24)
CALCIUM SERPL-MCNC: 9.2 MG/DL (ref 8.6–10.2)
CHLORIDE SERPL-SCNC: 101 MMOL/L (ref 96–106)
CO2 SERPL-SCNC: 24 MMOL/L (ref 20–29)
CREAT SERPL-MCNC: 1.11 MG/DL (ref 0.76–1.27)
EGFR: 68 ML/MIN/1.73
GLUCOSE SERPL-MCNC: 86 MG/DL (ref 65–99)
POTASSIUM SERPL-SCNC: 4.6 MMOL/L (ref 3.5–5.2)
SODIUM SERPL-SCNC: 138 MMOL/L (ref 134–144)

## 2022-10-04 ENCOUNTER — OFFICE VISIT (OUTPATIENT)
Dept: FAMILY MEDICINE CLINIC | Facility: CLINIC | Age: 82
End: 2022-10-04
Payer: COMMERCIAL

## 2022-10-04 VITALS
SYSTOLIC BLOOD PRESSURE: 130 MMHG | RESPIRATION RATE: 14 BRPM | OXYGEN SATURATION: 99 % | DIASTOLIC BLOOD PRESSURE: 78 MMHG | WEIGHT: 155.6 LBS | TEMPERATURE: 98.1 F | HEART RATE: 50 BPM | BODY MASS INDEX: 24.42 KG/M2 | HEIGHT: 67 IN

## 2022-10-04 DIAGNOSIS — I48.92 ATRIAL FLUTTER, UNSPECIFIED TYPE (HCC): ICD-10-CM

## 2022-10-04 DIAGNOSIS — Z79.899 HIGH RISK MEDICATION USE: ICD-10-CM

## 2022-10-04 DIAGNOSIS — R42 VERTIGO: ICD-10-CM

## 2022-10-04 DIAGNOSIS — Z13.6 SCREENING FOR CARDIOVASCULAR CONDITION: ICD-10-CM

## 2022-10-04 DIAGNOSIS — E78.5 HYPERLIPIDEMIA, UNSPECIFIED HYPERLIPIDEMIA TYPE: ICD-10-CM

## 2022-10-04 DIAGNOSIS — D49.2 ABNORMAL SKIN GROWTH: ICD-10-CM

## 2022-10-04 DIAGNOSIS — Z00.00 MEDICARE ANNUAL WELLNESS VISIT, SUBSEQUENT: Primary | ICD-10-CM

## 2022-10-04 DIAGNOSIS — Z12.5 SCREENING FOR PROSTATE CANCER: ICD-10-CM

## 2022-10-04 PROCEDURE — G0439 PPPS, SUBSEQ VISIT: HCPCS | Performed by: FAMILY MEDICINE

## 2022-10-04 RX ORDER — MECLIZINE HYDROCHLORIDE 25 MG/1
25 TABLET ORAL 3 TIMES DAILY PRN
Qty: 30 TABLET | Refills: 0 | Status: SHIPPED | OUTPATIENT
Start: 2022-10-04

## 2022-10-04 NOTE — PROGRESS NOTES
Assessment and Plan:     Problem List Items Addressed This Visit        Cardiovascular and Mediastinum    Atrial flutter ([address] )    Relevant Orders    TSH, 3rd generation      Other Visit Diagnoses     Medicare annual wellness visit, subsequent    -  Primary    Abnormal skin growth        Relevant Orders    Ambulatory Referral to Dermatology    Vertigo        Relevant Medications    meclizine (ANTIVERT) 25 mg tablet    Other Relevant Orders    TSH, 3rd generation    Screening for prostate cancer        Relevant Orders    UA (URINE) with reflex to Scope    PSA Total (Reflex To Free)    Hyperlipidemia, unspecified hyperlipidemia type        Relevant Orders    Lipid panel    TSH, 3rd generation    Screening for cardiovascular condition        Relevant Orders    CBC    Comprehensive metabolic panel    TSH, 3rd generation    High risk medication use        Relevant Orders    CBC    Comprehensive metabolic panel    TSH, 3rd generation        Labs ordered  Patient is having some more frequent vertigo issues  Will add meclizine   Patient will continue to chart the frequency of his symptoms need to consider either imaging of his head ENT eval and or vestibular rehab if worsens   Referred to dermatology for the lesion on his back that is becoming problematic  Can follow up 1 year sooner if needed          Depression Screening and Follow-up Plan: Patient was screened for depression during today's encounter  They screened negative with a PHQ-2 score of 0  Preventive health issues were discussed with patient, and age appropriate screening tests were ordered as noted in patient's After Visit Summary  Personalized health advice and appropriate referrals for health education or preventive services given if needed, as noted in patient's After Visit Summary       History of Present Illness:     Patient presents for a Medicare Wellness Visit    [de-identified]year old male presents to office for annual wellness visit with complaint of mole on abdomen and a new skin growth on back  Patient also states he has been experiences episodes of vertigo and dizzy spells over the past couple of years which seem to be frequent over the past year  Patient states that he believes these episodes occurred four times over the past two months  Patient denies any hearing loss but does state he has vomited with these episodes  Patient states that these episodes usually last between 30 to 60 minutes and occur more frequently in the morning  Patient states these episodes are triggered with looking down or at the computer screen too long  Patient Care Team:  Bharathi Dinh DO as PCP - General  MD Bharathi Raymond DO Sena Banister, DO     Review of Systems:     Review of Systems   Constitutional: Negative  HENT: Negative  Eyes: Negative  Respiratory: Negative  Cardiovascular: Negative  Gastrointestinal: Negative  Endocrine: Negative  Genitourinary: Negative  Musculoskeletal: Negative  Skin: Negative  Allergic/Immunologic: Negative  Neurological: Negative  Hematological: Negative  Psychiatric/Behavioral: Negative  All other systems reviewed and are negative         Problem List:     Patient Active Problem List   Diagnosis    S/P total hip arthroplasty    Preoperative cardiovascular examination    Sinus bradycardia    S/P total knee arthroplasty, left    Back pain    Urinary retention    BPH (benign prostatic hyperplasia)    Diverticulitis of colon    Fullness in clavicular area    Heart murmur    Kidney mass    Left-sided low back pain with left-sided sciatica    Leg pain    Neoplasm of skin    Osteoarthritis of hip    Primary osteoarthritis of both hips    Erectile dysfunction    Retention of urine    S/P total knee arthroplasty, right    Atrial flutter (HCC)      Past Medical and Surgical History:     Past Medical History:   Diagnosis Date    BPH (benign prostatic hyperplasia)     Diverticulitis of colon     Herniated lumbar intervertebral disc     Kidney mass     Murmur     diagnosed as a child 8 YO    Osteoarthritis     Preoperative cardiovascular examination 2018     Past Surgical History:   Procedure Laterality Date    HERNIA REPAIR      JOINT REPLACEMENT Bilateral     KNEE ARTHROSCOPY      bilateral    SC CYSTOURETHRO W/IMPLANT N/A 2019    Procedure: CYSTOSCOPY WITH INSERTION Andrea Petty;  Surgeon: Madhav Grove MD;  [facility];  Service: Urology    SC TOTAL HIP ARTHROPLASTY Left 2017    Procedure: ANTERIOR TOTAL HIP ARTHROPLASTY;  Surgeon: Poppy Stokes MD;  [facility];  Service: Orthopedics    SC TOTAL HIP ARTHROPLASTY Right 1/3/2018    Procedure: ANTERIOR TOTAL HIP ARTHROPLASTY;  Surgeon: Poppy Stokes MD;  [facility];  Service: Orthopedics    SC TOTAL KNEE ARTHROPLASTY Left 2019    Procedure: TOTAL KNEE ARTHROPLASTY;  Surgeon: Poppy Stokes MD;  [facility];  Service: Orthopedics    SC TOTAL KNEE ARTHROPLASTY Right 10/23/2019    Procedure: ARTHROPLASTY KNEE TOTAL;  Surgeon: Poppy Stokes MD;  [facility];  Service: Orthopedics      Family History:     Family History   Problem Relation Age of Onset    Cancer Mother     Cancer Father     Cancer Brother     Heart disease Brother       Social History:     Social History     Socioeconomic History    Marital status: /Civil Union     Spouse name: None    Number of children: None    Years of education: None    Highest education level: None   Occupational History    None   Tobacco Use    Smoking status: Former Smoker     Packs/day: 1 00     Years: 15 00     Pack years: 15 00     Quit date:      Years since quittin 7    Smokeless tobacco: Never Used   Vaping Use    Vaping Use: Never used   Substance and Sexual Activity    Alcohol use:  Yes     Alcohol/week: 1 0 standard drink     Types: 1 Cans of beer per week     Comment: 1-2 beers daily    Drug use: No    Sexual activity: Yes   Other Topics Concern    None   Social History Narrative    None     Social Determinants of Health     Financial Resource Strain: Low Risk     Difficulty of Paying Living Expenses: Not very hard   Food Insecurity: Not on file   Transportation Needs: No Transportation Needs    Lack of Transportation (Medical): No    Lack of Transportation (Non-Medical): No   Physical Activity: Not on file   Stress: Not on file   Social Connections: Not on file   Intimate Partner Violence: Not At Risk    Fear of Current or Ex-Partner: No    Emotionally Abused: No    Physically Abused: No    Sexually Abused: No   Housing Stability: Not on file      Medications and Allergies:     Current Outpatient Medications   Medication Sig Dispense Refill    meclizine (ANTIVERT) 25 mg tablet Take 1 tablet (25 mg total) by mouth 3 (three) times a day as needed for dizziness 30 tablet 0    multivitamin (THERAGRAN) TABS Take 1 tablet by mouth daily      penicillin V potassium (VEETID) 500 mg tablet Take 500 mg by mouth see administration instructions 4 tabs 1 hour before dental work      sildenafil (VIAGRA) 100 mg tablet Take 1 tablet (100 mg total) by mouth as needed for erectile dysfunction 10 tablet 9    Fluad Quadrivalent 0 5 ML PRSY inject 0 5 milliliters intramuscularly (Patient not taking: No sig reported)       No current facility-administered medications for this visit       No Known Allergies   Immunizations:     Immunization History   Administered Date(s) Administered    COVID-19 MODERNA VACC 0 5 ML IM 02/16/2021, 03/15/2021, 12/01/2021, 04/22/2022    COVID-19 Moderna Vac BIVALENT 18 Yr+ Im (BOOSTER ONLY) 09/21/2022    INFLUENZA 10/11/2018, 10/03/2020, 09/03/2021, 09/13/2022    Influenza Quadrivalent Preservative Free 3 years and older IM 10/23/2017    Influenza Split High Dose Preservative Free IM 10/28/2014, 10/23/2015, 10/23/2015, 10/12/2016, 10/23/2017    Pneumococcal Conjugate 13-Valent 09/17/2019    Pneumococcal Polysaccharide PPV23 04/06/2016    Tdap 02/16/2022    Zoster 06/08/2016    Zoster Vaccine Recombinant 01/27/2020, 09/10/2020    influenza, trivalent, adjuvanted 09/19/2019      Health Maintenance: There are no preventive care reminders to display for this patient  There are no preventive care reminders to display for this patient  Medicare Screening Tests and Risk Assessments:     Last Medicare Wellness visit information reviewed, patient interviewed and updates made to the record today  Health Risk Assessment:   Patient rates overall health as very good  Patient feels that their physical health rating is slightly better  Patient is very satisfied with their life  Eyesight was rated as slightly worse  Hearing was rated as same  Patient feels that their emotional and mental health rating is slightly better  Patients states they are never, rarely angry  Patient states they are never, rarely unusually tired/fatigued  Pain experienced in the last 7 days has been none  Patient states that he has experienced no weight loss or gain in last 6 months  Depression Screening:   PHQ-2 Score: 0      Fall Risk Screening: In the past year, patient has experienced: no history of falling in past year      Home Safety:  Patient does not have trouble with stairs inside or outside of their home  Patient has working smoke alarms and has working carbon monoxide detector  Home safety hazards include: none  Nutrition:   Current diet is Regular  Medications:   Patient is currently taking over-the-counter supplements  OTC medications include: Multivitamin  Patient is able to manage medications  Activities of Daily Living (ADLs)/Instrumental Activities of Daily Living (IADLs):   Walk and transfer into and out of bed and chair?: Yes  Dress and groom yourself?: Yes    Bathe or shower yourself?: Yes    Feed yourself?  Yes  Do your laundry/housekeeping?: Yes  Manage your money, pay your bills and track your expenses?: Yes  Make your own meals?: Yes    Do your own shopping?: Yes    Previous Hospitalizations:   Any hospitalizations or ED visits within the last 12 months?: No      Advance Care Planning:   Living will: No    Durable POA for healthcare: No    Advanced directive: No    Advanced directive counseling given: Yes    Five wishes given: Yes    End of Life Decisions reviewed with patient: Yes    Provider agrees with end of life decisions: Yes      Cognitive Screening:   Provider or family/friend/caregiver concerned regarding cognition?: No    PREVENTIVE SCREENINGS      Cardiovascular Screening:    General: Screening Current      Diabetes Screening:     General: Screening Current      Colorectal Cancer Screening:     General: Screening Not Indicated      Prostate Cancer Screening:    General: Screening Not Indicated      Osteoporosis Screening:    General: Screening Not Indicated      Abdominal Aortic Aneurysm (AAA) Screening:    Risk factors include: tobacco use        Lung Cancer Screening:     General: Screening Not Indicated      Hepatitis C Screening:    General: Screening Not Indicated    Screening, Brief Intervention, and Referral to Treatment (SBIRT)    Screening  Typical number of drinks in a day: 1  Typical number of drinks in a week: 7  Interpretation: Low risk drinking behavior  AUDIT-C Screenin) How often did you have a drink containing alcohol in the past year? 4 or more times a week  2) How many drinks did you have on a typical day when you were drinking in the past year? 1 to 2  3) How often did you have 6 or more drinks on one occasion in the past year? never    AUDIT-C Score: 4  Interpretation: Score 4-12 (male): POSITIVE screen for alcohol misuse    AUDIT Screenin) How often during the last year have you found that you were not able to stop drinking once you had started?  0 - never  5) How often during the last year have you failed to do what was normally expected from you because of drinking? 0 - never  6) How often during the last year have you needed a first drink in the morning to get yourself going after a heavy drinking session? 0 - never  7) How often during the last year have you had a feeling of guilt or remorse after drinking? 0 - never  8) How often during the last year have you been unable to remember what happened the night before because you had been drinking? 0 - never  9) Have you or someone else been injured as a result of your drinking? 0 - no  10) Has a relative or friend or a doctor or another health worker been concerned about your drinking or suggested you cut down? 0 - no    AUDIT Score: 4  Interpretation: Low risk alcohol consumption    Single Item Drug Screening:  How often have you used an illegal drug (including marijuana) or a prescription medication for non-medical reasons in the past year? never    Single Item Drug Screen Score: 0  Interpretation: Negative screen for possible drug use disorder    Brief Intervention  Alcohol & drug use screenings were reviewed  No concerns regarding substance use disorder identified  Other Counseling Topics:   Car/seat belt/driving safety, skin self-exam, sunscreen and regular weightbearing exercise and calcium and vitamin D intake  Visual Acuity Screening    Right eye Left eye Both eyes   Without correction: 20/70 20/70 20/70   With correction:           Physical Exam:     /78 (BP Location: Left arm, Patient Position: Sitting, Cuff Size: Standard)   Pulse (!) 50   Temp 98 1 °F (36 7 °C)   Resp 14   Ht 5' 7" (1 702 m)   Wt 70 6 kg (155 lb 9 6 oz)   SpO2 99%   BMI 24 37 kg/m²     Physical Exam  Vitals and nursing note reviewed  Constitutional:       Appearance: He is well-developed  HENT:      Head: Normocephalic and atraumatic        Right Ear: External ear normal       Left Ear: External ear normal       Nose: Nose normal    Eyes:      Conjunctiva/sclera: Conjunctivae normal  Pupils: Pupils are equal, round, and reactive to light  Cardiovascular:      Rate and Rhythm: Normal rate and regular rhythm  Heart sounds: Normal heart sounds  No murmur heard  Pulmonary:      Effort: Pulmonary effort is normal  No respiratory distress  Breath sounds: Normal breath sounds  Abdominal:      General: Bowel sounds are normal       Palpations: Abdomen is soft  Tenderness: There is no abdominal tenderness  Musculoskeletal:         General: Normal range of motion  Cervical back: Normal range of motion and neck supple  Skin:     General: Skin is warm and dry  Neurological:      Mental Status: He is alert and oriented to person, place, and time  Psychiatric:         Behavior: Behavior normal          Thought Content:  Thought content normal          Judgment: Judgment normal           Duong Page, DO

## 2022-10-04 NOTE — PATIENT INSTRUCTIONS
Medicare Preventive Visit Patient Instructions  Thank you for completing your Welcome to Medicare Visit or Medicare Annual Wellness Visit today  Your next wellness visit will be due in one year (10/5/2023)  The screening/preventive services that you may require over the next 5-10 years are detailed below  Some tests may not apply to you based off risk factors and/or age  Screening tests ordered at today's visit but not completed yet may show as past due  Also, please note that scanned in results may not display below  Preventive Screenings:  Service Recommendations Previous Testing/Comments   Colorectal Cancer Screening  · Colonoscopy    · Fecal Occult Blood Test (FOBT)/Fecal Immunochemical Test (FIT)  · Fecal DNA/Cologuard Test  · Flexible Sigmoidoscopy Age: 39-70 years old   Colonoscopy: every 10 years (May be performed more frequently if at higher risk)  OR  FOBT/FIT: every 1 year  OR  Cologuard: every 3 years  OR  Sigmoidoscopy: every 5 years  Screening may be recommended earlier than age 39 if at higher risk for colorectal cancer  Also, an individualized decision between you and your healthcare provider will decide whether screening between the ages of 74-80 would be appropriate  Colonoscopy: 05/04/2016  FOBT/FIT: Not on file  Cologuard: Not on file  Sigmoidoscopy: Not on file          Prostate Cancer Screening Individualized decision between patient and health care provider in men between ages of 53-78   Medicare will cover every 12 months beginning on the day after your 50th birthday PSA: 2 6 ng/mL           Hepatitis C Screening Once for adults born between 1945 and 1965  More frequently in patients at high risk for Hepatitis C Hep C Antibody: Not on file        Diabetes Screening 1-2 times per year if you're at risk for diabetes or have pre-diabetes Fasting glucose: 75 mg/dL (1/24/2019)  A1C: 5 2 % (9/17/2019)      Cholesterol Screening Once every 5 years if you don't have a lipid disorder   May order more often based on risk factors  Lipid panel: 10/05/2021         Other Preventive Screenings Covered by Medicare:  1  Abdominal Aortic Aneurysm (AAA) Screening: covered once if your at risk  You're considered to be at risk if you have a family history of AAA or a male between the age of 73-68 who smoking at least 100 cigarettes in your lifetime  2  Lung Cancer Screening: covers low dose CT scan once per year if you meet all of the following conditions: (1) Age 50-69; (2) No signs or symptoms of lung cancer; (3) Current smoker or have quit smoking within the last 15 years; (4) You have a tobacco smoking history of at least 20 pack years (packs per day x number of years you smoked); (5) You get a written order from a healthcare provider  3  Glaucoma Screening: covered annually if you're considered high risk: (1) You have diabetes OR (2) Family history of glaucoma OR (3)  aged 48 and older OR (3)  American aged 72 and older  3  Osteoporosis Screening: covered every 2 years if you meet one of the following conditions: (1) Have a vertebral abnormality; (2) On glucocorticoid therapy for more than 3 months; (3) Have primary hyperparathyroidism; (4) On osteoporosis medications and need to assess response to drug therapy  5  HIV Screening: covered annually if you're between the age of 12-76  Also covered annually if you are younger than 13 and older than 72 with risk factors for HIV infection  For pregnant patients, it is covered up to 3 times per pregnancy      Immunizations:  Immunization Recommendations   Influenza Vaccine Annual influenza vaccination during flu season is recommended for all persons aged >= 6 months who do not have contraindications   Pneumococcal Vaccine   * Pneumococcal conjugate vaccine = PCV13 (Prevnar 13), PCV15 (Vaxneuvance), PCV20 (Prevnar 20)  * Pneumococcal polysaccharide vaccine = PPSV23 (Pneumovax) Adults 25-60 years old: 1-3 doses may be recommended based on certain risk factors  Adults 72 years old: 1-2 doses may be recommended based off what pneumonia vaccine you previously received   Hepatitis B Vaccine 3 dose series if at intermediate or high risk (ex: diabetes, end stage renal disease, liver disease)   Tetanus (Td) Vaccine - COST NOT COVERED BY MEDICARE PART B Following completion of primary series, a booster dose should be given every 10 years to maintain immunity against tetanus  Td may also be given as tetanus wound prophylaxis  Tdap Vaccine - COST NOT COVERED BY MEDICARE PART B Recommended at least once for all adults  For pregnant patients, recommended with each pregnancy  Shingles Vaccine (Shingrix) - COST NOT COVERED BY MEDICARE PART B  2 shot series recommended in those aged 48 and above     Health Maintenance Due:  There are no preventive care reminders to display for this patient  Immunizations Due:  There are no preventive care reminders to display for this patient  Advance Directives   What are advance directives? Advance directives are legal documents that state your wishes and plans for medical care  These plans are made ahead of time in case you lose your ability to make decisions for yourself  Advance directives can apply to any medical decision, such as the treatments you want, and if you want to donate organs  What are the types of advance directives? There are many types of advance directives, and each state has rules about how to use them  You may choose a combination of any of the following:  · Living will: This is a written record of the treatment you want  You can also choose which treatments you do not want, which to limit, and which to stop at a certain time  This includes surgery, medicine, IV fluid, and tube feedings  · Durable power of  for healthcare [facility]): This is a written record that states who you want to make healthcare choices for you when you are unable to make them for yourself   This person, called a proxy, is usually a family member or a friend  You may choose more than 1 proxy  · Do not resuscitate (DNR) order:  A DNR order is used in case your heart stops beating or you stop breathing  It is a request not to have certain forms of treatment, such as CPR  A DNR order may be included in other types of advance directives  · Medical directive: This covers the care that you want if you are in a coma, near death, or unable to make decisions for yourself  You can list the treatments you want for each condition  Treatment may include pain medicine, surgery, blood transfusions, dialysis, IV or tube feedings, and a ventilator (breathing machine)  · Values history: This document has questions about your views, beliefs, and how you feel and think about life  This information can help others choose the care that you would choose  Why are advance directives important? An advance directive helps you control your care  Although spoken wishes may be used, it is better to have your wishes written down  Spoken wishes can be misunderstood, or not followed  Treatments may be given even if you do not want them  An advance directive may make it easier for your family to make difficult choices about your care  © Copyright [employer] 2018 Information is for End User's use only and may not be sold, redistributed or otherwise used for commercial purposes   All illustrations and images included in CareNotes® are the copyrighted property of A [employer] M , Inc  or [address] [place] [address]

## 2022-10-05 ENCOUNTER — CONSULT (OUTPATIENT)
Dept: DERMATOLOGY | Facility: CLINIC | Age: 82
End: 2022-10-05
Payer: COMMERCIAL

## 2022-10-05 VITALS — TEMPERATURE: 98.3 F | BODY MASS INDEX: 24.33 KG/M2 | HEIGHT: 67 IN | WEIGHT: 155 LBS

## 2022-10-05 DIAGNOSIS — D49.2 ABNORMAL SKIN GROWTH: ICD-10-CM

## 2022-10-05 DIAGNOSIS — D48.5 NEOPLASM OF UNCERTAIN BEHAVIOR OF SKIN: Primary | ICD-10-CM

## 2022-10-05 PROCEDURE — 11102 TANGNTL BX SKIN SINGLE LES: CPT | Performed by: DERMATOLOGY

## 2022-10-05 PROCEDURE — 88305 TISSUE EXAM BY PATHOLOGIST: CPT | Performed by: PATHOLOGY

## 2022-10-05 PROCEDURE — 99204 OFFICE O/P NEW MOD 45 MIN: CPT | Performed by: DERMATOLOGY

## 2022-10-05 NOTE — PATIENT INSTRUCTIONS
INFORMED CONSENT DISCUSSION AND POST-OPERATIVE INSTRUCTIONS FOR PATIENT    I   RATIONALE FOR PROCEDURE  I understand that a skin biopsy allows the Dermatologist to test a lesion or rash under the microscope to obtain a diagnosis  It usually involves numbing the area with numbing medication and removing a small piece of skin; sometimes the area will be closed with sutures  In this specific procedure, sutures are not usually needed  If any sutures are placed, then they are usually need to be removed in 2 weeks or less  I understand that my Dermatologist recommends that a skin "shave" biopsy be performed today  A local anesthetic, similar to the kind that a dentist uses when filling a cavity, will be injected with a very small needle into the skin area to be sampled  The injected skin and tissue underneath "will go to sleep and become numb so no pain should be felt afterwards  An instrument shaped like a tiny "razor blade" (shave biopsy instrument) will be used to cut a small piece of tissue and skin from the area so that a sample of tissue can be taken and examined more closely under the microscope  A slight amount of bleeding will occur, but it will be stopped with direct pressure and a pressure bandage and any other appropriate methods  I understands that a scar will form where the wound was created  Surgical ointment will be applied to help protect the wound  Sutures are not usually needed      II   RISKS AND POTENTIAL COMPLICATIONS   I understand the risks and potential complications of a skin biopsy include but are not limited to the following:  Bleeding  Infection  Pain  Scar/keloid  Skin discoloration  Incomplete Removal  Recurrence  Nerve Damage/Numbness/Loss of Function  Allergic Reaction to Anesthesia  Biopsies are diagnostic procedures and based on findings additional treatment or evaluation may be required  Loss or destruction of specimen resulting in no additional findings    My Dermatologist has explained to me the nature of the condition, the nature of the procedure, and the benefits to be reasonably expected compared with alternative approaches  My Dermatologist has discussed the likelihood of major risks or complications of this procedure including the specific risks listed above, such as bleeding, infection, and scarring/keloid  I understand that a scar is expected after this procedure  I understand that my physician cannot predict if the scar will form a "keloid," which extends beyond the borders of the wound that is created  A keloid is a thick, painful, and bumpy scar  A keloid can be difficult to treat, as it does not always respond well to therapy, which includes injecting cortisone directly into the keloid every few weeks  While this usually reduces the pain and size of the scar, it does not eliminate it  I understand that photographs may be taken before and after the procedure  These will be maintained as part of the medical providers confidential records and may not be made available to me  I further authorize the medical provider to use the photographs for teaching purposes or to illustrate scientific papers, books, or lectures if in his/her judgment, medical research, education, or science may benefit from its use  I have had an opportunity to fully inquire about the risks and benefits of this procedure and its alternatives  I have been given ample time and opportunity to ask questions and to seek a second opinion if I wished to do so  I acknowledge that there have specifically been no guarantees as to the cosmetic results from the procedure  I am aware that with any procedure there is always the possibility of an unexpected complication  III  POST-PROCEDURAL CARE (WHAT YOU WILL NEED TO DO "AFTER THE BIOPSY" TO OPTIMIZE HEALING)    Keep the area clean and dry  Try NOT to remove the bandage or get it wet for the first 24 hours      Gently clean the area and apply surgical ointment (such as Vaseline petrolatum ointment, which is available "over the counter" and not a prescription) to the biopsy site for up to 2 weeks straight  This acts to protect the wound from the outside world  Sutures are not usually placed in this procedure  If any sutures were placed, return for suture removal as instructed (generally 1 week for the face, 2 weeks for the body)  Take Acetaminophen (Tylenol) for discomfort, if no contraindications  Ibuprofen or aspirin could make bleeding worse  Call our office immediately for signs of infection: fever, chills, increased redness, warmth, tenderness, discomfort/pain, or pus or foul smell coming from the wound  WHAT TO DO IF THERE IS ANY BLEEDING? If a small amount of bleeding is noticed, place a clean cloth over the area and apply firm pressure for ten minutes  Check the wound after 10 minutes of direct pressure  If bleeding persists, try one more time for an additional 10 minutes of direct pressure on the area  If the bleeding becomes heavier or does not stop after the second attempt, or if you have any other questions about this procedure, then please call your [facility]'s Dermatologist by calling [phone] (SKIN)  I hereby acknowledge that I have reviewed and verified the site with my Dermatologist and have requested and authorized my Dermatologist to proceed with the procedure

## 2022-10-05 NOTE — PROGRESS NOTES
[facility] Note     Patient Name: Neelam Campos  Encounter Date: 10/05/22    Have you been cared for by a [facility] D[facility]atologist in the last 3 years and, if so, which one? No    Have you traveled outside of the [facility] in the past 3 months or outside of the [facility] area in the last 2 weeks? No    May we call your Preferred Phone number to discuss your specific medical information? Yes    May we leave a detailed message that includes your specific medical information? Yes      Today's Chief Concerns:  Concern #1:  Spot of concern      Past Medical History:  Have you personally ever had or currently have any of the following? Skin cancer (such as Melanoma, Basal Cell Carcinoma, Squamous Cell Carcinoma? (If Yes, please provide more detail)- No  Eczema: No  Psoriasis: No  HIV/AIDS: No  Hepatitis B or C: No  Tuberculosis: No  Systemic Immunosuppression such as Diabetes, Biologic or Immunotherapy, Chemotherapy, Organ Transplantation, Bone Marrow Transplantation (If YES, please provide more detail): No  Radiation Treatment (If YES, please provide more detail): No  Any other major medical conditions/concerns? (If Yes, which types)- No    Social History:    What is/was your primary occupation? Retired     What are your hobbies/past-times? [employer]     Family History:  Have any of your "first degree relatives" (parent, brother, sister, or child) had any of the following    Skin cancer such as Melanoma or Merkel Cell Carcinoma or Pancreatic Cancer? YES, Father had melanoma   Eczema, Asthma, Hay Fever or Seasonal Allergies: No  Psoriasis or Psoriatic Arthritis: No  Do any other medical conditions seem to run in your family? If Yes, what condition and which relatives?   No    Current Medications:       Current Outpatient Medications:     meclizine (ANTIVERT) 25 mg tablet, Take 1 tablet (25 mg total) by mouth 3 (three) times a day as needed for dizziness, Disp: 30 tablet, Rfl: 0    multivitamin (THERAGRAN) TABS, Take 1 tablet by mouth daily, Disp: , Rfl:     penicillin V potassium (VEETID) 500 mg tablet, Take 500 mg by mouth see administration instructions 4 tabs 1 hour before dental work, Disp: , Rfl:     sildenafil (VIAGRA) 100 mg tablet, Take 1 tablet (100 mg total) by mouth as needed for erectile dysfunction, Disp: 10 tablet, Rfl: 9    Fluad Quadrivalent 0 5 ML PRSY, inject 0 5 milliliters intramuscularly (Patient not taking: No sig reported), Disp: , Rfl:       Review of Systems:  Have you recently had or currently have any of the following? If YES, what are you doing for the problem? Fever, chills or unintended weight loss: No  Sudden loss or change in your vision: No  Nausea, vomiting or blood in your stool: No  Painful or swollen joints: No  Wheezing or cough: No  Changing mole or non-healing wound: No  Nosebleeds: No  Excessive sweating: No  Easy or prolonged bleeding? No  Over the last 2 weeks, how often have you been bothered by the following problems? Taking little interest or pleasure in doing things: 1 - Not at All  Feeling down, depressed, or hopeless: 1 - Not at All  Rapid heartbeat with epinephrine:  No    FEMALES ONLY:    Are you pregnant or planning to become pregnant? N/A  Are you currently or planning to be nursing or breast feeding? N/A    Any known allergies? No Known Allergies      Physical Exam:    Was a chaperone (Derm Clinical Assistant) present throughout the entire Physical Exam? Yes    Did the Dermatology Team specifically  the patient on the importance of a Full Skin Exam to be sure that nothing is missed clinically?  Yes}  Did the patient ultimately request or accept a Full Skin Exam?  NO  Did the patient specifically refuse to have the areas "under-the-bra" examined by the Dermatologist? No  Did the patient specifically refuse to have the areas "under-the-underwear" examined by the Dermatologist? No    CONSTITUTIONAL: Vitals:    10/05/22 1347   Temp: 98 3 °F (36 8 °C)   TempSrc: Temporal   Weight: 70 3 kg (155 lb)   Height: 5' 7" (1 702 m)       PSYCH: Normal mood and affect  EYES: Normal conjunctiva  ENT: Normal lips and oral mucosa  CARDIOVASCULAR: No edema  RESPIRATORY: Normal respirations  HEME/LYMPH/IMMUNO:  No regional lymphadenopathy except as noted below in "ASSESSMENT AND PLAN BY DIAGNOSIS"    SKIN:  FULL ORGAN SYSTEM EXAM   Hair, Scalp, Ears, Face Normal except as noted below in Assessment   Neck, Cervical Chain Nodes Normal except as noted below in Assessment   Right Arm/Hand/Fingers Normal except as noted below in Assessment   Left Arm/Hand/Fingers Normal except as noted below in Assessment   Chest/Breasts/Axillae Viewed areas Normal except as noted below in Assessment   Abdomen, Umbilicus Normal except as noted below in Assessment   Back/Spine Normal except as noted below in Assessment   Groin/Genitalia/Buttocks Normal except as noted below in Assessment   Right Leg, Foot, Toes Normal except as noted below in Assessment   Left Leg, Foot, Toes Normal except as noted below in Assessment        Assessment and Plan by Diagnosis:    History of Present Condition:    Duration:  How long has this been an issue for you?     3-4 months   Location Affected:  Where on the body is this affecting you? Back   Quality:  Is there any bleeding, pain, itch, burning/irritation, or redness associated with the skin lesion? Denies   Severity:  Describe any bleeding, pain, itch, burning/irritation, or redness on a scale of 1 to 10 (with 10 being the worst)  1  Timing:  Does this condition seem to be there pretty constantly or do you notice it more at specific times throughout the day? Constant   Context:  Have you ever noticed that this condition seems to be associated with specific activities you do? Denies   Modifying Factors:    Anything that seems to make the condition worse?      Denies   What have you tried to do to make the condition better? Denies   Associated Signs and Symptoms:  Does this skin lesion seem to be associated with any of the following:  Denies      NEOPLASM OF UNCERTAIN BEHAVIOR OF SKIN    Physical Exam:  (Anatomic Location); (Size and Morphological Description); (Differential Diagnosis):  Right posterior shoulder; 1 5 cm crusted verrucous plaque; DDX probable irritated Seborrheic keratosis doubt traumatized scc  Pertinent Positives:  Pertinent Negatives: Additional History of Present Condition:  Patient states his wife noticed the spot about 3-4 months ago  Asymptomatic     Assessment and Plan:  I have discussed with the patient that a sample of skin via a "skin biopsy would be potentially helpful to further make a specific diagnosis under the microscope  Based on a thorough discussion of this condition and the management approach to it (including a comprehensive discussion of the known risks, side effects and potential benefits of treatment), the patient (family) agrees to implement the following specific plan:    Procedure:  Skin Biopsy  After a thorough discussion of treatment options and risk/benefits/alternatives (including but not limited to local pain, scarring, dyspigmentation, blistering, possible superinfection, and inability to confirm a diagnosis via histopathology), verbal and written consent were obtained and portion of the rash was biopsied for tissue sample  See below for consent that was obtained from patient and subsequent Procedure Note    PROCEDURE TANGENTIAL (SHAVE) BIOPSY NOTE:    Performing Physician: Dr Desir  Anatomic Location; Clinical Description with size (cm); Pre-Op Diagnosis:      Right posterior shoulder; 1 5 cm crusted verrucous plaque  Post-op diagnosis: Same     Local anesthesia: 1% Lidocaine HCL     Topical anesthesia: None    Hemostasis: Aluminum chloride       After obtaining informed consent  at which time there was a discussion about the purpose of biopsy  and low risks of infection and bleeding  The area was prepped and draped in the usual fashion  Anesthesia was obtained with 1% lidocaine with epinephrine  A shave biopsy to an appropriate sampling depth was obtained by Shave (Dermablade or 15 blade) The resulting wound was covered with surgical ointment and bandaged appropriately  The patient tolerated the procedure well without complications and was without signs of functional compromise  Specimen has been sent for review by Dermatopathology  Standard post-procedure care has been explained and has been included in written form within the patient's copy of Informed Consent          Scribe Attestation    I,:  Colonel Webster am acting as a scribe while in the presence of the attending physician :       I,:  Aminah Horta MD personally performed the services described in this documentation    as scribed in my presence :

## 2022-10-11 ENCOUNTER — OFFICE VISIT (OUTPATIENT)
Dept: UROLOGY | Facility: HOSPITAL | Age: 82
End: 2022-10-11
Payer: COMMERCIAL

## 2022-10-11 VITALS
SYSTOLIC BLOOD PRESSURE: 168 MMHG | HEIGHT: 67 IN | OXYGEN SATURATION: 99 % | HEART RATE: 44 BPM | DIASTOLIC BLOOD PRESSURE: 78 MMHG | BODY MASS INDEX: 24.33 KG/M2 | WEIGHT: 155 LBS

## 2022-10-11 DIAGNOSIS — N52.9 ERECTILE DYSFUNCTION, UNSPECIFIED ERECTILE DYSFUNCTION TYPE: ICD-10-CM

## 2022-10-11 DIAGNOSIS — R33.9 URINARY RETENTION: Primary | ICD-10-CM

## 2022-10-11 LAB — POST-VOID RESIDUAL VOLUME, ML POC: 962 ML

## 2022-10-11 PROCEDURE — 51798 US URINE CAPACITY MEASURE: CPT | Performed by: NURSE PRACTITIONER

## 2022-10-11 PROCEDURE — 99213 OFFICE O/P EST LOW 20 MIN: CPT | Performed by: NURSE PRACTITIONER

## 2022-10-11 RX ORDER — SILDENAFIL 100 MG/1
100 TABLET, FILM COATED ORAL AS NEEDED
Qty: 10 TABLET | Refills: 3 | Status: SHIPPED | OUTPATIENT
Start: 2022-10-11

## 2022-10-11 NOTE — PROGRESS NOTES
10/11/22    Yasmin Coffey   1940   [id number]     Assessment  1 BPH s/p Urolift (7/5/2019)  2 Incomplete bladder emptying   3 ED     Discussion/Plan  1 BPH s/p Urolift (7/5/19)  2 Incomplete bladder emptying              Continue CIC daily, encouraged BID patient refused               PVR               [employer]: 14 Fr coude catheter  Passing catheter without difficulty              3/2021 Renal US with PVR: No hydronephrosis  Bilateral renal cysts  Redemonstrated diffuse irregular bladder wall thickening and marked post void residual volume   Layering debris within the bladder  Marked post void residual urine volume   Estimated volume is 844 mL               4/5/22 BMP BUN 12, Cr 1 11, eGFR 68  2 ED              Continue Sildenafil  mg PRN     Continue CIC daily  Encouraged CIC BID  He will follow up in 6 months or sooner if needed  Subjective  HPI   Sidney Watt is an [de-identified] y  o  male with a history of BPH and incomplete bladder emptying presents today for follow up  Patient previously known to Dr Caty Cohen underwent Urolift procedure in July 2019  Kristan Jeff has since been experiencing incomplete bladder emptying and performing intermittent catheterization once daily at [facility]  He states he discontinued tamsulosin several months ago secondary to side effect of headache   Patient has had significantly elevated post void residuals in the past, as high as almost 1L   He is asymptomatic, and comfortable with CIC, only once daily  Patient refused to increase CIC  His last BMP was normal from Fall 2021  Renal ultrasound from 2016 and 2021 showed no evidence of hydronephrosis, irregular thickening of the bladder wall and high volume PVR   Patient denies any lower urinary tract symptoms, gross hematuria, or dysuria   He is able to pass catheters without any difficulty  Patient continues to use sildenafil as needed prior to sexual activity   No additional changes to his overall health     Component Latest Ref Rng & Units 3/5/2019 4/15/2019 5/28/2019           3:13 PM  9:23 AM  2:52 PM   POST-VOID RESIDUAL VOLUME, ML POC      mL 500 35 [phone]     Component      Latest Ref Rng & Units 8/14/2019 3/4/2020 9/9/2020           2:52 PM  1:48 PM  1:08 PM   POST-VOID RESIDUAL VOLUME, ML POC      mL 369 [id number]     Component      Latest Ref Rng & Units 3/17/2021 3/14/2022 10/11/2022          11:14 AM  8:59 AM  9:03 AM   POST-VOID RESIDUAL VOLUME, ML POC      mL [id number]     Review of Systems - History obtained from chart review and the patient  General ROS: negative  Psychological ROS: negative  Cardiovascular ROS: negative  Gastrointestinal ROS: no abdominal pain, change in bowel habits, or black or bloody stools  Genito-Urinary ROS: positive for - ED   Musculoskeletal ROS: negative  Neurological ROS: no TIA or stroke symptoms   Dermatological ROS: negative       Objective  Physical Exam  Vitals and nursing note reviewed  Constitutional:       General: He is awake  He is not in acute distress  Appearance: Normal appearance  He is well-developed, well-groomed and normal weight  He is not ill-appearing, toxic-appearing or diaphoretic  Pulmonary:      Effort: Pulmonary effort is normal    Abdominal:      Tenderness: There is no right CVA tenderness or left CVA tenderness  Musculoskeletal:         General: Normal range of motion  Cervical back: Normal range of motion and neck supple  Skin:     General: Skin is warm  Neurological:      General: No focal deficit present  Mental Status: He is alert and oriented to person, place, and time  Mental status is at baseline  Psychiatric:         Attention and Perception: Attention normal          Mood and Affect: Mood normal          Speech: Speech normal          Behavior: Behavior normal  Behavior is cooperative  Thought Content:  Thought content normal          Cognition and Memory: Cognition normal          Judgment: Judgment normal  AUA SYMPTOM SCORE    Flowsheet Row Most Recent Value   AUA SYMPTOM SCORE    How often have you had a sensation of not emptying your bladder completely after you finished urinating? 1   How often have you had to urinate again less than two hours after you finished urinating? 0   How often have you found you stopped and started again several times when you urinate? 1   How often have you found it difficult to postpone urination? 0   How often have you had a weak urinary stream? 1   How often have you had to push or strain to begin urination? 0   How many times did you most typically get up to urinate from the time you went to bed at night until the time you got up in the morning? 0   Quality of Life: If you were to spend the rest of your life with your urinary condition just the way it is now, how would you feel about that? 2   AUA SYMPTOM SCORE 3               Makenna Grayson     I have spent 15 minutes with Patient  today in which greater than 50% of this time was spent in counseling/coordination of care regarding Intructions for management

## 2022-10-18 NOTE — RESULT ENCOUNTER NOTE
DERMATOPATHOLOGY RESULT NOTE    Results reviewed by ordering physician  Called patient to personally discuss results  No answer, left voicemail with result  Instructions for Clinical Derm Team:   (remember to route Result Note to appropriate staff):    None    Result & Plan by Specimen:    Specimen A: benign  Plan: reassured, benign  Final Diagnosis  A   Skin, right posterior shoulder, shave biopsy:  Seborrheic keratosis, inflamed and irritated

## 2022-10-20 LAB
ALBUMIN SERPL-MCNC: 4.7 G/DL (ref 3.7–4.7)
ALBUMIN/GLOB SERPL: 1.9 {RATIO} (ref 1.2–2.2)
ALP SERPL-CCNC: 22 IU/L (ref 44–121)
ALT SERPL-CCNC: 18 IU/L (ref 0–44)
APPEARANCE UR: ABNORMAL
AST SERPL-CCNC: 31 IU/L (ref 0–40)
BACTERIA URNS QL MICRO: ABNORMAL
BILIRUB SERPL-MCNC: 1.6 MG/DL (ref 0–1.2)
BILIRUB UR QL STRIP: NEGATIVE
BUN SERPL-MCNC: 11 MG/DL (ref 8–27)
BUN/CREAT SERPL: 10 (ref 10–24)
CALCIUM SERPL-MCNC: 9.3 MG/DL (ref 8.6–10.2)
CASTS URNS QL MICRO: ABNORMAL /LPF
CHLORIDE SERPL-SCNC: 99 MMOL/L (ref 96–106)
CHOLEST SERPL-MCNC: 167 MG/DL (ref 100–199)
CHOLEST/HDLC SERPL: 2.5 RATIO (ref 0–5)
CO2 SERPL-SCNC: 24 MMOL/L (ref 20–29)
COLOR UR: YELLOW
CREAT SERPL-MCNC: 1.11 MG/DL (ref 0.76–1.27)
EGFR: 67 ML/MIN/1.73
EPI CELLS #/AREA URNS HPF: ABNORMAL /HPF (ref 0–10)
ERYTHROCYTE [DISTWIDTH] IN BLOOD BY AUTOMATED COUNT: 12.8 % (ref 11.6–15.4)
GLOBULIN SER-MCNC: 2.5 G/DL (ref 1.5–4.5)
GLUCOSE SERPL-MCNC: 89 MG/DL (ref 70–99)
GLUCOSE UR QL: NEGATIVE
HCT VFR BLD AUTO: 44.1 % (ref 37.5–51)
HDLC SERPL-MCNC: 67 MG/DL
HGB BLD-MCNC: 14.9 G/DL (ref 13–17.7)
HGB UR QL STRIP: NEGATIVE
KETONES UR QL STRIP: NEGATIVE
LDLC SERPL CALC-MCNC: 84 MG/DL (ref 0–99)
LEUKOCYTE ESTERASE UR QL STRIP: ABNORMAL
MCH RBC QN AUTO: 32.4 PG (ref 26.6–33)
MCHC RBC AUTO-ENTMCNC: 33.8 G/DL (ref 31.5–35.7)
MCV RBC AUTO: 96 FL (ref 79–97)
MICRO URNS: ABNORMAL
NITRITE UR QL STRIP: POSITIVE
PH UR STRIP: 7 [PH] (ref 5–7.5)
PLATELET # BLD AUTO: 261 X10E3/UL (ref 150–450)
POTASSIUM SERPL-SCNC: 4.6 MMOL/L (ref 3.5–5.2)
PROT SERPL-MCNC: 7.2 G/DL (ref 6–8.5)
PROT UR QL STRIP: NEGATIVE
PSA SERPL-MCNC: 2.1 NG/ML (ref 0–4)
RBC # BLD AUTO: 4.6 X10E6/UL (ref 4.14–5.8)
RBC #/AREA URNS HPF: ABNORMAL /HPF (ref 0–2)
SL AMB REFLEX CRITERIA: NORMAL
SL AMB VLDL CHOLESTEROL CALC: 16 MG/DL (ref 5–40)
SODIUM SERPL-SCNC: 137 MMOL/L (ref 134–144)
SP GR UR: 1.01 (ref 1–1.03)
TRIGL SERPL-MCNC: 86 MG/DL (ref 0–149)
TSH SERPL DL<=0.005 MIU/L-ACNC: 4.38 UIU/ML (ref 0.45–4.5)
UROBILINOGEN UR STRIP-ACNC: 0.2 MG/DL (ref 0.2–1)
WBC # BLD AUTO: 5.6 X10E3/UL (ref 3.4–10.8)
WBC #/AREA URNS HPF: ABNORMAL /HPF (ref 0–5)

## 2023-04-25 ENCOUNTER — TELEPHONE (OUTPATIENT)
Dept: UROLOGY | Facility: AMBULATORY SURGERY CENTER | Age: 83
End: 2023-04-25

## 2023-04-25 DIAGNOSIS — N39.0 URINARY TRACT INFECTION WITHOUT HEMATURIA, SITE UNSPECIFIED: Primary | ICD-10-CM

## 2023-04-25 RX ORDER — LEVOFLOXACIN 500 MG/1
500 TABLET, FILM COATED ORAL EVERY 24 HOURS
Qty: 7 TABLET | Refills: 0 | Status: SHIPPED | OUTPATIENT
Start: 2023-04-25 | End: 2023-05-02

## 2023-04-25 NOTE — TELEPHONE ENCOUNTER
----- Message from [id number] Porsche Wilkins sent at 4/25/2023 11:09 AM EDT -----  Results of recent urine culture were positive for infection  Prescription for Levaquin was sent to his pharmacy based on final sensitivity  Patient has history of high-volume residuals and expressed concern for urinary tract infection and kidney compromise at recent office visit  He has deferred indwelling Ovalle catheter or CIC  Patient scheduled for renal ultrasound and would recommend repeat urine testing after completion of antibiotics

## 2023-04-25 NOTE — TELEPHONE ENCOUNTER
Spoke to patient and gave him urine culture results per Mira, [address] Roy[address] Jimenez and that he should have an ultrasound of kidneys and repeat urine studies after completion of Levaquin that she sent to the pharmacy  He stated he already has ultrasound scheduled and I will e-mail ([email or web address])  his urine study slips to take to [facility] for rechecking when antibiotic regimen is completed  He was thankful for the call

## 2023-05-07 LAB
APPEARANCE UR: CLEAR
BACTERIA UR CULT: NORMAL
BACTERIA URNS QL MICRO: NORMAL
BILIRUB UR QL STRIP: NEGATIVE
CASTS URNS QL MICRO: NORMAL /LPF
COLOR UR: YELLOW
EPI CELLS #/AREA URNS HPF: NORMAL /HPF (ref 0–10)
GLUCOSE UR QL: NEGATIVE
HGB UR QL STRIP: NEGATIVE
KETONES UR QL STRIP: NEGATIVE
LEUKOCYTE ESTERASE UR QL STRIP: NEGATIVE
Lab: NO GROWTH
MICRO URNS: NORMAL
MICRO URNS: NORMAL
NITRITE UR QL STRIP: NEGATIVE
PH UR STRIP: 7.5 [PH] (ref 5–7.5)
PROT UR QL STRIP: NEGATIVE
RBC #/AREA URNS HPF: NORMAL /HPF (ref 0–2)
SP GR UR: 1.01 (ref 1–1.03)
UROBILINOGEN UR STRIP-ACNC: 0.2 MG/DL (ref 0.2–1)
WBC #/AREA URNS HPF: NORMAL /HPF (ref 0–5)

## 2023-07-12 ENCOUNTER — HOSPITAL ENCOUNTER (OUTPATIENT)
Dept: ULTRASOUND IMAGING | Facility: HOSPITAL | Age: 83
Discharge: HOME | End: 2023-07-12
Payer: COMMERCIAL

## 2023-07-12 DIAGNOSIS — R33.9 URINARY RETENTION: ICD-10-CM

## 2023-07-12 PROCEDURE — 76770 US EXAM ABDO BACK WALL COMP: CPT

## 2023-07-18 ENCOUNTER — TELEPHONE (OUTPATIENT)
Dept: UROLOGY | Facility: AMBULATORY SURGERY CENTER | Age: 83
End: 2023-07-18

## 2023-07-18 DIAGNOSIS — R33.9 URINARY RETENTION: Primary | ICD-10-CM

## 2023-07-19 ENCOUNTER — TELEPHONE (OUTPATIENT)
Dept: UROLOGY | Facility: AMBULATORY SURGERY CENTER | Age: 83
End: 2023-07-19

## 2023-07-19 NOTE — TELEPHONE ENCOUNTER
----- Message from [id number] Exavio sent at 7/18/2023  9:29 AM EDT -----  Renal ultrasound appreciated significant postvoid residual volume. Fortunately, there is no evidence of hydronephrosis. Would again recommend patient CIC at least 3-4 times daily. He has continued to refuse CIC more than once daily. We should also check BMP prior to upcoming office visit.

## 2023-07-19 NOTE — TELEPHONE ENCOUNTER
Chen Munson and notified him the result of U/S - he said its been like that for 5 years and he is only going to cath once a day.   He was also notified to get BMP and it is a fasting blood test

## 2023-07-21 LAB
BUN SERPL-MCNC: 13 MG/DL (ref 8–27)
BUN/CREAT SERPL: 13 (ref 10–24)
CALCIUM SERPL-MCNC: 9.5 MG/DL (ref 8.6–10.2)
CHLORIDE SERPL-SCNC: 97 MMOL/L (ref 96–106)
CO2 SERPL-SCNC: 21 MMOL/L (ref 20–29)
CREAT SERPL-MCNC: 1.04 MG/DL (ref 0.76–1.27)
EGFR: 73 ML/MIN/1.73
GLUCOSE SERPL-MCNC: 83 MG/DL (ref 70–99)
POTASSIUM SERPL-SCNC: 5.2 MMOL/L (ref 3.5–5.2)
SODIUM SERPL-SCNC: 134 MMOL/L (ref 134–144)

## 2023-07-26 ENCOUNTER — OFFICE VISIT (OUTPATIENT)
Dept: UROLOGY | Facility: HOSPITAL | Age: 83
End: 2023-07-26
Payer: COMMERCIAL

## 2023-07-26 VITALS
OXYGEN SATURATION: 100 % | BODY MASS INDEX: 24.17 KG/M2 | WEIGHT: 154 LBS | DIASTOLIC BLOOD PRESSURE: 72 MMHG | HEART RATE: 62 BPM | SYSTOLIC BLOOD PRESSURE: 118 MMHG | HEIGHT: 67 IN

## 2023-07-26 DIAGNOSIS — R33.8 BENIGN PROSTATIC HYPERPLASIA WITH URINARY RETENTION: Primary | ICD-10-CM

## 2023-07-26 DIAGNOSIS — N52.9 ERECTILE DYSFUNCTION, UNSPECIFIED ERECTILE DYSFUNCTION TYPE: ICD-10-CM

## 2023-07-26 DIAGNOSIS — N40.1 BENIGN PROSTATIC HYPERPLASIA WITH URINARY RETENTION: Primary | ICD-10-CM

## 2023-07-26 PROCEDURE — 99213 OFFICE O/P EST LOW 20 MIN: CPT | Performed by: NURSE PRACTITIONER

## 2023-07-26 RX ORDER — TAMSULOSIN HYDROCHLORIDE 0.4 MG/1
0.4 CAPSULE ORAL
Qty: 30 CAPSULE | Refills: 3 | Status: SHIPPED | OUTPATIENT
Start: 2023-07-26 | End: 2023-07-30 | Stop reason: SDUPTHER

## 2023-07-26 NOTE — PROGRESS NOTES
7/26/2023    Nayeli Coffey  1940  [id number]      Assessment  -BPH s/p Urolift (7/2019)  -Incomplete bladder emptying  -Erectile dysfunction    Discussion/Plan  Lorenzo Sears is a 82 y.o. male being managed by our office    1. BPH s/p Urolift (7/2019), incomplete bladder emptying- patient refuses PVR assessment in the office today. We discussed results of his recent renal ultrasound from 7/12/2023 which showed no evidence of hydronephrosis or bladder distention. Postvoid residual measured 951 cc. Patient is in no acute distress at today's office visit. We again discussed the concerns of significantly elevated postvoid residual including renal compromise, risk for infection, and bladder perforation. He is well aware of risks and verbalizes understanding. Patient states he will continue to perform intermittent CIC once daily before bed only. He is not interested in further testing, cystoscopy, or increasing CIC frequency. We did discuss restarting tamsulosin. Patient is reluctant, but is amenable to trying. Prescription sent to his pharmacy. Reviewed timed and double voiding. Recommend continued follow-up, he is agreeable to returning in 6 months for reassessment. Patient was advised to call sooner with any additional questions or issues.    -All questions answered, patient agrees with plan      History of Present Illness  82 y.o. male with a history of BPH, incomplete bladder emptying, and ED presents today for follow up. Patient seen in the office in April 2023. He underwent UroLift procedure in July 2019. Patient continues to perform intermittent CIC for management of incomplete bladder emptying. He has a history of high-volume residuals. Patient was advised to increase CIC, but only performs once daily before bed. His last renal ultrasound from 2021 showed no evidence of hydronephrosis. Last creatinine from 10/19/2022 was 1.11. He denies any difficulties passing catheter.   Patient feels he is emptying to completion. He denies any additional lower urinary tract symptoms, gross hematuria, or dysuria. Patient presents today to discuss recent renal ultrasound results.     Patient continues to use sildenafil as needed prior to sexual activity.     Patient denies any strong family history of bladder, kidney, or prostate malignancy. Review of Systems  Review of Systems   Constitutional: Negative. HENT: Negative. Respiratory: Negative. Cardiovascular: Negative. Gastrointestinal: Negative. Genitourinary: Negative for decreased urine volume, difficulty urinating, dysuria, flank pain, frequency, hematuria and urgency. Musculoskeletal: Negative. Skin: Negative. Neurological: Negative. Psychiatric/Behavioral: Negative.         Past Medical History  Past Medical History:   Diagnosis Date   • BPH (benign prostatic hyperplasia)    • Diverticulitis of colon    • Herniated lumbar intervertebral disc    • Kidney mass    • Murmur 08/1945    diagnosed as a child 12 YO   • Osteoarthritis    • Preoperative cardiovascular examination 2/16/2018       Past Social History  Past Surgical History:   Procedure Laterality Date   • HERNIA REPAIR     • JOINT REPLACEMENT Bilateral    • KNEE ARTHROSCOPY      bilateral   • DC ARTHRP ACETBLR/PROX FEM PROSTC AGRFT/ALGRFT Left 6/12/2017    Procedure: ANTERIOR TOTAL HIP ARTHROPLASTY;  Surgeon: Kristal Chaudhari MD;  [facility];  Service: Orthopedics   • DC ARTHRP ACETBLR/PROX FEM PROSTC AGRFT/ALGRFT Right 1/3/2018    Procedure: ANTERIOR TOTAL HIP ARTHROPLASTY;  Surgeon: Kristal Chaudhari MD;  [facility];  Service: Orthopedics   • DC ARTHRP KNE CONDYLE&PLATU MEDIAL&LAT COMPARTMENTS Left 2/20/2019    Procedure: TOTAL KNEE ARTHROPLASTY;  Surgeon: Kristal Chaudhari MD;  [facility];  Service: Orthopedics   • DC ARTHRP KNE CONDYLE&PLATU MEDIAL&LAT COMPARTMENTS Right 10/23/2019    Procedure: ARTHROPLASTY KNEE TOTAL;  Surgeon: Kristal Chaudhari MD; Location: [facility];  Service: Orthopedics   • CO CYSTO INSERTION TRANSPROSTATIC IMPLANT SINGLE N/A 2019    Procedure: CYSTOSCOPY WITH INSERTION UROLIFT;  Surgeon: Dalila Brown MD;  Location:  MAIN OR;  Service: Urology       Past Family History  Family History   Problem Relation Age of Onset   • Cancer Mother    • Cancer Father    • Cancer Brother    • Heart disease Brother        Past Social history  Social History     Socioeconomic History   • Marital status: /Civil Union     Spouse name: Not on file   • Number of children: Not on file   • Years of education: Not on file   • Highest education level: Not on file   Occupational History   • Not on file   Tobacco Use   • Smoking status: Former     Packs/day: 1.00     Years: 15.00     Total pack years: 15.00     Types: Cigarettes     Quit date:      Years since quittin.5   • Smokeless tobacco: Never   Vaping Use   • Vaping Use: Never used   Substance and Sexual Activity   • Alcohol use: Yes     Alcohol/week: 1.0 standard drink of alcohol     Types: 1 Cans of beer per week     Comment: 1-2 beers daily   • Drug use: No   • Sexual activity: Yes   Other Topics Concern   • Not on file   Social History Narrative   • Not on file     Social Determinants of Health     Financial Resource Strain: Low Risk  (10/4/2022)    Overall Financial Resource Strain (CARDIA)    • Difficulty of Paying Living Expenses: Not very hard   Food Insecurity: No Food Insecurity (10/13/2020)    Hunger Vital Sign    • Worried About Running Out of Food in the Last Year: Never true    • Ran Out of Food in the Last Year: Never true   Transportation Needs: No Transportation Needs (10/4/2022)    PRAPARE - Transportation    • Lack of Transportation (Medical): No    • Lack of Transportation (Non-Medical):  No   Physical Activity: Insufficiently Active (2020)    Exercise Vital Sign    • Days of Exercise per Week: 3 days    • Minutes of Exercise per Session: 30 min   Stress: No Stress Concern Present (9/23/2020)    [facility]    • Feeling of Stress : Only a little   Social Connections: Socially Integrated (9/23/2020)    Social Connection and Isolation Panel [NHANES]    • Frequency of Communication with Friends and Family: Twice a week    • Frequency of Social Gatherings with Friends and Family: Once a week    • Attends Church Services: More than 4 times per year    • Active Member of Clubs or Organizations: Yes    • Attends Club or Organization Meetings: Never    • Marital Status:    Intimate Partner Violence: Not At Risk (10/4/2022)    Humiliation, Afraid, Rape, and Kick questionnaire    • Fear of Current or Ex-Partner: No    • Emotionally Abused: No    • Physically Abused: No    • Sexually Abused: No   Housing Stability: Not on file       Current Medications  Current Outpatient Medications   Medication Sig Dispense Refill   • meclizine (ANTIVERT) 25 mg tablet Take 1 tablet (25 mg total) by mouth 3 (three) times a day as needed for dizziness 30 tablet 0   • multivitamin (THERAGRAN) TABS Take 1 tablet by mouth daily     • sildenafil (VIAGRA) 100 mg tablet Take 1 tablet (100 mg total) by mouth as needed for erectile dysfunction 10 tablet 3   • tamsulosin (FLOMAX) 0.4 mg Take 1 capsule (0.4 mg total) by mouth daily with dinner 30 capsule 3   • penicillin V potassium (VEETID) 500 mg tablet Take 500 mg by mouth see administration instructions 4 tabs 1 hour before dental work (Patient not taking: Reported on 7/26/2023)       No current facility-administered medications for this visit. Allergies  No Known Allergies    Past Medical History, Social History, Family History, medications and allergies were reviewed.     Vitals  Vitals:    07/26/23 1303   BP: 118/72   BP Location: Right arm   Patient Position: Sitting   Cuff Size: Adult   Pulse: 62   SpO2: 100%   Weight: 69.9 kg (154 lb)   Height: 5' 7" (1.702 m) Physical Exam  Physical Exam  Constitutional:       Appearance: Normal appearance. He is well-developed. HENT:      Head: Normocephalic. Eyes:      Pupils: Pupils are equal, round, and reactive to light. Pulmonary:      Effort: Pulmonary effort is normal.   Abdominal:      Palpations: Abdomen is soft. Musculoskeletal:         General: Normal range of motion. Cervical back: Normal range of motion. Skin:     General: Skin is warm and dry. Neurological:      General: No focal deficit present. Mental Status: He is alert and oriented to person, place, and time. Psychiatric:         Mood and Affect: Mood normal.         Behavior: Behavior normal.         Thought Content: Thought content normal.         Judgment: Judgment normal.         Results    I have personally reviewed all pertinent lab results and reviewed with patient  Lab Results   Component Value Date    PSA 2.1 10/19/2022    PSA 2.6 10/05/2021    PSA 2.4 09/19/2019     Lab Results   Component Value Date    GLUCOSE 88 12/05/2017    CALCIUM 8.5 10/24/2019     12/05/2017    K 5.2 07/20/2023    CO2 21 07/20/2023    CL 97 07/20/2023    BUN 13 07/20/2023    CREATININE 1.04 07/20/2023     Lab Results   Component Value Date    WBC 5.6 10/19/2022    HGB 14.9 10/19/2022    HCT 44.1 10/19/2022    MCV 96 10/19/2022     10/19/2022     No results found for this or any previous visit (from the past 1 hour(s)).

## 2023-07-29 DIAGNOSIS — R33.8 BENIGN PROSTATIC HYPERPLASIA WITH URINARY RETENTION: ICD-10-CM

## 2023-07-29 DIAGNOSIS — N40.1 BENIGN PROSTATIC HYPERPLASIA WITH URINARY RETENTION: ICD-10-CM

## 2023-07-30 RX ORDER — TAMSULOSIN HYDROCHLORIDE 0.4 MG/1
0.4 CAPSULE ORAL
Qty: 90 CAPSULE | Refills: 3 | Status: SHIPPED | OUTPATIENT
Start: 2023-07-30

## 2023-08-04 NOTE — MISCELLANEOUS
Contacted [facility] department updated all provider information for pt's new prescription for Revlimid 25mg. Received updated auth [id number]. Information provided to [facility] pharmacy.   History of Present Illness  TCM Communication  Luke: The patient is being contacted for follow-up after hospitalization  Hospital records were reviewed  He was hospitalized at [facility]  The date of admission: 06/12/17, date of discharge: 06/13/17  Diagnosis: Primary Osteoarthritis of Left Hip -- Had Left Anterior JUDIE  He was discharged to home, with home health services, Has [facility] services and will have Physical Therapy  Medications reviewed and updated today  He refused a follow up appointment due to Patient and wife feel he does not need to see Dr Alek Raymundo for followup on this problem  Follow-up appointments with other specialists: Will be seeing the surgeon Dr Tre Roach  The patient is currently asymptomatic  Counseling was provided to patient's family  Topics counseled included instructions for management and importance of compliance with treatment  Communication performed and completed by ALFONZO Jc  Active Problems     1  Back pain (724 5) (M54 9)   2  Bladder disorder (596 9) (N32 9)   3  BPH (benign prostatic hyperplasia) (600 00) (N40 0)   4  Colon cancer screening (V76 51) (Z12 11)   5  Diverticulitis of colon (562 11) (K57 32)   6  Encounter for prostate cancer screening (V76 44) (Z12 5)   7  Encounter for screening colonoscopy (V76 51) (Z12 11)   8  Fullness in clavicular area (784 2) (R22 2)   9  Heart murmur (785 2) (R01 1)   10  Kidney mass (593 9) (N28 89)   11  Knee pain (719 46) (M25 569)   12  Laboratory examination ordered as part of a complete physical examination (V72 62)    (Z00 00)   13  Left-sided low back pain with left-sided sciatica (724 3) (M54 42)   14  Leg pain (729 5) (M79 606)   15  Neoplasm of skin (239 2) (D49 2)   16  Pain of both hip joints (719 45) (M25 551,M25 552)   17  Primary osteoarthritis of both hips (715 15) (M16 0)   18  Primary osteoarthritis of both knees (715 16) (M17 0)   19   Primary osteoarthritis of left hip (715 15) (M16 12)   20  Status post left hip replacement (V43 64) (V48 682)    Aftercare following left hip joint replacement surgery (V54 81) (Z47 1)          Past Medical History    1  History of acute bronchitis (V12 69) (Z87 09)   2  History of arthritis (V13 4) (Z87 39)    Surgical History    1  History of Arthroscopy Knee Left   2  History of Arthroscopy Knee Right   3  History of Hernia Repair    Family History  Mother    1  Family history of    2  Family history of Melanoma  Father    3  Family history of    4  Family history of Melanoma  Sister    5  Family history of     Social History    · Former smoker (N86 05) (L07 032)   · Denied: History of Marijuana   ·    · Never a smoker   · Denied: History of No drug use   · Retired   · Social alcohol use (Z78 9)    Current Meds   1  Ascorbic Acid 500 MG Oral Tablet; 1 TABLET Bid; Therapy: (Recorded:2017) to Recorded   2  Daily Value Multivitamin TABS; Take 1 tablet daily Recorded   3  Ferrous Sulfate 325 (65 Fe) MG Oral Tablet; TAKE 1 TABLET TWICE DAILY WITH MEALS; Therapy: (Recorded:2017) to Recorded   4  Folic Acid 1 MG Oral Tablet; TAKE 1 TABLET DAILY; Therapy: (Recorded:2017) to Recorded   5  Lovenox 100 MG/ML Subcutaneous Solution; Therapy: (Recorded:2017) to Recorded   6  OxyCODONE HCl - 5 MG Oral Tablet; TAKE 1 TO 2 TABLETS EVERY 4 TO 6 HOURS AS   NEEDED FOR PAIN;   Therapy: (Recorded:2017) to Recorded   7  Pantoprazole Sodium 40 MG Oral Tablet Delayed Release; TAKE 1 TABLET DAILY; Therapy: (Recorded:2017) to Recorded   8  TraMADol HCl - 50 MG Oral Tablet; TAKE 1 TABLET Every 6 hours; Therapy: (Recorded:2017) to Recorded    Allergies    1   No Known Drug Allergies    Future Appointments    Date/Time Provider Specialty Site   2017 11:00 AM Nedra Saunders, [facility] Orthopedic Surgery [facility] 1 Re[address] Damico     Signatures   Electronically signed by : Nelsy Rosado DO; 2017 1:00PM EST                       (Author)

## 2023-10-05 ENCOUNTER — OFFICE VISIT (OUTPATIENT)
Dept: FAMILY MEDICINE CLINIC | Facility: CLINIC | Age: 83
End: 2023-10-05
Payer: COMMERCIAL

## 2023-10-05 VITALS
WEIGHT: 152.4 LBS | SYSTOLIC BLOOD PRESSURE: 102 MMHG | OXYGEN SATURATION: 97 % | RESPIRATION RATE: 16 BRPM | HEART RATE: 50 BPM | DIASTOLIC BLOOD PRESSURE: 78 MMHG | TEMPERATURE: 97.8 F | BODY MASS INDEX: 23.92 KG/M2 | HEIGHT: 67 IN

## 2023-10-05 DIAGNOSIS — E78.5 HYPERLIPIDEMIA, UNSPECIFIED HYPERLIPIDEMIA TYPE: ICD-10-CM

## 2023-10-05 DIAGNOSIS — Z13.6 SCREENING FOR CARDIOVASCULAR CONDITION: ICD-10-CM

## 2023-10-05 DIAGNOSIS — Z79.899 HIGH RISK MEDICATION USE: ICD-10-CM

## 2023-10-05 DIAGNOSIS — I48.92 ATRIAL FLUTTER, UNSPECIFIED TYPE (HCC): ICD-10-CM

## 2023-10-05 DIAGNOSIS — Z12.5 SCREENING FOR PROSTATE CANCER: ICD-10-CM

## 2023-10-05 DIAGNOSIS — Z00.00 MEDICARE ANNUAL WELLNESS VISIT, SUBSEQUENT: Primary | ICD-10-CM

## 2023-10-05 PROBLEM — H25.813 COMBINED FORMS OF AGE-RELATED CATARACT, BILATERAL: Status: ACTIVE | Noted: 2023-10-05

## 2023-10-05 PROCEDURE — G0439 PPPS, SUBSEQ VISIT: HCPCS | Performed by: FAMILY MEDICINE

## 2023-10-05 NOTE — PATIENT INSTRUCTIONS
Medicare Preventive Visit Patient Instructions  Thank you for completing your Welcome to Medicare Visit or Medicare Annual Wellness Visit today. Your next wellness visit will be due in one year (10/5/2024). The screening/preventive services that you may require over the next 5-10 years are detailed below. Some tests may not apply to you based off risk factors and/or age. Screening tests ordered at today's visit but not completed yet may show as past due. Also, please note that scanned in results may not display below. Preventive Screenings:  Service Recommendations Previous Testing/Comments   Colorectal Cancer Screening  · Colonoscopy    · Fecal Occult Blood Test (FOBT)/Fecal Immunochemical Test (FIT)  · Fecal DNA/Cologuard Test  · Flexible Sigmoidoscopy Age: 43-73 years old   Colonoscopy: every 10 years (May be performed more frequently if at higher risk)  OR  FOBT/FIT: every 1 year  OR  Cologuard: every 3 years  OR  Sigmoidoscopy: every 5 years  Screening may be recommended earlier than age 39 if at higher risk for colorectal cancer. Also, an individualized decision between you and your healthcare provider will decide whether screening between the ages of 77-80 would be appropriate. Colonoscopy: 05/04/2016  FOBT/FIT: Not on file  Cologuard: Not on file  Sigmoidoscopy: Not on file          Prostate Cancer Screening Individualized decision between patient and health care provider in men between ages of 53-66   Medicare will cover every 12 months beginning on the day after your 50th birthday PSA: 2.1 ng/mL           Hepatitis C Screening Once for adults born between 1945 and 1965  More frequently in patients at high risk for Hepatitis C Hep C Antibody: Not on file        Diabetes Screening 1-2 times per year if you're at risk for diabetes or have pre-diabetes Fasting glucose: 75 mg/dL (1/24/2019)  A1C: 5.2 % (9/17/2019)      Cholesterol Screening Once every 5 years if you don't have a lipid disorder.  May order more often based on risk factors. Lipid panel: 10/19/2022         Other Preventive Screenings Covered by Medicare:  1. Abdominal Aortic Aneurysm (AAA) Screening: covered once if your at risk. You're considered to be at risk if you have a family history of AAA or a male between the age of 70-76 who smoking at least 100 cigarettes in your lifetime. 2. Lung Cancer Screening: covers low dose CT scan once per year if you meet all of the following conditions: (1) Age 48-67; (2) No signs or symptoms of lung cancer; (3) Current smoker or have quit smoking within the last 15 years; (4) You have a tobacco smoking history of at least 20 pack years (packs per day x number of years you smoked); (5) You get a written order from a healthcare provider. 3. Glaucoma Screening: covered annually if you're considered high risk: (1) You have diabetes OR (2) Family history of glaucoma OR (3)  aged 48 and older OR (3)  American aged 72 and older  3. Osteoporosis Screening: covered every 2 years if you meet one of the following conditions: (1) Have a vertebral abnormality; (2) On glucocorticoid therapy for more than 3 months; (3) Have primary hyperparathyroidism; (4) On osteoporosis medications and need to assess response to drug therapy. 5. HIV Screening: covered annually if you're between the age of 14-79. Also covered annually if you are younger than 13 and older than 72 with risk factors for HIV infection. For pregnant patients, it is covered up to 3 times per pregnancy.     Immunizations:  Immunization Recommendations   Influenza Vaccine Annual influenza vaccination during flu season is recommended for all persons aged >= 6 months who do not have contraindications   Pneumococcal Vaccine   * Pneumococcal conjugate vaccine = PCV13 (Prevnar 13), PCV15 (Vaxneuvance), PCV20 (Prevnar 20)  * Pneumococcal polysaccharide vaccine = PPSV23 (Pneumovax) Adults 20-63 years old: 1-3 doses may be recommended based on certain risk factors  Adults 72 years old: 1-2 doses may be recommended based off what pneumonia vaccine you previously received   Hepatitis B Vaccine 3 dose series if at intermediate or high risk (ex: diabetes, end stage renal disease, liver disease)   Tetanus (Td) Vaccine - COST NOT COVERED BY MEDICARE PART B Following completion of primary series, a booster dose should be given every 10 years to maintain immunity against tetanus. Td may also be given as tetanus wound prophylaxis. Tdap Vaccine - COST NOT COVERED BY MEDICARE PART B Recommended at least once for all adults. For pregnant patients, recommended with each pregnancy. Shingles Vaccine (Shingrix) - COST NOT COVERED BY MEDICARE PART B  2 shot series recommended in those aged 48 and above     Health Maintenance Due:  There are no preventive care reminders to display for this patient. Immunizations Due:      Topic Date Due   • COVID-19 Vaccine (6 - Moderna series) 01/21/2023     Advance Directives   What are advance directives? Advance directives are legal documents that state your wishes and plans for medical care. These plans are made ahead of time in case you lose your ability to make decisions for yourself. Advance directives can apply to any medical decision, such as the treatments you want, and if you want to donate organs. What are the types of advance directives? There are many types of advance directives, and each state has rules about how to use them. You may choose a combination of any of the following:  · Living will: This is a written record of the treatment you want. You can also choose which treatments you do not want, which to limit, and which to stop at a certain time. This includes surgery, medicine, IV fluid, and tube feedings. · Durable power of  for healthcare [facility]): This is a written record that states who you want to make healthcare choices for you when you are unable to make them for yourself.  This person, called codie proxy, is usually a family member or a friend. You may choose more than 1 proxy. · Do not resuscitate (DNR) order:  A DNR order is used in case your heart stops beating or you stop breathing. It is a request not to have certain forms of treatment, such as CPR. A DNR order may be included in other types of advance directives. · Medical directive: This covers the care that you want if you are in a coma, near death, or unable to make decisions for yourself. You can list the treatments you want for each condition. Treatment may include pain medicine, surgery, blood transfusions, dialysis, IV or tube feedings, and a ventilator (breathing machine). · Values history: This document has questions about your views, beliefs, and how you feel and think about life. This information can help others choose the care that you would choose. Why are advance directives important? An advance directive helps you control your care. Although spoken wishes may be used, it is better to have your wishes written down. Spoken wishes can be misunderstood, or not followed. Treatments may be given even if you do not want them. An advance directive may make it easier for your family to make difficult choices about your care. © Copyright [employer] 2018 Information is for End User's use only and may not be sold, redistributed or otherwise used for commercial purposes.  All illustrations and images included in CareNotes® are the copyrighted property of [employer] or  [address]

## 2023-10-05 NOTE — PROGRESS NOTES
Assessment and Plan:     Problem List Items Addressed This Visit        Cardiovascular and Mediastinum    Atrial flutter ([address])-- this is stable   Other Visit Diagnoses     Medicare annual wellness visit, subsequent    -  Primary        Medicare wellness visit completed  Patient overall is doing well  He will follow-up with urology  Follow-up with me in 1 year or sooner if needed  Labs ordered            Depression Screening and Follow-up Plan: Patient was screened for depression during today's encounter. They screened negative with a PHQ-2 score of 0. Preventive health issues were discussed with patient, and age appropriate screening tests were ordered as noted in patient's After Visit Summary. Personalized health advice and appropriate referrals for health education or preventive services given if needed, as noted in patient's After Visit Summary. History of Present Illness:     Patient presents for a Medicare Wellness Visit    Patient presents today for Medicare wellness visit  He is having some urological issues but otherwise has no other acute complaints today     Patient Care Team:  Karen Yates DO as PCP - [facility], DO Jason Koo DO     Review of Systems:     Review of Systems   Constitutional: Negative. HENT: Negative. Eyes: Negative. Respiratory: Negative. Cardiovascular: Negative. Gastrointestinal: Negative. Endocrine: Negative. Genitourinary: Negative. Musculoskeletal: Negative. Skin: Negative. Allergic/Immunologic: Negative. Neurological: Negative. Hematological: Negative. Psychiatric/Behavioral: Negative. All other systems reviewed and are negative.        Problem List:     Patient Active Problem List   Diagnosis   • S/P total hip arthroplasty   • Preoperative cardiovascular examination   • Sinus bradycardia   • S/P total knee arthroplasty, left   • Back pain   • Urinary retention   • BPH (benign prostatic hyperplasia)   • Diverticulitis of colon   • Fullness in clavicular area   • Heart murmur   • Kidney mass   • Leg pain   • Neoplasm of skin   • Osteoarthritis of hip   • Primary osteoarthritis of both hips   • Erectile dysfunction   • Retention of urine   • S/P total knee arthroplasty, right   • Atrial flutter (HCC)   • Combined forms of age-related cataract, bilateral      Past Medical and Surgical History:     Past Medical History:   Diagnosis Date   • Arthritis 10/2005   • BPH (benign prostatic hyperplasia)    • Diverticulitis of colon    • Heart murmur 08/1947   • Herniated lumbar intervertebral disc    • Kidney mass    • Murmur 08/1947    diagnosed as a child 6 YO   • Osteoarthritis    • Preoperative cardiovascular examination 02/16/2018     Past Surgical History:   Procedure Laterality Date   • HERNIA REPAIR     • HIP SURGERY  2017,2018   • JOINT REPLACEMENT Bilateral    • KNEE ARTHROSCOPY      bilateral   • NC ARTHRP ACETBLR/PROX FEM PROSTC AGRFT/ALGRFT Left 06/12/2017    Procedure: ANTERIOR TOTAL HIP ARTHROPLASTY;  Surgeon: Michael Bobo MD;  [facility];  Service: Orthopedics   • NC ARTHRP ACETBLR/PROX FEM PROSTC AGRFT/ALGRFT Right 01/03/2018    Procedure: ANTERIOR TOTAL HIP ARTHROPLASTY;  Surgeon: Michael Bobo MD;  [facility];  Service: Orthopedics   • NC ARTHRP KNE CONDYLE&PLATU MEDIAL&LAT COMPARTMENTS Left 02/20/2019    Procedure: TOTAL KNEE ARTHROPLASTY;  Surgeon: Michael Bobo MD;  [facility];  Service: Orthopedics   • NC ARTHRP KNE CONDYLE&PLATU MEDIAL&LAT COMPARTMENTS Right 10/23/2019    Procedure: ARTHROPLASTY KNEE TOTAL;  Surgeon: Michael Bobo MD;  [facility];  Service: Orthopedics   • NC CYSTO INSERTION TRANSPROSTATIC IMPLANT SINGLE N/A 07/05/2019    Procedure: CYSTOSCOPY WITH INSERTION Ibeth Chowdhury;  Surgeon: Scott Richardson MD;  Location: [facility];  Service: Urology      Family History:     Family History   Problem Relation Age of Onset   • Cancer Mother    • Cancer Father    • Cancer Brother    • Heart disease Brother       Social History:     Social History     Socioeconomic History   • Marital status: /Civil Union     Spouse name: None   • Number of children: None   • Years of education: None   • Highest education level: None   Occupational History   • None   Tobacco Use   • Smoking status: Former     Packs/day: 0.25     Years: 5.00     Total pack years: 1.25     Types: Cigarettes     Quit date: 10/1/1980     Years since quittin.0     Passive exposure: Past   • Smokeless tobacco: Never   • Tobacco comments:     Very light smoker   Vaping Use   • Vaping Use: Never used   Substance and Sexual Activity   • Alcohol use: Yes     Alcohol/week: 7.0 standard drinks of alcohol     Types: 7 Cans of beer per week     Comment: 1-2 beers daily   • Drug use: No   • Sexual activity: Yes     Partners: Female     Birth control/protection: Post-menopausal   Other Topics Concern   • None   Social History Narrative   • None     Social Determinants of Health     Financial Resource Strain: Low Risk  (2023)    Overall Financial Resource Strain (CARDIA)    • Difficulty of Paying Living Expenses: Not hard at all   Food Insecurity: No Food Insecurity (10/13/2020)    Hunger Vital Sign    • Worried About Running Out of Food in the Last Year: Never true    • Ran Out of Food in the Last Year: Never true   Transportation Needs: No Transportation Needs (2023)    PRAPARE - Transportation    • Lack of Transportation (Medical): No    • Lack of Transportation (Non-Medical): No   Physical Activity: Insufficiently Active (2020)    Exercise Vital Sign    • Days of Exercise per Week: 3 days    • Minutes of Exercise per Session: 30 min   Stress: No Stress Concern Present (2020)    [address] [place] [address]    • Feeling of Stress :  Only a little   Social Connections: Socially Integrated (2020)    Social Connection and Isolation Panel [NHANES]    • Frequency of Communication with Friends and Family: Twice a week    • Frequency of Social Gatherings with Friends and Family: Once a week    • Attends [employer] Services: More than 4 times per year    • Active Member of Clubs or Organizations: Yes    • Attends Club or Organization Meetings: Never    • Marital Status:    Intimate Partner Violence: Not At Risk (10/5/2023)    Humiliation, Afraid, Rape, and Kick questionnaire    • Fear of Current or Ex-Partner: No    • Emotionally Abused: No    • Physically Abused: No    • Sexually Abused: No   Housing Stability: Not on file      Medications and Allergies:     Current Outpatient Medications   Medication Sig Dispense Refill   • meclizine (ANTIVERT) 25 mg tablet Take 1 tablet (25 mg total) by mouth 3 (three) times a day as needed for dizziness 30 tablet 0   • multivitamin (THERAGRAN) TABS Take 1 tablet by mouth daily     • penicillin V potassium (VEETID) 500 mg tablet Take 500 mg by mouth see administration instructions 4 tabs 1 hour before dental work     • sildenafil (VIAGRA) 100 mg tablet Take 1 tablet (100 mg total) by mouth as needed for erectile dysfunction 10 tablet 3   • tamsulosin (FLOMAX) 0.4 mg take 1 capsule by mouth daily WITH DINNER (Patient not taking: Reported on 10/5/2023) 90 capsule 3     No current facility-administered medications for this visit.      No Known Allergies   Immunizations:     Immunization History   Administered Date(s) Administered   • COVID-19 MODERNA VACC 0.5 ML IM 02/16/2021, 03/15/2021, 12/01/2021, 04/22/2022   • COVID-19 Moderna Vac BIVALENT 12 Yr+ IM 0.5 ML 09/21/2022   • INFLUENZA 10/01/2016, 10/11/2018, 09/01/2019, 10/03/2020, 09/03/2021, 09/13/2022, 10/01/2022, 09/06/2023   • Influenza Quadrivalent Preservative Free 3 years and older IM 10/23/2017   • Influenza Split High Dose Preservative Free IM 10/28/2014, 10/23/2015, 10/23/2015, 10/12/2016, 10/23/2017   • Pneumococcal Conjugate 13-Valent 09/17/2019 • Pneumococcal Polysaccharide PPV23 04/06/2016, 05/06/2016   • Tdap 02/16/2022   • Zoster 06/08/2016   • Zoster Vaccine Recombinant 01/27/2020, 09/10/2020   • influenza, trivalent, adjuvanted 09/19/2019      Health Maintenance: There are no preventive care reminders to display for this patient. Topic Date Due   • COVID-19 Vaccine (6 - Moderna series) 01/21/2023      Medicare Screening Tests and Risk Assessments:     Sam Trujillo is here for his Subsequent Wellness visit. Last Medicare Wellness visit information reviewed, patient interviewed and updates made to the record today. Health Risk Assessment:   Patient rates overall health as very good. Patient feels that their physical health rating is same. Patient is very satisfied with their life. Eyesight was rated as same. Hearing was rated as same. Patient feels that their emotional and mental health rating is same. Patients states they are never, rarely angry. Patient states they are never, rarely unusually tired/fatigued. Pain experienced in the last 7 days has been none. Patient states that he has experienced no weight loss or gain in last 6 months. Depression Screening:   PHQ-2 Score: 0      Fall Risk Screening: In the past year, patient has experienced: no history of falling in past year      Home Safety:  Patient does not have trouble with stairs inside or outside of their home. Patient has working smoke alarms and has working carbon monoxide detector. Home safety hazards include: none. Nutrition:   Current diet is Regular. Medications:   Patient is not currently taking any over-the-counter supplements. Patient is able to manage medications. Activities of Daily Living (ADLs)/Instrumental Activities of Daily Living (IADLs):   Walk and transfer into and out of bed and chair?: Yes  Dress and groom yourself?: Yes    Bathe or shower yourself?: Yes    Feed yourself?  Yes  Do your laundry/housekeeping?: Yes  Manage your money, pay your bills and track your expenses?: Yes  Make your own meals?: Yes    Do your own shopping?: Yes    Previous Hospitalizations:   Any hospitalizations or ED visits within the last 12 months?: No      Advance Care Planning:   Living will: No    Durable POA for healthcare: No    Advanced directive: No    Advanced directive counseling given: Yes    Five wishes given: No    End of Life Decisions reviewed with patient: Yes    Provider agrees with end of life decisions: Yes      Cognitive Screening:   Provider or family/friend/caregiver concerned regarding cognition?: No    PREVENTIVE SCREENINGS      Cardiovascular Screening:    General: History Lipid Disorder and Screening Current      Diabetes Screening:     General: Screening Current      Colorectal Cancer Screening:     General: Screening Current      Prostate Cancer Screening:    General: Screening Not Indicated and Screening Current      Osteoporosis Screening:    General: Screening Not Indicated      Abdominal Aortic Aneurysm (AAA) Screening:    Risk factors include: tobacco use        General: Screening Not Indicated      Lung Cancer Screening:     General: Screening Not Indicated      Hepatitis C Screening:    General: Screening Not Indicated    Screening, Brief Intervention, and Referral to Treatment (SBIRT)    Screening  Typical number of drinks in a day: 1  Typical number of drinks in a week: 7  Interpretation: Low risk drinking behavior. AUDIT-C Screenin) How often did you have a drink containing alcohol in the past year? 2 to 4 times a month  2) How many drinks did you have on a typical day when you were drinking in the past year?  1 to 2  3) How often did you have 6 or more drinks on one occasion in the past year? never    AUDIT-C Score: 2  Interpretation: Score 0-3 (male): Negative screen for alcohol misuse    Single Item Drug Screening:  How often have you used an illegal drug (including marijuana) or a prescription medication for non-medical reasons in the past year? never    Single Item Drug Screen Score: 0  Interpretation: Negative screen for possible drug use disorder    Brief Intervention  Alcohol & drug use screenings were reviewed. No concerns regarding substance use disorder identified. Other Counseling Topics:   Car/seat belt/driving safety, skin self-exam, sunscreen and calcium and vitamin D intake and regular weightbearing exercise. No results found. Physical Exam:     /78 (BP Location: Left arm, Patient Position: Sitting, Cuff Size: Standard)   Pulse (!) 50   Temp 97.8 °F (36.6 °C)   Resp 16   Ht 5' 7" (1.702 m)   Wt 69.1 kg (152 lb 6.4 oz)   SpO2 97%   BMI 23.87 kg/m²     Physical Exam  Constitutional:       Appearance: He is well-developed. HENT:      Head: Normocephalic. Right Ear: External ear normal.      Left Ear: External ear normal.      Nose: Nose normal.   Eyes:      Conjunctiva/sclera: Conjunctivae normal.      Pupils: Pupils are equal, round, and reactive to light. Cardiovascular:      Rate and Rhythm: Normal rate and regular rhythm. Heart sounds: Normal heart sounds. Pulmonary:      Effort: Pulmonary effort is normal.      Breath sounds: Normal breath sounds. Abdominal:      General: Bowel sounds are normal.      Palpations: Abdomen is soft. Musculoskeletal:         General: Normal range of motion. Cervical back: Normal range of motion and neck supple. Skin:     General: Skin is warm and dry. Neurological:      Mental Status: He is alert and oriented to person, place, and time. Psychiatric:         Behavior: Behavior normal.         Thought Content:  Thought content normal.         Judgment: Judgment normal.          Duong De Santiago, DO

## 2023-10-31 LAB
ALBUMIN SERPL-MCNC: 4.4 G/DL (ref 3.7–4.7)
ALBUMIN/GLOB SERPL: 1.8 {RATIO} (ref 1.2–2.2)
ALP SERPL-CCNC: 21 IU/L (ref 44–121)
ALT SERPL-CCNC: 16 IU/L (ref 0–44)
APPEARANCE UR: ABNORMAL
AST SERPL-CCNC: 30 IU/L (ref 0–40)
BACTERIA URNS QL MICRO: ABNORMAL
BILIRUB SERPL-MCNC: 1.6 MG/DL (ref 0–1.2)
BILIRUB UR QL STRIP: NEGATIVE
BUN SERPL-MCNC: 11 MG/DL (ref 8–27)
BUN/CREAT SERPL: 10 (ref 10–24)
CALCIUM SERPL-MCNC: 9.4 MG/DL (ref 8.6–10.2)
CASTS URNS QL MICRO: ABNORMAL /LPF
CHLORIDE SERPL-SCNC: 98 MMOL/L (ref 96–106)
CHOLEST SERPL-MCNC: 159 MG/DL (ref 100–199)
CHOLEST/HDLC SERPL: 2.2 RATIO (ref 0–5)
CO2 SERPL-SCNC: 23 MMOL/L (ref 20–29)
COLOR UR: YELLOW
CREAT SERPL-MCNC: 1.11 MG/DL (ref 0.76–1.27)
EGFR: 67 ML/MIN/1.73
EPI CELLS #/AREA URNS HPF: ABNORMAL /HPF (ref 0–10)
ERYTHROCYTE [DISTWIDTH] IN BLOOD BY AUTOMATED COUNT: 11.5 % (ref 11.6–15.4)
GLOBULIN SER-MCNC: 2.4 G/DL (ref 1.5–4.5)
GLUCOSE SERPL-MCNC: 89 MG/DL (ref 70–99)
GLUCOSE UR QL: NEGATIVE
HCT VFR BLD AUTO: 43.4 % (ref 37.5–51)
HDLC SERPL-MCNC: 71 MG/DL
HGB BLD-MCNC: 15 G/DL (ref 13–17.7)
HGB UR QL STRIP: ABNORMAL
KETONES UR QL STRIP: NEGATIVE
LDLC SERPL CALC-MCNC: 73 MG/DL (ref 0–99)
LEUKOCYTE ESTERASE UR QL STRIP: ABNORMAL
MCH RBC QN AUTO: 33.6 PG (ref 26.6–33)
MCHC RBC AUTO-ENTMCNC: 34.6 G/DL (ref 31.5–35.7)
MCV RBC AUTO: 97 FL (ref 79–97)
MICRO URNS: ABNORMAL
NITRITE UR QL STRIP: NEGATIVE
PH UR STRIP: 7 [PH] (ref 5–7.5)
PLATELET # BLD AUTO: 271 X10E3/UL (ref 150–450)
POTASSIUM SERPL-SCNC: 5.5 MMOL/L (ref 3.5–5.2)
PROT SERPL-MCNC: 6.8 G/DL (ref 6–8.5)
PROT UR QL STRIP: NEGATIVE
PSA SERPL-MCNC: 2.3 NG/ML (ref 0–4)
RBC # BLD AUTO: 4.46 X10E6/UL (ref 4.14–5.8)
RBC #/AREA URNS HPF: ABNORMAL /HPF (ref 0–2)
SL AMB REFLEX CRITERIA: NORMAL
SL AMB VLDL CHOLESTEROL CALC: 15 MG/DL (ref 5–40)
SODIUM SERPL-SCNC: 133 MMOL/L (ref 134–144)
SP GR UR: 1.01 (ref 1–1.03)
TRIGL SERPL-MCNC: 82 MG/DL (ref 0–149)
UROBILINOGEN UR STRIP-ACNC: 0.2 MG/DL (ref 0.2–1)
WBC # BLD AUTO: 6.2 X10E3/UL (ref 3.4–10.8)
WBC #/AREA URNS HPF: >30 /HPF (ref 0–5)

## 2023-11-01 DIAGNOSIS — N39.0 URINARY TRACT INFECTION WITHOUT HEMATURIA, SITE UNSPECIFIED: Primary | ICD-10-CM

## 2023-11-01 RX ORDER — CIPROFLOXACIN 250 MG/1
250 TABLET, FILM COATED ORAL EVERY 12 HOURS SCHEDULED
Qty: 14 TABLET | Refills: 0 | Status: SHIPPED | OUTPATIENT
Start: 2023-11-01 | End: 2023-11-08

## 2024-02-12 ENCOUNTER — OFFICE VISIT (OUTPATIENT)
Dept: UROLOGY | Facility: AMBULATORY SURGERY CENTER | Age: 84
End: 2024-02-12
Payer: COMMERCIAL

## 2024-02-12 VITALS
BODY MASS INDEX: 23.81 KG/M2 | OXYGEN SATURATION: 97 % | DIASTOLIC BLOOD PRESSURE: 98 MMHG | SYSTOLIC BLOOD PRESSURE: 160 MMHG | WEIGHT: 152 LBS | HEART RATE: 47 BPM

## 2024-02-12 DIAGNOSIS — N40.1 BENIGN PROSTATIC HYPERPLASIA WITH URINARY RETENTION: ICD-10-CM

## 2024-02-12 DIAGNOSIS — R33.9 URINARY RETENTION: Primary | ICD-10-CM

## 2024-02-12 DIAGNOSIS — R33.8 BENIGN PROSTATIC HYPERPLASIA WITH URINARY RETENTION: ICD-10-CM

## 2024-02-12 LAB — POST-VOID RESIDUAL VOLUME, ML POC: 999 ML

## 2024-02-12 PROCEDURE — 51798 US URINE CAPACITY MEASURE: CPT

## 2024-02-12 PROCEDURE — 99213 OFFICE O/P EST LOW 20 MIN: CPT

## 2024-02-12 NOTE — PROGRESS NOTES
Office Visit- Urology  Raman Coffey 1940 MRN: [id number]      Assessment/Discussion/Plan    83 y.o. male managed by     Urinary retention   -patient with longstanding history of urinary retention  -S/p UroLift  -Patient has been utilizing CIC for bladder emptying.  He only utilizes CIC 1 time before bed  -Patient with a PVR of 999 mL today.  This is consistent with what was previously done.  Patient refuses consideration of indwelling catheter or increasing CIC this point in time.  I reviewed the risk of incomplete bladder emptying to that degree including further bladder deterioration, bilateral hydronephrosis leading to long-term chronic kidney disease, and recurrent urine tract infections.  Patient stressed understanding  -He is agreeable to obtain a updated ultrasound of the kidneys and bladder to ensure no hydronephrosis  -Renal function in October 2023 with stable  -Patient inquires on whether prostatic surgery would possibly allow him to not have to utilize CIC.  I stated that with his long degree of urinary retention it is likely that his bladder is atonic.  Before consideration of prostatic intervention would want to have the urodynamics to ensure that bladder function is intact.  Patient expressed understanding and does not want to proceed with urodynamics at this point in time  -Follow-up in 6 months given that ultrasound does not reveal hydronephrosis    Chief Complaint:   Raman is a 83 y.o. male presenting to the office for a follow up visit regarding urinary retention        Subjective    Patient is a 83-year-old male with a history of BPH status post UroLift and incomplete bladder emptying who presents for follow-up.  He was last in the office in April 2023.  He underwent UroLift procedure in July 2019.  However patient has had sustained incomplete bladder emptying despite prostatic intervention.  He performs intermittent CIC for management of incomplete bladder emptying with 1 time a  night.  He was previously advised to increase the time of his management catheterization but has refused.  He has also refused indwelling Ovalle catheterization.  His PVR today is 999 mL which is consistent while it was in April 2023.  Patient had a ultrasound the kidneys and bladder in July 2023 with no demonstrated on hydronephrosis.  His renal function was stable with a creatinine measurement of 1.11.  Patient denies any dysuria, gross hematuria, flank pain.  He states that he has no difficulty with insertion of catheter.  Only 1 episode year or so ago but none since.  He is currently happy with his urinary pattern.  He has not experienced any sensation of bladder discomfort      AUA SYMPTOM SCORE      Flowsheet Row Most Recent Value   AUA SYMPTOM SCORE    How often have you had a sensation of not emptying your bladder completely after you finished urinating? 3 (P)    How often have you had to urinate again less than two hours after you finished urinating? 2 (P)    How often have you found you stopped and started again several times when you urinate? 1 (P)    How often have you found it difficult to postpone urination? 1 (P)    How often have you had a weak urinary stream? 5 (P)    How often have you had to push or strain to begin urination? 1 (P)    How many times did you most typically get up to urinate from the time you went to bed at night until the time you got up in the morning? 0 (P)    Quality of Life: If you were to spend the rest of your life with your urinary condition just the way it is now, how would you feel about that? 3 (P)    AUA SYMPTOM SCORE 13 (P)             ROS:   Review of Systems   Constitutional: Negative.  Negative for chills, fatigue and fever.   HENT: Negative.     Respiratory:  Negative for shortness of breath.    Cardiovascular:  Negative for chest pain.   Gastrointestinal: Negative.  Negative for abdominal pain.   Endocrine: Negative.    Musculoskeletal: Negative.    Skin: Negative.     Neurological: Negative.  Negative for dizziness and light-headedness.   Hematological: Negative.    Psychiatric/Behavioral: Negative.           Past Medical History  Past Medical History:   Diagnosis Date    Arthritis 10/2005    BPH (benign prostatic hyperplasia)     Diverticulitis of colon     Heart murmur 08/1947    Herniated lumbar intervertebral disc     Kidney mass     Murmur 08/1947    diagnosed as a child 8 YO    Osteoarthritis     Preoperative cardiovascular examination 02/16/2018       Past Surgical History  Past Surgical History:   Procedure Laterality Date    HERNIA REPAIR      HIP SURGERY  2017,2018    JOINT REPLACEMENT Bilateral     KNEE ARTHROSCOPY      bilateral    UT ARTHRP ACETBLR/PROX FEM PROSTC AGRFT/ALGRFT Left 06/12/2017    Procedure: ANTERIOR TOTAL HIP ARTHROPLASTY;  Surgeon: Joselo Edmond MD;  [facility];  Service: Orthopedics    UT ARTHRP ACETBLR/PROX FEM PROSTC AGRFT/ALGRFT Right 01/03/2018    Procedure: ANTERIOR TOTAL HIP ARTHROPLASTY;  Surgeon: Joselo Edmond MD;  [facility];  Service: Orthopedics    UT ARTHRP KNE CONDYLE&PLATU MEDIAL&LAT COMPARTMENTS Left 02/20/2019    Procedure: TOTAL KNEE ARTHROPLASTY;  Surgeon: Joselo Edmond MD;  [facility];  Service: Orthopedics    UT ARTHRP KNE CONDYLE&PLATU MEDIAL&LAT COMPARTMENTS Right 10/23/2019    Procedure: ARTHROPLASTY KNEE TOTAL;  Surgeon: Joselo Edmond MD;  [facility];  Service: Orthopedics    UT CYSTO INSERTION TRANSPROSTATIC IMPLANT SINGLE N/A 07/05/2019    Procedure: CYSTOSCOPY WITH INSERTION UROLIFT;  Surgeon: Van Foster MD;  Location: [facility];  Service: Urology       Past Family History  Family History   Problem Relation Age of Onset    Cancer Mother     Cancer Father     Cancer Brother     Heart disease Brother        Past Social history  Social History     Socioeconomic History    Marital status: /Civil Union     Spouse name: Not on file    Number of children: Not on file     Years of education: Not on file    Highest education level: Not on file   Occupational History    Not on file   Tobacco Use    Smoking status: Former     Current packs/day: 0.00     Average packs/day: 0.3 packs/day for 5.0 years (1.3 ttl pk-yrs)     Types: Cigarettes     Start date: 10/1/1975     Quit date: 10/1/1980     Years since quittin.3     Passive exposure: Past    Smokeless tobacco: Never    Tobacco comments:     Very light smoker   Vaping Use    Vaping status: Never Used   Substance and Sexual Activity    Alcohol use: Yes     Alcohol/week: 7.0 standard drinks of alcohol     Types: 7 Cans of beer per week     Comment: 1-2 beers daily    Drug use: No    Sexual activity: Yes     Partners: Female     Birth control/protection: Post-menopausal   Other Topics Concern    Not on file   Social History Narrative    Not on file     Social Determinants of Health     Financial Resource Strain: Low Risk  (2023)    Overall Financial Resource Strain (CARDIA)     Difficulty of Paying Living Expenses: Not hard at all   Food Insecurity: No Food Insecurity (10/13/2020)    Hunger Vital Sign     Worried About Running Out of Food in the Last Year: Never true     Ran Out of Food in the Last Year: Never true   Transportation Needs: No Transportation Needs (2023)    PRAPARE - Transportation     Lack of Transportation (Medical): No     Lack of Transportation (Non-Medical): No   Physical Activity: Insufficiently Active (2020)    Exercise Vital Sign     Days of Exercise per Week: 3 days     Minutes of Exercise per Session: 30 min   Stress: No Stress Concern Present (2020)    [employer] - Occupational Stress Questionnaire     Feeling of Stress : Only a little   Social Connections: Socially Integrated (2020)    Social Connection and Isolation Panel [NHANES]     Frequency of Communication with Friends and Family: Twice a week     Frequency of Social Gatherings with Friends and  Family: Once a week     Attends [employer] Services: More than 4 times per year     Active Member of Clubs or Organizations: Yes     Attends Club or Organization Meetings: Never     Marital Status:    Intimate Partner Violence: Not At Risk (10/5/2023)    Humiliation, Afraid, Rape, and Kick questionnaire     Fear of Current or Ex-Partner: No     Emotionally Abused: No     Physically Abused: No     Sexually Abused: No   Housing Stability: Not on file       Current Medications  Current Outpatient Medications   Medication Sig Dispense Refill    meclizine (ANTIVERT) 25 mg tablet Take 1 tablet (25 mg total) by mouth 3 (three) times a day as needed for dizziness 30 tablet 0    multivitamin (THERAGRAN) TABS Take 1 tablet by mouth daily      penicillin V potassium (VEETID) 500 mg tablet Take 500 mg by mouth see administration instructions 4 tabs 1 hour before dental work      sildenafil (VIAGRA) 100 mg tablet Take 1 tablet (100 mg total) by mouth as needed for erectile dysfunction 10 tablet 3    tamsulosin (FLOMAX) 0.4 mg take 1 capsule by mouth daily WITH DINNER (Patient not taking: Reported on 10/5/2023) 90 capsule 3     No current facility-administered medications for this visit.       Allergies  No Known Allergies    OBJECTIVE    Vitals   Vitals:    02/12/24 1421   BP: 160/98   BP Location: Left arm   Patient Position: Sitting   Cuff Size: Adult   Pulse: (!) 47   SpO2: 97%   Weight: 68.9 kg (152 lb)       PVR:    Physical Exam  Constitutional:       General: He is not in acute distress.     Appearance: Normal appearance. He is normal weight. He is not ill-appearing or toxic-appearing.   HENT:      Head: Normocephalic and atraumatic.   Eyes:      Conjunctiva/sclera: Conjunctivae normal.   Cardiovascular:      Rate and Rhythm: Normal rate.   Pulmonary:      Effort: Pulmonary effort is normal. No respiratory distress.   Skin:     General: Skin is warm and dry.   Neurological:      General: No focal deficit present.       Mental Status: He is alert and oriented to person, place, and time.      Cranial Nerves: No cranial nerve deficit.   Psychiatric:         Mood and Affect: Mood normal.         Behavior: Behavior normal.         Thought Content: Thought content normal.          Labs:     Lab Results   Component Value Date    PSA 2.3 10/30/2023    PSA 2.1 10/19/2022    PSA 2.6 10/05/2021    PSA 2.4 09/19/2019     Lab Results   Component Value Date    CREATININE 1.11 10/30/2023      Lab Results   Component Value Date    HGBA1C 5.2 09/17/2019     Lab Results   Component Value Date    GLUCOSE 88 12/05/2017    CALCIUM 8.5 10/24/2019     12/05/2017    K 5.5 (H) 10/30/2023    CO2 23 10/30/2023    CL 98 10/30/2023    BUN 11 10/30/2023    CREATININE 1.11 10/30/2023       I have personally reviewed all pertinent lab results and reviewed with patient    Imaging   RENAL ULTRASOUND WITH PVR     INDICATION:   R33.9: Retention of urine, unspecified.     COMPARISON: Multiple priors most recently 3/24/2021     TECHNIQUE:   Ultrasound of the retroperitoneum was performed with a curvilinear transducer utilizing volumetric sweeps and still imaging techniques.     FINDINGS:     KIDNEYS:  Symmetric and normal size.  Right kidney:  11.6 x 5.5 x 5.0 cm. Volume 167.2 mL  Left kidney:  12.5 x 6.0 x 5.4 cm.  Volume 212.9 mL     Right kidney  The renal parenchyma is diffusely echogenic consistent with medical renal disease.  No mass is identified. Benign-appearing cysts present.  No hydronephrosis.  No shadowing calculi.  No perinephric fluid collections.     Left kidney  The renal parenchyma is diffusely echogenic consistent with medical renal disease.  No mass is identified. Benign-appearing cysts present.  No hydronephrosis.  No shadowing calculi.  No perinephric fluid collections.     URETERS:  Nonvisualized.     BLADDER:  Normally distended.  No focal thickening or mass lesions.  Bilateral ureteral jets detected.  Prevoid: 1159.9  Marked post void  residual volume. Measured post void volume in mL: 951.4        IMPRESSION:     Marked post void residual volume. Measured post void volume in mL: 951.4           Workstation performed: [id number]       Stephan Kamara PA-C  Date: 2/12/2024 Time: 2:39 PM  [facility]    This note was written using fluency dictation software. Please excuse any resulting minor grammatical errors.

## 2024-02-26 ENCOUNTER — HOSPITAL ENCOUNTER (OUTPATIENT)
Dept: ULTRASOUND IMAGING | Facility: HOSPITAL | Age: 84
Discharge: HOME | End: 2024-02-26
Payer: COMMERCIAL

## 2024-02-26 DIAGNOSIS — R33.9 URINARY RETENTION: ICD-10-CM

## 2024-02-26 PROCEDURE — 76770 US EXAM ABDO BACK WALL COMP: CPT

## 2024-04-15 ENCOUNTER — TELEPHONE (OUTPATIENT)
Age: 84
End: 2024-04-15

## 2024-04-15 NOTE — TELEPHONE ENCOUNTER
Patient called the RX Refill Line. Message is being forwarded to the office.     Patient called to let office know that [facility] medical will be contacting the office for a renewal for Catheter supplies.    Please contact patient at [phone]

## 2024-08-20 PROBLEM — N28.1 RENAL CYST: Status: ACTIVE | Noted: 2024-08-20

## 2024-08-20 NOTE — PROGRESS NOTES
Ambulatory Visit  Name: Raman Coffey      : 1940      MRN: [id number]  Encounter Provider: Marty Harry MD  Encounter Date: 2024   Encounter department: [facility] [place]    Assessment & Plan   1. Urinary retention  Assessment & Plan:  He will continue CIC at this time  Orders:  -     POCT Measure PVR  2. Benign prostatic hyperplasia with urinary obstruction  Assessment & Plan:  Status post placement of 7 UroLift implants in 2019, difficulty emptying his bladder, his postvoid residual urine volume is significantly less today than at previous visits  Currently in the 300 mL range, previously roughly 1 L.  I recommend clean intermittent catheterization twice daily, a task has been sent to the office to increase his catheter order accordingly.    I did speak with him about cystoscopy and transrectal ultrasound to look into monopolar transurethral resection of prostate if he is a candidate.  He will consider this going forward.  Otherwise he will continue CIC twice daily and see us back in 1 years time  Orders:  -     POCT Measure PVR  3. Renal cyst  Assessment & Plan:  Simple renal cyst, requires no intervention or follow-up  Orders:  -     POCT Measure PVR            History of Present Illness     Raman Coffey is a 83 y.o. male who presents  with BPH/LUTS and urinary retention/incomplete bladder emptying    The following portions of the patient's history were reviewed and updated as appropriate: allergies, current medications, past family history, past medical history, past social history, past surgical history and problem list.      Review of Systems   Constitutional: Negative.    HENT: Negative.     Eyes: Negative.    Respiratory: Negative.     Cardiovascular: Negative.    Gastrointestinal: Negative.    Endocrine: Negative.    Genitourinary:  Positive for difficulty urinating.   Musculoskeletal: Negative.    Skin: Negative.    Allergic/Immunologic: Negative.   "  Neurological: Negative.    Hematological: Negative.    Psychiatric/Behavioral: Negative.         AUA SYMPTOM SCORE      Flowsheet Row Most Recent Value   AUA SYMPTOM SCORE    How often have you had a sensation of not emptying your bladder completely after you finished urinating? 5 (P)     How often have you had to urinate again less than two hours after you finished urinating? 3 (P)     How often have you found you stopped and started again several times when you urinate? 3 (P)     How often have you found it difficult to postpone urination? 0 (P)     How often have you had a weak urinary stream? 5 (P)     How often have you had to push or strain to begin urination? 3 (P)     How many times did you most typically get up to urinate from the time you went to bed at night until the time you got up in the morning? 5 (P)     Quality of Life: If you were to spend the rest of your life with your urinary condition just the way it is now, how would you feel about that? 5 (P)     AUA SYMPTOM SCORE 24 (P)            Objective     /78 (BP Location: Left arm, Patient Position: Sitting, Cuff Size: Adult)   Pulse (!) 52   Ht 5' 7\" (1.702 m)   Wt 69.7 kg (153 lb 9.6 oz)   SpO2 99%   BMI 24.06 kg/m²   Physical Exam  Vitals reviewed.   Constitutional:       General: He is not in acute distress.     Appearance: Normal appearance. He is well-developed. He is not ill-appearing, toxic-appearing or diaphoretic.   HENT:      Head: Normocephalic and atraumatic.      Nose: Nose normal.      Mouth/Throat:      Mouth: Mucous membranes are moist.   Eyes:      General: No scleral icterus.        Right eye: No discharge.         Left eye: No discharge.   Neck:      Thyroid: No thyromegaly.      Trachea: No tracheal deviation.   Pulmonary:      Effort: Pulmonary effort is normal.   Chest:      Chest wall: No tenderness.   Abdominal:      General: There is no distension.      Palpations: There is no mass.      Tenderness: There is no " abdominal tenderness. There is no guarding.      Hernia: No hernia is present.   Musculoskeletal:         General: No deformity or signs of injury. Normal range of motion.      Cervical back: Normal range of motion and neck supple.   Lymphadenopathy:      Cervical: No cervical adenopathy.   Skin:     General: Skin is dry.      Coloration: Skin is not jaundiced or pale.      Findings: No erythema.   Neurological:      Mental Status: He is alert and oriented to person, place, and time.      Cranial Nerves: No cranial nerve deficit.      Motor: No abnormal muscle tone.      Coordination: Coordination normal.   Psychiatric:         Mood and Affect: Mood normal.         Behavior: Behavior normal.         Thought Content: Thought content normal.         Judgment: Judgment normal.       Results  Lab Results   Component Value Date    PSA 2.3 10/30/2023    PSA 2.1 10/19/2022    PSA 2.6 10/05/2021     Lab Results   Component Value Date    GLUCOSE 88 12/05/2017    CALCIUM 8.5 10/24/2019     12/05/2017    K 5.5 (H) 10/30/2023    CO2 23 10/30/2023    CL 98 10/30/2023    BUN 11 10/30/2023    CREATININE 1.11 10/30/2023     Lab Results   Component Value Date    WBC 6.2 10/30/2023    HGB 15.0 10/30/2023    HCT 43.4 10/30/2023    MCV 97 10/30/2023     10/30/2023       Office Urine Dip  Recent Results (from the past 1 hour(s))   POCT Measure PVR    Collection Time: 08/21/24  2:50 PM   Result Value Ref Range    POST-VOID RESIDUAL VOLUME, ML  mL   Elevated although improved from previous values    Administrative Statements

## 2024-08-21 ENCOUNTER — TELEPHONE (OUTPATIENT)
Dept: UROLOGY | Facility: HOSPITAL | Age: 84
End: 2024-08-21

## 2024-08-21 ENCOUNTER — OFFICE VISIT (OUTPATIENT)
Dept: UROLOGY | Facility: HOSPITAL | Age: 84
End: 2024-08-21
Payer: COMMERCIAL

## 2024-08-21 VITALS
SYSTOLIC BLOOD PRESSURE: 122 MMHG | BODY MASS INDEX: 24.11 KG/M2 | OXYGEN SATURATION: 99 % | HEIGHT: 67 IN | WEIGHT: 153.6 LBS | HEART RATE: 52 BPM | DIASTOLIC BLOOD PRESSURE: 78 MMHG

## 2024-08-21 DIAGNOSIS — N40.1 BENIGN PROSTATIC HYPERPLASIA WITH URINARY OBSTRUCTION: ICD-10-CM

## 2024-08-21 DIAGNOSIS — R33.9 URINARY RETENTION: Primary | ICD-10-CM

## 2024-08-21 DIAGNOSIS — N13.8 BENIGN PROSTATIC HYPERPLASIA WITH URINARY OBSTRUCTION: ICD-10-CM

## 2024-08-21 DIAGNOSIS — N28.1 RENAL CYST: ICD-10-CM

## 2024-08-21 LAB — POST-VOID RESIDUAL VOLUME, ML POC: 340 ML

## 2024-08-21 PROCEDURE — 99213 OFFICE O/P EST LOW 20 MIN: CPT | Performed by: UROLOGY

## 2024-08-21 PROCEDURE — 51798 US URINE CAPACITY MEASURE: CPT | Performed by: UROLOGY

## 2024-08-21 NOTE — ASSESSMENT & PLAN NOTE
Status post placement of 7 UroLift implants in 2019, difficulty emptying his bladder, his postvoid residual urine volume is significantly less today than at previous visits  Currently in the 300 mL range, previously roughly 1 L.  I recommend clean intermittent catheterization twice daily, a task has been sent to the office to increase his catheter order accordingly.    I did speak with him about cystoscopy and transrectal ultrasound to look into monopolar transurethral resection of prostate if he is a candidate.  He will consider this going forward.  Otherwise he will continue CIC twice daily and see us back in 1 years time

## 2024-10-09 ENCOUNTER — OFFICE VISIT (OUTPATIENT)
Dept: FAMILY MEDICINE CLINIC | Facility: CLINIC | Age: 84
End: 2024-10-09
Payer: COMMERCIAL

## 2024-10-09 VITALS
SYSTOLIC BLOOD PRESSURE: 114 MMHG | RESPIRATION RATE: 16 BRPM | HEIGHT: 67 IN | BODY MASS INDEX: 23.83 KG/M2 | TEMPERATURE: 96.3 F | WEIGHT: 151.8 LBS | DIASTOLIC BLOOD PRESSURE: 80 MMHG | HEART RATE: 52 BPM

## 2024-10-09 DIAGNOSIS — Z00.00 MEDICARE ANNUAL WELLNESS VISIT, SUBSEQUENT: Primary | ICD-10-CM

## 2024-10-09 DIAGNOSIS — Z12.5 SCREENING FOR PROSTATE CANCER: ICD-10-CM

## 2024-10-09 DIAGNOSIS — E78.5 HYPERLIPIDEMIA, UNSPECIFIED HYPERLIPIDEMIA TYPE: ICD-10-CM

## 2024-10-09 DIAGNOSIS — Z79.899 HIGH RISK MEDICATION USE: ICD-10-CM

## 2024-10-09 PROBLEM — I48.92 ATRIAL FLUTTER (HCC): Status: RESOLVED | Noted: 2019-10-23 | Resolved: 2024-10-09

## 2024-10-09 PROCEDURE — G0439 PPPS, SUBSEQ VISIT: HCPCS | Performed by: FAMILY MEDICINE

## 2024-10-09 NOTE — PROGRESS NOTES
Ambulatory Visit  Name: Raman Coffey      : 1940      MRN: [id number]  Encounter Provider: Duong De Santiago DO  Encounter Date: 10/9/2024   Encounter department: [facility]    Assessment & Plan  Medicare annual wellness visit, subsequent  Medicare wellness visit completed  Patient will follow-up with urology  Labs ordered  Patient to follow-up in 1 year or sooner if needed       Hyperlipidemia, unspecified hyperlipidemia type    Orders:    Lipid panel; Future    Lipid panel    High risk medication use    Orders:    CBC; Future    Comprehensive metabolic panel; Future    UA (URINE) with reflex to Scope; Future    CBC    Comprehensive metabolic panel    UA (URINE) with reflex to Scope    Screening for prostate cancer    Orders:    UA (URINE) with reflex to Scope; Future    PSA Total (Reflex To Free); Future    UA (URINE) with reflex to Scope    PSA Total (Reflex To Free)      Depression Screening and Follow-up Plan: Patient was screened for depression during today's encounter. They screened negative with a PHQ-2 score of 0.      Preventive health issues were discussed with patient, and age appropriate screening tests were ordered as noted in patient's After Visit Summary. Personalized health advice and appropriate referrals for health education or preventive services given if needed, as noted in patient's After Visit Summary.    History of Present Illness     Patient presents today for Medicare wellness visit  Overall he is doing well  He is following closely with urology       Patient Care Team:  Duong De Santiago DO as PCP - General  MD Duong Lofton DO Scott Loev, DO    Review of Systems   Constitutional: Negative.    HENT: Negative.     Eyes: Negative.    Respiratory: Negative.     Cardiovascular: Negative.    Gastrointestinal: Negative.    Endocrine: Negative.    Genitourinary: Negative.    Musculoskeletal: Negative.    Skin: Negative.    Allergic/Immunologic: Negative.     Neurological: Negative.    Hematological: Negative.    Psychiatric/Behavioral: Negative.     All other systems reviewed and are negative.    Medical History Reviewed by provider this encounter:       Annual Wellness Visit Questionnaire   Raman is here for his Subsequent Wellness visit. Last Medicare Wellness visit information reviewed, patient interviewed and updates made to the record today.      Health Risk Assessment:   Patient rates overall health as very good. Patient feels that their physical health rating is same. Patient is satisfied with their life. Eyesight was rated as slightly worse. Hearing was rated as same. Patient feels that their emotional and mental health rating is same. Patients states they are never, rarely angry. Patient states they are never, rarely unusually tired/fatigued. Pain experienced in the last 7 days has been some. Patient's pain rating has been 2/10. Patient states that he has experienced no weight loss or gain in last 6 months.     Depression Screening:   PHQ-2 Score: 0      Fall Risk Screening:   In the past year, patient has experienced: no history of falling in past year      Home Safety:  Patient does not have trouble with stairs inside or outside of their home. Patient has working smoke alarms and has working carbon monoxide detector. Home safety hazards include: none.     Nutrition:   Current diet is Regular and Limited junk food.     Medications:   Patient is not currently taking any over-the-counter supplements. Patient is able to manage medications.     Activities of Daily Living (ADLs)/Instrumental Activities of Daily Living (IADLs):   Walk and transfer into and out of bed and chair?: Yes  Dress and groom yourself?: Yes    Bathe or shower yourself?: Yes    Feed yourself? Yes  Do your laundry/housekeeping?: Yes  Manage your money, pay your bills and track your expenses?: Yes  Make your own meals?: Yes    Do your own shopping?: Yes    Previous Hospitalizations:   Any  hospitalizations or ED visits within the last 12 months?: No      Advance Care Planning:   Living will: No    Durable POA for healthcare: No    Advanced directive: No    Advanced directive counseling given: Yes    End of Life Decisions reviewed with patient: Yes    Provider agrees with end of life decisions: Yes      Cognitive Screening:   Provider or family/friend/caregiver concerned regarding cognition?: No    PREVENTIVE SCREENINGS      Cardiovascular Screening:    General: Screening Current      Diabetes Screening:     General: Screening Current      Colorectal Cancer Screening:     General: Screening Not Indicated      Prostate Cancer Screening:    General: Screening Not Indicated      Osteoporosis Screening:    General: Screening Not Indicated      Abdominal Aortic Aneurysm (AAA) Screening:    Risk factors include: tobacco use        General: Screening Not Indicated      Lung Cancer Screening:     General: Screening Not Indicated      Hepatitis C Screening:    General: Screening Not Indicated    Screening, Brief Intervention, and Referral to Treatment (SBIRT)    Screening  Typical number of drinks in a day: 1  Typical number of drinks in a week: 7  Interpretation: Low risk drinking behavior.    AUDIT-C Screenin) How often did you have a drink containing alcohol in the past year? 4 or more times a week  2) How many drinks did you have on a typical day when you were drinking in the past year? 1 to 2  3) How often did you have 6 or more drinks on one occasion in the past year? never    AUDIT-C Score: 4  Interpretation: Score 4-12 (male): POSITIVE screen for alcohol misuse    AUDIT Screenin) How often during the last year have you found that you were not able to stop drinking once you had started? 0 - never  5) How often during the last year have you failed to do what was normally expected from you because of drinking? 0 - never  6) How often during the last year have you needed a first drink in the  morning to get yourself going after a heavy drinking session? 0 - never  7) How often during the last year have you had a feeling of guilt or remorse after drinking? 0 - never  8) How often during the last year have you been unable to remember what happened the night before because you had been drinking? 0 - never  9) Have you or someone else been injured as a result of your drinking? 0 - no  10) Has a relative or friend or a doctor or another health worker been concerned about your drinking or suggested you cut down? 0 - no    AUDIT Score: 4  Interpretation: Low risk alcohol consumption    Single Item Drug Screening:  How often have you used an illegal drug (including marijuana) or a prescription medication for non-medical reasons in the past year? never    Single Item Drug Screen Score: 0  Interpretation: Negative screen for possible drug use disorder    Brief Intervention  Alcohol & drug use screenings were reviewed. No concerns regarding substance use disorder identified.     Other Counseling Topics:   Car/seat belt/driving safety, skin self-exam, sunscreen and calcium and vitamin D intake and regular weightbearing exercise.     Social Determinants of Health     Financial Resource Strain: Low Risk  (9/29/2023)    Overall Financial Resource Strain (CARDIA)     Difficulty of Paying Living Expenses: Not hard at all   Food Insecurity: No Food Insecurity (10/2/2024)    Hunger Vital Sign     Worried About Running Out of Food in the Last Year: Never true     Ran Out of Food in the Last Year: Never true   Transportation Needs: No Transportation Needs (10/2/2024)    PRAPARE - Transportation     Lack of Transportation (Medical): No     Lack of Transportation (Non-Medical): No   Housing Stability: Low Risk  (10/2/2024)    Housing Stability Vital Sign     Unable to Pay for Housing in the Last Year: No     Number of Times Moved in the Last Year: 0     Homeless in the Last Year: No   Utilities: Not At Risk (10/2/2024)    [facility]  "Utilities     Threatened with loss of utilities: No     Vision Screening    Right eye Left eye Both eyes   Without correction   20/50   With correction          Objective     /80 (BP Location: Left arm, Patient Position: Sitting, Cuff Size: Standard)   Pulse (!) 52   Temp (!) 96.3 °F (35.7 °C) (Tympanic)   Resp 16   Ht 5' 7\" (1.702 m)   Wt 68.9 kg (151 lb 12.8 oz)   BMI 23.78 kg/m²     Physical Exam  Vitals and nursing note reviewed.   Constitutional:       Appearance: Normal appearance. He is well-developed.   HENT:      Head: Normocephalic.      Right Ear: External ear normal.      Left Ear: External ear normal.      Nose: Nose normal.   Eyes:      Conjunctiva/sclera: Conjunctivae normal.      Pupils: Pupils are equal, round, and reactive to light.   Cardiovascular:      Rate and Rhythm: Normal rate and regular rhythm.      Heart sounds: Normal heart sounds.   Pulmonary:      Effort: Pulmonary effort is normal.      Breath sounds: Normal breath sounds.   Abdominal:      General: Bowel sounds are normal.      Palpations: Abdomen is soft.   Musculoskeletal:         General: Normal range of motion.      Cervical back: Normal range of motion and neck supple.   Skin:     General: Skin is warm and dry.   Neurological:      General: No focal deficit present.      Mental Status: He is alert and oriented to person, place, and time.   Psychiatric:         Behavior: Behavior normal.         Thought Content: Thought content normal.         Judgment: Judgment normal.         "

## 2024-10-09 NOTE — PATIENT INSTRUCTIONS
Medicare Preventive Visit Patient Instructions  Thank you for completing your Welcome to Medicare Visit or Medicare Annual Wellness Visit today. Your next wellness visit will be due in one year (10/10/2025).  The screening/preventive services that you may require over the next 5-10 years are detailed below. Some tests may not apply to you based off risk factors and/or age. Screening tests ordered at today's visit but not completed yet may show as past due. Also, please note that scanned in results may not display below.  Preventive Screenings:  Service Recommendations Previous Testing/Comments   Colorectal Cancer Screening  Colonoscopy    Fecal Occult Blood Test (FOBT)/Fecal Immunochemical Test (FIT)  Fecal DNA/Cologuard Test  Flexible Sigmoidoscopy Age: 45-75 years old   Colonoscopy: every 10 years (May be performed more frequently if at higher risk)  OR  FOBT/FIT: every 1 year  OR  Cologuard: every 3 years  OR  Sigmoidoscopy: every 5 years  Screening may be recommended earlier than age 45 if at higher risk for colorectal cancer. Also, an individualized decision between you and your healthcare provider will decide whether screening between the ages of 76-85 would be appropriate. Colonoscopy: 05/04/2016  FOBT/FIT: Not on file  Cologuard: Not on file  Sigmoidoscopy: Not on file          Prostate Cancer Screening Individualized decision between patient and health care provider in men between ages of 55-69   Medicare will cover every 12 months beginning on the day after your 50th birthday PSA: 2.3 ng/mL           Hepatitis C Screening Once for adults born between 1945 and 1965  More frequently in patients at high risk for Hepatitis C Hep C Antibody: Not on file        Diabetes Screening 1-2 times per year if you're at risk for diabetes or have pre-diabetes Fasting glucose: No results in last 5 years (No results in last 5 years)  A1C: No results in last 5 years (No results in last 5 years)      Cholesterol Screening  Once every 5 years if you don't have a lipid disorder. May order more often based on risk factors. Lipid panel: 10/30/2023         Other Preventive Screenings Covered by Medicare:  Abdominal Aortic Aneurysm (AAA) Screening: covered once if your at risk. You're considered to be at risk if you have a family history of AAA or a male between the age of 65-75 who smoking at least 100 cigarettes in your lifetime.  Lung Cancer Screening: covers low dose CT scan once per year if you meet all of the following conditions: (1) Age 55-77; (2) No signs or symptoms of lung cancer; (3) Current smoker or have quit smoking within the last 15 years; (4) You have a tobacco smoking history of at least 20 pack years (packs per day x number of years you smoked); (5) You get a written order from a healthcare provider.  Glaucoma Screening: covered annually if you're considered high risk: (1) You have diabetes OR (2) Family history of glaucoma OR (3)  aged 50 and older OR (4)  American aged 65 and older  Osteoporosis Screening: covered every 2 years if you meet one of the following conditions: (1) Have a vertebral abnormality; (2) On glucocorticoid therapy for more than 3 months; (3) Have primary hyperparathyroidism; (4) On osteoporosis medications and need to assess response to drug therapy.  HIV Screening: covered annually if you're between the age of 15-65. Also covered annually if you are younger than 15 and older than 65 with risk factors for HIV infection. For pregnant patients, it is covered up to 3 times per pregnancy.    Immunizations:  Immunization Recommendations   Influenza Vaccine Annual influenza vaccination during flu season is recommended for all persons aged >= 6 months who do not have contraindications   Pneumococcal Vaccine   * Pneumococcal conjugate vaccine = PCV13 (Prevnar 13), PCV15 (Vaxneuvance), PCV20 (Prevnar 20)  * Pneumococcal polysaccharide vaccine = PPSV23 (Pneumovax) Adults 19-65 yo with  certain risk factors or if 65+ yo  If never received any pneumonia vaccine: recommend Prevnar 20 (PCV20)  Give PCV20 if previously received 1 dose of PCV13 or PPSV23   Hepatitis B Vaccine 3 dose series if at intermediate or high risk (ex: diabetes, end stage renal disease, liver disease)   Respiratory syncytial virus (RSV) Vaccine - COVERED BY MEDICARE PART D  * RSVPreF3 (Arexvy) CDC recommends that adults 60 years of age and older may receive a single dose of RSV vaccine using shared clinical decision-making (SCDM)   Tetanus (Td) Vaccine - COST NOT COVERED BY MEDICARE PART B Following completion of primary series, a booster dose should be given every 10 years to maintain immunity against tetanus. Td may also be given as tetanus wound prophylaxis.   Tdap Vaccine - COST NOT COVERED BY MEDICARE PART B Recommended at least once for all adults. For pregnant patients, recommended with each pregnancy.   Shingles Vaccine (Shingrix) - COST NOT COVERED BY MEDICARE PART B  2 shot series recommended in those 19 years and older who have or will have weakened immune systems or those 50 years and older     Health Maintenance Due:  There are no preventive care reminders to display for this patient.  Immunizations Due:  There are no preventive care reminders to display for this patient.  Advance Directives   What are advance directives?  Advance directives are legal documents that state your wishes and plans for medical care. These plans are made ahead of time in case you lose your ability to make decisions for yourself. Advance directives can apply to any medical decision, such as the treatments you want, and if you want to donate organs.   What are the types of advance directives?  There are many types of advance directives, and each state has rules about how to use them. You may choose a combination of any of the following:  Living will:  This is a written record of the treatment you want. You can also choose which treatments  you do not want, which to limit, and which to stop at a certain time. This includes surgery, medicine, IV fluid, and tube feedings.   Durable power of  for healthcare (DPAHC):  This is a written record that states who you want to make healthcare choices for you when you are unable to make them for yourself. This person, called a proxy, is usually a family member or a friend. You may choose more than 1 proxy.  Do not resuscitate (DNR) order:  A DNR order is used in case your heart stops beating or you stop breathing. It is a request not to have certain forms of treatment, such as CPR. A DNR order may be included in other types of advance directives.  Medical directive:  This covers the care that you want if you are in a coma, near death, or unable to make decisions for yourself. You can list the treatments you want for each condition. Treatment may include pain medicine, surgery, blood transfusions, dialysis, IV or tube feedings, and a ventilator (breathing machine).  Values history:  This document has questions about your views, beliefs, and how you feel and think about life. This information can help others choose the care that you would choose.  Why are advance directives important?  An advance directive helps you control your care. Although spoken wishes may be used, it is better to have your wishes written down. Spoken wishes can be misunderstood, or not followed. Treatments may be given even if you do not want them. An advance directive may make it easier for your family to make difficult choices about your care.       © Copyright [employer] 2018 Information is for End User's use only and may not be sold, redistributed or otherwise used for commercial purposes. All illustrations and images included in CareNotes® are the copyrighted property of [employer] or [employer]

## 2024-10-23 LAB
ALBUMIN SERPL-MCNC: 4.5 G/DL (ref 3.7–4.7)
ALP SERPL-CCNC: 29 IU/L (ref 44–121)
ALT SERPL-CCNC: 38 IU/L (ref 0–44)
APPEARANCE UR: ABNORMAL
AST SERPL-CCNC: 44 IU/L (ref 0–40)
BACTERIA URNS QL MICRO: ABNORMAL
BILIRUB SERPL-MCNC: 1.6 MG/DL (ref 0–1.2)
BILIRUB UR QL STRIP: NEGATIVE
BUN SERPL-MCNC: 16 MG/DL (ref 8–27)
BUN/CREAT SERPL: 13 (ref 10–24)
CALCIUM SERPL-MCNC: 9.4 MG/DL (ref 8.6–10.2)
CASTS URNS QL MICRO: ABNORMAL /LPF
CHLORIDE SERPL-SCNC: 98 MMOL/L (ref 96–106)
CHOLEST SERPL-MCNC: 153 MG/DL (ref 100–199)
CHOLEST/HDLC SERPL: 2.4 RATIO (ref 0–5)
CO2 SERPL-SCNC: 24 MMOL/L (ref 20–29)
COLOR UR: YELLOW
CREAT SERPL-MCNC: 1.28 MG/DL (ref 0.76–1.27)
EGFR: 56 ML/MIN/1.73
EPI CELLS #/AREA URNS HPF: ABNORMAL /HPF (ref 0–10)
ERYTHROCYTE [DISTWIDTH] IN BLOOD BY AUTOMATED COUNT: 11.7 % (ref 11.6–15.4)
GLOBULIN SER-MCNC: 2.3 G/DL (ref 1.5–4.5)
GLUCOSE SERPL-MCNC: 86 MG/DL (ref 70–99)
GLUCOSE UR QL: NEGATIVE
HCT VFR BLD AUTO: 44.8 % (ref 37.5–51)
HDLC SERPL-MCNC: 65 MG/DL
HGB BLD-MCNC: 14.7 G/DL (ref 13–17.7)
HGB UR QL STRIP: ABNORMAL
KETONES UR QL STRIP: NEGATIVE
LDLC SERPL CALC-MCNC: 73 MG/DL (ref 0–99)
LEUKOCYTE ESTERASE UR QL STRIP: ABNORMAL
MCH RBC QN AUTO: 33.5 PG (ref 26.6–33)
MCHC RBC AUTO-ENTMCNC: 32.8 G/DL (ref 31.5–35.7)
MCV RBC AUTO: 102 FL (ref 79–97)
MICRO URNS: ABNORMAL
NITRITE UR QL STRIP: POSITIVE
PH UR STRIP: 7 [PH] (ref 5–7.5)
PLATELET # BLD AUTO: 269 X10E3/UL (ref 150–450)
POTASSIUM SERPL-SCNC: 4.7 MMOL/L (ref 3.5–5.2)
PROT SERPL-MCNC: 6.8 G/DL (ref 6–8.5)
PROT UR QL STRIP: ABNORMAL
PSA SERPL-MCNC: 2.5 NG/ML (ref 0–4)
RBC # BLD AUTO: 4.39 X10E6/UL (ref 4.14–5.8)
RBC #/AREA URNS HPF: ABNORMAL /HPF (ref 0–2)
SL AMB REFLEX CRITERIA: NORMAL
SL AMB VLDL CHOLESTEROL CALC: 15 MG/DL (ref 5–40)
SODIUM SERPL-SCNC: 136 MMOL/L (ref 134–144)
SP GR UR: 1.01 (ref 1–1.03)
TRIGL SERPL-MCNC: 76 MG/DL (ref 0–149)
UROBILINOGEN UR STRIP-ACNC: 0.2 MG/DL (ref 0.2–1)
WBC # BLD AUTO: 8.9 X10E3/UL (ref 3.4–10.8)
WBC #/AREA URNS HPF: >30 /HPF (ref 0–5)

## 2024-12-24 NOTE — PROGRESS NOTES
78 y o male presenting to the office for 1 year follow-up status post left Total Hip Arthroplasty on 6/12/17 and 6 months status post right Total Hip Arthroplasty on 1/3/18  Patient is doing well postoperatively  He denies any pain in the hips at this time  He denies any functional limitations secondary to the hips  Patient states that he does have bilateral knee pain which did have injection therapy for without significant improvement  He states that they are not bothersome at night this time to discuss any other options  Patient denies any numbness/tingling  He denies any other acute or associated complaints  Review of Systems  Review of systems negative unless otherwise specified in HPI    Past Medical History  Past Medical History:   Diagnosis Date    BPH (benign prostatic hyperplasia)     Diverticulitis of colon     Kidney mass     Murmur 08/1947    diagnosed as a child 6 YO    Osteoarthritis        Past Surgical History  Past Surgical History:   Procedure Laterality Date    HERNIA REPAIR      HIP ARTHROSCOPY W/ LABRAL DEBRIDEMENT      Left    KNEE ARTHROSCOPY      bilateral    MI TOTAL HIP ARTHROPLASTY Left 6/12/2017    Procedure: ANTERIOR TOTAL HIP ARTHROPLASTY;  Surgeon: Mile El MD;  Location: [facility];  Service: Orthopedics    MI TOTAL HIP ARTHROPLASTY Right 1/3/2018    Procedure: ANTERIOR TOTAL HIP ARTHROPLASTY;  Surgeon: Mile El MD;  Location: [facility];  Service: Orthopedics       Current Medications  No current outpatient prescriptions on file prior to visit  No current facility-administered medications on file prior to visit          Recent Labs [facility])    0  Lab Value Date/Time   HCT 29 2 (L) 01/05/2018 0439   HCT 40 0 12/05/2017 1403   HGB 10 1 (L) 01/05/2018 0439   HGB 14 1 12/05/2017 1403   WBC 10 96 (H) 01/05/2018 0439   WBC 0-5 12/05/2017 1403   WBC 5 6 12/05/2017 1403   INR 1 1 12/05/2017 1403   GLUCOSE 100 01/06/2018 0425 GLUCOSE 88 12/05/2017 1403   HGBA1C 5 0 12/05/2017 1403         Physical exam  · General: Awake, Alert, Oriented  · Eyes: Pupils equal, round and reactive to light  · Heart: regular rate and rhythm  · Lungs: No audible wheezing  · Abdomen: soft  bilateral Lower extremity  · Nontender to palpation of anterior hip joint  · Full range of motion active and passive without pain  · 5/5 strength to hip flexion and knee extension  · Sensation intact distally    Imaging  I personally reviewed radiology images:  Bilateral hip joint prosthesis in good position    Procedure  None    Assessment/Plan:   78 y  o male 6 months status post right anterior total hip arthroplasty and 1 year status post left anterior total hip arthroplasty  -continue full activities as tolerated  -follow-up in 6 months for final evaluation of right hip  -patient follow-up on an as-needed basis for his knee 10

## 2025-03-10 ENCOUNTER — TELEPHONE (OUTPATIENT)
Age: 85
End: 2025-03-10

## 2025-03-10 NOTE — TELEPHONE ENCOUNTER
Pt called in to schedule appt with Dr. Laguerre. Pt would like to know if he needs a prior auth from his insurance for the appt. Please advise.

## 2025-03-17 ENCOUNTER — OFFICE VISIT (OUTPATIENT)
Dept: SURGERY | Facility: HOSPITAL | Age: 85
End: 2025-03-17
Payer: COMMERCIAL

## 2025-03-17 VITALS
HEIGHT: 67 IN | DIASTOLIC BLOOD PRESSURE: 81 MMHG | OXYGEN SATURATION: 96 % | BODY MASS INDEX: 23.7 KG/M2 | WEIGHT: 151 LBS | HEART RATE: 54 BPM | TEMPERATURE: 97.3 F | SYSTOLIC BLOOD PRESSURE: 147 MMHG

## 2025-03-17 DIAGNOSIS — K40.90 RIGHT INGUINAL HERNIA: Primary | ICD-10-CM

## 2025-03-17 DIAGNOSIS — K42.9 UMBILICAL HERNIA: ICD-10-CM

## 2025-03-17 PROCEDURE — 99204 OFFICE O/P NEW MOD 45 MIN: CPT | Performed by: SURGERY

## 2025-03-24 ENCOUNTER — TELEPHONE (OUTPATIENT)
Age: 85
End: 2025-03-24

## 2025-03-24 NOTE — TELEPHONE ENCOUNTER
Patient is having his Procedure 04/08/2025    [facility] needs insurance referral as soon as possible.  Procedure Code:  1- 89301 with dx code K40.90  2- 52762 with dx code K42.9    [facility] - [id number]    Phone number to call with any questions is [phone]  Thank you

## 2025-03-25 ENCOUNTER — OFFICE VISIT (OUTPATIENT)
Dept: LAB | Facility: CLINIC | Age: 85
End: 2025-03-25
Payer: COMMERCIAL

## 2025-03-25 ENCOUNTER — APPOINTMENT (OUTPATIENT)
Dept: LAB | Facility: CLINIC | Age: 85
End: 2025-03-25
Payer: COMMERCIAL

## 2025-03-25 DIAGNOSIS — K40.90 RIGHT INGUINAL HERNIA: ICD-10-CM

## 2025-03-25 LAB
ANION GAP SERPL CALCULATED.3IONS-SCNC: 8 MMOL/L (ref 4–13)
BACTERIA UR QL AUTO: ABNORMAL /HPF
BASOPHILS # BLD AUTO: 0.07 THOUSANDS/ÂΜL (ref 0–0.1)
BASOPHILS NFR BLD AUTO: 1 % (ref 0–1)
BILIRUB UR QL STRIP: NEGATIVE
BUN SERPL-MCNC: 13 MG/DL (ref 5–25)
CALCIUM SERPL-MCNC: 9.7 MG/DL (ref 8.4–10.2)
CHLORIDE SERPL-SCNC: 96 MMOL/L (ref 96–108)
CLARITY UR: ABNORMAL
CO2 SERPL-SCNC: 30 MMOL/L (ref 21–32)
COLOR UR: ABNORMAL
CREAT SERPL-MCNC: 1.03 MG/DL (ref 0.6–1.3)
EOSINOPHIL # BLD AUTO: 0.17 THOUSAND/ÂΜL (ref 0–0.61)
EOSINOPHIL NFR BLD AUTO: 2 % (ref 0–6)
ERYTHROCYTE [DISTWIDTH] IN BLOOD BY AUTOMATED COUNT: 13.5 % (ref 11.6–15.1)
GFR SERPL CREATININE-BSD FRML MDRD: 67 ML/MIN/1.73SQ M
GLUCOSE P FAST SERPL-MCNC: 91 MG/DL (ref 65–99)
GLUCOSE UR STRIP-MCNC: NEGATIVE MG/DL
HCT VFR BLD AUTO: 48.4 % (ref 36.5–49.3)
HGB BLD-MCNC: 16.5 G/DL (ref 12–17)
HGB UR QL STRIP.AUTO: NEGATIVE
IMM GRANULOCYTES # BLD AUTO: 0.05 THOUSAND/UL (ref 0–0.2)
IMM GRANULOCYTES NFR BLD AUTO: 1 % (ref 0–2)
KETONES UR STRIP-MCNC: NEGATIVE MG/DL
LEUKOCYTE ESTERASE UR QL STRIP: ABNORMAL
LYMPHOCYTES # BLD AUTO: 1.84 THOUSANDS/ÂΜL (ref 0.6–4.47)
LYMPHOCYTES NFR BLD AUTO: 23 % (ref 14–44)
MCH RBC QN AUTO: 33.9 PG (ref 26.8–34.3)
MCHC RBC AUTO-ENTMCNC: 34.1 G/DL (ref 31.4–37.4)
MCV RBC AUTO: 99 FL (ref 82–98)
MONOCYTES # BLD AUTO: 0.75 THOUSAND/ÂΜL (ref 0.17–1.22)
MONOCYTES NFR BLD AUTO: 9 % (ref 4–12)
NEUTROPHILS # BLD AUTO: 5.22 THOUSANDS/ÂΜL (ref 1.85–7.62)
NEUTS SEG NFR BLD AUTO: 64 % (ref 43–75)
NITRITE UR QL STRIP: POSITIVE
NON-SQ EPI CELLS URNS QL MICRO: ABNORMAL /HPF
NRBC BLD AUTO-RTO: 0 /100 WBCS
PH UR STRIP.AUTO: 7 [PH]
PLATELET # BLD AUTO: 302 THOUSANDS/UL (ref 149–390)
PMV BLD AUTO: 9.9 FL (ref 8.9–12.7)
POTASSIUM SERPL-SCNC: 5.6 MMOL/L (ref 3.5–5.3)
PROT UR STRIP-MCNC: NEGATIVE MG/DL
RBC # BLD AUTO: 4.87 MILLION/UL (ref 3.88–5.62)
RBC #/AREA URNS AUTO: ABNORMAL /HPF
SODIUM SERPL-SCNC: 134 MMOL/L (ref 135–147)
SP GR UR STRIP.AUTO: 1.01 (ref 1–1.03)
UROBILINOGEN UR STRIP-ACNC: <2 MG/DL
WBC # BLD AUTO: 8.1 THOUSAND/UL (ref 4.31–10.16)
WBC #/AREA URNS AUTO: ABNORMAL /HPF

## 2025-03-25 PROCEDURE — 80048 BASIC METABOLIC PNL TOTAL CA: CPT

## 2025-03-25 PROCEDURE — 36415 COLL VENOUS BLD VENIPUNCTURE: CPT

## 2025-03-25 PROCEDURE — 85025 COMPLETE CBC W/AUTO DIFF WBC: CPT

## 2025-03-25 PROCEDURE — 87077 CULTURE AEROBIC IDENTIFY: CPT

## 2025-03-25 PROCEDURE — 93005 ELECTROCARDIOGRAM TRACING: CPT

## 2025-03-25 PROCEDURE — 81001 URINALYSIS AUTO W/SCOPE: CPT

## 2025-03-25 PROCEDURE — 87086 URINE CULTURE/COLONY COUNT: CPT

## 2025-03-27 LAB
QRS AXIS: 64 DEGREES
QRS AXIS: 64 DEGREES
QRSD INTERVAL: 78 MS
QRSD INTERVAL: 78 MS
QT INTERVAL: 454 MS
QT INTERVAL: 454 MS
QTC INTERVAL: 389 MS
QTC INTERVAL: 389 MS
T WAVE AXIS: 44 DEGREES
T WAVE AXIS: 44 DEGREES
VENTRICULAR RATE: 44 BPM
VENTRICULAR RATE: 44 BPM

## 2025-03-27 PROCEDURE — 93010 ELECTROCARDIOGRAM REPORT: CPT | Performed by: INTERNAL MEDICINE

## 2025-03-28 DIAGNOSIS — N39.0 UTI (URINARY TRACT INFECTION): Primary | ICD-10-CM

## 2025-03-28 LAB — BACTERIA UR CULT: ABNORMAL

## 2025-03-28 RX ORDER — SULFAMETHOXAZOLE AND TRIMETHOPRIM 800; 160 MG/1; MG/1
1 TABLET ORAL 2 TIMES DAILY
Qty: 14 TABLET | Refills: 0 | Status: SHIPPED | OUTPATIENT
Start: 2025-03-28 | End: 2025-04-04

## 2025-04-03 ENCOUNTER — TELEPHONE (OUTPATIENT)
Dept: SURGERY | Facility: HOSPITAL | Age: 85
End: 2025-04-03

## 2025-04-03 NOTE — TELEPHONE ENCOUNTER
FOLLOW UP: N/A    REASON FOR CONVERSATION: No Triage Call    SYMPTOMS: N/A    OTHER: Patient will be doing repeat urine culture tomorrow. Calling to make office aware    DISPOSITION: Information or Advice Only Call

## 2025-04-03 NOTE — PRE-PROCEDURE INSTRUCTIONS
Pre-Surgery Instructions:   Medication Instructions    meclizine (ANTIVERT) 25 mg tablet Uses PRN- OK to take day of surgery    multivitamin (THERAGRAN) TABS Stop taking 7 days prior to surgery.    sulfamethoxazole-trimethoprim (BACTRIM DS) 800-160 mg per tablet Will be completed 4-4-25   Medication instructions for day of surgery reviewed. Please take all instructed medications with only a sip of water.       You will receive a call one business day prior to surgery with an arrival time and hospital directions. If your surgery is scheduled on a Monday, the hospital will be calling you on the Friday prior to your surgery. If you have not heard from anyone by 8pm, please call the hospital supervisor through the hospital  at [phone] or [place] [phone]).    Do not eat or drink anything after midnight the night before your surgery, including candy, mints, lifesavers, or chewing gum. Do not drink alcohol 24hrs before your surgery. Try not to smoke at least 24hrs before your surgery.       Follow the pre surgery showering instructions as listed in the “My Surgical Experience Booklet” or otherwise provided by your surgeon's office. Do not use a blade to shave the surgical area 1 week before surgery. It is okay to use a clean electric clippers up to 24 hours before surgery. Do not apply any lotions, creams, including makeup, cologne, deodorant, or perfumes after showering on the day of your surgery. Do not use dry shampoo, hair spray, hair gel, or any type of hair products.     No contact lenses, eye make-up, or artificial eyelashes. Remove nail polish, including gel polish, and any artificial, gel, or acrylic nails if possible. Remove all jewelry including rings and body piercing jewelry.     Wear causal clothing that is easy to take on and off. Consider your type of surgery.    Keep any valuables, jewelry, piercings at home. Please bring any specially ordered equipment (sling, braces) if  indicated.    Arrange for a responsible person to drive you to and from the hospital on the day of your surgery. Please confirm the visitor policy for the day of your procedure when you receive your phone call with an arrival time.     Call the surgeon's office with any new illnesses, exposures, or additional questions prior to surgery.    Please reference your “My Surgical Experience Booklet” for additional information to prepare for your upcoming surgery.

## 2025-04-04 ENCOUNTER — APPOINTMENT (OUTPATIENT)
Dept: LAB | Facility: CLINIC | Age: 85
End: 2025-04-04
Payer: COMMERCIAL

## 2025-04-04 DIAGNOSIS — N39.0 UTI (URINARY TRACT INFECTION): ICD-10-CM

## 2025-04-04 PROBLEM — K42.9 UMBILICAL HERNIA: Status: ACTIVE | Noted: 2025-04-04

## 2025-04-04 PROBLEM — K40.90 RIGHT INGUINAL HERNIA: Status: ACTIVE | Noted: 2025-04-04

## 2025-04-04 PROCEDURE — 87086 URINE CULTURE/COLONY COUNT: CPT

## 2025-04-04 RX ORDER — CEFAZOLIN SODIUM 2 G/50ML
2000 SOLUTION INTRAVENOUS ONCE
Status: CANCELLED | OUTPATIENT
Start: 2025-04-08 | End: 2025-04-08

## 2025-04-04 RX ORDER — SODIUM CHLORIDE, SODIUM LACTATE, POTASSIUM CHLORIDE, CALCIUM CHLORIDE 600; 310; 30; 20 MG/100ML; MG/100ML; MG/100ML; MG/100ML
125 INJECTION, SOLUTION INTRAVENOUS CONTINUOUS
Status: CANCELLED | OUTPATIENT
Start: 2025-04-08

## 2025-04-04 NOTE — ASSESSMENT & PLAN NOTE
Right inguinal and umbilical hernia - discussed operative vs conservative mgt, surgical approaches, risks and benefits and patient has decided to proceed with laparoscopic right inguinal and open umbilical hernia repair. I will schedule at their earliest convenience.

## 2025-04-04 NOTE — H&P (VIEW-ONLY)
Assessment/Plan:    Right inguinal hernia  Right inguinal and umbilical hernia - discussed operative vs conservative mgt, surgical approaches, risks and benefits and patient has decided to proceed with laparoscopic right inguinal and open umbilical hernia repair. I will schedule at their earliest convenience.            HPI:  Raman Coffey is a 84 y.o.male who was referred for evaluation of Hernia (Ean Coffey is a patient that presents umbilical hernia with Right inguinal hernia pt states they notice hernia in August 2024 pt states bulging LLQ bulge pt states some pain in areas pt can feel bulge when cough/sneezing )  .    Currently right groin pain.     ROS:  General ROS: negative  negative for - chills, fatigue, fever or night sweats, weight loss  Respiratory ROS: no cough, shortness of breath, or wheezing  Cardiovascular ROS: no chest pain or dyspnea on exertion  Genito-Urinary ROS: no dysuria, trouble voiding, or hematuria  Musculoskeletal ROS: negative for - gait disturbance, joint pain or muscle pain  Neurological ROS: no TIA or stroke symptoms      [unfilled]  Patient has no known allergies.    Current Outpatient Medications:     meclizine (ANTIVERT) 25 mg tablet, Take 1 tablet (25 mg total) by mouth 3 (three) times a day as needed for dizziness, Disp: 30 tablet, Rfl: 0    multivitamin (THERAGRAN) TABS, Take 1 tablet by mouth daily, Disp: , Rfl:     penicillin V potassium (VEETID) 500 mg tablet, Take 500 mg by mouth see administration instructions 4 tabs 1 hour before dental work (Patient not taking: Reported on 8/21/2024), Disp: , Rfl:     sildenafil (VIAGRA) 100 mg tablet, Take 1 tablet (100 mg total) by mouth as needed for erectile dysfunction (Patient not taking: Reported on 8/21/2024), Disp: 10 tablet, Rfl: 3    sulfamethoxazole-trimethoprim (BACTRIM DS) 800-160 mg per tablet, Take 1 tablet by mouth 2 (two) times a day for 7 days, Disp: 14 tablet, Rfl: 0  Past Medical History:   Diagnosis Date     Anesthesia     Woke up during 1 procedure    Arthritis 10/2005    BPH (benign prostatic hyperplasia)     Diverticulitis of colon     Heart murmur 08/1947    Herniated lumbar intervertebral disc     Kidney mass     Murmur 08/1947    diagnosed as a child 6 YO    Osteoarthritis     Preoperative cardiovascular examination 02/16/2018     Past Surgical History:   Procedure Laterality Date    HERNIA REPAIR      HIP SURGERY  2017,2018    JOINT REPLACEMENT Bilateral     BTHR BTKR    KNEE ARTHROSCOPY      bilateral    [place] ARTHRP ACETBLR/PROX FEM PROSTC AGRFT/ALGRFT Left 06/12/2017    Procedure: ANTERIOR TOTAL HIP ARTHROPLASTY;  Surgeon: Joselo Edmond MD;  [facility];  Service: Orthopedics    [place] ARTHRP ACETBLR/PROX FEM PROSTC AGRFT/ALGRFT Right 01/03/2018    Procedure: ANTERIOR TOTAL HIP ARTHROPLASTY;  Surgeon: Joselo Edmond MD;  [facility];  Service: Orthopedics    [place] ARTHRP KNE CONDYLE&PLATU MEDIAL&LAT COMPARTMENTS Left 02/20/2019    Procedure: TOTAL KNEE ARTHROPLASTY;  Surgeon: Joselo Edmond MD;  [facility];  Service: Orthopedics    [place] ARTHRP KNE CONDYLE&PLATU MEDIAL&LAT COMPARTMENTS Right 10/23/2019    Procedure: ARTHROPLASTY KNEE TOTAL;  Surgeon: Joselo Edmond MD;  [facility];  Service: Orthopedics    [place] CYSTO INSERTION TRANSPROSTATIC IMPLANT SINGLE N/A 07/05/2019    Procedure: CYSTOSCOPY WITH INSERTION UROLIFT;  Surgeon: Van Foster MD;  Location: [facility];  Service: Urology     Family History   Problem Relation Age of Onset    Cancer Mother     Cancer Father     Cancer Brother     Heart disease Brother       reports that he quit smoking about 44 years ago. His smoking use included cigarettes. He started smoking about 49 years ago. He has a 1.3 pack-year smoking history. He has been exposed to tobacco smoke. He has never used smokeless tobacco. He reports current alcohol use of about 7.0 standard drinks of alcohol per week. He reports that he does not use  "drugs.    Labs:   Lab Results   Component Value Date    WBC 8.10 03/25/2025    WBC 8.9 10/22/2024    WBC 6.2 10/30/2023    WBC 5.6 10/19/2022    WBC 11.72 (H) 10/24/2019    WBC 9.81 10/23/2019    WBC 0-5 12/05/2017    WBC 5.6 12/05/2017    WBC 0-5 05/12/2017    WBC 6.0 05/12/2017    HGB 16.5 03/25/2025    HGB 14.7 10/22/2024    HGB 15.0 10/30/2023    HGB 14.9 10/19/2022    HGB 12.3 10/24/2019    HGB 15.1 10/23/2019    HGB 14.1 12/05/2017    HGB 13.6 05/12/2017    HGB 15.0 04/01/2016     03/25/2025     10/22/2024     10/30/2023     10/19/2022     10/24/2019     10/23/2019     12/05/2017     05/12/2017     04/01/2016     Lab Results   Component Value Date    ALT 38 10/22/2024    ALT 16 10/30/2023    ALT 18 10/19/2022    AST 44 (H) 10/22/2024    AST 30 10/30/2023    AST 31 10/19/2022     This SmartLink has not been configured with any valid records.       PHYSICAL EXAM  General Appearance:    Alert, cooperative, no distress,    Head:    Normocephalic without obvious abnormality   Eyes:    PERRL, conjunctiva/corneas clear, EOM's intact        Neck:   Supple, no adenopathy, no JVD   Back:     Symmetric, no spinal or CVA tenderness   Lungs:     Clear to auscultation bilaterally, no wheezing or rhonchi   Heart:    Regular rate and rhythm, S1 and S2 normal, no murmur   Abdomen:     Soft +BS ND NT + RIH and umb hernia   Extremities:   Extremities normal. No clubbing, cyanosis or edema   Psych:   Normal Affect, AOx3.    Neurologic:  Skin:   CNII-XII intact. Strength symmetric, speech intact    Warm, dry, intact, no visible rashes or lesions         Physical Exam              Some portions of this record may have been generated with voice recognition software. There may be translation, syntax,  or grammatical errors. Occasional wrong word or \"sound-a-like\" substitutions may have occurred due to the inherent limitations of the voice recognition software. Read the " chart carefully and recognize, using context, where substitutions may have occurred. If you have any questions, please contact the dictating provider for clarification or correction, as needed. This encounter has been coded by a non-certified coder.

## 2025-04-04 NOTE — PROGRESS NOTES
Assessment/Plan:    Right inguinal hernia  Right inguinal and umbilical hernia - discussed operative vs conservative mgt, surgical approaches, risks and benefits and patient has decided to proceed with laparoscopic right inguinal and open umbilical hernia repair. I will schedule at their earliest convenience.            HPI:  Raman Coffey is a 84 y.o.male who was referred for evaluation of Hernia (Ean Coffey is a patient that presents umbilical hernia with Right inguinal hernia pt states they notice hernia in August 2024 pt states bulging LLQ bulge pt states some pain in areas pt can feel bulge when cough/sneezing )  .    Currently right groin pain.     ROS:  General ROS: negative  negative for - chills, fatigue, fever or night sweats, weight loss  Respiratory ROS: no cough, shortness of breath, or wheezing  Cardiovascular ROS: no chest pain or dyspnea on exertion  Genito-Urinary ROS: no dysuria, trouble voiding, or hematuria  Musculoskeletal ROS: negative for - gait disturbance, joint pain or muscle pain  Neurological ROS: no TIA or stroke symptoms      [unfilled]  Patient has no known allergies.    Current Outpatient Medications:     meclizine (ANTIVERT) 25 mg tablet, Take 1 tablet (25 mg total) by mouth 3 (three) times a day as needed for dizziness, Disp: 30 tablet, Rfl: 0    multivitamin (THERAGRAN) TABS, Take 1 tablet by mouth daily, Disp: , Rfl:     penicillin V potassium (VEETID) 500 mg tablet, Take 500 mg by mouth see administration instructions 4 tabs 1 hour before dental work (Patient not taking: Reported on 8/21/2024), Disp: , Rfl:     sildenafil (VIAGRA) 100 mg tablet, Take 1 tablet (100 mg total) by mouth as needed for erectile dysfunction (Patient not taking: Reported on 8/21/2024), Disp: 10 tablet, Rfl: 3    sulfamethoxazole-trimethoprim (BACTRIM DS) 800-160 mg per tablet, Take 1 tablet by mouth 2 (two) times a day for 7 days, Disp: 14 tablet, Rfl: 0  Past Medical History:   Diagnosis Date     Anesthesia     Woke up during 1 procedure    Arthritis 10/2005    BPH (benign prostatic hyperplasia)     Diverticulitis of colon     Heart murmur 08/1947    Herniated lumbar intervertebral disc     Kidney mass     Murmur 08/1947    diagnosed as a child 6 YO    Osteoarthritis     Preoperative cardiovascular examination 02/16/2018     Past Surgical History:   Procedure Laterality Date    HERNIA REPAIR      HIP SURGERY  2017,2018    JOINT REPLACEMENT Bilateral     BTHR BTKR    KNEE ARTHROSCOPY      bilateral    [place] ARTHRP ACETBLR/PROX FEM PROSTC AGRFT/ALGRFT Left 06/12/2017    Procedure: ANTERIOR TOTAL HIP ARTHROPLASTY;  Surgeon: Joselo Edmond MD;  [facility];  Service: Orthopedics    [place] ARTHRP ACETBLR/PROX FEM PROSTC AGRFT/ALGRFT Right 01/03/2018    Procedure: ANTERIOR TOTAL HIP ARTHROPLASTY;  Surgeon: Joselo Edmond MD;  [facility];  Service: Orthopedics    [place] ARTHRP KNE CONDYLE&PLATU MEDIAL&LAT COMPARTMENTS Left 02/20/2019    Procedure: TOTAL KNEE ARTHROPLASTY;  Surgeon: Joselo Edmond MD;  [facility];  Service: Orthopedics    [place] ARTHRP KNE CONDYLE&PLATU MEDIAL&LAT COMPARTMENTS Right 10/23/2019    Procedure: ARTHROPLASTY KNEE TOTAL;  Surgeon: Joselo Edmond MD;  [facility];  Service: Orthopedics    [place] CYSTO INSERTION TRANSPROSTATIC IMPLANT SINGLE N/A 07/05/2019    Procedure: CYSTOSCOPY WITH INSERTION UROLIFT;  Surgeon: Van Foster MD;  [facility];  Service: Urology     Family History   Problem Relation Age of Onset    Cancer Mother     Cancer Father     Cancer Brother     Heart disease Brother       reports that he quit smoking about 44 years ago. His smoking use included cigarettes. He started smoking about 49 years ago. He has a 1.3 pack-year smoking history. He has been exposed to tobacco smoke. He has never used smokeless tobacco. He reports current alcohol use of about 7.0 standard drinks of alcohol per week. He reports that he does not use  "drugs.    Labs:   Lab Results   Component Value Date    WBC 8.10 03/25/2025    WBC 8.9 10/22/2024    WBC 6.2 10/30/2023    WBC 5.6 10/19/2022    WBC 11.72 (H) 10/24/2019    WBC 9.81 10/23/2019    WBC 0-5 12/05/2017    WBC 5.6 12/05/2017    WBC 0-5 05/12/2017    WBC 6.0 05/12/2017    HGB 16.5 03/25/2025    HGB 14.7 10/22/2024    HGB 15.0 10/30/2023    HGB 14.9 10/19/2022    HGB 12.3 10/24/2019    HGB 15.1 10/23/2019    HGB 14.1 12/05/2017    HGB 13.6 05/12/2017    HGB 15.0 04/01/2016     03/25/2025     10/22/2024     10/30/2023     10/19/2022     10/24/2019     10/23/2019     12/05/2017     05/12/2017     04/01/2016     Lab Results   Component Value Date    ALT 38 10/22/2024    ALT 16 10/30/2023    ALT 18 10/19/2022    AST 44 (H) 10/22/2024    AST 30 10/30/2023    AST 31 10/19/2022     This SmartLink has not been configured with any valid records.       PHYSICAL EXAM  General Appearance:    Alert, cooperative, no distress,    Head:    Normocephalic without obvious abnormality   Eyes:    PERRL, conjunctiva/corneas clear, EOM's intact        Neck:   Supple, no adenopathy, no JVD   Back:     Symmetric, no spinal or CVA tenderness   Lungs:     Clear to auscultation bilaterally, no wheezing or rhonchi   Heart:    Regular rate and rhythm, S1 and S2 normal, no murmur   Abdomen:     Soft +BS ND NT + RIH and umb hernia   Extremities:   Extremities normal. No clubbing, cyanosis or edema   Psych:   Normal Affect, AOx3.    Neurologic:  Skin:   CNII-XII intact. Strength symmetric, speech intact    Warm, dry, intact, no visible rashes or lesions         Physical Exam              Some portions of this record may have been generated with voice recognition software. There may be translation, syntax,  or grammatical errors. Occasional wrong word or \"sound-a-like\" substitutions may have occurred due to the inherent limitations of the voice recognition software. Read the " chart carefully and recognize, using context, where substitutions may have occurred. If you have any questions, please contact the dictating provider for clarification or correction, as needed. This encounter has been coded by a non-certified coder.

## 2025-04-05 LAB — BACTERIA UR CULT: NORMAL

## 2025-04-08 ENCOUNTER — HOSPITAL ENCOUNTER (OUTPATIENT)
Facility: HOSPITAL | Age: 85
Setting detail: OUTPATIENT SURGERY
Discharge: HOME | End: 2025-04-08
Attending: SURGERY | Admitting: SURGERY
Payer: COMMERCIAL

## 2025-04-08 VITALS
BODY MASS INDEX: 22.95 KG/M2 | DIASTOLIC BLOOD PRESSURE: 89 MMHG | WEIGHT: 146.2 LBS | HEIGHT: 67 IN | SYSTOLIC BLOOD PRESSURE: 183 MMHG | HEART RATE: 51 BPM | RESPIRATION RATE: 16 BRPM | OXYGEN SATURATION: 97 % | TEMPERATURE: 97.1 F

## 2025-04-08 PROCEDURE — 93005 ELECTROCARDIOGRAM TRACING: CPT

## 2025-04-08 RX ORDER — CEFAZOLIN SODIUM 2 G/50ML
2000 SOLUTION INTRAVENOUS ONCE
Status: DISCONTINUED | OUTPATIENT
Start: 2025-04-08 | End: 2025-04-08 | Stop reason: HOSPADM

## 2025-04-08 RX ORDER — ACETAMINOPHEN 500 MG
500 TABLET ORAL EVERY 6 HOURS PRN
COMMUNITY

## 2025-04-08 RX ORDER — SODIUM CHLORIDE, SODIUM LACTATE, POTASSIUM CHLORIDE, CALCIUM CHLORIDE 600; 310; 30; 20 MG/100ML; MG/100ML; MG/100ML; MG/100ML
125 INJECTION, SOLUTION INTRAVENOUS CONTINUOUS
Status: DISCONTINUED | OUTPATIENT
Start: 2025-04-08 | End: 2025-04-08 | Stop reason: HOSPADM

## 2025-04-08 NOTE — INTERVAL H&P NOTE
H&P reviewed. After examining the patient I find no changes in the patients condition since the H&P had been written.    Vitals:    04/08/25 0640   BP: (!) 183/89   Pulse: (!) 51   Resp: 16   Temp: (!) 97.1 °F (36.2 °C)   SpO2: 97%

## 2025-04-10 ENCOUNTER — OFFICE VISIT (OUTPATIENT)
Dept: CARDIOLOGY CLINIC | Facility: CLINIC | Age: 85
End: 2025-04-10
Payer: COMMERCIAL

## 2025-04-10 VITALS
HEIGHT: 67 IN | BODY MASS INDEX: 22.76 KG/M2 | SYSTOLIC BLOOD PRESSURE: 142 MMHG | WEIGHT: 145 LBS | DIASTOLIC BLOOD PRESSURE: 82 MMHG | HEART RATE: 57 BPM

## 2025-04-10 DIAGNOSIS — M16.0 PRIMARY OSTEOARTHRITIS OF BOTH HIPS: ICD-10-CM

## 2025-04-10 DIAGNOSIS — I48.91 ATRIAL FIBRILLATION, UNSPECIFIED TYPE (HCC): Primary | ICD-10-CM

## 2025-04-10 DIAGNOSIS — Z01.818 PRE-OPERATIVE CLEARANCE: ICD-10-CM

## 2025-04-10 LAB
ATRIAL RATE: 57 BPM
P AXIS: 50 DEGREES
PR INTERVAL: 218 MS
QRS AXIS: 101 DEGREES
QRSD INTERVAL: 78 MS
QT INTERVAL: 420 MS
QTC INTERVAL: 409 MS
T WAVE AXIS: 14 DEGREES
VENTRICULAR RATE: 57 BPM

## 2025-04-10 PROCEDURE — 93000 ELECTROCARDIOGRAM COMPLETE: CPT | Performed by: INTERNAL MEDICINE

## 2025-04-10 PROCEDURE — 99204 OFFICE O/P NEW MOD 45 MIN: CPT | Performed by: INTERNAL MEDICINE

## 2025-04-10 NOTE — ASSESSMENT & PLAN NOTE
History of hip replacement  Also has shoulder pain and may need surgery as patient thinks he may have tore rotator cuff

## 2025-04-10 NOTE — PATIENT INSTRUCTIONS
You can proceed with hernia surgery.     Consider starting anticoagulation.     Will obtain 2 week monitor.

## 2025-04-10 NOTE — H&P (VIEW-ONLY)
EPS Consultation/New Patient Evaluation - Raman Coffey 84 y.o. male MRN: [id number]       Referring:Duong De Santiago, DO     Assessment & Plan  Atrial fibrillation, unspecified type (HCC)  Patient has history of typical flutter  He was seen by cardiologist in 2019  He was recommended to start anticoagulation but he was reluctant at that time  He then presented for right inguinal and umbilical hernia  He was noted to be in atrial fibrillation and the surgery was canceled  Given CHADS2 Vascor of 2 recommend anticoagulation but patient would like to think about it  Will obtain 2-week cardiac event monitor to assess burden  During atrial fibrillation/atrial flutter his heart rate is on the lower side which can be augmented with IV medications during the surgery  Patient has baseline bradycardia due to prolonged history of being RRR    Primary osteoarthritis of both hips  History of hip replacement  Also has shoulder pain and may need surgery as patient thinks he may have tore rotator cuff  Pre-operative clearance  Patient currently does not have any symptoms suggestive of coronary disease  He exercises regularly without any chest pain  He is able to do at least 2 flights of stairs and at least 2 blocks of walking  He is able to proceed with his hernia surgery  If he decides to go on a blood thinner he can hold it 2 days prior to the procedure (for Eliquis, Xarelto) and for 1 week (for warfarin)  However currently he is opting to start blood thinner after his hernia surgery    CC/HPI:   It was a [facility] to [facility] As you know he is a 84 y.o. man with history of typical atrial flutter not on anticoagulation, arthritis with both knees and hips replacement, umbilical hernia and inguinal hernia who presents to get preop clearance.    Patient was scheduled for hernia surgery on the April 8, 2025.  EKG showed atrial fibrillation and therefore his  surgery was canceled.  Patient did not have any symptoms from atrial fibrillation.  In 2019 he was noted to be in atrial flutter at time of his preop clearance for his knee surgery.  He was recommended to start anticoagulation or consider ablation however he refused at that time.    Patient drinks about 3 cups of coffee a day and about a beer a day.  He otherwise denies any chronic medical issues    Past Medical History:  Past Medical History:   Diagnosis Date    Anesthesia     Woke up during 1 procedure    Arthritis 10/2005    BPH (benign prostatic hyperplasia)     Diverticulitis of colon     Heart murmur 08/1947    Herniated lumbar intervertebral disc     Kidney mass     Murmur 08/1947    diagnosed as a child 6 YO    Osteoarthritis     Preoperative cardiovascular examination 02/16/2018       Medications:      Current Outpatient Medications:     acetaminophen (TYLENOL) 500 mg tablet, Take 500 mg by mouth every 6 (six) hours as needed for mild pain, Disp: , Rfl:     meclizine (ANTIVERT) 25 mg tablet, Take 1 tablet (25 mg total) by mouth 3 (three) times a day as needed for dizziness, Disp: 30 tablet, Rfl: 0    multivitamin (THERAGRAN) TABS, Take 1 tablet by mouth daily, Disp: , Rfl:     penicillin V potassium (VEETID) 500 mg tablet, Take 500 mg by mouth see administration instructions 4 tabs 1 hour before dental work (Patient not taking: Reported on 8/21/2024), Disp: , Rfl:     sildenafil (VIAGRA) 100 mg tablet, Take 1 tablet (100 mg total) by mouth as needed for erectile dysfunction (Patient not taking: Reported on 8/21/2024), Disp: 10 tablet, Rfl: 3     Family History   Problem Relation Age of Onset    Cancer Mother     Cancer Father     Cancer Brother     Heart disease Brother      Social History     Socioeconomic History    Marital status: /Civil Union     Spouse name: Not on file    Number of children: Not on file    Years of education: Not on file    Highest education level: Not on file   Occupational  History    Not on file   Tobacco Use    Smoking status: Former     Current packs/day: 0.00     Average packs/day: 0.3 packs/day for 5.0 years (1.3 ttl pk-yrs)     Types: Cigarettes     Start date: 10/1/1975     Quit date: 10/1/1980     Years since quittin.5     Passive exposure: Past    Smokeless tobacco: Never    Tobacco comments:     Very light smoker   Vaping Use    Vaping status: Never Used   Substance and Sexual Activity    Alcohol use: Yes     Alcohol/week: 7.0 standard drinks of alcohol     Types: 7 Cans of beer per week     Comment: 1-2 beers daily    Drug use: No    Sexual activity: Yes     Partners: Female     Birth control/protection: Post-menopausal   Other Topics Concern    Not on file   Social History Narrative    Not on file     Social Drivers of Health     Financial Resource Strain: Low Risk  (2023)    Overall Financial Resource Strain (CARDIA)     Difficulty of Paying Living Expenses: Not hard at all   Food Insecurity: No Food Insecurity (10/2/2024)    Nursing - Inadequate Food Risk Classification     Worried About Running Out of Food in the Last Year: Never true     Ran Out of Food in the Last Year: Never true     Ran Out of Food in the Last Year: Not on file   Transportation Needs: No Transportation Needs (10/2/2024)    PRAPARE - Transportation     Lack of Transportation (Medical): No     Lack of Transportation (Non-Medical): No   Physical Activity: Insufficiently Active (2020)    Exercise Vital Sign     Days of Exercise per Week: 3 days     Minutes of Exercise per Session: 30 min   Stress: No Stress Concern Present (2020)    [employer] - Occupational Stress Questionnaire     Feeling of Stress : Only a little   Social Connections: Socially Integrated (2020)    Social Connection and Isolation Panel [NHANES]     Frequency of Communication with Friends and Family: Twice a week     Frequency of Social Gatherings with Friends and Family: Once a  week     Attends [employer] Services: More than 4 times per year     Active Member of Clubs or Organizations: Yes     Attends Club or Organization Meetings: Never     Marital Status:    Intimate Partner Violence: Not At Risk (10/5/2023)    Humiliation, Afraid, Rape, and Kick questionnaire     Fear of Current or Ex-Partner: No     Emotionally Abused: No     Physically Abused: No     Sexually Abused: No   Housing Stability: Low Risk  (10/2/2024)    Housing Stability Vital Sign     Unable to Pay for Housing in the Last Year: No     Number of Times Moved in the Last Year: 0     Homeless in the Last Year: No     Social History     Tobacco Use   Smoking Status Former    Current packs/day: 0.00    Average packs/day: 0.3 packs/day for 5.0 years (1.3 ttl pk-yrs)    Types: Cigarettes    Start date: 10/1/1975    Quit date: 10/1/1980    Years since quittin.5    Passive exposure: Past   Smokeless Tobacco Never   Tobacco Comments    Very light smoker     Social History     Substance and Sexual Activity   Alcohol Use Yes    Alcohol/week: 7.0 standard drinks of alcohol    Types: 7 Cans of beer per week    Comment: 1-2 beers daily       Review of Systems   Constitutional: Negative for chills and fever.   HENT: Negative.     Eyes:  Negative for blurred vision and double vision.   Cardiovascular:  Negative for chest pain, dyspnea on exertion, leg swelling, near-syncope, orthopnea, palpitations, paroxysmal nocturnal dyspnea and syncope.   Respiratory:  Negative for cough and sputum production.    Endocrine: Negative.    Skin: Negative.  Negative for rash.   Musculoskeletal: Negative.  Negative for arthritis and joint pain.   Gastrointestinal:  Negative for abdominal pain, nausea and vomiting.   Genitourinary: Negative.    Neurological: Negative.  Negative for dizziness and light-headedness.   Psychiatric/Behavioral: Negative.  The patient is not nervous/anxious.         Objective:     Vitals: Blood pressure 142/82, pulse 57,  "height 5' 7\" (1.702 m), weight 65.8 kg (145 lb)., Body mass index is 22.71 kg/m².,        Physical Exam:    GEN: Raman Coffey appears well, alert and oriented x 3, pleasant and cooperative   HEENT: pupils equal, round, and reactive to light; extraocular muscles intact  NECK: supple, no carotid bruits   HEART: regular rhythm, normal S1 and S2, no murmurs, clicks, gallops or rubs   LUNGS: clear to auscultation bilaterally; no wheezes, rales, or rhonchi   ABDOMEN: normal bowel sounds, soft, no tenderness, no distention  EXTREMITIES: peripheral pulses normal; no clubbing, cyanosis, or edema  NEURO: no focal findings   SKIN: normal without suspicious lesions on exposed skin      Labs & Results:  Below is the patient's most recent value for Albumin, ALT, AST, BUN, Calcium, Chloride, Cholesterol, CO2, Creatinine, GFR, Glucose, HDL, Hematocrit, Hemoglobin, Hemoglobin A1C, LDL, Magnesium, Phosphorus, Platelets, Potassium, PSA, Sodium, Triglycerides, and WBC.   Lab Results   Component Value Date    ALT 38 10/22/2024    AST 44 (H) 10/22/2024    BUN 13 03/25/2025    CALCIUM 9.7 03/25/2025    CL 96 03/25/2025    CHOL 168 04/14/2017    CO2 30 03/25/2025    CREATININE 1.03 03/25/2025    HDL 65 10/22/2024    HCT 48.4 03/25/2025    HGB 16.5 03/25/2025    HGBA1C 5.2 09/17/2019     03/25/2025    K 5.6 (H) 03/25/2025    PSA 2.5 10/22/2024     12/05/2017    TRIG 76 10/22/2024    WBC 8.10 03/25/2025     Note: for a comprehensive list of the patient's lab results, access the Results Review activity.          Cardiac testing:     I personally reviewed the ECG performed in the clinic on 04/10/25. It reveals sinus bradycardia.     Echocardiograms:  Results for orders placed during the hospital encounter of 09/26/19    Echo complete with contrast if indicated    [place] [address]    Transthoracic Echocardiogram  2D, M-mode, Doppler, and Color " Doppler    Study date:  26-Sep-2019    Patient: EL MENDOZA  MR number: [id number]  Account number: [id number]  : 1940  Age: 79 years  Gender: Male  Status: Outpatient  Location: [facility]  Height: 67 in  Weight: 153 lb  BP: 120/ 80 mmHg    Indications: Atrial flutter.    Diagnoses: I48.1 - Atrial flutter    Sonographer:  Sudhir REDDY, [facility]  Primary Physician:  Duong De Santiago DO  Referring Physician:  Tamika Cruz MD  Group:  [facility]  Interpreting Physician:  Ry Trinidad MD    SUMMARY    LEFT VENTRICLE:  Systolic function was normal by visual assessment. Ejection fraction was estimated to be 55 %.  There were no regional wall motion abnormalities.  Wall thickness was mildly increased.    AORTIC VALVE:  There was trace regurgitation.    TRICUSPID VALVE:  There was trace regurgitation.    AORTA:  The root exhibited moderate dilation.    HISTORY: PRIOR HISTORY: Bradycardia, Murmur.    PROCEDURE: The study was performed in the [facility]. This was a routine study. The transthoracic approach was used. The study included complete 2D imaging, M-mode, complete spectral Doppler, and color Doppler.  Images were obtained from the parasternal, apical, subcostal, and suprasternal notch acoustic windows. Image quality was adequate.    LEFT VENTRICLE: Size was normal. Systolic function was normal by visual assessment. Ejection fraction was estimated to be 55 %. There were no regional wall motion abnormalities. Wall thickness was mildly increased. DOPPLER: Transmitral  flow pattern: atrial fibrillation.    RIGHT VENTRICLE: The size was normal. Systolic function was normal. DOPPLER: Systolic pressure was not estimated.    LEFT ATRIUM: Size was normal.    RIGHT ATRIUM: Size was normal.    MITRAL VALVE: Valve structure was normal. There was mild thickening. There was normal leaflet separation. DOPPLER: The transmitral velocity was  within the normal range. There was no evidence for stenosis. There was no regurgitation.    AORTIC VALVE: The valve was trileaflet. Leaflets exhibited normal thickness and normal cuspal separation. DOPPLER: Transaortic velocity was within the normal range. There was no evidence for stenosis. There was trace regurgitation.    TRICUSPID VALVE: The valve structure was normal. There was normal leaflet separation. DOPPLER: The transtricuspid velocity was within the normal range. There was no evidence for stenosis. There was trace regurgitation.    PULMONIC VALVE: Leaflets exhibited normal thickness, no calcification, and normal cuspal separation. DOPPLER: The transpulmonic velocity was within the normal range. There was no regurgitation.    PERICARDIUM: There was no pericardial effusion.    AORTA: The root exhibited moderate dilation.    SYSTEMIC VEINS: IVC: The inferior vena cava was normal in size and course. Respirophasic changes were normal.    SYSTEM MEASUREMENT TABLES    2D  %FS: 37.66 %  Ao Diam: 4.05 cm  EDV(Teich): 70.33 ml  EF(Teich): 68.31 %  ESV(Cube): 15.6 ml  ESV(Teich): 22.29 ml  IVSd: 1.12 cm  LA Diam: 3 cm  LVEDV MOD A4C: 81.17 ml  LVEF MOD A4C: 57.43 %  LVESV MOD A4C: 34.55 ml  LVIDd: 4.01 cm  LVIDs: 2.5 cm  LVLd A4C: 7.97 cm  LVLs A4C: 7.62 cm  LVOT Diam: 2.23 cm  LVPWd: 1.03 cm  RA Area: 28.75 cm2  RV Diam.: 3.92 cm  SV MOD A4C: 46.62 ml  SV(Cube): 48.78 ml  SV(Teich): 48.04 ml    MM  TAPSE: 1.68 cm    [employer] Accredited Echocardiography Laboratory    Prepared and electronically signed by    Ry Trinidad MD  Signed 26-Sep-2019 13:30:40    No results found for this or any previous visit.      Catheterizations:   No results found for this or any previous visit.      Stress Tests:  No results found for this or any previous visit.      Holter monitor -   No results found for this or any previous visit.    No results found for this or any previous visit.

## 2025-04-10 NOTE — ASSESSMENT & PLAN NOTE
Patient currently does not have any symptoms suggestive of coronary disease  He exercises regularly without any chest pain  He is able to do at least 2 flights of stairs and at least 2 blocks of walking  He is able to proceed with his hernia surgery  If he decides to go on a blood thinner he can hold it 2 days prior to the procedure (for Eliquis, Xarelto) and for 1 week (for warfarin)  However currently he is opting to start blood thinner after his hernia surgery

## 2025-04-10 NOTE — PROGRESS NOTES
EPS Consultation/New Patient Evaluation - Raman Coffey 84 y.o. male MRN: [id number]       Referring:Duong De Santiago, DO     Assessment & Plan  Atrial fibrillation, unspecified type (HCC)  Patient has history of typical flutter  He was seen by cardiologist in 2019  He was recommended to start anticoagulation but he was reluctant at that time  He then presented for right inguinal and umbilical hernia  He was noted to be in atrial fibrillation and the surgery was canceled  Given CHADS2 Vascor of 2 recommend anticoagulation but patient would like to think about it  Will obtain 2-week cardiac event monitor to assess burden  During atrial fibrillation/atrial flutter his heart rate is on the lower side which can be augmented with IV medications during the surgery  Patient has baseline bradycardia due to prolonged history of being RRR    Primary osteoarthritis of both hips  History of hip replacement  Also has shoulder pain and may need surgery as patient thinks he may have tore rotator cuff  Pre-operative clearance  Patient currently does not have any symptoms suggestive of coronary disease  He exercises regularly without any chest pain  He is able to do at least 2 flights of stairs and at least 2 blocks of walking  He is able to proceed with his hernia surgery  If he decides to go on a blood thinner he can hold it 2 days prior to the procedure (for Eliquis, Xarelto) and for 1 week (for warfarin)  However currently he is opting to start blood thinner after his hernia surgery    CC/HPI:   It was a pleasure to see Raman Coffey in our arrhythmia clinic at [facility]. As you know he is a 84 y.o. man with history of typical atrial flutter not on anticoagulation, arthritis with both knees and hips replacement, umbilical hernia and inguinal hernia who presents to get preop clearance.    Patient was scheduled for hernia surgery on the April 8, 2025.  EKG showed atrial fibrillation and therefore his  surgery was canceled.  Patient did not have any symptoms from atrial fibrillation.  In 2019 he was noted to be in atrial flutter at time of his preop clearance for his knee surgery.  He was recommended to start anticoagulation or consider ablation however he refused at that time.    Patient drinks about 3 cups of coffee a day and about a beer a day.  He otherwise denies any chronic medical issues    Past Medical History:  Past Medical History:   Diagnosis Date    Anesthesia     Woke up during 1 procedure    Arthritis 10/2005    BPH (benign prostatic hyperplasia)     Diverticulitis of colon     Heart murmur 08/1947    Herniated lumbar intervertebral disc     Kidney mass     Murmur 08/1947    diagnosed as a child 8 YO    Osteoarthritis     Preoperative cardiovascular examination 02/16/2018       Medications:      Current Outpatient Medications:     acetaminophen (TYLENOL) 500 mg tablet, Take 500 mg by mouth every 6 (six) hours as needed for mild pain, Disp: , Rfl:     meclizine (ANTIVERT) 25 mg tablet, Take 1 tablet (25 mg total) by mouth 3 (three) times a day as needed for dizziness, Disp: 30 tablet, Rfl: 0    multivitamin (THERAGRAN) TABS, Take 1 tablet by mouth daily, Disp: , Rfl:     penicillin V potassium (VEETID) 500 mg tablet, Take 500 mg by mouth see administration instructions 4 tabs 1 hour before dental work (Patient not taking: Reported on 8/21/2024), Disp: , Rfl:     sildenafil (VIAGRA) 100 mg tablet, Take 1 tablet (100 mg total) by mouth as needed for erectile dysfunction (Patient not taking: Reported on 8/21/2024), Disp: 10 tablet, Rfl: 3     Family History   Problem Relation Age of Onset    Cancer Mother     Cancer Father     Cancer Brother     Heart disease Brother      Social History     Socioeconomic History    Marital status: /Civil Union     Spouse name: Not on file    Number of children: Not on file    Years of education: Not on file    Highest education level: Not on file   Occupational  History    Not on file   Tobacco Use    Smoking status: Former     Current packs/day: 0.00     Average packs/day: 0.3 packs/day for 5.0 years (1.3 ttl pk-yrs)     Types: Cigarettes     Start date: 10/1/1975     Quit date: 10/1/1980     Years since quittin.5     Passive exposure: Past    Smokeless tobacco: Never    Tobacco comments:     Very light smoker   Vaping Use    Vaping status: Never Used   Substance and Sexual Activity    Alcohol use: Yes     Alcohol/week: 7.0 standard drinks of alcohol     Types: 7 Cans of beer per week     Comment: 1-2 beers daily    Drug use: No    Sexual activity: Yes     Partners: Female     Birth control/protection: Post-menopausal   Other Topics Concern    Not on file   Social History Narrative    Not on file     Social Drivers of Health     Financial Resource Strain: Low Risk  (2023)    Overall Financial Resource Strain (CARDIA)     Difficulty of Paying Living Expenses: Not hard at all   Food Insecurity: No Food Insecurity (10/2/2024)    Nursing - Inadequate Food Risk Classification     Worried About Running Out of Food in the Last Year: Never true     Ran Out of Food in the Last Year: Never true     Ran Out of Food in the Last Year: Not on file   Transportation Needs: No Transportation Needs (10/2/2024)    PRAPARE - Transportation     Lack of Transportation (Medical): No     Lack of Transportation (Non-Medical): No   Physical Activity: Insufficiently Active (2020)    Exercise Vital Sign     Days of Exercise per Week: 3 days     Minutes of Exercise per Session: 30 min   Stress: No Stress Concern Present (2020)    [employer] - Occupational Stress Questionnaire     Feeling of Stress : Only a little   Social Connections: Socially Integrated (2020)    Social Connection and Isolation Panel [NHANES]     Frequency of Communication with Friends and Family: Twice a week     Frequency of Social Gatherings with Friends and Family: Once a  week     Attends Synagogue Services: More than 4 times per year     Active Member of Clubs or Organizations: Yes     Attends Club or Organization Meetings: Never     Marital Status:    Intimate Partner Violence: Not At Risk (10/5/2023)    Humiliation, Afraid, Rape, and Kick questionnaire     Fear of Current or Ex-Partner: No     Emotionally Abused: No     Physically Abused: No     Sexually Abused: No   Housing Stability: Low Risk  (10/2/2024)    Housing Stability Vital Sign     Unable to Pay for Housing in the Last Year: No     Number of Times Moved in the Last Year: 0     Homeless in the Last Year: No     Social History     Tobacco Use   Smoking Status Former    Current packs/day: 0.00    Average packs/day: 0.3 packs/day for 5.0 years (1.3 ttl pk-yrs)    Types: Cigarettes    Start date: 10/1/1975    Quit date: 10/1/1980    Years since quittin.5    Passive exposure: Past   Smokeless Tobacco Never   Tobacco Comments    Very light smoker     Social History     Substance and Sexual Activity   Alcohol Use Yes    Alcohol/week: 7.0 standard drinks of alcohol    Types: 7 Cans of beer per week    Comment: 1-2 beers daily       Review of Systems   Constitutional: Negative for chills and fever.   HENT: Negative.     Eyes:  Negative for blurred vision and double vision.   Cardiovascular:  Negative for chest pain, dyspnea on exertion, leg swelling, near-syncope, orthopnea, palpitations, paroxysmal nocturnal dyspnea and syncope.   Respiratory:  Negative for cough and sputum production.    Endocrine: Negative.    Skin: Negative.  Negative for rash.   Musculoskeletal: Negative.  Negative for arthritis and joint pain.   Gastrointestinal:  Negative for abdominal pain, nausea and vomiting.   Genitourinary: Negative.    Neurological: Negative.  Negative for dizziness and light-headedness.   Psychiatric/Behavioral: Negative.  The patient is not nervous/anxious.         Objective:     Vitals: Blood pressure 142/82, pulse 57,  "height 5' 7\" (1.702 m), weight 65.8 kg (145 lb)., Body mass index is 22.71 kg/m².,        Physical Exam:    GEN: Raman Coffey appears well, alert and oriented x 3, pleasant and cooperative   HEENT: pupils equal, round, and reactive to light; extraocular muscles intact  NECK: supple, no carotid bruits   HEART: regular rhythm, normal S1 and S2, no murmurs, clicks, gallops or rubs   LUNGS: clear to auscultation bilaterally; no wheezes, rales, or rhonchi   ABDOMEN: normal bowel sounds, soft, no tenderness, no distention  EXTREMITIES: peripheral pulses normal; no clubbing, cyanosis, or edema  NEURO: no focal findings   SKIN: normal without suspicious lesions on exposed skin      Labs & Results:  Below is the patient's most recent value for Albumin, ALT, AST, BUN, Calcium, Chloride, Cholesterol, CO2, Creatinine, GFR, Glucose, HDL, Hematocrit, Hemoglobin, Hemoglobin A1C, LDL, Magnesium, Phosphorus, Platelets, Potassium, PSA, Sodium, Triglycerides, and WBC.   Lab Results   Component Value Date    ALT 38 10/22/2024    AST 44 (H) 10/22/2024    BUN 13 03/25/2025    CALCIUM 9.7 03/25/2025    CL 96 03/25/2025    CHOL 168 04/14/2017    CO2 30 03/25/2025    CREATININE 1.03 03/25/2025    HDL 65 10/22/2024    HCT 48.4 03/25/2025    HGB 16.5 03/25/2025    HGBA1C 5.2 09/17/2019     03/25/2025    K 5.6 (H) 03/25/2025    PSA 2.5 10/22/2024     12/05/2017    TRIG 76 10/22/2024    WBC 8.10 03/25/2025     Note: for a comprehensive list of the patient's lab results, access the Results Review activity.          Cardiac testing:     I personally reviewed the ECG performed in the clinic on 04/10/25. It reveals sinus bradycardia.     Echocardiograms:  Results for orders placed during the hospital encounter of 09/26/19    Echo complete with contrast if [place] [id number]    Transthoracic Echocardiogram  2D, M-mode, Doppler, and Color " Doppler    Study date:  26-Sep-2019    Patient: EL MENDOZA  MR number: [id number]  Account number: [id number]  : 1940  Age: 79 years  Gender: Male  Status: Outpatient  Location: [facility]  Height: 67 in  Weight: 153 lb  BP: 120/ 80 mmHg    Indications: Atrial flutter.    Diagnoses: I48.1 - Atrial flutter    Sonographer:  Sudhir REDDY, [facility]  Primary Physician:  Duong De Santiago DO  Referring Physician:  Tamika Cruz MD  Group:  [facility]  Interpreting Physician:  Ry Trinidad MD    SUMMARY    LEFT VENTRICLE:  Systolic function was normal by visual assessment. Ejection fraction was estimated to be 55 %.  There were no regional wall motion abnormalities.  Wall thickness was mildly increased.    AORTIC VALVE:  There was trace regurgitation.    TRICUSPID VALVE:  There was trace regurgitation.    AORTA:  The root exhibited moderate dilation.    HISTORY: PRIOR HISTORY: Bradycardia, Murmur.    PROCEDURE: The study was performed in the [facility]. This was a routine study. The transthoracic approach was used. The study included complete 2D imaging, M-mode, complete spectral Doppler, and color Doppler.  Images were obtained from the parasternal, apical, subcostal, and suprasternal notch acoustic windows. Image quality was adequate.    LEFT VENTRICLE: Size was normal. Systolic function was normal by visual assessment. Ejection fraction was estimated to be 55 %. There were no regional wall motion abnormalities. Wall thickness was mildly increased. DOPPLER: Transmitral  flow pattern: atrial fibrillation.    RIGHT VENTRICLE: The size was normal. Systolic function was normal. DOPPLER: Systolic pressure was not estimated.    LEFT ATRIUM: Size was normal.    RIGHT ATRIUM: Size was normal.    MITRAL VALVE: Valve structure was normal. There was mild thickening. There was normal leaflet separation. DOPPLER: The transmitral velocity was  within the normal range. There was no evidence for stenosis. There was no regurgitation.    AORTIC VALVE: The valve was trileaflet. Leaflets exhibited normal thickness and normal cuspal separation. DOPPLER: Transaortic velocity was within the normal range. There was no evidence for stenosis. There was trace regurgitation.    TRICUSPID VALVE: The valve structure was normal. There was normal leaflet separation. DOPPLER: The transtricuspid velocity was within the normal range. There was no evidence for stenosis. There was trace regurgitation.    PULMONIC VALVE: Leaflets exhibited normal thickness, no calcification, and normal cuspal separation. DOPPLER: The transpulmonic velocity was within the normal range. There was no regurgitation.    PERICARDIUM: There was no pericardial effusion.    AORTA: The root exhibited moderate dilation.    SYSTEMIC VEINS: IVC: The inferior vena cava was normal in size and course. Respirophasic changes were normal.    SYSTEM MEASUREMENT TABLES    2D  %FS: 37.66 %  Ao Diam: 4.05 cm  EDV(Teich): 70.33 ml  EF(Teich): 68.31 %  ESV(Cube): 15.6 ml  ESV(Teich): 22.29 ml  IVSd: 1.12 cm  LA Diam: 3 cm  LVEDV MOD A4C: 81.17 ml  LVEF MOD A4C: 57.43 %  LVESV MOD A4C: 34.55 ml  LVIDd: 4.01 cm  LVIDs: 2.5 cm  LVLd A4C: 7.97 cm  LVLs A4C: 7.62 cm  LVOT Diam: 2.23 cm  LVPWd: 1.03 cm  RA Area: 28.75 cm2  RV Diam.: 3.92 cm  SV MOD A4C: 46.62 ml  SV(Cube): 48.78 ml  SV(Teich): 48.04 ml    MM  TAPSE: 1.68 cm    [employer] Accredited Echocardiography Laboratory    Prepared and electronically signed by    Ry Trinidad MD  Signed 26-Sep-2019 13:30:40    No results found for this or any previous visit.      Catheterizations:   No results found for this or any previous visit.      Stress Tests:  No results found for this or any previous visit.      Holter monitor -   No results found for this or any previous visit.    No results found for this or any previous visit.

## 2025-04-11 ENCOUNTER — TELEPHONE (OUTPATIENT)
Dept: CARDIOLOGY CLINIC | Facility: CLINIC | Age: 85
End: 2025-04-11

## 2025-04-11 DIAGNOSIS — K42.9 UMBILICAL HERNIA WITHOUT OBSTRUCTION AND WITHOUT GANGRENE: ICD-10-CM

## 2025-04-11 DIAGNOSIS — N39.0 UTI (URINARY TRACT INFECTION): ICD-10-CM

## 2025-04-11 DIAGNOSIS — K40.90 RIGHT INGUINAL HERNIA: Primary | ICD-10-CM

## 2025-04-11 NOTE — TELEPHONE ENCOUNTER
Requested pt's TTE with Doppler Complete report performed on 02/11/25 be faxed to our office per Dr. Eldridge. Sent the fax request to [phone] per [facility] telephone call.

## 2025-04-12 ENCOUNTER — APPOINTMENT (OUTPATIENT)
Dept: LAB | Facility: CLINIC | Age: 85
End: 2025-04-12
Payer: COMMERCIAL

## 2025-04-12 DIAGNOSIS — K40.90 RIGHT INGUINAL HERNIA: ICD-10-CM

## 2025-04-12 DIAGNOSIS — N39.0 UTI (URINARY TRACT INFECTION): ICD-10-CM

## 2025-04-12 DIAGNOSIS — K42.9 UMBILICAL HERNIA WITHOUT OBSTRUCTION AND WITHOUT GANGRENE: ICD-10-CM

## 2025-04-12 LAB
BACTERIA UR QL AUTO: ABNORMAL /HPF
BILIRUB UR QL STRIP: NEGATIVE
CLARITY UR: ABNORMAL
COLOR UR: ABNORMAL
GLUCOSE UR STRIP-MCNC: NEGATIVE MG/DL
HGB UR QL STRIP.AUTO: ABNORMAL
KETONES UR STRIP-MCNC: NEGATIVE MG/DL
LEUKOCYTE ESTERASE UR QL STRIP: ABNORMAL
NITRITE UR QL STRIP: NEGATIVE
NON-SQ EPI CELLS URNS QL MICRO: ABNORMAL /HPF
PH UR STRIP.AUTO: 7.5 [PH]
PROT UR STRIP-MCNC: ABNORMAL MG/DL
RBC #/AREA URNS AUTO: ABNORMAL /HPF
SP GR UR STRIP.AUTO: 1.01 (ref 1–1.03)
UROBILINOGEN UR STRIP-ACNC: <2 MG/DL
WBC #/AREA URNS AUTO: ABNORMAL /HPF
WBC CLUMPS # UR AUTO: PRESENT /UL

## 2025-04-12 PROCEDURE — 87086 URINE CULTURE/COLONY COUNT: CPT

## 2025-04-12 PROCEDURE — 81001 URINALYSIS AUTO W/SCOPE: CPT

## 2025-04-12 PROCEDURE — 87147 CULTURE TYPE IMMUNOLOGIC: CPT

## 2025-04-13 LAB
ATRIAL RATE: 52 BPM
P AXIS: 68 DEGREES
QRS AXIS: 17 DEGREES
QRSD INTERVAL: 88 MS
QT INTERVAL: 448 MS
QTC INTERVAL: 417 MS
T WAVE AXIS: 30 DEGREES
VENTRICULAR RATE: 52 BPM

## 2025-04-13 PROCEDURE — 93010 ELECTROCARDIOGRAM REPORT: CPT | Performed by: INTERNAL MEDICINE

## 2025-04-14 ENCOUNTER — TELEPHONE (OUTPATIENT)
Age: 85
End: 2025-04-14

## 2025-04-14 NOTE — TELEPHONE ENCOUNTER
Patient called because his UA is abnormal and questioning if he should go on antibiotics prior to surgery on 04/30. Warm transfer to [facility].

## 2025-04-16 LAB — BACTERIA UR CULT: ABNORMAL

## 2025-04-17 ENCOUNTER — ANESTHESIA EVENT (OUTPATIENT)
Dept: PERIOP | Facility: HOSPITAL | Age: 85
End: 2025-04-17
Payer: COMMERCIAL

## 2025-04-24 ENCOUNTER — OFFICE VISIT (OUTPATIENT)
Dept: SURGERY | Facility: HOSPITAL | Age: 85
End: 2025-04-24
Payer: COMMERCIAL

## 2025-04-24 VITALS
BODY MASS INDEX: 23.54 KG/M2 | HEART RATE: 49 BPM | SYSTOLIC BLOOD PRESSURE: 123 MMHG | OXYGEN SATURATION: 97 % | TEMPERATURE: 97.5 F | WEIGHT: 150 LBS | DIASTOLIC BLOOD PRESSURE: 72 MMHG | HEIGHT: 67 IN

## 2025-04-24 DIAGNOSIS — K42.9 UMBILICAL HERNIA WITHOUT OBSTRUCTION AND WITHOUT GANGRENE: Primary | ICD-10-CM

## 2025-04-24 DIAGNOSIS — K40.90 RIGHT INGUINAL HERNIA: ICD-10-CM

## 2025-04-24 PROCEDURE — 99213 OFFICE O/P EST LOW 20 MIN: CPT | Performed by: SURGERY

## 2025-04-30 ENCOUNTER — HOSPITAL ENCOUNTER (OUTPATIENT)
Facility: HOSPITAL | Age: 85
Setting detail: OUTPATIENT SURGERY
Discharge: HOME | End: 2025-04-30
Attending: SURGERY | Admitting: SURGERY
Payer: COMMERCIAL

## 2025-04-30 ENCOUNTER — ANESTHESIA (OUTPATIENT)
Dept: PERIOP | Facility: HOSPITAL | Age: 85
End: 2025-04-30
Payer: COMMERCIAL

## 2025-04-30 VITALS
HEIGHT: 67 IN | TEMPERATURE: 97.5 F | SYSTOLIC BLOOD PRESSURE: 168 MMHG | DIASTOLIC BLOOD PRESSURE: 98 MMHG | BODY MASS INDEX: 22.85 KG/M2 | WEIGHT: 145.6 LBS | OXYGEN SATURATION: 98 % | HEART RATE: 46 BPM | RESPIRATION RATE: 20 BRPM

## 2025-04-30 DIAGNOSIS — K40.90 RIGHT INGUINAL HERNIA: Primary | ICD-10-CM

## 2025-04-30 DIAGNOSIS — K42.9 UMBILICAL HERNIA WITHOUT OBSTRUCTION AND WITHOUT GANGRENE: ICD-10-CM

## 2025-04-30 PROBLEM — I48.91 A-FIB (HCC): Status: ACTIVE | Noted: 2025-04-30

## 2025-04-30 LAB
ANION GAP SERPL CALCULATED.3IONS-SCNC: 6 MMOL/L (ref 4–13)
BUN SERPL-MCNC: 16 MG/DL (ref 5–25)
CALCIUM SERPL-MCNC: 8.8 MG/DL (ref 8.4–10.2)
CHLORIDE SERPL-SCNC: 101 MMOL/L (ref 96–108)
CO2 SERPL-SCNC: 29 MMOL/L (ref 21–32)
CREAT SERPL-MCNC: 0.93 MG/DL (ref 0.6–1.3)
GFR SERPL CREATININE-BSD FRML MDRD: 76 ML/MIN/1.73SQ M
GLUCOSE P FAST SERPL-MCNC: 87 MG/DL (ref 65–99)
GLUCOSE SERPL-MCNC: 87 MG/DL (ref 65–140)
POTASSIUM SERPL-SCNC: 4 MMOL/L (ref 3.5–5.3)
SODIUM SERPL-SCNC: 136 MMOL/L (ref 135–147)

## 2025-04-30 PROCEDURE — 80048 BASIC METABOLIC PNL TOTAL CA: CPT | Performed by: ANESTHESIOLOGY

## 2025-04-30 PROCEDURE — 49650 LAP ING HERNIA REPAIR INIT: CPT | Performed by: PHYSICIAN ASSISTANT

## 2025-04-30 PROCEDURE — C1781 MESH (IMPLANTABLE): HCPCS | Performed by: SURGERY

## 2025-04-30 PROCEDURE — 49650 LAP ING HERNIA REPAIR INIT: CPT | Performed by: SURGERY

## 2025-04-30 DEVICE — 3DMAX MESH, 10.8 CM X 16.0 CM (4.3" X 6.3"), RIGHT, LARGE
Type: IMPLANTABLE DEVICE | Site: INGUINAL | Status: FUNCTIONAL
Brand: 3DMAX

## 2025-04-30 RX ORDER — DEXAMETHASONE SODIUM PHOSPHATE 10 MG/ML
INJECTION, SOLUTION INTRAMUSCULAR; INTRAVENOUS AS NEEDED
Status: DISCONTINUED | OUTPATIENT
Start: 2025-04-30 | End: 2025-04-30

## 2025-04-30 RX ORDER — FENTANYL CITRATE 50 UG/ML
INJECTION, SOLUTION INTRAMUSCULAR; INTRAVENOUS AS NEEDED
Status: DISCONTINUED | OUTPATIENT
Start: 2025-04-30 | End: 2025-04-30

## 2025-04-30 RX ORDER — OXYCODONE HYDROCHLORIDE 5 MG/1
5 TABLET ORAL EVERY 6 HOURS PRN
Qty: 12 TABLET | Refills: 0 | Status: SHIPPED | OUTPATIENT
Start: 2025-04-30 | End: 2025-05-03

## 2025-04-30 RX ORDER — ROCURONIUM BROMIDE 10 MG/ML
INJECTION, SOLUTION INTRAVENOUS AS NEEDED
Status: DISCONTINUED | OUTPATIENT
Start: 2025-04-30 | End: 2025-04-30

## 2025-04-30 RX ORDER — CEFAZOLIN SODIUM 2 G/50ML
SOLUTION INTRAVENOUS AS NEEDED
Status: DISCONTINUED | OUTPATIENT
Start: 2025-04-30 | End: 2025-04-30

## 2025-04-30 RX ORDER — SODIUM CHLORIDE, SODIUM LACTATE, POTASSIUM CHLORIDE, CALCIUM CHLORIDE 600; 310; 30; 20 MG/100ML; MG/100ML; MG/100ML; MG/100ML
INJECTION, SOLUTION INTRAVENOUS CONTINUOUS PRN
Status: DISCONTINUED | OUTPATIENT
Start: 2025-04-30 | End: 2025-04-30

## 2025-04-30 RX ORDER — SODIUM CHLORIDE, SODIUM LACTATE, POTASSIUM CHLORIDE, CALCIUM CHLORIDE 600; 310; 30; 20 MG/100ML; MG/100ML; MG/100ML; MG/100ML
125 INJECTION, SOLUTION INTRAVENOUS CONTINUOUS
Status: DISCONTINUED | OUTPATIENT
Start: 2025-04-30 | End: 2025-04-30 | Stop reason: HOSPADM

## 2025-04-30 RX ORDER — LIDOCAINE HYDROCHLORIDE 10 MG/ML
INJECTION, SOLUTION EPIDURAL; INFILTRATION; INTRACAUDAL; PERINEURAL AS NEEDED
Status: DISCONTINUED | OUTPATIENT
Start: 2025-04-30 | End: 2025-04-30

## 2025-04-30 RX ORDER — LIDOCAINE HYDROCHLORIDE 10 MG/ML
0.5 INJECTION, SOLUTION EPIDURAL; INFILTRATION; INTRACAUDAL; PERINEURAL ONCE AS NEEDED
Status: DISCONTINUED | OUTPATIENT
Start: 2025-04-30 | End: 2025-04-30 | Stop reason: HOSPADM

## 2025-04-30 RX ORDER — ONDANSETRON 2 MG/ML
INJECTION INTRAMUSCULAR; INTRAVENOUS AS NEEDED
Status: DISCONTINUED | OUTPATIENT
Start: 2025-04-30 | End: 2025-04-30

## 2025-04-30 RX ORDER — HYDROMORPHONE HCL IN WATER/PF 6 MG/30 ML
0.2 PATIENT CONTROLLED ANALGESIA SYRINGE INTRAVENOUS
Status: DISCONTINUED | OUTPATIENT
Start: 2025-04-30 | End: 2025-04-30 | Stop reason: HOSPADM

## 2025-04-30 RX ORDER — FENTANYL CITRATE/PF 50 MCG/ML
25 SYRINGE (ML) INJECTION
Status: DISCONTINUED | OUTPATIENT
Start: 2025-04-30 | End: 2025-04-30 | Stop reason: HOSPADM

## 2025-04-30 RX ORDER — IBUPROFEN 600 MG/1
600 TABLET, FILM COATED ORAL EVERY 6 HOURS PRN
COMMUNITY
Start: 2025-04-30

## 2025-04-30 RX ORDER — EPHEDRINE SULFATE 50 MG/ML
INJECTION INTRAVENOUS AS NEEDED
Status: DISCONTINUED | OUTPATIENT
Start: 2025-04-30 | End: 2025-04-30

## 2025-04-30 RX ORDER — GLYCOPYRROLATE 0.2 MG/ML
INJECTION INTRAMUSCULAR; INTRAVENOUS AS NEEDED
Status: DISCONTINUED | OUTPATIENT
Start: 2025-04-30 | End: 2025-04-30

## 2025-04-30 RX ORDER — ONDANSETRON 2 MG/ML
4 INJECTION INTRAMUSCULAR; INTRAVENOUS ONCE AS NEEDED
Status: DISCONTINUED | OUTPATIENT
Start: 2025-04-30 | End: 2025-04-30 | Stop reason: HOSPADM

## 2025-04-30 RX ORDER — PROPOFOL 10 MG/ML
INJECTION, EMULSION INTRAVENOUS AS NEEDED
Status: DISCONTINUED | OUTPATIENT
Start: 2025-04-30 | End: 2025-04-30

## 2025-04-30 RX ADMIN — FENTANYL CITRATE 50 MCG: 50 INJECTION INTRAMUSCULAR; INTRAVENOUS at 07:33

## 2025-04-30 RX ADMIN — EPHEDRINE SULFATE 10 MG: 50 INJECTION INTRAVENOUS at 08:23

## 2025-04-30 RX ADMIN — LIDOCAINE HYDROCHLORIDE 50 MG: 10 INJECTION, SOLUTION EPIDURAL; INFILTRATION; INTRACAUDAL; PERINEURAL at 07:33

## 2025-04-30 RX ADMIN — ROCURONIUM BROMIDE 40 MG: 10 INJECTION INTRAVENOUS at 07:33

## 2025-04-30 RX ADMIN — CEFAZOLIN SODIUM 2000 MG: 2 SOLUTION INTRAVENOUS at 07:49

## 2025-04-30 RX ADMIN — ONDANSETRON 4 MG: 2 INJECTION, SOLUTION INTRAMUSCULAR; INTRAVENOUS at 08:24

## 2025-04-30 RX ADMIN — FENTANYL CITRATE 50 MCG: 50 INJECTION INTRAMUSCULAR; INTRAVENOUS at 08:11

## 2025-04-30 RX ADMIN — DEXAMETHASONE SODIUM PHOSPHATE 10 MG: 10 INJECTION, SOLUTION INTRAMUSCULAR; INTRAVENOUS at 07:46

## 2025-04-30 RX ADMIN — GLYCOPYRROLATE 0.2 MG: 0.2 INJECTION INTRAMUSCULAR; INTRAVENOUS at 07:46

## 2025-04-30 RX ADMIN — SUGAMMADEX 150 MG: 100 INJECTION, SOLUTION INTRAVENOUS at 08:29

## 2025-04-30 RX ADMIN — SODIUM CHLORIDE, SODIUM LACTATE, POTASSIUM CHLORIDE, AND CALCIUM CHLORIDE: .6; .31; .03; .02 INJECTION, SOLUTION INTRAVENOUS at 07:29

## 2025-04-30 RX ADMIN — PROPOFOL 150 MG: 10 INJECTION, EMULSION INTRAVENOUS at 07:33

## 2025-04-30 NOTE — ANESTHESIA POSTPROCEDURE EVALUATION
Post-Op Assessment Note    CV Status:  Stable    Pain management: adequate       Mental Status:  Alert and awake   Hydration Status:  Euvolemic   PONV Controlled:  Controlled   Airway Patency:  Patent     Post Op Vitals Reviewed: Yes    No anethesia notable event occurred.    Staff: CRNA           Last Filed PACU Vitals:  Vitals Value Taken Time   Temp     Pulse 52 04/30/25 0852   /90 04/30/25 0848   Resp 0 04/30/25 0852   SpO2 98 % 04/30/25 0852   Vitals shown include unfiled device data.

## 2025-04-30 NOTE — OP NOTE
Inguinal Hernia, Laparocopic, Procedure Note    Name: Raman Coffey   : 1940  MRN: [id number]  Date: 2025    Indications: The patient presented with a history of a right, not reducible inguinal hernia and umbilical hernia    Pre-operative Diagnosis: right not reducible inguinal hernia and umbilical hernia    Post-operative Diagnosis: right not reducible direct and/or indirect inguinal hernia and umbilical hernia    Procedure: Laparoscopic repair of right inguinal hernia with mesh, Laparoscopic assisted bilateral TAP block, open umbilical hernia repair (primary)    Surgeons and Role:     * Milton Laguerre MD - Primary     * GA QuesadaC - Assisting    No qualified resident available.  PA was medically necessary for the surgical safety of the case, including suturing, retraction, hemostasis.    Anesthesia: General endotracheal anesthesia    Procedure Details   The patient was seen again in the Holding Room. The risks, benefits, complications, treatment options, and expected outcomes were discussed with the patient. The possibilities of reaction to medication, pulmonary aspiration, perforation of viscus, bleeding, postoperative short or long term nerve entrapment causing pain,recurrent infection, the need for additional procedures, and development of a complication requiring transfusion or further operation were discussed with the patient and/or family. There was concurrence with the proposed plan, and informed consent was obtained. The site of surgery was properly noted/marked. The patient was taken to the Operating Room, identified as Raman Coffey and the procedure verified as hernia repair. A Time Out was held and the above information confirmed.    The patient was prepped and draped in a sterile fashion.  A timeout was again performed.  Local anesthesia was used in the incision. An umbilical incision was made and a Ann was passed around the umbilical stalk and this was  from  underlying umbilical hernia sac using cautery. A roxana trocar was inserted into the abdomen via the umbilical hernia defect and the abdomen was insufflated to appropriate pressure. Two additional 5mm trocars were placed lateral to rectus muscle approximately at the level of the umbilicus.  At this point the patient was placed into Trendelenburg position and noted to have right inguinal hernia .  A laparoscopic assisted bilateral TAP block was performed using a combination of lidocaine and marcaine. The peritoneum was incised from the medial umbilical fold out laterally past the internal ring on the right. Using peanut the superior flap was dissected. Next the direct space was mobilized by exposing Petros's ligament all the way along its length to the pubic tubercle. If a direct hernia defect was seen this was dissected and reduced. If there was indirect hernia sac this was carefully mobilized off the cord structures with care to avoid injury to the gonadal vessels or spermatic cord. The remainder of the inferior flap was created. At this point Bard 3-D max mesh was selected and placed into the preperitoneal space. The mesh was secured inferiorly at Petros's. Medially at the pubic tubercle, and laterally at the anterior abdominal wall. Of note no clips were placed lateral to the gonadal vessels or inferior to the iliopubic tract. The peritoneum was closed using running V-lock suture.    The umbilical hernia defect was closed with multiple figure of eight prolene sutures. The wound was closed in multiple layers using 3-0 Vicryl sutures and the skin of all ports were closed using a 4-0 Monocryl subcuticular stitch. The wound was dressed with Histacryl.  The patient was anatomically correct at the end of the procedure.  The patient tolerated the procedure in good condition.    Instrument, sponge, and needle counts were correct prior to closure and at the conclusion of the case.    This text is generated with voice  recognition software. There may be translation, syntax,  or grammatical errors. If you have any questions, please contact the dictating provider.     Findings: right not reducible direct and/or indirect inguinal hernia and umbilical hernia    Estimated Blood Loss: Minimal       Specimens: All specimens were sent for pathology.   Order Name Source Comment Collection Info Order Time   BASIC METABOLIC PANEL Arm, Right  Collected By: Leonie Bowers RN 4/30/2025  6:19 AM            Complications: None; patient tolerated the procedure well.           Disposition: PACU            Condition: stable    Signature:   Milton Laguerre MD  Date: 4/30/2025 Time: 9:19 AM

## 2025-04-30 NOTE — ANESTHESIA PREPROCEDURE EVALUATION
Procedure:  REPAIR HERNIA INGUINAL, LAPAROSCOPIC (Right: Groin)  REPAIR HERNIA UMBILICAL (Abdomen)    Relevant Problems   ANESTHESIA   (-) History of anesthesia complications      CARDIO   (+) A-fib (HCC)   (+) Heart murmur   (+) Sinus bradycardia   (-) Chest pain   (-) ESPARZA (dyspnea on exertion)      /RENAL   (+) BPH (benign prostatic hyperplasia)   (+) Renal cyst      PULMONARY   (-) Shortness of breath   (-) Sleep apnea   (-) URI (upper respiratory infection)        Physical Exam    Airway    Mallampati score: II  TM Distance: >3 FB  Neck ROM: full     Dental       Cardiovascular      Pulmonary      Other Findings        Anesthesia Plan  ASA Score- 2     Anesthesia Type- general with ASA Monitors.         Additional Monitors:     Airway Plan: ETT.           Plan Factors-Exercise tolerance (METS): >4 METS.    Chart reviewed. EKG reviewed.  Existing labs reviewed. Patient summary reviewed.                  Induction- intravenous.    Postoperative Plan-         Informed Consent- Anesthetic plan and risks discussed with patient.  I personally reviewed this patient with the CRNA. Discussed and agreed on the Anesthesia Plan with the CRNA..      NPO Status:  Vitals Value Taken Time   Date of last liquid 04/29/25 04/30/25 0627   Time of last liquid 1900 04/30/25 0627   Date of last solid 04/29/25 04/30/25 0627   Time of last solid 1800 04/30/25 0627

## 2025-04-30 NOTE — DISCHARGE INSTR - AVS FIRST PAGE
[facility] General Surgery [place]  Post-Operative Care Instructions  Dr. Milton Laguerre MD, [facility]  [phone]    1. General: You will feel pulling sensations around the wound or funny aches and pains around the incisions. This is normal. Even minor surgery is a change in your body and this is your body's way of reacting to it. If you have had abdominal surgery, it may help to support the incision with a small pillow or blanket for comfort when moving or coughing.    2. Wound care:  Okay to shower.  The glue will fall off over the next week or 2.   Use ice for the first 5 days after surgery.  Do not use for longer than 20 minutes at a time. Use ice 5 times per day.    3. Water: You may shower over the wounds. Do not bathe or use a pool or hot tub until cleared by the physician.   If you were discharged with a drain, make sure drain site is covered with plastic wrap before showering.    4. Activity: You may go up and down stairs, walk as much as you are comfortable, but walk at least 3 times each day. If you have had abdominal surgery, do not lift anything heavier than 15 lbs for the 1st 2 weeks and 25 lbs for weeks 3 and 4.     5. Diet: You may resume a regular diet. If you had a same-day surgery or overnight stay surgery, you may wish to eat lightly for a few days: soups, crackers, and sandwiches. You may resume a regular diet when ready.    6. Medications: Resume all of your previous medications, unless told otherwise by the doctor. Avoid aspirin products for 2-3 days after the date of surgery. You may, at that time, begin to take them again. Use Tylenol and Ibuprofen for pain control.  You may alternate these medications every 3 hours.  For example: you may take Tylenol at noon, Ibuprofen at 3:00 p.m., and Tylenol again at 6:00 p.m., etc.  You should use ice to assist with pain control as above.  You do not need to take narcotic pain medication unless you are having significant pain.   If you were  prescribed a narcotic pain medication containing Tylenol, such as Percocet or Norco, do not use supplemental Tylenol.    7. Driving: You will need someone to drive you home on the day of surgery or discharge. Do not drive or make any important decisions while on narcotic pain medication or 24 hours and after anesthesia or sedation for surgery. Generally, you may drive when your off all narcotic pain medications and you are comfortable.     8. Upset Stomach: You may take Maalox, Tums, or similar items for an upset stomach. If your narcotic pain medication causes an upset stomach, do not take it on an empty stomach. Try taking it with at least some crackers or toast.     9. Constipation: Patients often experience constipation after surgery. You may take over-the-counter medication for this, such as Metamucil, Senokot, Dulcolax, milk of magnesia, etc. You may take a suppository unless you have had anorectal surgery such as a procedure on your hemorrhoids. If you experience significant nausea or vomiting after abdominal surgery, call the office before trying any of these medications.    10. Call the office: If you are experiencing any of the following: fevers above 101.5°, significant nausea or vomiting, if the wound develops drainage and/or there is excessive redness around the wound, or if you have significant diarrhea or other worsening symptoms.    11. Pain: You may be given a prescription for pain medication.  This will be sent to your pharmacy prior to discharge.    12. Sexual Activity: You may resume sexual activity when you feel ready and comfortable and your incision is sealed and healed without apparent infection risk.    13. Urination: If you have not urinated in 6 hours, go directly to the ER for evaluation for urinary retention.     14. Follow-up in 2 weeks.      ***READ ONLY IF YOU HAVE BEEN DISCHARGED WITH A URINARY CATHETER***  Ovalle Insertion for Post-Op Urinary Retention    - A prescription for Flomax  will be sent to your pharmacy.  This should be taken daily while the urinary catheter remains in place.    You will not be given a prescription for Flomax if your prostate has been removed.  If you are already taking Flomax, continue the medication as prescribed.    - We will send a message to the urology group who will contact you within the next 48 hours with further instructions and to schedule an appointment for voiding trial and catheter removal.  The urinary catheter will remain in place for approximately 1 week.  If you are not contacted within the next 48 hours please call our office to assist with scheduling your follow-up.    - If you have your own urologist, you should contact your physician the day after discharge for instructions and to schedule a voiding trial and catheter removal.

## 2025-04-30 NOTE — PERIOPERATIVE NURSING NOTE
Patient states that he self catheterizes at home. Per Boy CAREY, patient does not have to void prior to d/c.

## 2025-04-30 NOTE — INTERIM OP NOTE
REPAIR HERNIA INGUINAL, LAPAROSCOPIC, REPAIR HERNIA UMBILICAL  Postoperative Note  PATIENT NAME: Raman Coffey  : 1940  MRN: [id number]  [facility] OR ROOM 02    Surgery Date: 2025    Preop Diagnosis:  Right inguinal hernia [K40.90]    Post-Op Diagnosis Codes:     * Right inguinal hernia [K40.90]    Procedure(s) (LRB):  REPAIR HERNIA INGUINAL, LAPAROSCOPIC (Right)  REPAIR HERNIA UMBILICAL (N/A)    Surgeons and Role:     * Milton Laguerre MD - Primary     * Sabina Swain PA-C - Assisting    Specimens:  * No specimens in log *    Estimated Blood Loss:   Minimal    Anesthesia Type:   General     Findings:    R inguinal hernia    Complications:   None    Hernia Surgery Operative Note    Name: Raman Coffey    Gender: male    Age: 84 y.o.    Race:     BMI: Body mass index is 22.8 kg/m².    DIAGNOSIS:   1. Right inguinal hernia  Indwelling Catheter Self Care    oxyCODONE (Roxicodone) 5 immediate release tablet    ibuprofen (MOTRIN) 600 mg tablet      2. Umbilical hernia without obstruction and without gangrene  oxyCODONE (Roxicodone) 5 immediate release tablet          Diabetes Mellitis: No    Coronary Heart Disease: No    Cancer: No    Steroid Use: No    Tobacco use: Yes   Last used:    Type: cigarettes   Frequency (per day): 0.3   Duration (years): 5    Alcohol use: Yes Moderate    Location of Hernia: R inguinal hernia, indirect   Length:2.0  Width:1.5  Primary: Yes  Recurrent: No   Number of recurrences:    Access: Laparoscope    Component Separation: No    Mesh:   Yes -  - 3D Bard Large    Location of Hernia: umbilical  Length:1.0  Width:1.0  Primary: Yes  Recurrent: No   Number of recurrences:    Access: open    Component Separation: No    Mesh:   no    Operative Time: 35mins             SIGNATURE: Sabina Swain PA-C   DATE: 2025   TIME: 8:39 AM

## 2025-04-30 NOTE — INTERVAL H&P NOTE
H&P reviewed. After examining the patient I find no changes in the patients condition since the H&P had been written.    Vitals:    04/30/25 0643   BP: 161/86   Pulse: (!) 45   Resp: 15   Temp: (!) 97.4 °F (36.3 °C)   SpO2: 98%

## 2025-05-01 NOTE — ANESTHESIA POSTPROCEDURE EVALUATION
Post-Op Assessment Note    Last Filed PACU Vitals:  Vitals Value Taken Time   Temp 97.5 °F (36.4 °C) 04/30/25 0848   Pulse 48 04/30/25 0917   /87 04/30/25 0915   Resp 33 04/30/25 0917   SpO2 96 % 04/30/25 0917   Vitals shown include unfiled device data.    Modified Isaiah:     Vitals Value Taken Time   Activity 2 04/30/25 0915   Respiration 2 04/30/25 0915   Circulation 2 04/30/25 0915   Consciousness 2 04/30/25 0915   Oxygen Saturation 2 04/30/25 0915     Modified Isaiah Score: 10

## 2025-05-02 LAB
CV ZIO AVB TOTAL  DURATION: 61.13 SEC
CV ZIO AVB3 MIN HR: 25 BEATS
CV ZIO AVB3 PRESENT: NORMAL
CV ZIO BASELINE AVG BPM: 48 BPM
CV ZIO BASELINE BPM HIGH: 162 BPM
CV ZIO BASELINE BPM LOW: 21 BPM
CV ZIO BLOCK COUNT: 8
CV ZIO DEVICE ANALYSIS TIME: NORMAL
CV ZIO ECT SVE COUNT: NORMAL EPISODES
CV ZIO ECT SVE CPLT COUNT: 1451 EPISODES
CV ZIO ECT SVE CPLT FREQ: NORMAL
CV ZIO ECT SVE FREQ: NORMAL
CV ZIO ECT SVE TPLT COUNT: 144 EPISODES
CV ZIO ECT SVE TPLT FREQ: NORMAL
CV ZIO ECT VE COUNT: 4694 EPISODES
CV ZIO ECT VE CPLT COUNT: 446 EPISODES
CV ZIO ECT VE CPLT FREQ: NORMAL
CV ZIO ECT VE FREQ: NORMAL
CV ZIO ECT VE TPLT COUNT: 25 EPISODES
CV ZIO ECT VE TPLT FREQ: NORMAL
CV ZIO ECTOPIC SVE COUPLET RAW PERCENT: 0.3 %
CV ZIO ECTOPIC SVE ISOLATED PERCENT: 1.4 %
CV ZIO ECTOPIC SVE TRIPLET RAW PERCENT: 0.04 %
CV ZIO ECTOPIC VE COUPLET RAW PERCENT: 0.09 %
CV ZIO ECTOPIC VE ISOLATED PERCENT: 0.49 %
CV ZIO ECTOPIC VE TRIPLET RAW PERCENT: 0.01 %
CV ZIO ENROLLMENT END: NORMAL
CV ZIO ENROLLMENT START: NORMAL
CV ZIO L BIGEMINY DUR: 3.8 SEC
CV ZIO L BIGEMINY END: NORMAL
CV ZIO L BIGEMINY START: NORMAL
CV ZIO L TRIGEMINY DUR: 7.4 SEC
CV ZIO L TRIGEMINY END: NORMAL
CV ZIO L TRIGEMINY START: NORMAL
CV ZIO PATIENT EVENTS DIARIES: 1
CV ZIO PATIENT EVENTS TRIGGERS: 1
CV ZIO PAUSE COUNT: 6
CV ZIO PAUSE END: 3.3 SEC
CV ZIO PAUSE START: 3 SEC
CV ZIO PRESCRIPTION STATUS: NORMAL
CV ZIO SVT AVG BPM: 140 BPM
CV ZIO SVT BPM HIGH: 152 BPM
CV ZIO SVT BPM LOW: 126 BPM
CV ZIO SVT COUNT: 3
CV ZIO SVT F EPI AVG BPM: 144 BPM
CV ZIO SVT F EPI BEATS: 7 BEATS
CV ZIO SVT F EPI BPM HIGH: 152 BPM
CV ZIO SVT F EPI BPM LOW: 132 BPM
CV ZIO SVT F EPI DUR: 2.7 SEC
CV ZIO SVT F EPI END: NORMAL
CV ZIO SVT F EPI START: NORMAL
CV ZIO SVT L EPI AVG BPM: 138 BPM
CV ZIO SVT L EPI BEATS: 8 BEATS
CV ZIO SVT L EPI BPM HIGH: 146 BPM
CV ZIO SVT L EPI BPM LOW: 126 BPM
CV ZIO SVT L EPI DUR: 3.5 SEC
CV ZIO SVT L EPI END: NORMAL
CV ZIO SVT L EPI START: NORMAL
CV ZIO TOTAL  ENROLLMENT PERIOD: NORMAL
CV ZIO VT AVG BPM: 131 BPM
CV ZIO VT BPM HIGH: 162 BPM
CV ZIO VT BPM LOW: 98 BPM
CV ZIO VT COUNT: 3
CV ZIO VT F EPI AVG BPM: 140
CV ZIO VT F EPI BEATS: 4 BEATS
CV ZIO VT F EPI BPM HIGH: 162
CV ZIO VT F EPI BPM LOW: 126
CV ZIO VT F EPI DUR: 1.6 SEC
CV ZIO VT F EPI END: NORMAL
CV ZIO VT F EPI START: NORMAL
CV ZIO VT L EPI AVG BPM: 130
CV ZIO VT L EPI BEATS: 8 BEATS
CV ZIO VT L EPI BPM HIGH: 143 BPM
CV ZIO VT L EPI BPM LOW: 119 BPM
CV ZIO VT L EPI DUR: 4.2
CV ZIO VT L EPI END: NORMAL
CV ZIO VT L EPI START: NORMAL

## 2025-05-07 ENCOUNTER — TELEPHONE (OUTPATIENT)
Dept: UROLOGY | Facility: HOSPITAL | Age: 85
End: 2025-05-07

## 2025-05-07 NOTE — PROGRESS NOTES
Assessment/Plan:   Raman Coffey is a 84 y.o.male who comes in today for postoperative check after a laparoscopic repair of right inguinal hernia with mesh, laparoscopic assisted bilateral TAP block, and an open umbilical hernia repair (primary) with Dr. Laguerre on 04/30/2025.    1. S/P right inguinal hernia repair   2. S/P umbilical hernia repair, follow-up exam  Patient is pleased with surgical results. Patient is doing well with no complaints. Follow up PRN.     Pathology: None sent  Postoperative restrictions reviewed, including specific lifting and exercise restrictions. All questions asked and answered.     ___________________________________________________  HPI:  Raman Coffey is a 84 y.o.male who comes in today for postoperative check after recent laparoscopic repair of right inguinal hernia with mesh, laparoscopic assisted bilateral TAP block, and an open umbilical hernia repair (primary) with Dr. Laguerre on 04/30/2025.    Currently doing well without problems, no fever or chills,no nausea and no vomiting. Denies chest pain and troubles breathing. Reports having some drainage from umbilcus for the first few days but since then has subsided and no longer has drainage.    ROS:  General ROS: negative for - chills, fatigue, fever or night sweats, weight loss  Respiratory ROS: no cough, shortness of breath, or wheezing  Cardiovascular ROS: no chest pain or dyspnea on exertion  Genito-Urinary ROS: no dysuria, trouble voiding, or hematuria  Musculoskeletal ROS: negative for - gait disturbance, joint pain or muscle pain  Neurological ROS: no TIA or stroke symptoms    GI ROS: see HPI  Skin ROS: no new rashes or lesions   Lymphatic ROS: no new adenopathy noted by pt.   GYN ROS: see HPI, no new GYN history or bleeding noted  Psy ROS: no new mental or behavioral disturbances     Patient Active Problem List   Diagnosis    S/P total hip arthroplasty    Preoperative cardiovascular examination    Sinus bradycardia     S/P total knee arthroplasty, left    Back pain    Urinary retention    BPH (benign prostatic hyperplasia)    Diverticulitis of colon    Fullness in clavicular area    Heart murmur    Leg pain    Neoplasm of skin    Osteoarthritis of hip    Primary osteoarthritis of both hips    Erectile dysfunction    Retention of urine    S/P total knee arthroplasty, right    Combined forms of age-related cataract, bilateral    Renal cyst    Right inguinal hernia    Umbilical hernia    Pre-operative clearance    A-fib (HCC)         Allergies:   Patient has no known allergies.      Current Outpatient Medications:     acetaminophen (TYLENOL) 500 mg tablet, Take 500 mg by mouth every 6 (six) hours as needed for mild pain, Disp: , Rfl:     ibuprofen (MOTRIN) 600 mg tablet, Take 1 tablet (600 mg total) by mouth every 6 (six) hours as needed for mild pain, Disp: , Rfl:     meclizine (ANTIVERT) 25 mg tablet, Take 1 tablet (25 mg total) by mouth 3 (three) times a day as needed for dizziness, Disp: 30 tablet, Rfl: 0    multivitamin (THERAGRAN) TABS, Take 1 tablet by mouth daily, Disp: , Rfl:     Past Medical History:   Diagnosis Date    Anesthesia     Woke up during 1 procedure    Arthritis 10/2005    BPH (benign prostatic hyperplasia)     Diverticulitis of colon     Heart murmur 08/1947    Herniated lumbar intervertebral disc     Kidney mass     Murmur 08/1947    diagnosed as a child 8 YO    Osteoarthritis     Preoperative cardiovascular examination 02/16/2018       Past Surgical History:   Procedure Laterality Date    HERNIA REPAIR      HIP SURGERY  2017,2018    JOINT REPLACEMENT Bilateral     BTHR BTKR    KNEE ARTHROSCOPY      bilateral    NE ARTHRP ACETBLR/PROX FEM PROSTC AGRFT/ALGRFT Left 06/12/2017    Procedure: ANTERIOR TOTAL HIP ARTHROPLASTY;  Surgeon: Joselo Edmond MD;  [facility];  Service: Orthopedics    NE ARTHRP ACETBLR/PROX FEM PROSTC AGRFT/ALGRFT Right 01/03/2018    Procedure: ANTERIOR TOTAL HIP ARTHROPLASTY;   Surgeon: Joselo Edmond MD;  [facility];  Service: Orthopedics    AR ARTHRP KNE CONDYLE&PLATU MEDIAL&LAT COMPARTMENTS Left 02/20/2019    Procedure: TOTAL KNEE ARTHROPLASTY;  Surgeon: Joselo Edmond MD;  [facility];  Service: Orthopedics    AR ARTHRP KNE CONDYLE&PLATU MEDIAL&LAT COMPARTMENTS Right 10/23/2019    Procedure: ARTHROPLASTY KNEE TOTAL;  Surgeon: Joselo Edmond MD;  [facility];  Service: Orthopedics    AR CYSTO INSERTION TRANSPROSTATIC IMPLANT SINGLE N/A 07/05/2019    Procedure: CYSTOSCOPY WITH INSERTION UROLIFT;  Surgeon: Van Foster MD;  [facility];  Service: Urology    AR LAPAROSCOPY SURG RPR INITIAL INGUINAL HERNIA Right 4/30/2025    Procedure: REPAIR HERNIA INGUINAL, LAPAROSCOPIC;  Surgeon: Milton Laguerre MD;  [facility];  Service: General    AR RPR AA HERNIA 1ST < 3 CM REDUCIBLE N/A 4/30/2025    Procedure: REPAIR HERNIA UMBILICAL;  Surgeon: Milton Laguerre MD;  [facility];  Service: General       Family History   Problem Relation Age of Onset    Cancer Mother     Cancer Father     Cancer Brother     Heart disease Brother         reports that he quit smoking about 44 years ago. His smoking use included cigarettes. He started smoking about 49 years ago. He has a 1.3 pack-year smoking history. He has been exposed to tobacco smoke. He has never used smokeless tobacco. He reports current alcohol use of about 7.0 standard drinks of alcohol per week. He reports that he does not use drugs.    There were no vitals filed for this visit.     PHYSICAL EXAM  General: normal, cooperative, no distress  Abdominal: soft, nondistended, or nontender  Incision: clean, dry, and intact and healing well    MARIFER Saldivar    Date: 5/7/2025 Time: 1:46 PM

## 2025-05-07 NOTE — TELEPHONE ENCOUNTER
"Patient called the RX Refill Line. Message is being forwarded to the office.     Patient is requesting a new script for catheter 14\" coude tip w guide strip.  Sent to [facility] and stated the office will have the information for [facility] because he have been on the these catheters for some years, he also stated his next appointment is 08/2025    Patient , Only have 6 days left, and need the new script sent as soon as possible so he does not run out before they deliver  "

## 2025-05-08 ENCOUNTER — TELEPHONE (OUTPATIENT)
Dept: CARDIOLOGY CLINIC | Facility: CLINIC | Age: 85
End: 2025-05-08

## 2025-05-08 NOTE — TELEPHONE ENCOUNTER
Patient had a min HR of 21 bpm, max HR of 162 bpm, and avg HR of 48 bpm. Predominant underlying rhythm was Sinus Rhythm. First Degree AV Block was present. 3 Ventricular Tachycardia runs occurred, the run with the fastest interval lasting 4 beats with a max rate of 162 bpm, the longest lasting 8 beats with an avg rate of 130 bpm. 3 Supraventricular Tachycardia runs occurred, the run with the fastest interval lasting 7 beats with a max rate of 152 bpm, the longest lasting 8 beats with an avg rate of 138 bpm. 6 Pauses occurred, the longest lasting 3.3 secs (18 bpm). Some Pause(s) occurred due to Second Degree AV Block-Mobitz I (Wenckebach) and Non-Conducted SVE(s). 8 episode(s) of AV Block (3rd°) occurred, lasting a total of 1 min 1 sec. Second Degree AV Block-Mobitz I (Wenckebach) was present. Isolated SVEs were occasional (1.4%, 96802), SVE Couplets were rare (<1.0%, 1451), and SVE Triplets were rare (<1.0%, 144). Isolated VEs were rare (<1.0%, 4694), VE Couplets were rare (<1.0%,   446), and VE Triplets were rare (<1.0%, 25). Ventricular Bigeminy and Trigeminy were present. MD notification criteria for Complete Heart Block and Symptomatic Bradycardia met - report posted prior to notification ([facility]).    Spoke to patient.  Patient had complete heart block around 5 PM on April 10.  He was asymptomatic however patient has progression of his conduction disease and requires pacemaker.  I recommended pacemaker to the patient and discussed risk and benefits as well as procedure detail with the patient.  He prefers to think about it despite my emphasis on getting the pacemaker sooner than later.  He will call the office once he makes a decision and then we will proceed accordingly.

## 2025-05-14 ENCOUNTER — OFFICE VISIT (OUTPATIENT)
Dept: SURGERY | Facility: HOSPITAL | Age: 85
End: 2025-05-14

## 2025-05-14 VITALS
HEART RATE: 52 BPM | WEIGHT: 151 LBS | SYSTOLIC BLOOD PRESSURE: 145 MMHG | DIASTOLIC BLOOD PRESSURE: 89 MMHG | OXYGEN SATURATION: 96 % | BODY MASS INDEX: 23.7 KG/M2 | HEIGHT: 67 IN | TEMPERATURE: 97.5 F

## 2025-05-14 DIAGNOSIS — Z87.19 S/P RIGHT INGUINAL HERNIA REPAIR: Primary | ICD-10-CM

## 2025-05-14 DIAGNOSIS — Z98.890 S/P RIGHT INGUINAL HERNIA REPAIR: Primary | ICD-10-CM

## 2025-05-14 DIAGNOSIS — Z09 S/P UMBILICAL HERNIA REPAIR, FOLLOW-UP EXAM: ICD-10-CM

## 2025-05-14 PROCEDURE — 99024 POSTOP FOLLOW-UP VISIT: CPT

## 2025-05-15 NOTE — ASSESSMENT & PLAN NOTE
Right inguinal/Umbilical hernia - discussed operative vs conservative mgt, surgical approaches, risks and benefits and patient has decided to proceed with laparoscopic right inguinal and open umbilical hernia repair. I will schedule at their earliest convenience.

## 2025-05-15 NOTE — PROGRESS NOTES
Assessment/Plan:    Umbilical hernia  See below    Right inguinal hernia  Right inguinal/Umbilical hernia - discussed operative vs conservative mgt, surgical approaches, risks and benefits and patient has decided to proceed with laparoscopic right inguinal and open umbilical hernia repair. I will schedule at their earliest convenience.        Preoperative Clearance: Cardiac    HPI:  Raman Coffey is a 84 y.o.male who was referred for evaluation of Follow-up (Pt F/U RIH LAP pt states no changes states it still is painful and reducible noticeable bulge)  .    Currently RIH and umb hernia.     ROS:  General ROS: negative  negative for - chills, fatigue, fever or night sweats, weight loss  Respiratory ROS: no cough, shortness of breath, or wheezing  Cardiovascular ROS: no chest pain or dyspnea on exertion  Genito-Urinary ROS: no dysuria, trouble voiding, or hematuria  Musculoskeletal ROS: negative for - gait disturbance, joint pain or muscle pain  Neurological ROS: no TIA or stroke symptoms  abdominal pain    [unfilled]  Patient has no known allergies.  Current Medications[1]  Past Medical History:   Diagnosis Date    Anesthesia     Woke up during 1 procedure    Arthritis 10/2005    BPH (benign prostatic hyperplasia)     Diverticulitis of colon     Heart murmur 08/1947    Herniated lumbar intervertebral disc     Kidney mass     Murmur 08/1947    diagnosed as a child 8 YO    Osteoarthritis     Preoperative cardiovascular examination 02/16/2018     Past Surgical History:   Procedure Laterality Date    HERNIA REPAIR      HIP SURGERY  2017,2018    JOINT REPLACEMENT Bilateral     BTHR BTKR    KNEE ARTHROSCOPY      bilateral    NH ARTHRP ACETBLR/PROX FEM PROSTC AGRFT/ALGRFT Left 06/12/2017    Procedure: ANTERIOR TOTAL HIP ARTHROPLASTY;  Surgeon: Joselo Edmond MD;  [facility];  Service: Orthopedics    NH ARTHRP ACETBLR/PROX FEM PROSTC AGRFT/ALGRFT Right 01/03/2018    Procedure: ANTERIOR TOTAL HIP ARTHROPLASTY;   Surgeon: Joselo Edmond MD;  [facility];  Service: Orthopedics    MT ARTHRP KNE CONDYLE&PLATU MEDIAL&LAT COMPARTMENTS Left 02/20/2019    Procedure: TOTAL KNEE ARTHROPLASTY;  Surgeon: Joselo Edmond MD;  [facility];  Service: Orthopedics    MT ARTHRP KNE CONDYLE&PLATU MEDIAL&LAT COMPARTMENTS Right 10/23/2019    Procedure: ARTHROPLASTY KNEE TOTAL;  Surgeon: Joselo Edmond MD;  [facility];  Service: Orthopedics    MT CYSTO INSERTION TRANSPROSTATIC IMPLANT SINGLE N/A 07/05/2019    Procedure: CYSTOSCOPY WITH INSERTION UROLIFT;  Surgeon: Van Foster MD;  [facility];  Service: Urology    MT LAPAROSCOPY SURG RPR INITIAL INGUINAL HERNIA Right 4/30/2025    Procedure: REPAIR HERNIA INGUINAL, LAPAROSCOPIC;  Surgeon: Milton Laguerre MD;  [facility];  Service: General    MT RPR AA HERNIA 1ST < 3 CM REDUCIBLE N/A 4/30/2025    Procedure: REPAIR HERNIA UMBILICAL;  Surgeon: Milton Laguerre MD;  [facility];  Service: General     Family History   Problem Relation Age of Onset    Cancer Mother     Cancer Father     Cancer Brother     Heart disease Brother       reports that he quit smoking about 44 years ago. His smoking use included cigarettes. He started smoking about 49 years ago. He has a 1.3 pack-year smoking history. He has been exposed to tobacco smoke. He has never used smokeless tobacco. He reports current alcohol use of about 7.0 standard drinks of alcohol per week. He reports that he does not use drugs.    Labs:   Lab Results   Component Value Date    WBC 8.10 03/25/2025    WBC 8.9 10/22/2024    WBC 6.2 10/30/2023    WBC 5.6 10/19/2022    WBC 11.72 (H) 10/24/2019    WBC 9.81 10/23/2019    WBC 0-5 12/05/2017    WBC 5.6 12/05/2017    WBC 0-5 05/12/2017    WBC 6.0 05/12/2017    HGB 16.5 03/25/2025    HGB 14.7 10/22/2024    HGB 15.0 10/30/2023    HGB 14.9 10/19/2022    HGB 12.3 10/24/2019    HGB 15.1 10/23/2019    HGB 14.1 12/05/2017    HGB 13.6 05/12/2017     "HGB 15.0 04/01/2016     03/25/2025     10/22/2024     10/30/2023     10/19/2022     10/24/2019     10/23/2019     12/05/2017     05/12/2017     04/01/2016     Lab Results   Component Value Date    ALT 38 10/22/2024    ALT 16 10/30/2023    ALT 18 10/19/2022    AST 44 (H) 10/22/2024    AST 30 10/30/2023    AST 31 10/19/2022     This SmartLink has not been configured with any valid records.       PHYSICAL EXAM  General Appearance:    Alert, cooperative, no distress,    Head:    Normocephalic without obvious abnormality   Eyes:    PERRL, conjunctiva/corneas clear, EOM's intact        Neck:   Supple, no adenopathy, no JVD   Back:     Symmetric, no spinal or CVA tenderness   Lungs:     Clear to auscultation bilaterally, no wheezing or rhonchi   Heart:    Regular rate and rhythm, S1 and S2 normal, no murmur   Abdomen:     Soft +BS ND NT + RIH and umb hernia   Extremities:   Extremities normal. No clubbing, cyanosis or edema   Psych:   Normal Affect, AOx3.    Neurologic:  Skin:   CNII-XII intact. Strength symmetric, speech intact    Warm, dry, intact, no visible rashes or lesions         Physical Exam              Some portions of this record may have been generated with voice recognition software. There may be translation, syntax,  or grammatical errors. Occasional wrong word or \"sound-a-like\" substitutions may have occurred due to the inherent limitations of the voice recognition software. Read the chart carefully and recognize, using context, where substitutions may have occurred. If you have any questions, please contact the dictating provider for clarification or correction, as needed. This encounter has been coded by a non-certified coder.        [1]   Current Outpatient Medications:     acetaminophen (TYLENOL) 500 mg tablet, Take 500 mg by mouth every 6 (six) hours as needed for mild pain, Disp: , Rfl:     ibuprofen (MOTRIN) 600 mg tablet, Take 1 tablet " (600 mg total) by mouth every 6 (six) hours as needed for mild pain, Disp: , Rfl:     meclizine (ANTIVERT) 25 mg tablet, Take 1 tablet (25 mg total) by mouth 3 (three) times a day as needed for dizziness, Disp: 30 tablet, Rfl: 0    multivitamin (THERAGRAN) TABS, Take 1 tablet by mouth daily, Disp: , Rfl:

## 2025-07-03 ENCOUNTER — OFFICE VISIT (OUTPATIENT)
Dept: FAMILY MEDICINE CLINIC | Facility: CLINIC | Age: 85
End: 2025-07-03
Payer: COMMERCIAL

## 2025-07-03 VITALS
TEMPERATURE: 97.3 F | OXYGEN SATURATION: 98 % | HEART RATE: 46 BPM | HEIGHT: 67 IN | RESPIRATION RATE: 18 BRPM | BODY MASS INDEX: 23.95 KG/M2 | DIASTOLIC BLOOD PRESSURE: 80 MMHG | WEIGHT: 152.6 LBS | SYSTOLIC BLOOD PRESSURE: 130 MMHG

## 2025-07-03 DIAGNOSIS — R42 VERTIGO: ICD-10-CM

## 2025-07-03 PROCEDURE — 99213 OFFICE O/P EST LOW 20 MIN: CPT | Performed by: FAMILY MEDICINE

## 2025-07-03 RX ORDER — MECLIZINE HYDROCHLORIDE 25 MG/1
25 TABLET ORAL 3 TIMES DAILY PRN
Qty: 30 TABLET | Refills: 3 | Status: SHIPPED | OUTPATIENT
Start: 2025-07-03

## 2025-07-03 NOTE — PROGRESS NOTES
"Name: Raman Coffey      : 1940      MRN: [id number]  Encounter Provider: Duong De Santiago DO  Encounter Date: 7/3/2025   Encounter department: [facility]  :  Assessment & Plan  Vertigo    Orders:    meclizine (ANTIVERT) 25 mg tablet; Take 1 tablet (25 mg total) by mouth 3 (three) times a day as needed for dizziness    Ambulatory Referral to Physical Therapy; Future    Refilled meclizine  Patient referred to physical therapy for vestibular rehab  Follow-up as needed       History of Present Illness   Patient presents today for vertigo issues  Patient had a history of vertigo in the past he is running out of meclizine  He is currently did not had an attack but is having issues and has had few lesser events recently    Dizziness      Review of Systems   Constitutional: Negative.    HENT: Negative.     Eyes: Negative.    Respiratory: Negative.     Cardiovascular: Negative.    Gastrointestinal: Negative.    Endocrine: Negative.    Genitourinary: Negative.    Musculoskeletal: Negative.    Skin: Negative.    Allergic/Immunologic: Negative.    Neurological:  Positive for dizziness.   Hematological: Negative.    Psychiatric/Behavioral: Negative.     All other systems reviewed and are negative.      Objective   /90 (BP Location: Left arm, Patient Position: Sitting, Cuff Size: Standard)   Pulse (!) 46   Temp (!) 97.3 °F (36.3 °C) (Tympanic)   Resp 18   Ht 5' 7\" (1.702 m)   Wt 69.2 kg (152 lb 9.6 oz)   SpO2 98%   BMI 23.90 kg/m²      Physical Exam  Vitals and nursing note reviewed.   Constitutional:       Appearance: Normal appearance. He is well-developed.   HENT:      Head: Normocephalic.      Right Ear: External ear normal.      Left Ear: External ear normal.      Nose: Nose normal.     Eyes:      Conjunctiva/sclera: Conjunctivae normal.      Pupils: Pupils are equal, round, and reactive to light.       Cardiovascular:      Rate and Rhythm: Normal rate and regular rhythm.      " Heart sounds: Normal heart sounds.   Pulmonary:      Effort: Pulmonary effort is normal.      Breath sounds: Normal breath sounds.   Abdominal:      General: Bowel sounds are normal.      Palpations: Abdomen is soft.     Musculoskeletal:         General: Normal range of motion.      Cervical back: Normal range of motion and neck supple.     Skin:     General: Skin is warm and dry.     Neurological:      General: No focal deficit present.      Mental Status: He is alert and oriented to person, place, and time.     Psychiatric:         Behavior: Behavior normal.         Thought Content: Thought content normal.         Judgment: Judgment normal.

## 2025-07-10 DIAGNOSIS — Z00.6 ENCOUNTER FOR EXAMINATION FOR NORMAL COMPARISON OR CONTROL IN CLINICAL RESEARCH PROGRAM: ICD-10-CM

## 2025-07-11 ENCOUNTER — APPOINTMENT (OUTPATIENT)
Dept: LAB | Facility: CLINIC | Age: 85
End: 2025-07-11

## 2025-07-11 DIAGNOSIS — Z00.6 ENCOUNTER FOR EXAMINATION FOR NORMAL COMPARISON OR CONTROL IN CLINICAL RESEARCH PROGRAM: ICD-10-CM

## 2025-07-11 PROCEDURE — 36415 COLL VENOUS BLD VENIPUNCTURE: CPT

## 2025-07-21 LAB
APOB+LDLR+PCSK9 GENE MUT ANL BLD/T: NOT DETECTED
BRCA1+BRCA2 DEL+DUP + FULL MUT ANL BLD/T: NOT DETECTED
MLH1+MSH2+MSH6+PMS2 GN DEL+DUP+FUL M: NOT DETECTED

## (undated) DEVICE — PADDING CAST 4 IN  COTTON STRL

## (undated) DEVICE — SUT VICRYL PLUS 1 CTB-1 36 IN VCPB947H

## (undated) DEVICE — HEAVY DUTY TABLE COVER: Brand: CONVERTORS

## (undated) DEVICE — HOOD: Brand: FLYTE, SURGICOOL

## (undated) DEVICE — PINNACLE CANCELLOUS BONE SCREW 6.5MM X 20MM
Type: IMPLANTABLE DEVICE | Site: HIP | Status: NON-FUNCTIONAL
Brand: PINNACLE

## (undated) DEVICE — METZENBAUM ADTEC SINGLE USE DISSECTING SCISSORS, SHAFT ONLY, MONOPOLAR, CURVED TO LEFT, WORKING LENGTH: 12 1/4", (310 MM), DIAM. 5 MM, INSULATED, DOUBLE ACTION, STERILE, DISPOSABLE, PACKAGE OF 10 PIECES: Brand: AESCULAP

## (undated) DEVICE — SUT MONOCRYL PLUS 3-0 PS-2 27 IN MCP427H

## (undated) DEVICE — ARTHROSCOPY FLOOR MAT

## (undated) DEVICE — THE SIMPULSE SOLO SYSTEM WITH ULTREX RETRACTABLE SPLASH SHIELD TIP: Brand: SIMPULSE SOLO

## (undated) DEVICE — VIOLET BRAIDED (POLYGLACTIN 910), SYNTHETIC ABSORBABLE SUTURE: Brand: COATED VICRYL

## (undated) DEVICE — PACK MAJOR ORTHO W/SPLITS PBDS

## (undated) DEVICE — COBAN 4 IN STERILE

## (undated) DEVICE — ANTIBACTERIAL UNDYED BRAIDED (POLYGLACTIN 910), SYNTHETIC ABSORBABLE SUTURE: Brand: COATED VICRYL

## (undated) DEVICE — RECIPROCATING BLADE, DOUBLE SIDED (70.0 X 0.96 X 12.5MM)

## (undated) DEVICE — INSUFFLATION TUBING PRIMFLO

## (undated) DEVICE — GLOVE INDICATOR PI UNDERGLOVE SZ 6.5 BLUE

## (undated) DEVICE — DRAPE C-ARM X-RAY

## (undated) DEVICE — PRECISION FALCON 90.0MM OSCILLATING TIP SAW CARTRIDGE: Brand: PRECISION FALCON

## (undated) DEVICE — 3M™ IOBAN™ 2 ANTIMICROBIAL INCISE DRAPE 6650EZ: Brand: IOBAN™ 2

## (undated) DEVICE — SILVER-COATED ANTIMICROBIAL BARRIER DRESSING: Brand: ACTICOAT   4" X 8"

## (undated) DEVICE — GLOVE SRG BIOGEL ORTHOPEDIC 8.5

## (undated) DEVICE — CEMENT MIXING TOWER

## (undated) DEVICE — BIPOLAR SEALER 23-113-1 AQM 2.3: Brand: AQUAMANTYS™

## (undated) DEVICE — BULB SYRINGE,IRRIGATION WITH PROTECTIVE CAP: Brand: DOVER

## (undated) DEVICE — SCD SEQUENTIAL COMPRESSION COMFORT SLEEVE MEDIUM KNEE LENGTH: Brand: KENDALL SCD

## (undated) DEVICE — CHLORAPREP HI-LITE 10.5ML ORANGE

## (undated) DEVICE — GLOVE INDICATOR PI UNDERGLOVE SZ 8.5 BLUE

## (undated) DEVICE — DRAPE SHEET X-LG

## (undated) DEVICE — JP 3-SPRING RES W/10FR PVC DRAIN/TR: Brand: CARDINAL HEALTH

## (undated) DEVICE — CASSETTE DRAPE: Brand: UNBRANDED

## (undated) DEVICE — ABDOMINAL PAD: Brand: DERMACEA

## (undated) DEVICE — INTENDED FOR TISSUE SEPARATION, AND OTHER PROCEDURES THAT REQUIRE A SHARP SURGICAL BLADE TO PUNCTURE OR CUT.: Brand: BARD-PARKER SAFETY BLADES SIZE 10, STERILE

## (undated) DEVICE — SUT VICRYL 0 UR-6 27 IN J603H

## (undated) DEVICE — CHLORAPREP HI-LITE 26ML ORANGE

## (undated) DEVICE — VEST SURGEON DISPOSABLE

## (undated) DEVICE — SUT VICRYL PLUS 2-0 CTB-1 27 IN VCPB259H

## (undated) DEVICE — 450 ML BOTTLE OF 0.05% CHLORHEXIDINE GLUCONATE IN 99.95% STERILE WATER FOR IRRIGATION, USP AND APPLICATOR.: Brand: IRRISEPT ANTIMICROBIAL WOUND LAVAGE

## (undated) DEVICE — 3M™ TEGADERM™ TRANSPARENT FILM DRESSING FRAME STYLE, 1628, 6 IN X 8 IN (15 CM X 20 CM), 10/CT 8CT/CASE: Brand: 3M™ TEGADERM™

## (undated) DEVICE — DISPOSABLE EQUIPMENT COVER: Brand: SMALL TOWEL DRAPE

## (undated) DEVICE — CAPIT KNEE ATTUNE RP CEMENT - DEPUY

## (undated) DEVICE — PLUMEPEN PRO 10FT

## (undated) DEVICE — TUBING SUCTION 5MM X 12 FT

## (undated) DEVICE — REM POLYHESIVE ADULT PATIENT RETURN ELECTRODE: Brand: VALLEYLAB

## (undated) DEVICE — PAD GROUNDING ADULT

## (undated) DEVICE — BLUE HEAT SCOPE WARMER

## (undated) DEVICE — MEDI-VAC YANKAUER SUCTION HANDLE W/BULBOUS AND CONTROL VENT: Brand: CARDINAL HEALTH

## (undated) DEVICE — ADHESIVE SKIN CLOSR DERMABOND PRINEO

## (undated) DEVICE — LAPAROSCOPIC DISSECTOR: Brand: DEROYAL

## (undated) DEVICE — TROCAR: Brand: KII® SLEEVE

## (undated) DEVICE — CUFF TOURNIQUET 30 X 4 IN QUICK CONNECT DISP 1BLA

## (undated) DEVICE — SUT MONOCRYL 3-0 PS-2 27 IN Y427H

## (undated) DEVICE — DRAPE EQUIPMENT RF WAND

## (undated) DEVICE — SPONGE PVP SCRUB WING STERILE

## (undated) DEVICE — 3M™ IOBAN™ 2 ANTIMICROBIAL INCISE DRAPE 6640EZ: Brand: IOBAN™ 2

## (undated) DEVICE — BETHLEHEM UNIV TOTAL KNEE, KIT: Brand: CARDINAL HEALTH

## (undated) DEVICE — DUAL CUT SAGITTAL BLADE

## (undated) DEVICE — BETHLEHEM UNIVERSAL MINOR GEN: Brand: CARDINAL HEALTH

## (undated) DEVICE — CYSTOSCOPY PACK: Brand: CONVERTORS

## (undated) DEVICE — CAPIT HIP MOP -METAL ON POLY

## (undated) DEVICE — BLADE ELECTRODE: Brand: EDGE

## (undated) DEVICE — TRAY FOLEY 16FR URIMETER SURESTEP

## (undated) DEVICE — TROCARS: Brand: KII® BALLOON BLUNT TIP SYSTEM

## (undated) DEVICE — BAG URINE DRAINAGE 2000ML ANTI RFLX LF

## (undated) DEVICE — GAUZE SPONGES,16 PLY: Brand: CURITY

## (undated) DEVICE — GLOVE SRG BIOGEL 6.5

## (undated) DEVICE — ABSORBABLE WOUND CLOSURE DEVICE: Brand: V-LOC 90

## (undated) DEVICE — TRAY FOLEY 18FR URIMETER SURESTEP

## (undated) DEVICE — GLOVE SRG BIOGEL ECLIPSE 8

## (undated) DEVICE — 3M™ STERI-DRAPE™ U-DRAPE 1015: Brand: STERI-DRAPE™

## (undated) DEVICE — U-DRAPE: Brand: CONVERTORS

## (undated) DEVICE — 3M™ TEGADERM™ TRANSPARENT FILM DRESSING FRAME STYLE, 1626W, 4 IN X 4-3/4 IN (10 CM X 12 CM), 50/CT 4CT/CASE: Brand: 3M™ TEGADERM™

## (undated) DEVICE — NEEDLE HYPO 23G X 1-1/2 IN

## (undated) DEVICE — SUT ETHIBOND 1 CT-1 30 IN X425H

## (undated) DEVICE — INSULATED BLADE ELECTRODE: Brand: EDGE

## (undated) DEVICE — GLOVE SRG BIOGEL ECLIPSE 7.5

## (undated) DEVICE — Device: Brand: BOOT LINER, DISPOSABLE

## (undated) DEVICE — POSITIONER HANA TABLE PACK

## (undated) DEVICE — INTENDED FOR TISSUE SEPARATION, AND OTHER PROCEDURES THAT REQUIRE A SHARP SURGICAL BLADE TO PUNCTURE OR CUT.: Brand: BARD-PARKER SAFETY BLADES SIZE 11, STERILE

## (undated) DEVICE — SUT PROLENE 2-0 CT-2 30 IN 8411H

## (undated) DEVICE — CLEANSER WOUND IRRISEPT 0.05PCT CHG 450ML STRL

## (undated) DEVICE — HOOD: Brand: FLYTE

## (undated) DEVICE — IMPERVIOUS STOCKINETTE: Brand: DEROYAL

## (undated) DEVICE — BIPOLAR SEALER 23-301-1 AQM MBS: Brand: AQUAMANTYS™

## (undated) DEVICE — SUT MONOCRYL 4-0 PS-2 27 IN Y426H

## (undated) DEVICE — TROCAR: Brand: KII FIOS FIRST ENTRY

## (undated) DEVICE — GLOVE INDICATOR PI UNDERGLOVE SZ 8 BLUE

## (undated) DEVICE — MEDI-VAC YANKAUER SUCTION HANDLE W/STRAIGHT TIP & CONTROL VENT: Brand: CARDINAL HEALTH

## (undated) DEVICE — STRAIGHT HERNIA STAPLER: Brand: MULTIFIRE ENDO HERNIA

## (undated) DEVICE — X-RAY DETECTABLE SPONGES,16 PLY: Brand: VISTEC

## (undated) DEVICE — GAUZE SPONGES,USP TYPE VII GAUZE, 12 PLY: Brand: CURITY

## (undated) DEVICE — DERMABOND PRINEO

## (undated) DEVICE — DECANTER: Brand: UNBRANDED

## (undated) DEVICE — INTENDED FOR TISSUE SEPARATION, AND OTHER PROCEDURES THAT REQUIRE A SHARP SURGICAL BLADE TO PUNCTURE OR CUT.: Brand: BARD-PARKER SAFETY BLADES SIZE 15, STERILE

## (undated) DEVICE — SMOKE EVAC FLUID SUCTION HEPA FILTER

## (undated) DEVICE — ACE WRAP 6 IN UNSTERILE

## (undated) DEVICE — SPECIMEN CONTAINER STERILE PEEL PACK

## (undated) DEVICE — UROCATCH BAG

## (undated) DEVICE — ALLENTOWN LAP CHOLE APP PACK: Brand: CARDINAL HEALTH

## (undated) DEVICE — NEPTUNE E-SEP SMOKE EVACUATION PENCIL, COATED, 70MM BLADE, PUSH BUTTON SWITCH: Brand: NEPTUNE E-SEP

## (undated) DEVICE — SYRINGE CATH TIP 50ML

## (undated) DEVICE — CYSTO TUBING TUR Y IRRIGATION

## (undated) DEVICE — EXOFIN PRECISION PEN HIGH VISCOSITY TOPICAL SKIN ADHESIVE: Brand: EXOFIN PRECISION PEN, 1G

## (undated) DEVICE — CUFF TOURNIQUET 34 X 4 IN QUICK CONNECT DISP 1BLA

## (undated) DEVICE — SYRINGE 10ML LL